# Patient Record
Sex: MALE | Race: WHITE | Employment: OTHER | URBAN - NONMETROPOLITAN AREA
[De-identification: names, ages, dates, MRNs, and addresses within clinical notes are randomized per-mention and may not be internally consistent; named-entity substitution may affect disease eponyms.]

---

## 2017-09-22 ENCOUNTER — HOSPITAL ENCOUNTER (OUTPATIENT)
Dept: PHYSICAL THERAPY | Age: 42
Setting detail: THERAPIES SERIES
Discharge: HOME | End: 2017-09-22
Payer: MEDICARE

## 2017-09-22 PROCEDURE — G8978 MOBILITY CURRENT STATUS: HCPCS | Performed by: PHYSICAL THERAPIST

## 2017-09-22 PROCEDURE — 97110 THERAPEUTIC EXERCISES: CPT | Performed by: PHYSICAL THERAPIST

## 2017-09-22 PROCEDURE — 97163 PT EVAL HIGH COMPLEX 45 MIN: CPT | Performed by: PHYSICAL THERAPIST

## 2017-09-22 PROCEDURE — G8979 MOBILITY GOAL STATUS: HCPCS | Performed by: PHYSICAL THERAPIST

## 2017-09-29 ENCOUNTER — HOSPITAL ENCOUNTER (OUTPATIENT)
Dept: PHYSICAL THERAPY | Age: 42
Setting detail: THERAPIES SERIES
Discharge: HOME | End: 2017-09-29
Payer: MEDICARE

## 2017-09-29 PROCEDURE — 97110 THERAPEUTIC EXERCISES: CPT | Performed by: PHYSICAL THERAPIST

## 2017-09-29 PROCEDURE — 97112 NEUROMUSCULAR REEDUCATION: CPT | Performed by: PHYSICAL THERAPIST

## 2017-10-03 ENCOUNTER — HOSPITAL ENCOUNTER (OUTPATIENT)
Dept: PHYSICAL THERAPY | Age: 42
Setting detail: THERAPIES SERIES
Discharge: HOME | End: 2017-10-03
Payer: MEDICARE

## 2017-10-03 PROCEDURE — 97110 THERAPEUTIC EXERCISES: CPT | Performed by: PHYSICAL THERAPIST

## 2017-10-03 PROCEDURE — 97112 NEUROMUSCULAR REEDUCATION: CPT | Performed by: PHYSICAL THERAPIST

## 2017-10-03 NOTE — FLOWSHEET NOTE
Physical Therapy Daily Treatment Note    Date:  10/3/2017    Patient Name:  Aimee Severino    :  1975  MRN: [id number]  Restrictions/Precautions:     Medical/Treatment Diagnosis Information:   · Diagnosis: Multiple Sclerosis  · Treatment Diagnosis: Multiple Sclerosis, decreased strength, decreased balance  Insurance/Certification information:  PT Insurance Information: Medicare  Physician Information:  Referring Practitioner: Brad Espinoza  Plan of care signed (Y/N):  N  Visit# / total visits:  3/10  Pain level: 0/10     G-Code (if applicable):      Date G-Code Applied:  17  PT G-Codes  Functional Assessment Tool Used: TUG; Tinetti  Score: TU seconds; Tinetti:  = 46% or 54% disabled  Functional Limitation: Mobility: Walking and moving around  Mobility: Walking and Moving Around Current Status (): At least 40 percent but less than 60 percent impaired, limited or restricted  Mobility: Walking and Moving Around Goal Status (): At least 1 percent but less than 20 percent impaired, limited or restricted    Time In: 2:09   Time Out: 3:03    Progress Note: []  Yes  [x]  No  Next due by: Visit #10   Or by 10/22/17    Subjective:   \"I was a little worn out after the last PT session. But it only took about 15 minutes to recover. I still have no pain, just weakness. \"    Objective:   Observation: Required multiple HHA touch downs with balance ex this date. Test measurements:      Exercises: exercises per flowsheet to facilitate increased functional strength, stability, balance, proprioception, and improved gait and safety. Verbal, visual, and tactile cues given to improve technique and form. Exercise/Equipment Resistance/Repetitions Other comments   Nu-Step Held due to patient stating he has tried this in the past with not good results due to knees collapsing inward.          Counter Ex 15x each bilat HR/TR, 3 Way Hip, HS Curls   Mini Squats 15x Ball between legs    FTEO/FTEC 3 x 30\" Tandem Stance 2 x 30\"    Offset Feet Balance 2 x 30\"    Sidesteps 3 laps    Retro Walk 3 laps              LAQ c eccentric lower 15x bilat    Seated march with eccentric lower 15x bilat    Quad Sets 3\" x 10 each    SLR c eccentric lower 10x bilat    Hip ABD/ADD RED t-band/ball 15x     HS Curls c manual resist 15x    Prone HS Curls 15x Manual assist with R leg   Bridges 15x              [x] Provided verbal/tactile cueing for activities related to strengthening, flexibility, endurance, ROM. (53255)  [x] Provided verbal/tactile cueing for activities related to improving balance, coordination, kinesthetic sense, posture, motor skill, proprioception. (74381)    Therapeutic Activities:     [] Therapeutic activities, direct (one-on-one) patient contact (use of dynamic activities to improve functional performance). (73734)    Gait:   [] Provided training and instruction to the patient for ambulation re-education. (65772)    Self-Care/ADL's  [] Self-care/home management training and compensatory training, meal preparation, safety procedures, and instructions in use of assistive technology devices/adaptive equipment, direct one-on-one contact. (67514)    Home Exercise Program:     [x] Reviewed/Progressed HEP activities related to strengthening, flexibility, endurance, ROM. (52920)  [] Reviewed/Progressed HEP activities related to improving balance, coordination, kinesthetic sense, posture, motor skill, proprioception.  (40862)    Manual Treatments:    [] Provided manual therapy to mobilize soft tissue/joints for the purpose of modulating pain, promoting relaxation,  increasing ROM, reducing/eliminating soft tissue swelling/inflammation/restriction, improving soft tissue extensibility.  (30928)    Service Based Modalities:      Timed Code Treatment Minutes:   44' there-ex, 15' neuro re-ed    Total Treatment Minutes:   47'    Treatment/Activity Tolerance:  [x] Patient tolerated treatment well [x] Patient limited by zulay  []

## 2017-10-06 ENCOUNTER — HOSPITAL ENCOUNTER (OUTPATIENT)
Dept: PHYSICAL THERAPY | Age: 42
Setting detail: THERAPIES SERIES
Discharge: HOME | End: 2017-10-06
Payer: MEDICARE

## 2017-10-06 PROCEDURE — 97110 THERAPEUTIC EXERCISES: CPT | Performed by: PHYSICAL THERAPIST

## 2017-10-06 PROCEDURE — 97112 NEUROMUSCULAR REEDUCATION: CPT | Performed by: PHYSICAL THERAPIST

## 2017-10-06 NOTE — FLOWSHEET NOTE
the past with not good results due to knees collapsing inward. Counter Ex 15x each bilat HR/TR, 3 Way Hip, HS Curls   Mini Squats 15x Ball between legs    FTEO/FTEC 3 x 30\"    Tandem Stance 2 x 30\"    Offset Feet Balance 2 x 30\"    Sidesteps 3 laps    Retro Walk 3 laps              LAQ c eccentric lower 15x bilat    Seated march with eccentric lower 15x bilat    Seated Hip ADD/ABD Ball/GREEN TBand 20x each          Quad Sets 3\" x 10 each    SLR c eccentric lower 10x bilat    Hip ABD/ADD RED t-band/ball 15x     HS Curls c manual resist 15x    Prone HS Curls 15x Manual assist with R leg   Bridges 15x              [x] Provided verbal/tactile cueing for activities related to strengthening, flexibility, endurance, ROM. (87389)  [x] Provided verbal/tactile cueing for activities related to improving balance, coordination, kinesthetic sense, posture, motor skill, proprioception. (05706)    Therapeutic Activities:     [] Therapeutic activities, direct (one-on-one) patient contact (use of dynamic activities to improve functional performance). (79183)    Gait:   [] Provided training and instruction to the patient for ambulation re-education. (53799)    Self-Care/ADL's  [] Self-care/home management training and compensatory training, meal preparation, safety procedures, and instructions in use of assistive technology devices/adaptive equipment, direct one-on-one contact. (20302)    Home Exercise Program:     [x] Reviewed/Progressed HEP activities related to strengthening, flexibility, endurance, ROM. (28461)  [] Reviewed/Progressed HEP activities related to improving balance, coordination, kinesthetic sense, posture, motor skill, proprioception.  (90844)    Manual Treatments:    [] Provided manual therapy to mobilize soft tissue/joints for the purpose of modulating pain, promoting relaxation,  increasing ROM, reducing/eliminating soft tissue swelling/inflammation/restriction, improving soft tissue extensibility. (46167)    Service Based Modalities:      Timed Code Treatment Minutes:   55' there-ex, 15' neuro re-ed    Total Treatment Minutes:   64'    Treatment/Activity Tolerance:  [x] Patient tolerated treatment well [x] Patient limited by fatique  [] Patient limited by pain  [] Patient limited by other medical complications  [] Other:     Prognosis: [] Good [x] Fair  [] Poor    Patient Requires Follow-up: [x] Yes  [] No      Goals:     Long term goals  Time Frame for Long term goals : 4 weeks  Long term goal 1: Indep with HEP   Long term goal 2: Increase bilat LE strength to [facility] for improved ease with ADL and home/community management  Long term goal 3: Pt to perform balance activities without UE support for 45 seconds or more for improved balance and stability and iincreased ease with home/community ADL  Long term goal 4: Pt to be able to ambulate short home distances without RW to improve ease with home management and ADL  Long term goal 5: TUG/TInetti scores within age-accepted norms (13 sec TUG, >23/28 Tinetti) for improved safety and ease with home and community management    Plan:   [x] Continue per plan of care [] Alter current plan (see comments)  [] Plan of care initiated [] Hold pending MD visit [] Discharge    Plan for Next Session:  Progress as tolerated.      Electronically signed by:  Melba Espinoza DPT

## 2017-10-13 ENCOUNTER — HOSPITAL ENCOUNTER (OUTPATIENT)
Dept: PHYSICAL THERAPY | Age: 42
Setting detail: THERAPIES SERIES
Discharge: HOME | End: 2017-10-13
Payer: MEDICARE

## 2017-10-13 PROCEDURE — 97110 THERAPEUTIC EXERCISES: CPT | Performed by: PHYSICAL THERAPIST

## 2017-10-13 PROCEDURE — 97112 NEUROMUSCULAR REEDUCATION: CPT | Performed by: PHYSICAL THERAPIST

## 2017-10-18 ENCOUNTER — HOSPITAL ENCOUNTER (OUTPATIENT)
Dept: PHYSICAL THERAPY | Age: 42
Setting detail: THERAPIES SERIES
Discharge: HOME | End: 2017-10-18
Payer: MEDICARE

## 2017-10-18 PROCEDURE — 97110 THERAPEUTIC EXERCISES: CPT | Performed by: PHYSICAL THERAPIST

## 2017-10-18 PROCEDURE — 97112 NEUROMUSCULAR REEDUCATION: CPT | Performed by: PHYSICAL THERAPIST

## 2017-10-18 NOTE — FLOWSHEET NOTE
visual, and tactile cues given to improve technique and form. Exercise/Equipment Resistance/Repetitions Other comments   Nu-Step Held due to patient stating he has tried this in the past with not good results due to knees collapsing inward. Counter Ex 15x each bilat HR/TR, 3 Way Hip, HS Curls   Mini Squats 15x Ball between legs    FTEO/FTEC 3 x 30\"    Tandem Stance 2 x 30\"    Offset Feet Balance 2 x 30\"    Sidesteps 3 laps    Retro Walk 3 laps    Lateral Step Ups 4\" x 5 each direction Without KAFO for improved ease with functional knee flexion   Marches 10x Without KAFO   HS Curls Without KAFO 10x bilat              LAQ c eccentric lower 15x bilat    Seated march with eccentric lower 15x bilat    Seated Hip ADD/ABD Ball/GREEN TBand 20x each          Quad Sets 3\" x 10 each    SLR c eccentric lower 15x bilat    Hip ABD/ADD RED t-band/ball 15x     HS Curls c manual resist 15x    Prone HS Curls 15x Manual assist with R leg   Bridges 15x              Bicep Curls 4# 15x each    Tricep Ext overhead 4# 10x each    Shoulder Press 4# 10x     Wand ABC 1x each arm    [x] Provided verbal/tactile cueing for activities related to strengthening, flexibility, endurance, ROM. (77036)  [x] Provided verbal/tactile cueing for activities related to improving balance, coordination, kinesthetic sense, posture, motor skill, proprioception. (64615)    Therapeutic Activities:     [] Therapeutic activities, direct (one-on-one) patient contact (use of dynamic activities to improve functional performance). (51407)    Gait:   [] Provided training and instruction to the patient for ambulation re-education. (85441)    Self-Care/ADL's  [] Self-care/home management training and compensatory training, meal preparation, safety procedures, and instructions in use of assistive technology devices/adaptive equipment, direct one-on-one contact.  (93191)    Home Exercise Program:     [x] Reviewed/Progressed HEP activities related to strengthening,

## 2017-10-20 ENCOUNTER — HOSPITAL ENCOUNTER (OUTPATIENT)
Dept: PHYSICAL THERAPY | Age: 42
Setting detail: THERAPIES SERIES
Discharge: HOME | End: 2017-10-20
Payer: MEDICARE

## 2017-10-25 ENCOUNTER — HOSPITAL ENCOUNTER (OUTPATIENT)
Dept: PHYSICAL THERAPY | Age: 42
Setting detail: THERAPIES SERIES
Discharge: HOME | End: 2017-10-25
Payer: MEDICARE

## 2017-10-25 PROCEDURE — 97110 THERAPEUTIC EXERCISES: CPT | Performed by: PHYSICAL THERAPY ASSISTANT

## 2017-10-25 PROCEDURE — 97112 NEUROMUSCULAR REEDUCATION: CPT | Performed by: PHYSICAL THERAPY ASSISTANT

## 2017-10-27 ENCOUNTER — HOSPITAL ENCOUNTER (OUTPATIENT)
Dept: PHYSICAL THERAPY | Age: 42
Setting detail: THERAPIES SERIES
Discharge: HOME | End: 2017-10-27
Payer: MEDICARE

## 2017-10-27 PROCEDURE — 97110 THERAPEUTIC EXERCISES: CPT | Performed by: PHYSICAL THERAPIST

## 2017-10-27 PROCEDURE — 97112 NEUROMUSCULAR REEDUCATION: CPT | Performed by: PHYSICAL THERAPIST

## 2017-10-27 NOTE — FLOWSHEET NOTE
Physical Therapy Daily Treatment Note    Date:  10/27/2017    Patient Name:  Sheyla Alcocer    :  1975  MRN: [id number]     Restrictions/Precautions:       Medical/Treatment Diagnosis Information:   · Diagnosis: Multiple Sclerosis  · Treatment Diagnosis: Multiple Sclerosis, decreased strength, decreased balance    Insurance/Certification information:  PT Insurance Information: Medicare    Physician Information:  Referring Practitioner: Chas Jaeger    Plan of care signed (Y/N):  N    Visit# / total visits:  7/10    Pain level: 0/10     G-Code (if applicable):      Date G-Code Applied:  17  PT G-Codes  Functional Assessment Tool Used: TUG; Tinetti  Score: TU seconds; Tinetti:  = 46% or 54% disabled  Functional Limitation: Mobility: Walking and moving around  Mobility: Walking and Moving Around Current Status (): At least 40 percent but less than 60 percent impaired, limited or restricted  Mobility: Walking and Moving Around Goal Status (): At least 1 percent but less than 20 percent impaired, limited or restricted    Time In:  3:02   Time Out:  3:57    Progress Note: []  Yes  [x]  No  Next due by: Visit #10   Or by 10/22/17    Subjective:   \"My legs have felt about the same. The balance is still not great, but it is improving for sure. \"      Objective:   Observation: Required multiple HHA touch downs with balance ex this date. Performed a couple ex without KAFO to increase ease with functional knee flexion due to decreased weight of KAFO, though had decreased stability in R leg when right leg was supporting in SLS. Test measurements:    FTEC on stable surface: 30 seconds without HHA required and only SBA  from therapist    Exercises: ALEX performed per flow chart to facilitate strength, motion and stability to allow ease with daily activities and ambulation. Verbal cuing for progression, technique and safety. Limited strength and mobility and whitnie.    Exercise/Equipment to improving balance, coordination, kinesthetic sense, posture, motor skill, proprioception.  (78922)    Manual Treatments:    [] Provided manual therapy to mobilize soft tissue/joints for the purpose of modulating pain, promoting relaxation,  increasing ROM, reducing/eliminating soft tissue swelling/inflammation/restriction, improving soft tissue extensibility. (97963)    Service Based Modalities:      Timed Code Treatment Minutes:   27' there-ex, 25' neuro re-ed    Total Treatment Minutes:   54'    Treatment/Activity Tolerance:  [x] Patient tolerated treatment well [x] Patient limited by fatique  [] Patient limited by pain  [] Patient limited by other medical complications  [] Other:     Prognosis: [] Good [x] Fair  [] Poor    Patient Requires Follow-up: [x] Yes  [] No      Goals:     Long term goals  Time Frame for Long term goals : 4 weeks  Long term goal 1: Indep with HEP   Long term goal 2: Increase bilat LE strength to [facility] for improved ease with ADL and home/community management  Long term goal 3: Pt to perform balance activities without UE support for 45 seconds or more for improved balance and stability and iincreased ease with home/community ADL  Long term goal 4: Pt to be able to ambulate short home distances without RW to improve ease with home management and ADL  Long term goal 5: TUG/TInetti scores within age-accepted norms (13 sec TUG, >23/28 Tinetti) for improved safety and ease with home and community management    Plan:   [x] Continue per plan of care [] Alter current plan (see comments)  [] Plan of care initiated [] Hold pending MD visit [] Discharge    Plan for Next Session:  Progress as tolerated.      Electronically signed by:  Valentina Payne DPT

## 2017-10-31 ENCOUNTER — HOSPITAL ENCOUNTER (OUTPATIENT)
Dept: PHYSICAL THERAPY | Age: 42
Setting detail: THERAPIES SERIES
Discharge: HOME | End: 2017-10-31
Payer: MEDICARE

## 2017-10-31 PROCEDURE — 97110 THERAPEUTIC EXERCISES: CPT | Performed by: PHYSICAL THERAPY ASSISTANT

## 2017-10-31 PROCEDURE — 97112 NEUROMUSCULAR REEDUCATION: CPT | Performed by: PHYSICAL THERAPY ASSISTANT

## 2017-11-03 ENCOUNTER — HOSPITAL ENCOUNTER (OUTPATIENT)
Dept: PHYSICAL THERAPY | Age: 42
Setting detail: THERAPIES SERIES
Discharge: HOME | End: 2017-11-03
Payer: MEDICARE

## 2017-11-03 PROCEDURE — 97112 NEUROMUSCULAR REEDUCATION: CPT | Performed by: PHYSICAL THERAPIST

## 2017-11-03 PROCEDURE — G8978 MOBILITY CURRENT STATUS: HCPCS | Performed by: PHYSICAL THERAPIST

## 2017-11-03 PROCEDURE — 97110 THERAPEUTIC EXERCISES: CPT | Performed by: PHYSICAL THERAPIST

## 2017-11-03 PROCEDURE — G8979 MOBILITY GOAL STATUS: HCPCS | Performed by: PHYSICAL THERAPIST

## 2017-11-03 NOTE — PROGRESS NOTES
Physical Therapy  [facility]  Rehabilitation and Sports Medicine    [x] [place]  Phone: [phone]  Fax: [phone]      [] [place]  Phone: [phone]  Fax: [phone]    Physical Therapy Progress Note  Date: 11/3/2017        Patient Name:  Marissa Zaman    :  1975  MRN: [id number]  Restrictions/Precautions:      Medical/Treatment Diagnosis Information:  ·   Diagnosis: Multiple Sclerosis  · Treatment Diagnosis: Multiple Sclerosis, decreased strength, decreased balance  Insurance/Certification information:   Medicare  Physician Information:   Jany Jeffery  Plan of care signed (Y/N): N   Visit# / total visits:  9/10  Pain level: 0/10     G-Code (if applicable):      Date G-Code Applied:  11/3/2017  PT G-Codes  Functional Assessment Tool Used: TUG; Tinetti  Score: TU seconds; Tinetti:  = 61% or 39% disabled  Functional Limitation: Mobility: Walking and moving around  Mobility: Walking and Moving Around Current Status (): At least 20 percent but less than 40 percent impaired, limited or restricted  Mobility: Walking and Moving Around Goal Status (): At least 1 percent but less than 20 percent impaired, limited or restricted     Time Period for Report:  17 - 11/3/17  Cancels/No-shows to date:  3    Plan of Care/Treatment to date:  [x] Therapeutic Exercise    [] Modalities:  [x] Therapeutic Activity     [] Ultrasound  [] Electrical Stimulation  [x] Gait Training      [] Cervical Traction    [] Lumbar Traction  [x] Neuromuscular Re-education  [] Cold/hotpack [] Iontophoresis  [x] Instruction in HEP      Other:  [] Manual Therapy       []    [] Aquatic Therapy       []                    ? Subjective:    \"I am noticing that I am getting stronger, but the progress is still slow. I know that it is part of the disease process that progress won't come overnight. \"     Objective:  ·   Observation: Required multiple HHA touch downs with balance ex this re-certify for additional visits:      Requested frequency/duration:  2X/week for 4 weeks    Electronically signed by:  Lida Amezquita PT, DPT    If you have any questions or concerns, please don't hesitate to call.   Thank you for your referral.    Physician Signature:________________________________Date:__________________  By signing above, therapists plan is approved by physician

## 2017-11-03 NOTE — FLOWSHEET NOTE
Physical Therapy Daily Treatment Note    Date:  11/3/2017    Patient Name:  Estelita Calvo    :  1975  MRN: [id number]     Restrictions/Precautions:       Medical/Treatment Diagnosis Information:   · Diagnosis: Multiple Sclerosis  · Treatment Diagnosis: Multiple Sclerosis, decreased strength, decreased balance    Insurance/Certification information:  PT Insurance Information: Medicare    Physician Information:  Referring Practitioner: Marguerite Garcia    Plan of care signed (Y/N):  N    Visit# / total visits:  9/10    Pain level: 0/10     G-Code (if applicable):      Date G-Code Applied:  11/3/17  PT G-Codes  Functional Assessment Tool Used: TUG; Tinetti  Score: TU seconds (chair with no arms); Tinetti:  = 61% or 39% disabled  Functional Limitation: Mobility: Walking and moving around  Mobility: Walking and Moving Around Current Status (): At least 20 percent but less than 40 percent impaired, limited or restricted  Mobility: Walking and Moving Around Goal Status (): At least 1 percent but less than 20 percent impaired, limited or restricted    Time In:  3:03   Time Out: 3:57      Progress Note: [x]  Yes, Sent 11/3/17 []  No  Next due by: Visit #10   Or by 12/3/17    Subjective:   \"I am noticing that I am getting stronger, but the progress is still slow. I know that it is part of the disease process that progress won't come overnight. \"    Objective:   Observation: Required multiple HHA touch downs with balance ex this date. Performed a couple ex without KAFO to increase ease with functional knee flexion due to decreased weight of KAFO, though had decreased stability in R leg when right leg was supporting in SLS.   Test measurements:    FTEO: 30 seconds without assist from therapist or HHA  Feet offset eyes open: required no HHA or therapist assist for full 30\"  Rhomberg/Tandem: required 3-4 touch downs HHA to remain balanced for 30\"      Exercises: ALEX performed per flow chart to facilitate strength, motion and stability to allow ease with daily activities and ambulation. Verbal cuing for progression, technique and safety. Limited strength and mobility and stability. Initiated foam with balance exercises. Difficulty noted. Exercise/Equipment Resistance/Repetitions Other comments   Nu-Step Held due to patient stating he has tried this in the past with not good results due to knees collapsing inward. Counter Ex 20x each bilat HR/TR, 3 Way Hip, HS Curls   Mini Squats 15x Ball between legs No UE assist   FTEO/FTEC 3 x 30\" FOAM   Tandem Stance 3 x 30\"    Offset Feet Balance 3 x 30\"    Sidesteps 3 laps    Retro Walk 1 lap around gym    Lateral Step Ups 4\" x 5 each direction Without KAFO for improved ease with functional knee flexion   Marches 15x Without KAFO   HS Curls Without KAFO 15x bilat              LAQ c eccentric lower 15x bilat    Seated march with eccentric lower 15x bilat    Seated Hip ADD/ABD RED 25x each         Quad Sets 3\" x 10 each    SLR c eccentric lower 15x bilat    Hip ABD/ADD RED t-band/ball 20x  Advanced   HS Curls c manual resist 15x    Prone HS Curls 15x Manual assist with R leg   Bridges 15x              Bicep Curls 4# 15x each    Tricep Ext overhead 4# 10x each    Shoulder Press 4# 15x     Wand ABC 1x each arm    [x] Provided verbal/tactile cueing for activities related to strengthening, flexibility, endurance, ROM. (24600)  [x] Provided verbal/tactile cueing for activities related to improving balance, coordination, kinesthetic sense, posture, motor skill, proprioception. (32326)    Therapeutic Activities:     [] Therapeutic activities, direct (one-on-one) patient contact (use of dynamic activities to improve functional performance). (60100)    Gait:   [] Provided training and instruction to the patient for ambulation re-education.  (11511)    Self-Care/ADL's  [] Self-care/home management training and compensatory training, meal preparation, safety procedures, and instructions in use of assistive technology devices/adaptive equipment, direct one-on-one contact. (83859)    Home Exercise Program:     [x] Reviewed/Progressed HEP activities related to strengthening, flexibility, endurance, ROM. (73189)  [] Reviewed/Progressed HEP activities related to improving balance, coordination, kinesthetic sense, posture, motor skill, proprioception.  (41503)    Manual Treatments:    [] Provided manual therapy to mobilize soft tissue/joints for the purpose of modulating pain, promoting relaxation,  increasing ROM, reducing/eliminating soft tissue swelling/inflammation/restriction, improving soft tissue extensibility. (58040)    Service Based Modalities:      Timed Code Treatment Minutes:  24 ' there-ex, 30' neuro re-ed    Total Treatment Minutes:   47'    Treatment/Activity Tolerance:  [x] Patient tolerated treatment well [x] Patient limited by fatique  [] Patient limited by pain  [] Patient limited by other medical complications  [] Other:     Prognosis: [] Good [x] Fair  [] Poor    Patient Requires Follow-up: [x] Yes  [] No      Goals:     Long term goals  Time Frame for Long term goals : 4 weeks  Long term goal 1: Indep with HEP   Long term goal 2: Increase bilat LE strength to [facility] for improved ease with ADL and home/community management  Long term goal 3: Pt to perform balance activities without UE support for 45 seconds or more for improved balance and stability and iincreased ease with home/community ADL  Long term goal 4: Pt to be able to ambulate short home distances without RW to improve ease with home management and ADL  Long term goal 5: TUG/TInetti scores within age-accepted norms (13 sec TUG, >23/28 Tinetti) for improved safety and ease with home and community management    Plan:   [x] Continue per plan of care [] Alter current plan (see comments)  [] Plan of care initiated [] Hold pending MD visit [] Discharge    Plan for Next Session:  Progress as tolerated.

## 2017-11-08 ENCOUNTER — HOSPITAL ENCOUNTER (OUTPATIENT)
Dept: PHYSICAL THERAPY | Age: 42
Setting detail: THERAPIES SERIES
Discharge: HOME | End: 2017-11-08
Payer: MEDICARE

## 2017-11-08 PROCEDURE — 97112 NEUROMUSCULAR REEDUCATION: CPT | Performed by: PHYSICAL THERAPY ASSISTANT

## 2017-11-08 PROCEDURE — 97110 THERAPEUTIC EXERCISES: CPT | Performed by: PHYSICAL THERAPY ASSISTANT

## 2017-11-08 NOTE — FLOWSHEET NOTE
Physical Therapy Daily Treatment Note    Date:  2017    Patient Name:  Marissa Zaman    :  1975  MRN: [id number]     Restrictions/Precautions:       Medical/Treatment Diagnosis Information:   · Diagnosis: Multiple Sclerosis  · Treatment Diagnosis: Multiple Sclerosis, decreased strength, decreased balance    Insurance/Certification information:  PT Insurance Information: Medicare    Physician Information:  Referring Practitioner: Jany Jeffery    Plan of care signed (Y/N):  N    Visit# / total visits:  10/10    Pain level: 0/10     G-Code (if applicable):      Date G-Code Applied:  11/3/17  PT G-Codes  Functional Assessment Tool Used: TUG; Tinetti  Score: TU seconds (chair with no arms); Tinetti:  = 61% or 39% disabled  Functional Limitation: Mobility: Walking and moving around  Mobility: Walking and Moving Around Current Status (): At least 20 percent but less than 40 percent impaired, limited or restricted  Mobility: Walking and Moving Around Goal Status (): At least 1 percent but less than 20 percent impaired, limited or restricted    Time In:  4:06  Time Out: 5:06    Progress Note: []  Yes, Sent 11/3/17 [x]  No  Next due by: Visit #10   Or by 12/3/17    Subjective:   Pt. Relates discomfort through right pec region. Pain with palpation. No additional c/o noted at this time. Objective:   Observation: Required multiple HHA touch downs with balance ex this date. Performed a couple ex without KAFO to increase ease with functional knee flexion due to decreased weight of KAFO, though had decreased stability in R leg when right leg was supporting in SLS.   Test measurements:    FTEO: 30 seconds without assist from therapist or HHA  Feet offset eyes open: required no HHA or therapist assist for full 30\"  Rhomberg/Tandem: required 3-4 touch downs HHA to remain balanced for 30\"      Exercises: ALEX performed per flow chart to facilitate strength, motion and stability to allow ease with daily activities and ambulation. Verbal cuing for progression, technique and safety. Limited strength and mobility and stability. Held several exercises this date. Initiated [place] e-stim re-ed to facilitate anterior tib strength. Exercise/Equipment Resistance/Repetitions Other comments   Nu-Step Held due to patient stating he has tried this in the past with not good results due to knees collapsing inward. Counter Ex 20x each bilat HR/TR, 3 Way Hip, HS Curls   Mini Squats 15x Ball between legs No UE assist   FTEO/FTEC 3 x 30\" FOAM   Tandem Stance 3 x 30\"    Offset Feet Balance 3 x 30\"    Sidesteps 3 laps    Retro Walk 1 lap around gym    Lateral Step Ups 4\" x 5 each direction Without KAFO for improved ease with functional knee flexion   Marches 15x Without KAFO   HS Curls Without KAFO 15x bilat              LAQ c eccentric lower 15x bilat    Seated march with eccentric lower 15x bilat    Seated Hip ADD/ABD RED 25x each         Quad Sets 3\" x 10 each    SLR c eccentric lower 15x bilat    Hip ABD/ADD RED t-band/ball 20x  Advanced   HS Curls c manual resist     Prone HS Curls  Manual assist with R leg   Bridges               Bicep Curls     Tricep Ext overhead     Shoulder Press     Wand ABC     [x] Provided verbal/tactile cueing for activities related to strengthening, flexibility, endurance, ROM. (19575)  [x] Provided verbal/tactile cueing for activities related to improving balance, coordination, kinesthetic sense, posture, motor skill, proprioception. (22563)    Therapeutic Activities:     [] Therapeutic activities, direct (one-on-one) patient contact (use of dynamic activities to improve functional performance). (90150)    Gait:   [] Provided training and instruction to the patient for ambulation re-education.  (99772)    Self-Care/ADL's  [] Self-care/home management training and compensatory training, meal preparation, safety procedures, and instructions in use of assistive technology devices/adaptive equipment, direct one-on-one contact. (63754)    Home Exercise Program:     [x] Reviewed/Progressed HEP activities related to strengthening, flexibility, endurance, ROM. (75821)  [] Reviewed/Progressed HEP activities related to improving balance, coordination, kinesthetic sense, posture, motor skill, proprioception.  (36469)    Manual Treatments:    [] Provided manual therapy to mobilize soft tissue/joints for the purpose of modulating pain, promoting relaxation,  increasing ROM, reducing/eliminating soft tissue swelling/inflammation/restriction, improving soft tissue extensibility. (58075)    Service Based Modalities:      Timed Code Treatment Minutes: 30' there-ex, 30' neuro re-ed    Total Treatment Minutes:   61'    Treatment/Activity Tolerance:  [x] Patient tolerated treatment well [x] Patient limited by fatique  [] Patient limited by pain  [] Patient limited by other medical complications  [] Other:     Prognosis: [] Good [x] Fair  [] Poor    Patient Requires Follow-up: [x] Yes  [] No      Goals:     Long term goals  Time Frame for Long term goals : 4 weeks  Long term goal 1: Indep with HEP   Long term goal 2: Increase bilat LE strength to [facility] for improved ease with ADL and home/community management  Long term goal 3: Pt to perform balance activities without UE support for 45 seconds or more for improved balance and stability and iincreased ease with home/community ADL  Long term goal 4: Pt to be able to ambulate short home distances without RW to improve ease with home management and ADL  Long term goal 5: TUG/TInetti scores within age-accepted norms (13 sec TUG, >23/28 Tinetti) for improved safety and ease with home and community management    Plan:   [x] Continue per plan of care [] Alter current plan (see comments)  [] Plan of care initiated [] Hold pending MD visit [] Discharge    Plan for Next Session:  Progress as tolerated. Monitor tolerance to neuro re-ed. Progress as able.      Electronically signed by:  Chris Ye

## 2017-11-14 ENCOUNTER — HOSPITAL ENCOUNTER (OUTPATIENT)
Dept: PHYSICAL THERAPY | Age: 42
Setting detail: THERAPIES SERIES
Discharge: HOME | End: 2017-11-14
Payer: MEDICARE

## 2017-11-14 ENCOUNTER — OFFICE VISIT (OUTPATIENT)
Dept: PRIMARY CARE CLINIC | Age: 42
End: 2017-11-14
Payer: MEDICARE

## 2017-11-14 VITALS
WEIGHT: 139 LBS | OXYGEN SATURATION: 97 % | HEIGHT: 74 IN | BODY MASS INDEX: 17.84 KG/M2 | RESPIRATION RATE: 16 BRPM | HEART RATE: 86 BPM | SYSTOLIC BLOOD PRESSURE: 100 MMHG | DIASTOLIC BLOOD PRESSURE: 50 MMHG | TEMPERATURE: 96.9 F

## 2017-11-14 DIAGNOSIS — H10.33 ACUTE BACTERIAL CONJUNCTIVITIS OF BOTH EYES: Primary | ICD-10-CM

## 2017-11-14 PROCEDURE — G8484 FLU IMMUNIZE NO ADMIN: HCPCS | Performed by: NURSE PRACTITIONER

## 2017-11-14 PROCEDURE — G8427 DOCREV CUR MEDS BY ELIG CLIN: HCPCS | Performed by: NURSE PRACTITIONER

## 2017-11-14 PROCEDURE — 99213 OFFICE O/P EST LOW 20 MIN: CPT | Performed by: NURSE PRACTITIONER

## 2017-11-14 PROCEDURE — G8419 CALC BMI OUT NRM PARAM NOF/U: HCPCS | Performed by: NURSE PRACTITIONER

## 2017-11-14 PROCEDURE — 1036F TOBACCO NON-USER: CPT | Performed by: NURSE PRACTITIONER

## 2017-11-14 RX ORDER — SULFACETAMIDE SODIUM 100 MG/ML
2 SOLUTION/ DROPS OPHTHALMIC 4 TIMES DAILY
Qty: 1 BOTTLE | Refills: 0 | Status: SHIPPED | OUTPATIENT
Start: 2017-11-14 | End: 2017-11-24

## 2017-11-14 RX ORDER — GABAPENTIN 300 MG/1
600 CAPSULE ORAL NIGHTLY
COMMUNITY

## 2017-11-14 ASSESSMENT — ENCOUNTER SYMPTOMS
EYE REDNESS: 1
BLURRED VISION: 0
EYE DISCHARGE: 1
SHORTNESS OF BREATH: 0
WHEEZING: 0
EYE ITCHING: 1
COUGH: 0
FOREIGN BODY SENSATION: 1

## 2017-11-14 NOTE — PROGRESS NOTES
Physical Therapy  Outpatient Physical Therapy    [x] [place]  Phone: [phone]  Fax: [phone]      [] [place]  Phone: [phone]  Fax: [phone]    Physical Therapy  Cancellation/No-show Note  Patient Name:  Prudencio Hernandez  :  1975   Date:  2017  Cancelled visits to date: 2  No-shows to date: 2    For today's appointment patient:  [x]  Cancelled  []  Rescheduled appointment  []  No-show     Reason given by patient:  [x]  Patient ill  []  Conflicting appointment  []  No transportation    []  Conflict with work  []  No reason given  []  Other:     Comments:  Patient called and was just seen by Urgent Care and has pinkeye, so will not be coming to therapy today    Electronically signed by: Elizabeth Valentino, PT, DPT

## 2017-11-14 NOTE — PROGRESS NOTES
Subjective:      Patient ID: Alen Delong is a 36 y.o. male. Eye Problem    Both eyes are affected. This is a new problem. The current episode started yesterday. The problem occurs constantly. The problem has been unchanged. The injury mechanism is unknown. The patient is experiencing no pain. There is no known exposure to pink eye. He does not wear contacts. Associated symptoms include an eye discharge, eye redness, a foreign body sensation and itching. Pertinent negatives include no blurred vision or fever. He has tried nothing for the symptoms. The treatment provided no relief. Review of Systems   Constitutional: Negative for activity change, appetite change and fever. Eyes: Positive for discharge, redness and itching. Negative for blurred vision and visual disturbance. Respiratory: Negative for cough, shortness of breath and wheezing. Cardiovascular: Negative for chest pain and palpitations. Objective:   Physical Exam   Constitutional: He appears well-developed and well-nourished. No distress. HENT:   Head: Normocephalic and atraumatic. Eyes: EOM are normal. Pupils are equal, round, and reactive to light. Right eye exhibits discharge. Left eye exhibits discharge. Right conjunctiva is injected. Left conjunctiva is injected. Neck: Neck supple. Cardiovascular: Normal rate and regular rhythm. Pulmonary/Chest: Effort normal and breath sounds normal. No respiratory distress. He has no wheezes. He has no rales. Neurological: He is alert. Nursing note and vitals reviewed. Assessment:      1.  Acute bacterial conjunctivitis of both eyes             Plan:      beph-10 2 drops into both eyes 4 times daily for 10 days  Warm compresses to both eyes  Return if symptoms do not improve or worsen   Return PRN

## 2017-11-17 ENCOUNTER — APPOINTMENT (OUTPATIENT)
Dept: PHYSICAL THERAPY | Age: 42
End: 2017-11-17
Payer: MEDICARE

## 2017-11-21 ENCOUNTER — HOSPITAL ENCOUNTER (OUTPATIENT)
Dept: PHYSICAL THERAPY | Age: 42
Setting detail: THERAPIES SERIES
Discharge: HOME | End: 2017-11-21
Payer: MEDICARE

## 2017-11-21 PROCEDURE — 97110 THERAPEUTIC EXERCISES: CPT | Performed by: PHYSICAL THERAPY ASSISTANT

## 2017-11-21 PROCEDURE — 97112 NEUROMUSCULAR REEDUCATION: CPT | Performed by: PHYSICAL THERAPY ASSISTANT

## 2017-11-22 ENCOUNTER — HOSPITAL ENCOUNTER (OUTPATIENT)
Dept: PHYSICAL THERAPY | Age: 42
Setting detail: THERAPIES SERIES
Discharge: HOME | End: 2017-11-22
Payer: MEDICARE

## 2017-11-22 PROCEDURE — G8978 MOBILITY CURRENT STATUS: HCPCS | Performed by: PHYSICAL THERAPIST

## 2017-11-22 PROCEDURE — 97112 NEUROMUSCULAR REEDUCATION: CPT | Performed by: PHYSICAL THERAPY ASSISTANT

## 2017-11-22 PROCEDURE — G8979 MOBILITY GOAL STATUS: HCPCS | Performed by: PHYSICAL THERAPIST

## 2017-11-22 PROCEDURE — 97110 THERAPEUTIC EXERCISES: CPT | Performed by: PHYSICAL THERAPY ASSISTANT

## 2017-11-22 NOTE — PROGRESS NOTES
Physical Therapy  [facility]  Rehabilitation and Sports Medicine    [x] [place]  Phone: [phone]  Fax: [phone]      [] [place]  Phone: [phone]  Fax: [phone]    Physical Therapy Progress Note  Date: 2017        Patient Name:  Mally Dobbins    :  1975  MRN: [id number]  Restrictions/Precautions:      Medical/Treatment Diagnosis Information:  ·   Diagnosis: Multiple Sclerosis  · Treatment Diagnosis: Multiple Sclerosis, decreased strength, decreased balance   Insurance/Certification information:   Medicare  Physician Information:   Clement Crenshaw  Plan of care signed (Y/N): N   Visit# / total visits:  11  Pain level: 0/10     G-Code (if applicable):      Date G-Code Applied:  2017    PT G-Codes  Functional Assessment Tool Used: TUG; Tinetti  Score: TU seconds (chair with no arms); Tinetti:  = 61% or 39% disabled  Functional Limitation: Mobility: Walking and moving around  Mobility: Walking and Moving Around Current Status (): At least 20 percent but less than 40 percent impaired, limited or restricted  Mobility: Walking and Moving Around Goal Status (): At least 1 percent but less than 20 percent impaired, limited or restricted     Time Period for Report:  17 - 17  Cancels/No-shows to date:  4    Plan of Care/Treatment to date:  [x] Therapeutic Exercise    [] Modalities:  [x] Therapeutic Activity     [] Ultrasound  [] Electrical Stimulation  [x] Gait Training      [] Cervical Traction    [] Lumbar Traction  [x] Neuromuscular Re-education  [] Cold/hotpack [] Iontophoresis  [x] Instruction in HEP      Other:  [] Manual Therapy       []    [] Aquatic Therapy       []                    ? Subjective:    No issues with pain noted this date. Notes feeling well with neuro re-ed this date.       Objective:  ·     Observation:   · TU'' (IE: 18'')  · Tinetti:  (IE: )  · LE strength hip flexion: 3/5  · Abd:-3-3/5  ·                                     Add: 3-3+/5  ·                         Knee flexion: -3-3/5                          Extension: 4/5        Assessment:  Maria A Resendiz is making progress with stability, balance, and improved safety and ease/speed with ambulation, though he is still limited in all of these areas. Plan:    Continue per POC to increase strength, stability, balance, proprioception for improved ease with ambulation and home/community management    Goals:    Long term goals  Time Frame for Long term goals : 4 weeks  Long term goal 1: Indep with HEP (Compliance and understanding noted)  Long term goal 2: Increase bilat LE strength to [facility] for improved ease with ADL and home/community management (See above)  Long term goal 3: Pt to perform balance activities without UE support for 45 seconds or more for improved balance and stability and iincreased ease with home/community ADL(Requires several rest breaks throughout program)  Long term goal 4: Pt to be able to ambulate short home distances without RW to improve ease with home management and ADL (Ongoing)  Long term goal 5: TUG/TInetti scores within age-accepted norms (13 sec TUG, >23/28 Tinetti) for improved safety and ease with home and community management (See above)    Current Frequency/Duration: 11/22/17 - 12/22/17  # Days per week: [] 1 day # Weeks: [] 1 week [x] 4 weeks      [x] 2 days? [] 2 weeks [] 5 weeks      [] 3 days   [] 3 weeks [] 6 weeks     Rehab Potential: [] Excellent [x] Good [] Fair  [] Poor     Goal Status:  [] Achieved [x] Partially Achieved  [] Not Achieved     Patient Status: [] Continue per initial plan of Care     [] Patient now discharged     [x] Additional visits requested, Please re-certify for additional visits:      Requested frequency/duration:  2X/week for 4 weeks    Electronically signed by:  Bj Valero, PT, DPT    If you have any questions or concerns, please don't hesitate to call.   Thank you for your referral.    Physician Signature:________________________________Date:__________________  By signing above, therapists plan is approved by physician

## 2017-11-27 ENCOUNTER — HOSPITAL ENCOUNTER (OUTPATIENT)
Dept: PHYSICAL THERAPY | Age: 42
Setting detail: THERAPIES SERIES
Discharge: HOME | End: 2017-11-27
Payer: MEDICARE

## 2017-11-27 PROCEDURE — 97110 THERAPEUTIC EXERCISES: CPT | Performed by: PHYSICAL THERAPIST

## 2017-11-27 PROCEDURE — 97112 NEUROMUSCULAR REEDUCATION: CPT | Performed by: PHYSICAL THERAPIST

## 2017-11-27 NOTE — FLOWSHEET NOTE
position    Exercises:   Exercise/Equipment Resistance/Repetitions Other comments   Nu-Step          Counter Ex 20x each bilat HR/TR, 3 Way Hip, HS Curls   Mini Squats 10x Ball between legs No UE assist, FOAM   FTEO/FTEC 3 x 30\" FOAM   Tandem Stance 3 x 30\"    Offset Feet Balance 3 x 30\"    Sidesteps 3 laps    Retro Walk 1 lap around gym    Lateral Step Ups 4\" x 5 each direction Without KAFO for improved ease with functional knee flexion   Marches 15x seated   HS Curls Without KAFO 15x bilat    Amb with quad cane 2 laps Therapist CGA        LAQ c eccentric lower 20x bilat Advanced   Seated march with eccentric lower 20x bilat Advanced   Seated Hip ADD/ABD RED 25x each         Quad Sets 3\" x 10 each    SLR c eccentric lower 15x bilat    Hip ABD/ADD RED t-band/ball 20x  Advanced   HS Curls  RED Tband 10x bilat    Prone HS Curls  Manual assist with R leg   Bridges 15x              Bicep Curls 3#x15 Initiated   Tricep Ext overhead     Shoulder Press 3#x15 Initiated                  Wand ABC     [x] Provided verbal/tactile cueing for activities related to strengthening, flexibility, endurance, ROM. (99133)  [x] Provided verbal/tactile cueing for activities related to improving balance, coordination, kinesthetic sense, posture, motor skill, proprioception. (45024)    Therapeutic Activities:     [] Therapeutic activities, direct (one-on-one) patient contact (use of dynamic activities to improve functional performance). (45458)    Gait:   [] Provided training and instruction to the patient for ambulation re-education. (80759)    Self-Care/ADL's  [] Self-care/home management training and compensatory training, meal preparation, safety procedures, and instructions in use of assistive technology devices/adaptive equipment, direct one-on-one contact.  (83020)    Home Exercise Program:     [x] Reviewed/Progressed HEP activities related to strengthening, flexibility, endurance, ROM. (48402)  [] Reviewed/Progressed HEP activities

## 2017-11-29 ENCOUNTER — HOSPITAL ENCOUNTER (OUTPATIENT)
Dept: PHYSICAL THERAPY | Age: 42
Setting detail: THERAPIES SERIES
Discharge: HOME | End: 2017-11-29
Payer: MEDICARE

## 2017-11-29 PROCEDURE — 97112 NEUROMUSCULAR REEDUCATION: CPT | Performed by: PHYSICAL THERAPY ASSISTANT

## 2017-11-29 PROCEDURE — 97110 THERAPEUTIC EXERCISES: CPT | Performed by: PHYSICAL THERAPY ASSISTANT

## 2017-11-29 NOTE — FLOWSHEET NOTE
foot back position worse than left foot back position    Exercises:   Exercise/Equipment Resistance/Repetitions Other comments   Nu-Step          Counter Ex 20x each bilat HR/TR, 3 Way Hip, HS Curls   Mini Squats 10x Ball between legs No UE assist, FOAM   FTEO/FTEC 3 x 30\" FOAM   Tandem Stance 3 x 30\"    Offset Feet Balance 3 x 30\"    Sidesteps 3 laps    Retro Walk 1 lap around gym    Lateral Step Ups  Without KAFO for improved ease with functional knee flexion   Marches 15x seated   HS Curls Without KAFO 15x bilat    Amb with quad cane 2 laps Therapist CGA        LAQ c eccentric lower 20x bilat Advanced   Seated march with eccentric lower 20x bilat Advanced   Seated Hip ADD/ABD RED 25x each         Quad Sets 3\" x 10 each    SLR c eccentric lower 15x bilat    Hip ABD/ADD RED t-band/ball 20x  Advanced   HS Curls  RED Tband 10x bilat    Prone HS Curls  Manual assist with R leg   Bridges 15x              Bicep Curls 3#x15    Tricep Ext overhead     Shoulder Press 3#x15                   Wand ABC     [x] Provided verbal/tactile cueing for activities related to strengthening, flexibility, endurance, ROM. (86571)  [x] Provided verbal/tactile cueing for activities related to improving balance, coordination, kinesthetic sense, posture, motor skill, proprioception. (17224)    Therapeutic Activities:     [] Therapeutic activities, direct (one-on-one) patient contact (use of dynamic activities to improve functional performance). (48200)    Gait:   [] Provided training and instruction to the patient for ambulation re-education. (55743)    Self-Care/ADL's  [] Self-care/home management training and compensatory training, meal preparation, safety procedures, and instructions in use of assistive technology devices/adaptive equipment, direct one-on-one contact.  (80057)    Home Exercise Program:     [x] Reviewed/Progressed HEP activities related to strengthening, flexibility, endurance, ROM. (37121)  [] Reviewed/Progressed HEP

## 2017-12-04 ENCOUNTER — HOSPITAL ENCOUNTER (OUTPATIENT)
Dept: PHYSICAL THERAPY | Age: 42
Setting detail: THERAPIES SERIES
Discharge: HOME | End: 2017-12-04
Payer: MEDICARE

## 2017-12-04 PROCEDURE — 97110 THERAPEUTIC EXERCISES: CPT

## 2017-12-04 PROCEDURE — 97032 APPL MODALITY 1+ESTIM EA 15: CPT

## 2017-12-04 NOTE — FLOWSHEET NOTE
session. Exercise/Equipment Resistance/Repetitions Other comments   Nu-Step     Elliptical 5'    Counter Ex 20x each bilat HR/TR, 3 Way Hip, HS Curls   Mini Squats 10x Ball between legs No UE assist, FOAM   FTEO/FTEC 3 x 30\" FOAM   Tandem Stance 3 x 30\"    Offset Feet Balance 3 x 30\"    Sidesteps 3 laps    Retro Walk 1 lap around gym    Lateral Step Ups  Without KAFO for improved ease with functional knee flexion   Marches 15x seated   HS Curls Without KAFO 15x bilat    Amb with quad cane 2 laps Therapist CGA        LAQ c eccentric lower 20x bilat Advanced   Seated march with eccentric lower 20x bilat Advanced   Seated Hip ADD/ABD RED 25x each         Quad Sets 3\" x 10 each    SLR c eccentric lower 15x bilat    Hip ABD/ADD RED t-band/ball 20x  Advanced   HS Curls  RED Tband 10x bilat    Prone HS Curls  Manual assist with R leg   Bridges 15x              Bicep Curls 4#x15    Tricep Ext overhead     Shoulder Press 4#x15                   Wand ABC     [x] Provided verbal/tactile cueing for activities related to strengthening, flexibility, endurance, ROM. (63225)  [x] Provided verbal/tactile cueing for activities related to improving balance, coordination, kinesthetic sense, posture, motor skill, proprioception. (88356)    Therapeutic Activities:     [] Therapeutic activities, direct (one-on-one) patient contact (use of dynamic activities to improve functional performance). (09307)    Gait:   [] Provided training and instruction to the patient for ambulation re-education. (40637)    Self-Care/ADL's  [] Self-care/home management training and compensatory training, meal preparation, safety procedures, and instructions in use of assistive technology devices/adaptive equipment, direct one-on-one contact. (85913)    Home Exercise Program:   Continue current HEP.    [x] Reviewed/Progressed HEP activities related to strengthening, flexibility, endurance, ROM. (28297)  [x] Reviewed/Progressed HEP activities related to improving balance, coordination, kinesthetic sense, posture, motor skill, proprioception.  (77936)    Manual Treatments:    [] Provided manual therapy to mobilize soft tissue/joints for the purpose of modulating pain, promoting relaxation,  increasing ROM, reducing/eliminating soft tissue swelling/inflammation/restriction, improving soft tissue extensibility. (29584)    Service Based Modalities:  [place] stim to R anterior tib x 10'    Timed Code Treatment Minutes:  36' there-ex        15' neuro re-ed    Total Treatment Minutes:   72'    Treatment/Activity Tolerance:  [x] Patient tolerated treatment well [x] Patient limited by fatique  [] Patient limited by pain  [] Patient limited by other medical complications  [] Other:     Prognosis: [] Good [x] Fair  [] Poor    Patient Requires Follow-up: [x] Yes  [] No      Goals:     Long term goals  Time Frame for Long term goals : 4 weeks  Long term goal 1: Indep with HEP  Long term goal 2: Increase bilat LE strength to [facility] for improved ease with ADL and home/community management   Long term goal 3: Pt to perform balance activities without UE support for 45 seconds or more for improved balance and stability and iincreased ease with home/community ADL(Requires several rest breaks throughout program)  Long term goal 4: Pt to be able to ambulate short home distances without RW to improve ease with home management and ADL (Ongoing)  Long term goal 5: TUG/TInetti scores within age-accepted norms (13 sec TUG, >23/28 Tinetti) for improved safety and ease with home and community management     Plan:   [x] Continue per plan of care [] Alter current plan (see comments)   [] Plan of care initiated [] Hold pending MD visit [] Discharge    Plan for Next Session:  Progress as tolerated. Monitor tolerance to neuro re-ed. Progress as able.      Electronically signed by:  Breanna Hartman

## 2017-12-06 ENCOUNTER — HOSPITAL ENCOUNTER (OUTPATIENT)
Dept: PHYSICAL THERAPY | Age: 42
Setting detail: THERAPIES SERIES
Discharge: HOME | End: 2017-12-06
Payer: MEDICARE

## 2017-12-06 PROCEDURE — 97112 NEUROMUSCULAR REEDUCATION: CPT | Performed by: PHYSICAL THERAPIST

## 2017-12-06 PROCEDURE — 97110 THERAPEUTIC EXERCISES: CPT | Performed by: PHYSICAL THERAPIST

## 2017-12-06 NOTE — FLOWSHEET NOTE
Physical Therapy Daily Treatment Note    Date:  2017    Patient Name:  Darron Greco    :  1975  MRN: [id number]     Restrictions/Precautions:       Medical/Treatment Diagnosis Information:   · Diagnosis: Multiple Sclerosis  · Treatment Diagnosis: Multiple Sclerosis, decreased strength, decreased balance    Insurance/Certification information:  PT Insurance Information: Medicare    Physician Information:  Referring Practitioner: Kaylah Rodriguez    Plan of care signed (Y/N):  N    Visit# / total visits:   2nd POC (16 total)    Pain level: 0/10     G-Code (if applicable):      Date G-Code Applied:  11/3/17  PT G-Codes  Functional Assessment Tool Used: TUG; Tinetti  Score: TU seconds (chair with no arms); Tinetti:  = 61% or 39% disabled  Functional Limitation: Mobility: Walking and moving around  Mobility: Walking and Moving Around Current Status (): At least 20 percent but less than 40 percent impaired, limited or restricted  Mobility: Walking and Moving Around Goal Status (): At least 1 percent but less than 20 percent impaired, limited or restricted    Time In:  2:59  Time Out: 3:54     Progress Note: []  Yes, Sent 11/3/17 [x]  No  Next due by: Visit #10   Or by 12/3/17    Subjective:   \"I still have a long way to go, but I have been impressed with how much easier it is for me to walk around home when I don't have my walker (furniture walking is improved. )\"    Objective: ALEX performed per flow chart to facilitate strength, motion and stability to allow ease with daily activities and ambulation. Verbal cuing for progression, technique and safety. Limited strength and mobility and stability. [place] e-stim re-ed to facilitate anterior tib strength. Difficulty with advanced gait and balance exercises remains.      Observation:   Required CGA from therapist with SP cane ambulation trials, although required less pressure and HHA with opposite hand on therapist shoulder this date as opposed to initial date of incorporating this activity    Exercises: Pt performed all ALEX per flow chart to increase LE strength and stability. Russian stim post session. Exercise/Equipment Resistance/Repetitions Other comments   Nu-Step     Elliptical 5'    Counter Ex 20x each bilat HR/TR, 3 Way Hip, HS Curls   Mini Squats 10x Ball between legs No UE assist, FOAM   FTEO/FTEC 3 x 30\" FOAM   Tandem Stance 3 x 30\"    Offset Feet Balance 3 x 30\"    Sidesteps 3 laps    Retro Walk 1 lap around gym    Lateral Step Ups  Without KAFO for improved ease with functional knee flexion   Marches 15x seated   HS Curls Without KAFO 15x bilat    Amb with quad cane 2 laps Therapist CGA        LAQ c eccentric lower 20x bilat Advanced   Seated march with eccentric lower 20x bilat Advanced   Seated Hip ADD/ABD RED 25x each         Quad Sets 3\" x 10 each    SLR c eccentric lower 15x bilat    Hip ABD/ADD RED t-band/ball 20x  Advanced   HS Curls  RED Tband 10x bilat    Prone HS Curls  Manual assist with R leg   Bridges 15x, x2              Bicep Curls 4#x15    Tricep Ext overhead     Shoulder Press 4#x15                   Wand ABC     [x] Provided verbal/tactile cueing for activities related to strengthening, flexibility, endurance, ROM. (27844)  [x] Provided verbal/tactile cueing for activities related to improving balance, coordination, kinesthetic sense, posture, motor skill, proprioception. (65495)    Therapeutic Activities:     [] Therapeutic activities, direct (one-on-one) patient contact (use of dynamic activities to improve functional performance). (19747)    Gait:   [] Provided training and instruction to the patient for ambulation re-education. (32780)    Self-Care/ADL's  [] Self-care/home management training and compensatory training, meal preparation, safety procedures, and instructions in use of assistive technology devices/adaptive equipment, direct one-on-one contact.  (90092)    Home Exercise Program:   Continue current HEP.   [x] Reviewed/Progressed HEP activities related to strengthening, flexibility, endurance, ROM. (08902)  [x] Reviewed/Progressed HEP activities related to improving balance, coordination, kinesthetic sense, posture, motor skill, proprioception.  (06562)    Manual Treatments:    [] Provided manual therapy to mobilize soft tissue/joints for the purpose of modulating pain, promoting relaxation,  increasing ROM, reducing/eliminating soft tissue swelling/inflammation/restriction, improving soft tissue extensibility. (90060)    Service Based Modalities:      Timed Code Treatment Minutes:  36' there-ex        15' neuro re-ed    Total Treatment Minutes:   54'    Treatment/Activity Tolerance:  [x] Patient tolerated treatment well [x] Patient limited by fatique  [] Patient limited by pain  [] Patient limited by other medical complications  [] Other:     Prognosis: [] Good [x] Fair  [] Poor    Patient Requires Follow-up: [x] Yes  [] No      Goals:     Long term goals  Time Frame for Long term goals : 4 weeks  Long term goal 1: Indep with HEP  Long term goal 2: Increase bilat LE strength to [facility] for improved ease with ADL and home/community management   Long term goal 3: Pt to perform balance activities without UE support for 45 seconds or more for improved balance and stability and iincreased ease with home/community ADL(Requires several rest breaks throughout program)  Long term goal 4: Pt to be able to ambulate short home distances without RW to improve ease with home management and ADL (Ongoing)  Long term goal 5: TUG/TInetti scores within age-accepted norms (13 sec TUG, >23/28 Tinetti) for improved safety and ease with home and community management     Plan:   [x] Continue per plan of care [] Alter current plan (see comments)   [] Plan of care initiated [] Hold pending MD visit [] Discharge    Plan for Next Session:  Progress as tolerated. Monitor tolerance to neuro re-ed. Progress as able.      Electronically signed

## 2017-12-12 ENCOUNTER — HOSPITAL ENCOUNTER (OUTPATIENT)
Dept: PHYSICAL THERAPY | Age: 42
Setting detail: THERAPIES SERIES
Discharge: HOME | End: 2017-12-12
Payer: MEDICARE

## 2017-12-12 PROCEDURE — 97112 NEUROMUSCULAR REEDUCATION: CPT | Performed by: PHYSICAL THERAPY ASSISTANT

## 2017-12-12 PROCEDURE — 97110 THERAPEUTIC EXERCISES: CPT | Performed by: PHYSICAL THERAPY ASSISTANT

## 2017-12-12 NOTE — FLOWSHEET NOTE
Physical Therapy Daily Treatment Note    Date:  2017    Patient Name:  Lorelei Bernal    :  1975  MRN: [id number]     Restrictions/Precautions:       Medical/Treatment Diagnosis Information:   · Diagnosis: Multiple Sclerosis  · Treatment Diagnosis: Multiple Sclerosis, decreased strength, decreased balance    Insurance/Certification information:  PT Insurance Information: Medicare    Physician Information:  Referring Practitioner: Andi Calvo    Plan of care signed (Y/N):  N    Visit# / total visits:   2nd POC (17 total)    Pain level: 0/10     G-Code (if applicable):      Date G-Code Applied:  11/3/17  PT G-Codes  Functional Assessment Tool Used: TUG; Tinetti  Score: TU seconds (chair with no arms); Tinetti:  = 61% or 39% disabled  Functional Limitation: Mobility: Walking and moving around  Mobility: Walking and Moving Around Current Status (): At least 20 percent but less than 40 percent impaired, limited or restricted  Mobility: Walking and Moving Around Goal Status (): At least 1 percent but less than 20 percent impaired, limited or restricted    Time In:  2:58  Time Out: 4:00    Progress Note: []  Yes, Sent 11/3/17 [x]  No  Next due by: Visit #10   Or by 17    Subjective:      Objective: ALEX performed per flow chart to facilitate strength, motion and stability to allow ease with daily activities and ambulation. Verbal cuing for progression, technique and safety. Limited strength and mobility and stability. [place] e-stim re-ed to facilitate anterior tib strength. Difficulty with advanced gait and balance exercises remains. Observation:   Required CGA from therapist with SP cane ambulation trials, although required less pressure and HHA with opposite hand on therapist shoulder this date as opposed to initial date of incorporating this activity    Exercises: Pt performed all ALEX per flow chart to increase LE strength and stability. Russian stim post session. Exercise/Equipment Resistance/Repetitions Other comments   Nu-Step     Elliptical 5'    Counter Ex 10x2# each bilat HR/TR, 3 Way Hip, HS Curls   Mini Squats 10x Ball between legs No UE assist, FOAM   FTEO/FTEC 3 x 30\" FOAM   Tandem Stance 3 x 30\"    Offset Feet Balance 3 x 30\"    Sidesteps 3 laps    Retro Walk 1 lap around gym    Lateral Step Ups  Without KAFO for improved ease with functional knee flexion   Marches 10 x2# seated   HS Curls Without KAFO     Amb with quad cane 1 laps Therapist CGA        LAQ c eccentric lower Advanced   Seated march with eccentric lower Advanced   Seated Hip ADD/ABD RED 25x each         Quad Sets    SLR c eccentric lower    Hip ABD/ADD RED t-band/ball 20x  Advanced   HS Curls  RED Tband 10x bilat    Prone HS Curls  Manual assist with R leg   Bridges 15x, x2              Bicep Curls 4#x15    Tricep Ext overhead     Shoulder Press 7#x15 BAR   Shoulder flexion, abd 10x3# Initiated             Wand ABC     [x] Provided verbal/tactile cueing for activities related to strengthening, flexibility, endurance, ROM. (18371)  [x] Provided verbal/tactile cueing for activities related to improving balance, coordination, kinesthetic sense, posture, motor skill, proprioception. (05426)    Therapeutic Activities:     [] Therapeutic activities, direct (one-on-one) patient contact (use of dynamic activities to improve functional performance). (28660)    Gait:   [] Provided training and instruction to the patient for ambulation re-education. (82668)    Self-Care/ADL's  [] Self-care/home management training and compensatory training, meal preparation, safety procedures, and instructions in use of assistive technology devices/adaptive equipment, direct one-on-one contact. (68645)    Home Exercise Program:   Continue current HEP.    [x] Reviewed/Progressed HEP activities related to strengthening, flexibility, endurance, ROM. (15827)  [x] Reviewed/Progressed HEP activities related to improving balance,

## 2017-12-14 ENCOUNTER — HOSPITAL ENCOUNTER (OUTPATIENT)
Dept: PHYSICAL THERAPY | Age: 42
Setting detail: THERAPIES SERIES
Discharge: HOME | End: 2017-12-14
Payer: MEDICARE

## 2017-12-14 PROCEDURE — 97110 THERAPEUTIC EXERCISES: CPT | Performed by: PHYSICAL THERAPY ASSISTANT

## 2017-12-14 PROCEDURE — 97112 NEUROMUSCULAR REEDUCATION: CPT | Performed by: PHYSICAL THERAPY ASSISTANT

## 2017-12-14 NOTE — FLOWSHEET NOTE
Physical Therapy Daily Treatment Note    Date:  2017    Patient Name:  Aimee Severino    :  1975  MRN: [id number]     Restrictions/Precautions:       Medical/Treatment Diagnosis Information:   · Diagnosis: Multiple Sclerosis  · Treatment Diagnosis: Multiple Sclerosis, decreased strength, decreased balance    Insurance/Certification information:  PT Insurance Information: Medicare    Physician Information:  Referring Practitioner: Brad Espinoza    Plan of care signed (Y/N):  N    Visit# / total visits:   2nd POC (18 total)    Pain level: 0/10     G-Code (if applicable):      Date G-Code Applied:  11/3/17  PT G-Codes  Functional Assessment Tool Used: TUG; Tinetti  Score: TU seconds (chair with no arms); Tinetti:  = 61% or 39% disabled  Functional Limitation: Mobility: Walking and moving around  Mobility: Walking and Moving Around Current Status (): At least 20 percent but less than 40 percent impaired, limited or restricted  Mobility: Walking and Moving Around Goal Status (): At least 1 percent but less than 20 percent impaired, limited or restricted    Time In:  3:01  Time Out: 4:15    Progress Note: []  Yes, Sent 11/3/17 [x]  No  Next due by: Visit #10   Or by 17    Subjective:  Pt. States fatigue after last session but no increase in pain or lasting effects. Objective: ALEX performed per flow chart to facilitate strength, motion and stability to allow ease with daily activities and ambulation. Verbal cuing for progression, technique and safety. Held several exercises due to testing and measurements this [email or web address]  Limited strength and mobility and stability. [place] e-stim re-ed to facilitate anterior tib strength. Difficulty with advanced gait and balance exercises remains.      Observation:     Tinetti:     TU sec (with arm rests)    18 sec with arm rests (18 sec at last UPOC)    Strength knee flexion: 4/5, 3/5     Ext: 4+/5, -4/5    Hip flexion: 3+-4-/5   Ext: HEP activities related to improving balance, coordination, kinesthetic sense, posture, motor skill, proprioception.  (94278)    Manual Treatments:    [] Provided manual therapy to mobilize soft tissue/joints for the purpose of modulating pain, promoting relaxation,  increasing ROM, reducing/eliminating soft tissue swelling/inflammation/restriction, improving soft tissue extensibility. (67253)    Service Based Modalities:      Timed Code Treatment Minutes:  61' there-ex        15' neuro re-ed    Total Treatment Minutes:   76'    Treatment/Activity Tolerance:  [x] Patient tolerated treatment well [x] Patient limited by fatique  [] Patient limited by pain  [] Patient limited by other medical complications  [] Other:     Prognosis: [] Good [x] Fair  [] Poor    Patient Requires Follow-up: [x] Yes  [] No      Goals:     Long term goals  Time Frame for Long term goals : 4 weeks  Long term goal 1: Indep with HEP (Ongoing compliance with UPOC)  Long term goal 2: Increase bilat LE strength to [facility] for improved ease with ADL and home/community management (See above)  Long term goal 3: Pt to perform balance activities without UE support for 45 seconds or more for improved balance and stability and iincreased ease with home/community ADL(FTEO: >45'', EC: 5-7'', both on foam)  Long term goal 4: Pt to be able to ambulate short home distances without RW to improve ease with home management and ADL (Ongoing, grabs items throughout house when without AD. Gait trains with straight cane with one UE assist)  Long term goal 5: TUG/TInetti scores within age-accepted norms (13 sec TUG, >23/28 Tinetti) for improved safety and ease with home and community management (See above)    Plan:   [x] Continue per plan of care [] Alter current plan (see comments)   [] Plan of care initiated [] Hold pending MD visit [] Discharge    Plan for Next Session:  Progress as tolerated. Monitor tolerance to neuro re-ed. Progress as able.      Electronically signed by:   Rosie Hannah

## 2017-12-18 ENCOUNTER — HOSPITAL ENCOUNTER (OUTPATIENT)
Dept: PHYSICAL THERAPY | Age: 42
Setting detail: THERAPIES SERIES
Discharge: HOME | End: 2017-12-18
Payer: MEDICARE

## 2017-12-18 PROCEDURE — 97112 NEUROMUSCULAR REEDUCATION: CPT | Performed by: PHYSICAL THERAPY ASSISTANT

## 2017-12-18 PROCEDURE — 97110 THERAPEUTIC EXERCISES: CPT | Performed by: PHYSICAL THERAPY ASSISTANT

## 2017-12-18 NOTE — FLOWSHEET NOTE
Physical Therapy Daily Treatment Note    Date:  2017    Patient Name:  Susie Bradley    :  1975  MRN: [id number]     Restrictions/Precautions:       Medical/Treatment Diagnosis Information:   · Diagnosis: Multiple Sclerosis  · Treatment Diagnosis: Multiple Sclerosis, decreased strength, decreased balance    Insurance/Certification information:  PT Insurance Information: Medicare    Physician Information:  Referring Practitioner: Lyly Woodall    Plan of care signed (Y/N):  N    Visit# / total visits:   POC (19 total)    Pain level: 0/10     G-Code (if applicable):      Date G-Code Applied:  11/3/17  PT G-Codes  Functional Assessment Tool Used: TUG; Tinetti  Score: TU seconds (chair with no arms); Tinetti:  = 61% or 39% disabled  Functional Limitation: Mobility: Walking and moving around  Mobility: Walking and Moving Around Current Status (): At least 20 percent but less than 40 percent impaired, limited or restricted  Mobility: Walking and Moving Around Goal Status (): At least 1 percent but less than 20 percent impaired, limited or restricted    Time In:  2:32  Time Out: 3:41    Progress Note: []  Yes, Sent 11/3/17 [x]  No  Next due by: Visit #10   Or by 17    Subjective:  Pt. States fatigue after last session but no increase in pain or lasting effects. Objective: ALEX performed per flow chart to facilitate strength, motion and stability to allow ease with daily activities and ambulation. Advanced and progressed several exercises to improve balance and strength. Verbal cuing for progression, technique and safety. Limited strength and mobility and stability. [place] e-stim re-ed to facilitate anterior tib strength. Difficulty with advanced gait and balance exercises remains.      Observation:       Exercises:  Exercise/Equipment Resistance/Repetitions Other comments   Nu-Step     Elliptical 5'    Counter Ex 10x2# each bilat HR/TR, 3 Way Hip, HS Curls   Mini Squats 15x Ball between legs No UE assist, FOAM (Advanced)   FTEO/FTEC 3 x 30\" FOAM   Tandem Stance 3 x 30\"    Offset Feet Balance 3 x 30\"    Sidesteps 3 laps    Retro Walk 1 lap around gym    Lateral Step Ups  Without KAFO for improved ease with functional knee flexion   Marches 10 x2# seated   HS Curls Without KAFO     Amb with quad cane 1 laps Therapist CGA        LAQ c eccentric lower 20x bilat Advanced   Seated march with eccentric lower Advanced   Seated Hip ADD/ABD  25x/20x GR each         Quad Sets    SLR c eccentric lower    Hip ABD/ADD RED t-band/ball 20x  Advanced   HS Curls  RED Tband 10x bilat    Prone HS Curls  Manual assist with R leg   Bridges 15x, x2              Bicep Curls 4#x15    Tricep Ext overhead     Shoulder Press 7#x20 BAR (Advanced)   Shoulder flexion, abd 10x3#              Wand ABC     [x] Provided verbal/tactile cueing for activities related to strengthening, flexibility, endurance, ROM. (72145)  [x] Provided verbal/tactile cueing for activities related to improving balance, coordination, kinesthetic sense, posture, motor skill, proprioception. (55186)    Therapeutic Activities:     [] Therapeutic activities, direct (one-on-one) patient contact (use of dynamic activities to improve functional performance). (61700)    Gait:   [] Provided training and instruction to the patient for ambulation re-education. (09906)    Self-Care/ADL's  [] Self-care/home management training and compensatory training, meal preparation, safety procedures, and instructions in use of assistive technology devices/adaptive equipment, direct one-on-one contact. (01135)    Home Exercise Program:   Continue current HEP.    [x] Reviewed/Progressed HEP activities related to strengthening, flexibility, endurance, ROM. (49402)  [x] Reviewed/Progressed HEP activities related to improving balance, coordination, kinesthetic sense, posture, motor skill, proprioception.  (81728)    Manual Treatments:    [] Provided manual therapy to

## 2017-12-19 PROCEDURE — G8978 MOBILITY CURRENT STATUS: HCPCS | Performed by: PHYSICAL THERAPIST

## 2017-12-19 PROCEDURE — G8979 MOBILITY GOAL STATUS: HCPCS | Performed by: PHYSICAL THERAPIST

## 2017-12-19 NOTE — PROGRESS NOTES
Physical Therapy  [facility]  Rehabilitation and Sports Medicine    [x] [place]  Phone: [phone]  Fax: [phone]      [] [place]  Phone: [phone]  Fax: [phone]    Physical Therapy Progress Note  Date: 2017        Patient Name:  Susie Bradley    :  1975  MRN: [id number]  Restrictions/Precautions:     Medical/Treatment Diagnosis Information:   · Diagnosis: Multiple Sclerosis  · Treatment Diagnosis: Multiple Sclerosis, decreased strength, decreased balance  Insurance/Certification information:  PT Insurance Information: Medicare  Physician Information:  Referring Practitioner: Lyly Woodall  Plan of care signed (Y/N):  N  Visit# / total visits:   3rd POC (19 total)  Pain level:      0/10      G-Code (if applicable):      Date G-Code Applied:  2017  PT G-Codes  Functional Assessment Tool Used: TUG; Tinetti  Functional Limitation: Mobility: Walking and moving around  Mobility: Walking and Moving Around Current Status (): At least 20 percent but less than 40 percent impaired, limited or restricted  Mobility: Walking and Moving Around Goal Status (): At least 1 percent but less than 20 percent impaired, limited or restricted     Time Period for Report:  17 - 17  Cancels/No-shows to date:  4    Plan of Care/Treatment to date:  [] Therapeutic Exercise    [] Modalities:  [] Therapeutic Activity     [] Ultrasound  [] Electrical Stimulation  [] Gait Training      [] Cervical Traction    [] Lumbar Traction  [] Neuromuscular Re-education  [] Cold/hotpack [] Iontophoresis  [] Instruction in HEP      Other:  [] Manual Therapy       []    [] Aquatic Therapy       []                    ? Subjective:    Pt. States fatigue after last session but no increase in pain or lasting effects.          Objective:   TU seconds  Tinetti        Assessment:   Elen Keen has been making progress with strength and stability, improving in balance, although all of theses areas are still lacking compared to normal values. Plan:    Continue per POC to increase strength, stability, improve balance and ease/indep with ambulation and ADL       Goals:    Long term goals  Time Frame for Long term goals : 4 weeks  Long term goal 1: Indep with HEP (Ongoing compliance with UPOC)  Long term goal 2: Increase bilat LE strength to [facility] for improved ease with ADL and home/community management (See above)  Long term goal 3: Pt to perform balance activities without UE support for 45 seconds or more for improved balance and stability and iincreased ease with home/community ADL(FTEO: >45'', EC: 5-7'', both on foam)  Long term goal 4: Pt to be able to ambulate short home distances without RW to improve ease with home management and ADL (Ongoing, grabs items throughout house when without AD. Gait trains with straight cane with one UE assist)  Long term goal 5: TUG/TInetti scores within age-accepted norms (13 sec TUG, >23/28 Tinetti) for improved safety and ease with home and community management (See above)      Current Frequency/Duration: 12/18/17 - 1/29/18  # Days per week: [] 1 day # Weeks: [] 1 week [] 4 weeks      [x] 2 days? [] 2 weeks [] 5 weeks      [] 3 days   [] 3 weeks [x] 6 weeks     Rehab Potential: [] Excellent [x] Good [x] Fair  [] Poor     Goal Status:  [] Achieved [x] Partially Achieved  [] Not Achieved     Patient Status: [] Continue per initial plan of Care     [] Patient now discharged     [x] Additional visits requested, Please re-certify for additional visits:      Requested frequency/duration:  2X/week for 6 weeks    Electronically signed by:  Samir Sebastian, PT, DPT    If you have any questions or concerns, please don't hesitate to call.   Thank you for your referral.    Physician Signature:________________________________Date:__________________  By signing above, therapists plan is approved by physician

## 2017-12-20 ENCOUNTER — HOSPITAL ENCOUNTER (OUTPATIENT)
Dept: PHYSICAL THERAPY | Age: 42
Setting detail: THERAPIES SERIES
Discharge: HOME | End: 2017-12-20
Payer: MEDICARE

## 2017-12-20 PROCEDURE — 97112 NEUROMUSCULAR REEDUCATION: CPT | Performed by: PHYSICAL THERAPY ASSISTANT

## 2017-12-20 PROCEDURE — 97110 THERAPEUTIC EXERCISES: CPT | Performed by: PHYSICAL THERAPY ASSISTANT

## 2017-12-20 NOTE — FLOWSHEET NOTE
Curls   Mini Squats 15x Ball between legs No UE assist, FOAM (Advanced)   FTEO/FTEC 3 x 30\" FOAM   Tandem Stance 3 x 30\"    Offset Feet Balance     Sidesteps 3 laps 0-1 HHA   Retro Walk 1 lap around gym    Lateral Step Ups  Without KAFO for improved ease with functional knee flexion   Marches 15 x2# Seated (Advanced)   HS Curls Without KAFO     Amb with quad cane 1 laps Therapist CGA, NO UE assist        LAQ c eccentric lower 20x bilat Advanced   Seated march with eccentric lower Advanced   Seated Hip ADD/ABD  25x/20x GR each         Quad Sets    SLR c eccentric lower    Hip ABD/ADD     HS Curls  RED Tband 10x bilat    Prone HS Curls  Manual assist with R leg   Bridges 15x, x2         Ball diagonals 5x ea Standing   [place] 1x Square             Bicep Curls 4#x15 In standing   Tricep Ext overhead     Shoulder Press 7#x20 BAR (Advanced) Instanding   Shoulder flexion, abd 10x3# Instanding. Wand ABC     [x] Provided verbal/tactile cueing for activities related to strengthening, flexibility, endurance, ROM. (52708)  [x] Provided verbal/tactile cueing for activities related to improving balance, coordination, kinesthetic sense, posture, motor skill, proprioception. (08866)    Therapeutic Activities:     [] Therapeutic activities, direct (one-on-one) patient contact (use of dynamic activities to improve functional performance). (81420)    Gait:   [] Provided training and instruction to the patient for ambulation re-education. (60662)    Self-Care/ADL's  [] Self-care/home management training and compensatory training, meal preparation, safety procedures, and instructions in use of assistive technology devices/adaptive equipment, direct one-on-one contact. (31954)    Home Exercise Program:   Continue current HEP.    [x] Reviewed/Progressed HEP activities related to strengthening, flexibility, endurance, ROM. (24217)  [x] Reviewed/Progressed HEP activities related to improving balance, coordination, kinesthetic

## 2017-12-26 ENCOUNTER — HOSPITAL ENCOUNTER (OUTPATIENT)
Dept: PHYSICAL THERAPY | Age: 42
Setting detail: THERAPIES SERIES
Discharge: HOME | End: 2017-12-26
Payer: MEDICARE

## 2017-12-26 PROCEDURE — 97110 THERAPEUTIC EXERCISES: CPT | Performed by: PHYSICAL THERAPY ASSISTANT

## 2017-12-26 PROCEDURE — 97112 NEUROMUSCULAR REEDUCATION: CPT | Performed by: PHYSICAL THERAPY ASSISTANT

## 2017-12-26 NOTE — FLOWSHEET NOTE
Tandem Stance     Offset Feet Balance     Sidesteps 3 laps 0-1 HHA   Retro Walk 1 lap around gym Single point cane   Lateral Step Ups  Without KAFO for improved ease with functional knee flexion   Marches 15 x2# Seated    HS Curls Without KAFO     Amb with quad cane 1 laps Therapist CGA, NO UE assist        LAQ c eccentric lower 20x bilat Advanced   Seated march with eccentric lower Advanced   Seated Hip ADD/ABD  25x/20x GR each         Quad Sets    SLR c eccentric lower    Hip ABD/ADD     HS Curls  RED Tband 10x bilat    Prone HS Curls  Manual assist with R leg   Bridges 15x, x2         Ball diagonals 5x ea Standing   Valpar 1x Square             Bicep Curls In standing   Tricep Ext overhead    Shoulder Press BAR (Advanced) Instanding   Shoulder flexion, abd Instanding. Wand ABC     [x] Provided verbal/tactile cueing for activities related to strengthening, flexibility, endurance, ROM. (95696)  [x] Provided verbal/tactile cueing for activities related to improving balance, coordination, kinesthetic sense, posture, motor skill, proprioception. (25811)    Therapeutic Activities:     [] Therapeutic activities, direct (one-on-one) patient contact (use of dynamic activities to improve functional performance). (58591)    Gait:   [] Provided training and instruction to the patient for ambulation re-education. (33984)    Self-Care/ADL's  [] Self-care/home management training and compensatory training, meal preparation, safety procedures, and instructions in use of assistive technology devices/adaptive equipment, direct one-on-one contact. (05550)    Home Exercise Program:   Continue current HEP.    [x] Reviewed/Progressed HEP activities related to strengthening, flexibility, endurance, ROM. (99842)  [x] Reviewed/Progressed HEP activities related to improving balance, coordination, kinesthetic sense, posture, motor skill, proprioception.  (44083)    Manual Treatments:    [] Provided manual therapy to mobilize soft tissue/joints for the purpose of modulating pain, promoting relaxation,  increasing ROM, reducing/eliminating soft tissue swelling/inflammation/restriction, improving soft tissue extensibility. (21523)    Service Based Modalities:      Timed Code Treatment Minutes:  59' there-ex        14' neuro re-ed    Total Treatment Minutes:  66'    Treatment/Activity Tolerance:  [x] Patient tolerated treatment well [x] Patient limited by fatique  [] Patient limited by pain  [] Patient limited by other medical complications  [] Other:     Prognosis: [] Good [x] Fair  [] Poor    Patient Requires Follow-up: [x] Yes  [] No      Goals:     Long term goals  Time Frame for Long term goals : 4 weeks  Long term goal 1: Indep with HEP (Ongoing compliance with [facility])  Long term goal 2: Increase bilat LE strength to [facility] for improved ease with ADL and home/community management (See above)  Long term goal 3: Pt to perform balance activities without UE support for 45 seconds or more for improved balance and stability and iincreased ease with home/community ADL(FTEO: >45'', EC: 5-7'', both on foam)  Long term goal 4: Pt to be able to ambulate short home distances without RW to improve ease with home management and ADL (Ongoing, grabs items throughout house when without AD. Gait trains with straight cane with no UE assist)  Long term goal 5: TUG/TInetti scores within age-accepted norms (13 sec TUG, >23/28 Tinetti) for improved safety and ease with home and community management (See above)    Plan:   [x] Continue per plan of care [] Alter current plan (see comments)   [] Plan of care initiated [] Hold pending MD visit [] Discharge    Plan for Next Session:  Progress as tolerated. Monitor tolerance to neuro re-ed. Progress as able. Electronically signed by:   Meaghan Hassan

## 2017-12-28 ENCOUNTER — HOSPITAL ENCOUNTER (OUTPATIENT)
Dept: PHYSICAL THERAPY | Age: 42
Setting detail: THERAPIES SERIES
Discharge: HOME | End: 2017-12-28
Payer: MEDICARE

## 2018-01-02 ENCOUNTER — HOSPITAL ENCOUNTER (OUTPATIENT)
Dept: PHYSICAL THERAPY | Age: 43
Setting detail: THERAPIES SERIES
Discharge: HOME | End: 2018-01-02
Payer: MEDICARE

## 2018-01-02 PROCEDURE — 97112 NEUROMUSCULAR REEDUCATION: CPT | Performed by: PHYSICAL THERAPY ASSISTANT

## 2018-01-02 PROCEDURE — 97110 THERAPEUTIC EXERCISES: CPT | Performed by: PHYSICAL THERAPY ASSISTANT

## 2018-01-04 ENCOUNTER — HOSPITAL ENCOUNTER (OUTPATIENT)
Dept: PHYSICAL THERAPY | Age: 43
Setting detail: THERAPIES SERIES
Discharge: HOME | End: 2018-01-04
Payer: MEDICARE

## 2018-01-04 PROCEDURE — 97112 NEUROMUSCULAR REEDUCATION: CPT | Performed by: PHYSICAL THERAPY ASSISTANT

## 2018-01-04 PROCEDURE — 97110 THERAPEUTIC EXERCISES: CPT | Performed by: PHYSICAL THERAPY ASSISTANT

## 2018-01-04 NOTE — FLOWSHEET NOTE
Physical Therapy Daily Treatment Note    Date:  2018    Patient Name:  Mahnaz Garcia    :  1975  MRN: [id number]     Restrictions/Precautions:       Medical/Treatment Diagnosis Information:   · Diagnosis: Multiple Sclerosis  · Treatment Diagnosis: Multiple Sclerosis, decreased strength, decreased balance    Insurance/Certification information:  PT Insurance Information: Medicare    Physician Information:  Referring Practitioner: Elizabeth Waldron    Plan of care signed (Y/N):  N    Visit# / total visits:   3rd POC (22 total)    Pain level: 0/10     G-Code (if applicable):      Date G-Code Applied:  11/3/17  PT G-Codes  Functional Assessment Tool Used: TUG; Tinetti  Score: TU seconds (chair with no arms); Tinetti:  = 61% or 39% disabled  Functional Limitation: Mobility: Walking and moving around  Mobility: Walking and Moving Around Current Status (): At least 20 percent but less than 40 percent impaired, limited or restricted  Mobility: Walking and Moving Around Goal Status (): At least 1 percent but less than 20 percent impaired, limited or restricted    Time In:  2:53  Time Out: 4:12    Progress Note: []  Yes, Sent 11/3/17 [x]  No  Next due by: Visit #10   Or by 17    Subjective:  Notes feeling well after last session. No c/o at this time or after last session. Objective: ALEX performed per flow chart to facilitate strength, motion and stability to allow ease with daily activities and ambulation. Advanced and progressed several exercises to improve balance and strength. Verbal cuing for progression, technique and safety. Advanced to single point cane with retro gait training. Difficulty with dynamic gait noted. [place] e-stim re-ed to facilitate anterior tib strength. Observation:   Increased paulo with gait with straight cance noted.      RTD: 1-10-18      Exercises:  Exercise/Equipment Resistance/Repetitions Other comments   Nu-Step     Elliptical 6'    Counter Ex

## 2018-01-09 ENCOUNTER — HOSPITAL ENCOUNTER (OUTPATIENT)
Dept: PHYSICAL THERAPY | Age: 43
Setting detail: THERAPIES SERIES
Discharge: HOME | End: 2018-01-09
Payer: MEDICARE

## 2018-01-09 PROCEDURE — 97110 THERAPEUTIC EXERCISES: CPT | Performed by: PHYSICAL THERAPIST

## 2018-01-09 PROCEDURE — 97112 NEUROMUSCULAR REEDUCATION: CPT | Performed by: PHYSICAL THERAPIST

## 2018-01-09 PROCEDURE — G0283 ELEC STIM OTHER THAN WOUND: HCPCS | Performed by: PHYSICAL THERAPIST

## 2018-01-16 ENCOUNTER — HOSPITAL ENCOUNTER (OUTPATIENT)
Dept: PHYSICAL THERAPY | Age: 43
Setting detail: THERAPIES SERIES
Discharge: HOME | End: 2018-01-16
Payer: MEDICARE

## 2018-01-16 PROCEDURE — 97112 NEUROMUSCULAR REEDUCATION: CPT | Performed by: PHYSICAL THERAPY ASSISTANT

## 2018-01-16 PROCEDURE — 97110 THERAPEUTIC EXERCISES: CPT | Performed by: PHYSICAL THERAPY ASSISTANT

## 2018-01-16 NOTE — FLOWSHEET NOTE
Physical Therapy Daily Treatment Note    Date:  2018    Patient Name:  Chris Calvert    :  1975  MRN: [id number]     Restrictions/Precautions:       Medical/Treatment Diagnosis Information:   · Diagnosis: Multiple Sclerosis  · Treatment Diagnosis: Multiple Sclerosis, decreased strength, decreased balance    Insurance/Certification information:  PT Insurance Information: Medicare    Physician Information:  Referring Practitioner: Amee Castro    Plan of care signed (Y/N):  N    Visit# / total visits:   3rd POC (24 total)    Pain level: 0/10     G-Code (if applicable):      Date G-Code Applied:  17  PT G-Codes  Functional Assessment Tool Used: TUG; Tinetti  Score: TU seconds (chair with no arms)     15 seconds (Current); Tinetti:  = 61% or 39% disabled   Functional Limitation: Mobility: Walking and moving around  Mobility: Walking and Moving Around Current Status (): At least 20 percent but less than 40 percent impaired, limited or restricted  Mobility: Walking and Moving Around Goal Status (): At least 1 percent but less than 20 percent impaired, limited or restricted    Time In:  3:00  Time Out: 4:15    Progress Note: []  Yes, Sent 11/3/17 [x]  No  Next due by: Visit #10   Or by 18    Subjective:  Notes falling once (x1) since last session when walking with cane at home. No additional c/o at this time. Notes being placed on different OTC meds. Will bring in updated list next session. Objective: ALEX performed per flow chart to facilitate strength, motion and stability to allow ease with daily activities and ambulation. Advanced and progressed several exercises to improve balance and strength. Verbal cuing for progression, technique and safety. Difficulty with dynamic gait and balance remains. [place] e-stim re-ed to facilitate anterior tib strength. Observation:   Increased paulo with gait with straight cance noted.        Exercises:  Exercise/Equipment

## 2018-01-18 ENCOUNTER — HOSPITAL ENCOUNTER (OUTPATIENT)
Dept: PHYSICAL THERAPY | Age: 43
Setting detail: THERAPIES SERIES
Discharge: HOME | End: 2018-01-18
Payer: MEDICARE

## 2018-01-18 PROCEDURE — 97112 NEUROMUSCULAR REEDUCATION: CPT | Performed by: PHYSICAL THERAPIST

## 2018-01-18 PROCEDURE — 97110 THERAPEUTIC EXERCISES: CPT | Performed by: PHYSICAL THERAPIST

## 2018-01-18 NOTE — FLOWSHEET NOTE
Physical Therapy Daily Treatment Note    Date:  2018    Patient Name:  Liberty Ramirez    :  1975  MRN: [id number]     Restrictions/Precautions:       Medical/Treatment Diagnosis Information:   · Diagnosis: Multiple Sclerosis  · Treatment Diagnosis: Multiple Sclerosis, decreased strength, decreased balance    Insurance/Certification information:  PT Insurance Information: Medicare    Physician Information:  Referring Practitioner: Lenora Rodriguez    Plan of care signed (Y/N):  N    Visit# / total visits:   3rd POC (25 total)    Pain level: 0/10     G-Code (if applicable):      Date G-Code Applied:  17  PT G-Codes  Functional Assessment Tool Used: TUG; Tinetti  Score: TU seconds (chair with no arms)     15 seconds (Current); Tinetti:  = 61% or 39% disabled   Functional Limitation: Mobility: Walking and moving around  Mobility: Walking and Moving Around Current Status (): At least 20 percent but less than 40 percent impaired, limited or restricted  Mobility: Walking and Moving Around Goal Status (): At least 1 percent but less than 20 percent impaired, limited or restricted    Time In:  2:56  Time Out: 4:00    Progress Note: []  Yes, Sent 11/3/17 [x]  No  Next due by: Visit #10   Or by 18    Subjective:  \"I am going to be starting a new medication which my doctor said has research to show that it can improve MS patients, so I start that as soon as it arrives in the mail. \"      Objective: ALEX performed per flow chart to facilitate strength, motion and stability to allow ease with daily activities and ambulation. Advanced and progressed several exercises to improve balance and strength. Verbal cuing for progression, technique and safety. Difficulty with dynamic gait and balance remains. [place] e-stim re-ed to facilitate anterior tib strength. Observation:   Increased paulo with gait with straight cance noted.    Multiple bouts of instability and required HHA with technology devices/adaptive equipment, direct one-on-one contact. (94427)    Home Exercise Program:   Continue current HEP. [x] Reviewed/Progressed HEP activities related to strengthening, flexibility, endurance, ROM. (45763)  [x] Reviewed/Progressed HEP activities related to improving balance, coordination, kinesthetic sense, posture, motor skill, proprioception.  (94359)    Manual Treatments:    [] Provided manual therapy to mobilize soft tissue/joints for the purpose of modulating pain, promoting relaxation,  increasing ROM, reducing/eliminating soft tissue swelling/inflammation/restriction, improving soft tissue extensibility. (73980)    Service Based Modalities:      Timed Code Treatment Minutes:  52' there-ex        15' neuro re-ed    Total Treatment Minutes: 59'    Treatment/Activity Tolerance:  [x] Patient tolerated treatment well [x] Patient limited by fatique  [] Patient limited by pain  [] Patient limited by other medical complications  [] Other:     Prognosis: [] Good [x] Fair  [] Poor    Patient Requires Follow-up: [x] Yes  [] No      Goals:     Long term goals  Time Frame for Long term goals : 4 weeks  Long term goal 1: Indep with HEP   Long term goal 2: Increase bilat LE strength to [facility] for improved ease with ADL and home/community management   Long term goal 3: Pt to perform balance activities without UE support for 45 seconds or more for improved balance and stability and iincreased ease with home/community ADL  Long term goal 4: Pt to be able to ambulate short home distances without RW to improve ease with home management and ADL   Long term goal 5: TUG/TInetti scores within age-accepted norms (13 sec TUG, >23/28 Tinetti) for improved safety and ease with home and community management     Plan:   [x] Continue per plan of care [] Alter current plan (see comments)   [] Plan of care initiated [] Hold pending MD visit [] Discharge    Plan for Next Session:  Progress as tolerated.  Monitor tolerance to

## 2018-01-23 ENCOUNTER — HOSPITAL ENCOUNTER (OUTPATIENT)
Dept: PHYSICAL THERAPY | Age: 43
Setting detail: THERAPIES SERIES
Discharge: HOME | End: 2018-01-23
Payer: MEDICARE

## 2018-01-23 PROCEDURE — G0283 ELEC STIM OTHER THAN WOUND: HCPCS | Performed by: PHYSICAL THERAPY ASSISTANT

## 2018-01-23 PROCEDURE — 97112 NEUROMUSCULAR REEDUCATION: CPT | Performed by: PHYSICAL THERAPY ASSISTANT

## 2018-01-23 PROCEDURE — 97110 THERAPEUTIC EXERCISES: CPT | Performed by: PHYSICAL THERAPY ASSISTANT

## 2018-01-23 NOTE — FLOWSHEET NOTE
Physical Therapy Daily Treatment Note    Date:  2018    Patient Name:  Travon Carpenter    :  1975  MRN: [id number]     Restrictions/Precautions:       Medical/Treatment Diagnosis Information:   · Diagnosis: Multiple Sclerosis  · Treatment Diagnosis: Multiple Sclerosis, decreased strength, decreased balance    Insurance/Certification information:  PT Insurance Information: Medicare    Physician Information:  Referring Practitioner: Carina Aldana    Plan of care signed (Y/N):  N    Visit# / total visits:   3rd POC (26 total)    Pain level: 0/10     G-Code (if applicable):      Date G-Code Applied:  17  PT G-Codes  Functional Assessment Tool Used: TUG; Tinetti  Score: TU seconds (chair with no arms)     15 seconds (Current); Tinetti:  = 61% or 39% disabled   Functional Limitation: Mobility: Walking and moving around  Mobility: Walking and Moving Around Current Status (): At least 20 percent but less than 40 percent impaired, limited or restricted  Mobility: Walking and Moving Around Goal Status (): At least 1 percent but less than 20 percent impaired, limited or restricted    Time In:  3:00  Time Out: 4:14    Progress Note: []  Yes, Sent 11/3/17 [x]  No  Next due by: Visit #10   Or by 18    Subjective:  Pt. Lloyd Goddard having no pain at this time. Has been taking Biotin (100mg, 1x/day) ~1wk, no noticeable changes     Objective: ALEX performed per flow chart to facilitate strength, motion and stability to allow ease with daily activities and ambulation. Advanced and progressed several exercises to improve balance and strength. Verbal cuing for progression, technique and safety. Difficulty with dynamic gait and balance remains. [place] e-stim re-ed to facilitate anterior tib strength.      Observation:     Testing: Strength hip flexion: 3+/5 ; 4/5    Abd: -4/5   Knee flexion: R/L: -4-4/5 : 4-4+/5    Ext: 4/5    Tinetti:    TU''      Exercises:  Exercise/Equipment

## 2018-01-25 ENCOUNTER — HOSPITAL ENCOUNTER (OUTPATIENT)
Dept: PHYSICAL THERAPY | Age: 43
Setting detail: THERAPIES SERIES
Discharge: HOME | End: 2018-01-25
Payer: MEDICARE

## 2018-01-25 PROCEDURE — G0283 ELEC STIM OTHER THAN WOUND: HCPCS | Performed by: PHYSICAL THERAPY ASSISTANT

## 2018-01-25 PROCEDURE — 97110 THERAPEUTIC EXERCISES: CPT | Performed by: PHYSICAL THERAPY ASSISTANT

## 2018-01-25 PROCEDURE — 97112 NEUROMUSCULAR REEDUCATION: CPT | Performed by: PHYSICAL THERAPY ASSISTANT

## 2018-01-25 NOTE — FLOWSHEET NOTE
posture, motor skill, proprioception.  (27619)    Manual Treatments:    [] Provided manual therapy to mobilize soft tissue/joints for the purpose of modulating pain, promoting relaxation,  increasing ROM, reducing/eliminating soft tissue swelling/inflammation/restriction, improving soft tissue extensibility. (89880)    Service Based Modalities:  13' [place] e-stim to R LE to increase muscle recruitment and neuro facilitation    Timed Code Treatment Minutes:  40' there-ex        14' neuro re-ed    Total Treatment Minutes: 68'    Treatment/Activity Tolerance:  [x] Patient tolerated treatment well [x] Patient limited by fatique  [] Patient limited by pain  [] Patient limited by other medical complications  [] Other:     Prognosis: [] Good [x] Fair  [] Poor    Patient Requires Follow-up: [x] Yes  [] No      Goals:     Long term goals  Time Frame for Long term goals : 4 weeks  Long term goal 1: Indep with HEP   Long term goal 2: Increase bilat LE strength to [facility] for improved ease with ADL and home/community management   Long term goal 3: Pt to perform balance activities without UE support for 45 seconds or more for improved balance and stability and iincreased ease with home/community ADL  Long term goal 4: Pt to be able to ambulate short home distances without RW to improve ease with home management and ADL   Long term goal 5: TUG/TInetti scores within age-accepted norms (13 sec TUG, >23/28 Tinetti) for improved safety and ease with home and community management     Plan:   [x] Continue per plan of care [] Alter current plan (see comments)   [] Plan of care initiated [] Hold pending MD visit [] Discharge    Plan for Next Session:  Progress as tolerated. Monitor tolerance to neuro re-ed. Progress as able. Electronically signed by:   Daiana Enciso

## 2018-01-30 ENCOUNTER — HOSPITAL ENCOUNTER (OUTPATIENT)
Dept: PHYSICAL THERAPY | Age: 43
Setting detail: THERAPIES SERIES
Discharge: HOME | End: 2018-01-30
Payer: MEDICARE

## 2018-01-31 PROCEDURE — G8979 MOBILITY GOAL STATUS: HCPCS | Performed by: PHYSICAL THERAPIST

## 2018-01-31 PROCEDURE — G8978 MOBILITY CURRENT STATUS: HCPCS | Performed by: PHYSICAL THERAPIST

## 2018-01-31 NOTE — PROGRESS NOTES
Physical Therapy  [facility]  Rehabilitation and Sports Medicine    [x] [place]  Phone: [phone]  Fax: [phone]      [] [place]  Phone: [phone]  Fax: [phone]    Physical Therapy Progress Note  Date: 2018        Patient Name:  Nona Grace    :  1975  MRN: [id number]  Restrictions/Precautions:     Medical/Treatment Diagnosis Information:   · Diagnosis: Multiple Sclerosis  · Treatment Diagnosis: Multiple Sclerosis, decreased strength, decreased balance  Insurance/Certification information:  PT Insurance Information: Medicare  Physician Information:  Referring Practitioner: Boy Barone  Plan of care signed (Y/N):  N  Visit# / total visits:   3rd POC (26 total)  Pain level:      0/10       G-Code (if applicable):      Date G-Code Applied:  18    PT G-Codes  Functional Assessment Tool Used: TUG; Tinetti  Score: TU seconds (chair with no arms)     15 seconds (Current); Tinetti:  = 61% or 39% disabled   Functional Limitation: Mobility: Walking and moving around  Mobility: Walking and Moving Around Current Status (): At least 20 percent but less than 40 percent impaired, limited or restricted  Mobility: Walking and Moving Around Goal Status (): At least 1 percent but less than 20 percent impaired, limited or restricted    Time Period for Report: 17 - 18   Cancels/No-shows to date: 5    Plan of Care/Treatment to date:  [x] Therapeutic Exercise    [x] Modalities:  [] Therapeutic Activity     [] Ultrasound  [x] Electrical Stimulation  [x] Gait Training      [] Cervical Traction    [] Lumbar Traction  [x] Neuromuscular Re-education  [] Cold/hotpack [] Iontophoresis  [x] Instruction in HEP      Other:  [x] Manual Therapy       []    [] Aquatic Therapy       []                    ? Subjective:    Pt. Relates having no pain at this time.  Has been taking Biotin (100mg, 1x/day) ~1wk, no noticeable changes      Objective:    ALEX

## 2018-02-01 ENCOUNTER — HOSPITAL ENCOUNTER (OUTPATIENT)
Dept: PHYSICAL THERAPY | Age: 43
Setting detail: THERAPIES SERIES
Discharge: HOME | End: 2018-02-01
Payer: MEDICARE

## 2018-02-01 PROCEDURE — 97116 GAIT TRAINING THERAPY: CPT | Performed by: PHYSICAL THERAPIST

## 2018-02-01 PROCEDURE — 97112 NEUROMUSCULAR REEDUCATION: CPT | Performed by: PHYSICAL THERAPIST

## 2018-02-01 PROCEDURE — 97110 THERAPEUTIC EXERCISES: CPT | Performed by: PHYSICAL THERAPIST

## 2018-02-01 NOTE — FLOWSHEET NOTE
Physical Therapy Daily Treatment Note    Date:  2018    Patient Name:  Travon Carpenter    :  1975  MRN: [id number]     Restrictions/Precautions:       Medical/Treatment Diagnosis Information:   · Diagnosis: Multiple Sclerosis  · Treatment Diagnosis: Multiple Sclerosis, decreased strength, decreased balance    Insurance/Certification information:  PT Insurance Information: Medicare    Physician Information:  Referring Practitioner: Carina Aldana    Plan of care signed (Y/N):  N    Visit# / total visits: 2/10 4th POC (28 total)    Pain level: 0/10     G-Code (if applicable):      Date G-Code Applied:  18  PT G-Codes  Functional Assessment Tool Used: TUG; Tinetti  Score: TU seconds (chair with no arms)     15 seconds (Current); Tinetti:  = 61% or 39% disabled   Functional Limitation: Mobility: Walking and moving around  Mobility: Walking and Moving Around Current Status (): At least 20 percent but less than 40 percent impaired, limited or restricted  Mobility: Walking and Moving Around Goal Status (): At least 1 percent but less than 20 percent impaired, limited or restricted    Time In:  3:05  Time Out: 4:00    Progress Note: []  Yes, Sent 11/3/17 [x]  No  Next due by: Visit #10   Or by 18    Subjective:  Pt. Relates having no pain at this time. No issues noted after last session. Objective: ALEX performed per flow chart to facilitate strength, motion and stability to allow ease with daily activities and ambulation. Advanced and progressed several exercises to improve balance and strength. Verbal cuing for progression, technique and safety. Difficulty with dynamic gait and balance remains but improvement noted this date. [place] e-stim re-ed to facilitate anterior tib strength.      Observation: LEFS:  = 66% disabled    Exercises:  Exercise/Equipment Resistance/Repetitions Other comments   Nu-Step     Elliptical     Counter Ex 15x2# each bilat , 3 Way Hip, HS Curls coordination, kinesthetic sense, posture, motor skill, proprioception.  (24752)    Manual Treatments:    [] Provided manual therapy to mobilize soft tissue/joints for the purpose of modulating pain, promoting relaxation,  increasing ROM, reducing/eliminating soft tissue swelling/inflammation/restriction, improving soft tissue extensibility. (84633)    Service Based Modalities:      Timed Code Treatment Minutes:  13' there-ex        30' neuro re-ed       10' Gait    Total Treatment Minutes: 54'    Treatment/Activity Tolerance:  [x] Patient tolerated treatment well [x] Patient limited by fatique  [] Patient limited by pain  [] Patient limited by other medical complications  [] Other:     Prognosis: [] Good [x] Fair  [] Poor    Patient Requires Follow-up: [x] Yes  [] No      Goals:     Long term goals  Time Frame for Long term goals : 4 weeks  Long term goal 1: Indep with HEP   Long term goal 2: Increase bilat LE strength to [facility] for improved ease with ADL and home/community management   Long term goal 3: Pt to perform balance activities without UE support for 45 seconds or more for improved balance and stability and iincreased ease with home/community ADL  Long term goal 4: Pt to be able to ambulate short home distances without RW to improve ease with home management and ADL   Long term goal 5: TUG/TInetti scores within age-accepted norms (13 sec TUG, >23/28 Tinetti) for improved safety and ease with home and community management     Plan:   [x] Continue per plan of care [] Alter current plan (see comments)   [] Plan of care initiated [] Hold pending MD visit [] Discharge    Plan for Next Session:  Progress as tolerated. Monitor tolerance to neuro re-ed. Progress as able.      Electronically signed by:  Kole Strickland DPT

## 2018-02-06 ENCOUNTER — HOSPITAL ENCOUNTER (OUTPATIENT)
Dept: PHYSICAL THERAPY | Age: 43
Setting detail: THERAPIES SERIES
Discharge: HOME | End: 2018-02-06
Payer: MEDICARE

## 2018-02-06 PROCEDURE — 97110 THERAPEUTIC EXERCISES: CPT | Performed by: PHYSICAL THERAPY ASSISTANT

## 2018-02-06 PROCEDURE — 97112 NEUROMUSCULAR REEDUCATION: CPT | Performed by: PHYSICAL THERAPY ASSISTANT

## 2018-02-06 PROCEDURE — G0283 ELEC STIM OTHER THAN WOUND: HCPCS | Performed by: PHYSICAL THERAPY ASSISTANT

## 2018-02-06 NOTE — FLOWSHEET NOTE
Physical Therapy Daily Treatment Note    Date:  2018    Patient Name:  Fabien Ahn    :  1975  MRN: [id number]     Restrictions/Precautions:       Medical/Treatment Diagnosis Information:   · Diagnosis: Multiple Sclerosis  · Treatment Diagnosis: Multiple Sclerosis, decreased strength, decreased balance    Insurance/Certification information:  PT Insurance Information: Medicare    Physician Information:  Referring Practitioner: Alfred Mccoy    Plan of care signed (Y/N):  N    Visit# / total visits: 2/10 4th POC (28 total)    Pain level: 0/10     G-Code (if applicable):      Date G-Code Applied:  18  PT G-Codes  Functional Assessment Tool Used: TUG; Tinetti  Score: TU seconds (chair with no arms)     15 seconds (Current); Tinetti:  = 61% or 39% disabled   Functional Limitation: Mobility: Walking and moving around  Mobility: Walking and Moving Around Current Status (): At least 20 percent but less than 40 percent impaired, limited or restricted  Mobility: Walking and Moving Around Goal Status (): At least 1 percent but less than 20 percent impaired, limited or restricted    Time In: 3:00  Time Out: 4:15    Progress Note: []  Yes, Sent 11/3/17 [x]  No  Next due by: Visit #10   Or by 18    Subjective:  Pt. Relates having no pain at this time. No issues noted after last session. Objective: ALEX performed per flow chart to facilitate strength, motion and stability to allow ease with daily activities and ambulation. Advanced and progressed several exercises to improve balance and strength. Verbal cuing for progression, technique and safety. Difficulty with dynamic gait and balance remains and exacerbated with fatigue this date. [place] e-stim re-ed to facilitate anterior tib strength. Pt. Given written and verbal instruction for ankle t-band HEP. Increased fatigue noted at this date. Unable to perform full lap of gait with cane due to fatigue.  Held several advanced

## 2018-02-08 ENCOUNTER — HOSPITAL ENCOUNTER (OUTPATIENT)
Dept: PHYSICAL THERAPY | Age: 43
Setting detail: THERAPIES SERIES
Discharge: HOME | End: 2018-02-08
Payer: MEDICARE

## 2018-02-08 PROCEDURE — 97110 THERAPEUTIC EXERCISES: CPT | Performed by: PHYSICAL THERAPIST

## 2018-02-08 PROCEDURE — 97112 NEUROMUSCULAR REEDUCATION: CPT | Performed by: PHYSICAL THERAPIST

## 2018-02-13 ENCOUNTER — HOSPITAL ENCOUNTER (OUTPATIENT)
Dept: PHYSICAL THERAPY | Age: 43
Setting detail: THERAPIES SERIES
Discharge: HOME | End: 2018-02-13
Payer: MEDICARE

## 2018-02-13 PROCEDURE — 97112 NEUROMUSCULAR REEDUCATION: CPT | Performed by: PHYSICAL THERAPIST

## 2018-02-13 PROCEDURE — 97110 THERAPEUTIC EXERCISES: CPT | Performed by: PHYSICAL THERAPIST

## 2018-02-13 NOTE — FLOWSHEET NOTE
ambulation re-education. (29333)    Self-Care/ADL's  [] Self-care/home management training and compensatory training, meal preparation, safety procedures, and instructions in use of assistive technology devices/adaptive equipment, direct one-on-one contact. (16128)    Home Exercise Program:   Continue current HEP. [x] Reviewed/Progressed HEP activities related to strengthening, flexibility, endurance, ROM. (30231)  [x] Reviewed/Progressed HEP activities related to improving balance, coordination, kinesthetic sense, posture, motor skill, proprioception.  (76715)    Manual Treatments:    [] Provided manual therapy to mobilize soft tissue/joints for the purpose of modulating pain, promoting relaxation,  increasing ROM, reducing/eliminating soft tissue swelling/inflammation/restriction, improving soft tissue extensibility. (45338)    Service Based Modalities:   Timed Code Treatment Minutes:  27' there-ex        23' neuro re-ed           Total Treatment Minutes: 48'    Treatment/Activity Tolerance:  [x] Patient tolerated treatment well [x] Patient limited by fatique  [] Patient limited by pain  [] Patient limited by other medical complications  [] Other:     Prognosis: [] Good [x] Fair  [] Poor    Patient Requires Follow-up: [x] Yes  [] No      Goals:     Long term goals  Time Frame for Long term goals : 4 weeks  Long term goal 1: Indep with HEP   Long term goal 2:  Increase bilat LE strength to [facility] for improved ease with ADL and home/community management   Long term goal 3: Pt to perform balance activities without UE support for 45 seconds or more for improved balance and stability and iincreased ease with home/community ADL  Long term goal 4: Pt to be able to ambulate short home distances without RW to improve ease with home management and ADL   Long term goal 5: TUG/TInetti scores within age-accepted norms (13 sec TUG, >23/28 Tinetti) for improved safety and ease with home and community management     Plan:   [x]

## 2018-02-15 ENCOUNTER — APPOINTMENT (OUTPATIENT)
Dept: PHYSICAL THERAPY | Age: 43
End: 2018-02-15
Payer: MEDICARE

## 2018-02-20 ENCOUNTER — HOSPITAL ENCOUNTER (OUTPATIENT)
Dept: PHYSICAL THERAPY | Age: 43
Setting detail: THERAPIES SERIES
Discharge: HOME | End: 2018-02-20
Payer: MEDICARE

## 2018-02-20 PROCEDURE — G0283 ELEC STIM OTHER THAN WOUND: HCPCS | Performed by: PHYSICAL THERAPY ASSISTANT

## 2018-02-20 PROCEDURE — 97110 THERAPEUTIC EXERCISES: CPT | Performed by: PHYSICAL THERAPY ASSISTANT

## 2018-02-20 PROCEDURE — 97112 NEUROMUSCULAR REEDUCATION: CPT | Performed by: PHYSICAL THERAPY ASSISTANT

## 2018-02-20 NOTE — FLOWSHEET NOTE
this date during 2nd lap of no-assistive device (but PT CGA) ambulation around the clinic. Exercises:  Exercise/Equipment Resistance/Repetitions Other comments   Nu-Step     Elliptical     Counter Ex 15x2# each bilat (FOAM) , 3 Way Hip, HS Curls   Mini Squats 15x Ball between legs 1-2 UE assist, ruth disc   FTEO/FAEC 3 x 30\" FOAM   Tandem Stance 2 x 30\"    Offset Feet Balance  DynaDisc 3 x 30\"    Sidesteps 3 laps 0-1 HHA   Retro Walk  Single point cane   Lateral Step Ups  Without KAFO for improved ease with functional knee flexion   Balance on Round surface of BOSU 1 x 40\"    Marches 20 x2# Seated    \"[place]\" walking along //    HS Curls Without KAFO    Amb with straight cane Therapist CGA, NO UE assist   Hurdles    LAQ c eccentric lower    Seated march with eccentric lower 20x   Seated Hip ADD/ABD 25x/20x BLUE each         Quad Sets    SLR c eccentric lower    Hip ABD/ADD     HS Curls     Prone HS Curls Manual assist with R leg   Bridges         Ball diagonals, rot, overhead 10x4# ea Standing    Valpar  Square             Bicep Curls In standing   Tricep Ext overhead    Shoulder Press BAR (Advanced)    Shoulder flexion, abd    Ambulate around clinic, no AD 2 laps Verbal and tactile cues as well as CGA from therapist to improve gait mechanics and dynamic stability with gait        Wand Ambulation Half lap Initiated today   [x] Provided verbal/tactile cueing for activities related to strengthening, flexibility, endurance, ROM. (11725)  [x] Provided verbal/tactile cueing for activities related to improving balance, coordination, kinesthetic sense, posture, motor skill, proprioception. (92397)    Therapeutic Activities:     [] Therapeutic activities, direct (one-on-one) patient contact (use of dynamic activities to improve functional performance). (50905)    Gait:   [] Provided training and instruction to the patient for ambulation re-education.  (84005)    Self-Care/ADL's  [] Self-care/home management training

## 2018-02-22 ENCOUNTER — HOSPITAL ENCOUNTER (OUTPATIENT)
Dept: PHYSICAL THERAPY | Age: 43
Setting detail: THERAPIES SERIES
Discharge: HOME | End: 2018-02-22
Payer: MEDICARE

## 2018-02-22 PROCEDURE — 97112 NEUROMUSCULAR REEDUCATION: CPT | Performed by: PHYSICAL THERAPIST

## 2018-02-22 PROCEDURE — 97110 THERAPEUTIC EXERCISES: CPT | Performed by: PHYSICAL THERAPIST

## 2018-02-22 NOTE — FLOWSHEET NOTE
Physical Therapy Daily Treatment Note    Date:  2018    Patient Name:  Leona Mcclendon    :  1975  MRN: [id number]     Restrictions/Precautions:       Medical/Treatment Diagnosis Information:   · Diagnosis: Multiple Sclerosis  · Treatment Diagnosis: Multiple Sclerosis, decreased strength, decreased balance    Insurance/Certification information:  PT Insurance Information: Medicare    Physician Information:  Referring Practitioner: Yaa Barlow    Plan of care signed (Y/N):  N    Visit# / total visits: 5/10 4th POC (31 total)    Pain level: 0/10     G-Code (if applicable):      Date G-Code Applied:  18  PT G-Codes  Functional Assessment Tool Used: TUG; Tinetti  Score: TU seconds (chair with no arms)     15 seconds (Current); Tinetti:  = 61% or 39% disabled   Functional Limitation: Mobility: Walking and moving around  Mobility: Walking and Moving Around Current Status (): At least 20 percent but less than 40 percent impaired, limited or restricted  Mobility: Walking and Moving Around Goal Status (): At least 1 percent but less than 20 percent impaired, limited or restricted    Time In: 3:14  Time Out: 4:04    Progress Note: []  Yes, Sent 11/3/17 [x]  No  Next due by: Visit #10   Or by 18    Subjective:  \"I have been doing alright with this new walker, it is lighter than the old one, so it's easier to push and easier to maneuver in my car. My walking seems to be getting a bit better, but I'm still not ready to try a cane on my own. \"    Objective: ALEX performed per flow chart to facilitate strength, motion and stability to allow ease with daily activities and ambulation. Advanced and progressed several exercises to improve balance and strength. Verbal cuing for progression, technique and safety. Difficulty with dynamic gait and balance remains and exacerbated with fatigue this date.      Increased fatigue noted at this date during 2nd lap of no-assistive device (but PT CGA)

## 2018-02-27 ENCOUNTER — HOSPITAL ENCOUNTER (OUTPATIENT)
Dept: PHYSICAL THERAPY | Age: 43
Setting detail: THERAPIES SERIES
Discharge: HOME | End: 2018-02-27
Payer: MEDICARE

## 2018-02-27 PROCEDURE — 97110 THERAPEUTIC EXERCISES: CPT | Performed by: PHYSICAL THERAPIST

## 2018-02-27 PROCEDURE — 97112 NEUROMUSCULAR REEDUCATION: CPT | Performed by: PHYSICAL THERAPIST

## 2018-02-27 NOTE — FLOWSHEET NOTE
Physical Therapy Daily Treatment Note    Date:  2018    Patient Name:  Deandra Lofton    :  1975  MRN: [id number]     Restrictions/Precautions:       Medical/Treatment Diagnosis Information:   · Diagnosis: Multiple Sclerosis  · Treatment Diagnosis: Multiple Sclerosis, decreased strength, decreased balance    Insurance/Certification information:  PT Insurance Information: Medicare    Physician Information:  Referring Practitioner: Tristian Lyons    Plan of care signed (Y/N):  N    Visit# / total visits: 6/10 4th POC (32 total)    Pain level: 010     G-Code (if applicable):      Date G-Code Applied:  18  PT G-Codes  Functional Assessment Tool Used: TUG; Tinetti  Score: TU seconds (initial eval)     14 seconds (Current); Tinetti:  = 68% or 32% disabled     (Previous  = 39%)  Functional Limitation: Mobility: Walking and moving around  Mobility: Walking and Moving Around Current Status (): At least 20 percent but less than 40 percent impaired, limited or restricted  Mobility: Walking and Moving Around Goal Status (): At least 1 percent but less than 20 percent impaired, limited or restricted    Time In: 2:39  Time Out: 3:37    Progress Note: []  Yes, Sent 11/3/17 [x]  No  Next due by: Visit #10   Or by 18    Subjective:  \"I noticed that I didn't have as much pain or soreness in my legs after the last PT session, so I think we can step things up even more difficulty to push these leg muscles even more today. \"    Objective: ALEX performed per flow chart to facilitate strength, motion and stability to allow ease with daily activities and ambulation. Advanced and progressed several exercises to improve balance and strength. Verbal cuing for progression, technique and safety. Difficulty with dynamic gait and balance remains and exacerbated with fatigue this date.      Increased fatigue noted at this date during 2nd lap of no-assistive device (but PT CGA) ambulation around the clinic. Able to take rest break and then continue on with dynadisc and BOSU balance exercises. Exercises:  Exercise/Equipment Resistance/Repetitions Other comments   Nu-Step     Elliptical     Counter Ex 20x2# each bilat (FOAM)  3 Way Hip, HS Curls, Marches   Mini Squats 20x Ball between legs 1-2 UE assist, ruth disc   FTEO/FAEC 3 x 30\" FOAM   Tandem Stance 2 x 30\"    Offset Feet Balance  DynaDisc 3 x 30\"    Sidesteps 3 laps 0-1 HHA   Retro Walk  Single point cane   Lateral Step Ups  Without KAFO for improved ease with functional knee flexion   Balance on Round surface of BOSU 1 x 40\"    Marches 20 x2# Seated    \"[place]\" walking along //    HS Curls Without KAFO    Amb with straight cane Therapist CGA, NO UE assist   Hurdles    LAQ c eccentric lower 20x bilat   Seated march with eccentric lower 20x   Seated Hip ADD/ABD 25x/20x BLUE each    Tandem balance EO/EC 2 x 30\" each    Sit-Stands 10x with airex pad in chair  10x without airex, but with ball between legs         Quad Sets    SLR c eccentric lower    Hip ABD/ADD     HS Curls     Prone HS Curls Manual assist with R leg   Bridges         Ball diagonals, rot, overhead 10x4# ea Standing    [place]  Square             Bicep Curls In standing   Tricep Ext overhead    Shoulder Press BAR (Advanced)    Shoulder flexion, abd    Ambulate around clinic, no AD 2 laps Verbal and tactile cues as well as CGA from therapist to improve gait mechanics and dynamic stability with gait        [x] Provided verbal/tactile cueing for activities related to strengthening, flexibility, endurance, ROM. (10582)  [x] Provided verbal/tactile cueing for activities related to improving balance, coordination, kinesthetic sense, posture, motor skill, proprioception. (21807)    Therapeutic Activities:     [] Therapeutic activities, direct (one-on-one) patient contact (use of dynamic activities to improve functional performance).  (01915)    Gait:   [] Provided training and instruction to the

## 2018-03-01 ENCOUNTER — APPOINTMENT (OUTPATIENT)
Dept: PHYSICAL THERAPY | Age: 43
End: 2018-03-01
Payer: MEDICARE

## 2018-03-06 ENCOUNTER — HOSPITAL ENCOUNTER (OUTPATIENT)
Dept: PHYSICAL THERAPY | Age: 43
Setting detail: THERAPIES SERIES
Discharge: HOME | End: 2018-03-06
Payer: MEDICARE

## 2018-03-06 PROCEDURE — 97110 THERAPEUTIC EXERCISES: CPT | Performed by: PHYSICAL THERAPY ASSISTANT

## 2018-03-06 PROCEDURE — 97112 NEUROMUSCULAR REEDUCATION: CPT | Performed by: PHYSICAL THERAPY ASSISTANT

## 2018-03-06 NOTE — FLOWSHEET NOTE
assistive technology devices/adaptive equipment, direct one-on-one contact. (16557)    Home Exercise Program:   Continue current HEP. [x] Reviewed/Progressed HEP activities related to strengthening, flexibility, endurance, ROM. (51277)  [x] Reviewed/Progressed HEP activities related to improving balance, coordination, kinesthetic sense, posture, motor skill, proprioception.  (05896)    Manual Treatments:    [] Provided manual therapy to mobilize soft tissue/joints for the purpose of modulating pain, promoting relaxation,  increasing ROM, reducing/eliminating soft tissue swelling/inflammation/restriction, improving soft tissue extensibility. (06763)    Service Based Modalities:   Timed Code Treatment Minutes:  27' there-ex        30' neuro re-ed           Total Treatment Minutes: 61'    Treatment/Activity Tolerance:  [x] Patient tolerated treatment well [x] Patient limited by fatique  [] Patient limited by pain  [] Patient limited by other medical complications  [] Other:     Prognosis: [] Good [x] Fair  [] Poor    Patient Requires Follow-up: [x] Yes  [] No      Goals:     Long term goals  Time Frame for Long term goals : 4 weeks  Long term goal 1: Indep with HEP   Long term goal 2:  Increase bilat LE strength to [facility] for improved ease with ADL and home/community management   Long term goal 3: Pt to perform balance activities without UE support for 45 seconds or more for improved balance and stability and iincreased ease with home/community ADL  Long term goal 4: Pt to be able to ambulate short home distances without RW to improve ease with home management and ADL   Long term goal 5: TUG/TInetti scores within age-accepted norms (13 sec TUG, >23/28 Tinetti) for improved safety and ease with home and community management      Plan:   [x] Continue per plan of care [] Alter current plan (see comments)   [] Plan of care initiated [] Hold pending MD visit [] Discharge    Plan for Next Session: Continue to advance dynamic

## 2018-03-08 ENCOUNTER — HOSPITAL ENCOUNTER (OUTPATIENT)
Dept: PHYSICAL THERAPY | Age: 43
Setting detail: THERAPIES SERIES
Discharge: HOME | End: 2018-03-08
Payer: MEDICARE

## 2018-03-08 PROCEDURE — 97112 NEUROMUSCULAR REEDUCATION: CPT | Performed by: PHYSICAL THERAPY ASSISTANT

## 2018-03-08 PROCEDURE — 97110 THERAPEUTIC EXERCISES: CPT | Performed by: PHYSICAL THERAPY ASSISTANT
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
## 2018-03-13 ENCOUNTER — HOSPITAL ENCOUNTER (OUTPATIENT)
Dept: PHYSICAL THERAPY | Age: 43
Setting detail: THERAPIES SERIES
Discharge: HOME | End: 2018-03-13
Payer: MEDICARE

## 2018-03-13 PROCEDURE — 97112 NEUROMUSCULAR REEDUCATION: CPT | Performed by: PHYSICAL THERAPY ASSISTANT

## 2018-03-13 PROCEDURE — 97110 THERAPEUTIC EXERCISES: CPT | Performed by: PHYSICAL THERAPY ASSISTANT

## 2018-03-15 ENCOUNTER — HOSPITAL ENCOUNTER (OUTPATIENT)
Dept: PHYSICAL THERAPY | Age: 43
Setting detail: THERAPIES SERIES
Discharge: HOME | End: 2018-03-15
Payer: MEDICARE

## 2018-03-15 PROCEDURE — 97110 THERAPEUTIC EXERCISES: CPT | Performed by: PHYSICAL THERAPIST

## 2018-03-15 PROCEDURE — 97112 NEUROMUSCULAR REEDUCATION: CPT | Performed by: PHYSICAL THERAPIST

## 2018-03-15 NOTE — FLOWSHEET NOTE
Physical Therapy Daily Treatment Note    Date:  3/15/2018    Patient Name:  Mahnaz Garcia    :  1975  MRN: [id number]     Restrictions/Precautions:       Medical/Treatment Diagnosis Information:   · Diagnosis: Multiple Sclerosis  · Treatment Diagnosis: Multiple Sclerosis, decreased strength, decreased balance    Insurance/Certification information:  PT Insurance Information: Medicare    Physician Information:  Referring Practitioner: Elizabeth Waldron    Plan of care signed (Y/N):  N    Visit# / total visits: 9/10 4th POC (34 total)    Pain level: 0/10     G-Code (if applicable):      Date G-Code Applied:  18  PT G-Codes  Functional Assessment Tool Used: TUG; Tinetti  Score: TU seconds (initial eval)     14 seconds (Current); Tinetti:  = 68% or 32% disabled     (Previous  = 39%)  Functional Limitation: Mobility: Walking and moving around  Mobility: Walking and Moving Around Current Status (): At least 20 percent but less than 40 percent impaired, limited or restricted  Mobility: Walking and Moving Around Goal Status (): At least 1 percent but less than 20 percent impaired, limited or restricted    Time In: 3:01  Time Out: 4:01    Progress Note: []  Yes, Sent 11/3/17 [x]  No  Next due by: Visit #10   Or by 18    Subjective:  No new c/o noted at this time. Felt improvement in gait in facility when performed prior to exercises. Objective: ALEX performed per flow chart to facilitate strength, motion and stability to allow ease with daily activities and ambulation. Added and advanced several exercises to improve dynamic balance and strength. Verbal cuing for progression, technique and safety. Difficulty with dynamic gait and balance remains and exacerbated with fatigue this date. Fatigue noted at conclusion of session. Increased fatigue noted at this date during 2nd lap of no-assistive device (but PTA CGA) ambulation around the clinic.    Able to complete 2 x 10 sit-stands performance). (08884)    Gait:   [] Provided training and instruction to the patient for ambulation re-education. (59826)    Self-Care/ADL's  [] Self-care/home management training and compensatory training, meal preparation, safety procedures, and instructions in use of assistive technology devices/adaptive equipment, direct one-on-one contact. (54649)    Home Exercise Program:   Continue current HEP. [x] Reviewed/Progressed HEP activities related to strengthening, flexibility, endurance, ROM. (44439)  [x] Reviewed/Progressed HEP activities related to improving balance, coordination, kinesthetic sense, posture, motor skill, proprioception.  (01906)    Manual Treatments:    [] Provided manual therapy to mobilize soft tissue/joints for the purpose of modulating pain, promoting relaxation,  increasing ROM, reducing/eliminating soft tissue swelling/inflammation/restriction, improving soft tissue extensibility. (91873)    Service Based Modalities:   Timed Code Treatment Minutes:  27' there-ex        30' neuro re-ed           Total Treatment Minutes: 61'    Treatment/Activity Tolerance:  [x] Patient tolerated treatment well [x] Patient limited by fatique  [] Patient limited by pain  [] Patient limited by other medical complications  [] Other:     Prognosis: [] Good [x] Fair  [] Poor    Patient Requires Follow-up: [x] Yes  [] No      Goals:     Long term goals  Time Frame for Long term goals : 4 weeks  Long term goal 1: Indep with HEP   Long term goal 2:  Increase bilat LE strength to [facility] for improved ease with ADL and home/community management   Long term goal 3: Pt to perform balance activities without UE support for 45 seconds or more for improved balance and stability and iincreased ease with home/community ADL  Long term goal 4: Pt to be able to ambulate short home distances without RW to improve ease with home management and ADL   Long term goal 5: TUG/TInetti scores within age-accepted norms (13 sec TUG, >23/28

## 2018-03-27 ENCOUNTER — HOSPITAL ENCOUNTER (OUTPATIENT)
Dept: PHYSICAL THERAPY | Age: 43
Setting detail: THERAPIES SERIES
Discharge: HOME | End: 2018-03-27
Payer: MEDICARE

## 2018-03-27 PROCEDURE — 97112 NEUROMUSCULAR REEDUCATION: CPT | Performed by: PHYSICAL THERAPIST

## 2018-03-27 PROCEDURE — 97110 THERAPEUTIC EXERCISES: CPT | Performed by: PHYSICAL THERAPIST

## 2018-03-27 NOTE — FLOWSHEET NOTE
Physical Therapy Daily Treatment Note    Date:  3/27/2018    Patient Name:  Salvador Swartz    :  1975  MRN: [id number]     Restrictions/Precautions:       Medical/Treatment Diagnosis Information:   · Diagnosis: Multiple Sclerosis  · Treatment Diagnosis: Multiple Sclerosis, decreased strength, decreased balance    Insurance/Certification information:  PT Insurance Information: Medicare    Physician Information:  Referring Practitioner: Radha Sherman    Plan of care signed (Y/N):  N    Visit# / total visits: 10/10 4th POC (34 total)    Pain level: 010     G-Code (if applicable):      Date G-Code Applied:  18  PT G-Codes  Functional Assessment Tool Used: TUG; Tinetti  Score: TU seconds (initial eval)     14 seconds (Current); Tinetti:  = 68% or 32% disabled     (Previous  = 39%)  Functional Limitation: Mobility: Walking and moving around  Mobility: Walking and Moving Around Current Status (): At least 20 percent but less than 40 percent impaired, limited or restricted  Mobility: Walking and Moving Around Goal Status (): At least 1 percent but less than 20 percent impaired, limited or restricted    Time In: 3:00  Time Out: 3:48    Progress Note: []  Yes, Sent 11/3/17 [x]  No  Next due by: Visit #10   Or by 18    Subjective:  \"I haven't had any major issues the past week. I missed last week because of having some family in town and a few other appointments, but I was doing my exercises at home 1x per day. \"    Objective: ALEX performed per flow chart to facilitate strength, motion and stability to allow ease with daily activities and ambulation. Added and advanced several exercises to improve dynamic balance and strength. Verbal cuing for progression, technique and safety. Difficulty with dynamic gait and balance remains and exacerbated with fatigue this date. Fatigue noted at conclusion of session.     Increased fatigue noted at this date during 2nd lap of no-assistive

## 2018-03-29 ENCOUNTER — HOSPITAL ENCOUNTER (OUTPATIENT)
Dept: PHYSICAL THERAPY | Age: 43
Setting detail: THERAPIES SERIES
Discharge: HOME | End: 2018-03-29
Payer: MEDICARE

## 2018-03-29 PROCEDURE — G8980 MOBILITY D/C STATUS: HCPCS | Performed by: PHYSICAL THERAPIST

## 2018-03-29 PROCEDURE — 97110 THERAPEUTIC EXERCISES: CPT | Performed by: PHYSICAL THERAPY ASSISTANT

## 2018-03-29 PROCEDURE — G8979 MOBILITY GOAL STATUS: HCPCS | Performed by: PHYSICAL THERAPIST

## 2018-03-29 PROCEDURE — 97112 NEUROMUSCULAR REEDUCATION: CPT | Performed by: PHYSICAL THERAPY ASSISTANT

## 2018-08-06 ENCOUNTER — HOSPITAL ENCOUNTER (OUTPATIENT)
Dept: PHYSICAL THERAPY | Age: 43
Setting detail: THERAPIES SERIES
Discharge: HOME | End: 2018-08-06
Payer: MEDICARE

## 2018-08-06 PROCEDURE — G8978 MOBILITY CURRENT STATUS: HCPCS | Performed by: PHYSICAL THERAPIST

## 2018-08-06 PROCEDURE — 97162 PT EVAL MOD COMPLEX 30 MIN: CPT | Performed by: PHYSICAL THERAPIST

## 2018-08-06 PROCEDURE — 97112 NEUROMUSCULAR REEDUCATION: CPT | Performed by: PHYSICAL THERAPIST

## 2018-08-06 PROCEDURE — G8979 MOBILITY GOAL STATUS: HCPCS | Performed by: PHYSICAL THERAPIST

## 2018-08-06 NOTE — PROGRESS NOTES
Physical Therapy  Initial Assessment  Date: 2018  Patient Name: Shaila Puckett  MRN: [id number]  : 1975     Treatment Diagnosis: multiple sclerosis-weakness    Restrictions       Subjective   General  Chart Reviewed: Yes  Patient assessed for rehabilitation services?: Yes  Response To Previous Treatment: Not applicable  Family / Caregiver Present: No  Referring Practitioner: Rae Johnson. MD Shira  Referral Date : 18  Diagnosis: Multiple Sclerosis  Follows Commands: Within Functional Limits  PT Visit Information  Onset Date: 18  PT Insurance Information: Medicare/Medicaid  Subjective  Subjective: \"I've had MS for quite a few years, I was diagnosed when I was 24 (). We have done some different rounds of therapy, which I was doing last  and earlier this spring and we had gotten pretty far but decided to take a break for a few months. I don't think I have gone backward a whole lot since we stopped in the spring, but there are a couple things that have gone backward a little bit, and haven't really progressed forward much. I am trying to do as much yardwork as I can, but sometimes I have to  my \"bad leg\" to get through the grass or mulch. \"  Pain Screening  Patient Currently in Pain: No  Vital Signs  Patient Currently in Pain: No    Vision/Hearing       Orientation  Orientation  Overall Orientation Status: Within Normal Limits    Social/Functional History  Social/Functional History  Lives With: Significant other  Type of Home: House  Home Layout: One level  Home Access: Ramped entrance;Stairs to enter without rails  Entrance Stairs - Number of Steps: 1  Bathroom Shower/Tub: Tub/Shower unit  Bathroom Toilet: Standard  Bathroom Accessibility: Accessible  Home Equipment: 4 wheeled walker  Receives Help From: Family  ADL Assistance: Independent  Homemaking Assistance: Independent  Homemaking Responsibilities: Yes  Meal Prep Responsibility: Secondary  Laundry Responsibility: Secondary  Cleaning Responsibility: Secondary (has 2x per week home health care to assist with cleaning and grocery shopping)  Shopping Responsibility: Secondary  Ambulation Assistance: Independent  Transfer Assistance: Independent  Active : Yes  Mode of Transportation: [employer]  Occupation: On disability  Leisure & Hobbies: go out to eat with girlfriend, visit with friends, yard work  Objective     Observation/Palpation  Posture: Fair  Observation: pt amb into clinic with 4WW and AFO on R leg    PROM RLE (degrees)  RLE PROM: WNL  AROM RLE (degrees)  RLE AROM: WFL  RLE General AROM: decreased ease with getting to full knee flexion due to decreased strength, not limited from pain or restricted motion  PROM LLE (degrees)  LLE PROM: WFL  AROM LLE (degrees)  LLE AROM : WFL    Strength RLE  Strength RLE: Exception  R Hip Flexion: 3/5  R Knee Flexion: 3/5  R Knee Extension: 2+/5  R Ankle Dorsiflexion: 3-/5  Strength LLE  Strength LLE: WFL  Comment: grossly 4-/5 all motions     Additional Measures  Special Tests: TU seconds           Ambulation  Ambulation?: Yes  Ambulation 1  Surface: level tile  Device: Rollator;Rolling Walker  Other Apparatus: AFO  Assistance: Independent  Quality of Gait: requires mild vaulting of R leg during swing phase to clear foot due to decreased R knee flexion during swing  Distance: 100'  Balance  Posture: Good  Sitting - Static: Fair  Sitting - Dynamic: Fair  Standing - Static: +;Poor  Standing - Dynamic: Poor;+                         Assessment   Conditions Requiring Skilled Therapeutic Intervention  Body structures, Functions, Activity limitations: Decreased functional mobility ; Decreased ADL status; Decreased ROM; Decreased strength  Treatment Diagnosis: multiple sclerosis-weakness  Prognosis: Good  Decision Making: Medium Complexity  Patient Education: Issued HEP  REQUIRES PT FOLLOW UP: Yes  Activity Tolerance  Activity Tolerance: Patient Tolerated treatment well         Plan Plan  Times per week: 2  Plan weeks: 6  Current Treatment Recommendations: Strengthening, ROM, Balance Training, Functional Mobility Training, ADL/Self-care Training, Gait Training, Neuromuscular Re-education, Manual Therapy - Soft Tissue Mobilization, Modalities, Home Exercise Program    G-Code  PT G-Nazanin  Functional Assessment Tool Used: Tinetti  Score: 15/28 = 54% or 46% disabled  Functional Limitation: Mobility: Walking and moving around  Mobility: Walking and Moving Around Current Status (): At least 40 percent but less than 60 percent impaired, limited or restricted  Mobility: Walking and Moving Around Goal Status ():  At least 1 percent but less than 20 percent impaired, limited or restricted    OutComes Score                                           Goals  Short term goals  Time Frame for Short term goals: 3 weeks  Short term goal 1: Initiate HEP  Short term goal 2: Tandem balance >15\" bilaterally for improved ease with ADL and yard work  Short term goal 3: Improve TUG time to <15\" for improved balance and safety in home and community  Long term goals  Time Frame for Long term goals : 6 weeks  Long term goal 1: Indep with HEP  Long term goal 2: Tandem balance >25\" bilaterally for improved ease with ADL and yard work  Long term goal 3: Improve TUG time to <13\" for improved balance and safety in home and community  Long term goal 4: Pt to amb 50' with least restrictive AD to improve home/community ambulation  Long term goal 5: Improve Tinetti score to >21 for improved home/community ambulation and safety  Patient Goals   Patient goals : \"walk better\"       Therapy Time   Individual Concurrent Group Co-treatment   Time In 1600         Time Out 1647         Minutes 47         Timed Code Treatment Minutes: 24 Minutes       Laurie Patterson, PT, DPT

## 2018-08-06 NOTE — PROGRESS NOTES
home/community ambulation  Long term goal 5: Improve Tinetti score to >21 for improved home/community ambulation and safety    Plan:   [] Continue per plan of care [] Alter current plan (see comments)  [x] Plan of care initiated [] Hold pending MD visit [] Discharge    Plan for Next Session:  Progress as tolerated    Electronically signed by:  Sebas Luz DPT

## 2018-08-08 ENCOUNTER — HOSPITAL ENCOUNTER (OUTPATIENT)
Dept: PHYSICAL THERAPY | Age: 43
Setting detail: THERAPIES SERIES
Discharge: HOME | End: 2018-08-08
Payer: MEDICARE

## 2018-08-08 PROCEDURE — 97112 NEUROMUSCULAR REEDUCATION: CPT | Performed by: PHYSICAL THERAPIST

## 2018-08-15 ENCOUNTER — HOSPITAL ENCOUNTER (OUTPATIENT)
Dept: PHYSICAL THERAPY | Age: 43
Setting detail: THERAPIES SERIES
Discharge: HOME | End: 2018-08-15
Payer: MEDICARE

## 2018-08-15 PROCEDURE — 97112 NEUROMUSCULAR REEDUCATION: CPT | Performed by: PHYSICAL THERAPIST

## 2018-08-20 ENCOUNTER — HOSPITAL ENCOUNTER (OUTPATIENT)
Dept: PHYSICAL THERAPY | Age: 43
Setting detail: THERAPIES SERIES
Discharge: HOME | End: 2018-08-20
Payer: MEDICARE

## 2018-08-20 PROCEDURE — 97112 NEUROMUSCULAR REEDUCATION: CPT | Performed by: PHYSICAL THERAPY ASSISTANT

## 2018-08-20 NOTE — PROGRESS NOTES
Physical Therapy    Physical Therapy Daily Treatment Note    Date:  2018    Patient Name:  Miguelito Doshi    :  1975  MRN: [id number]  Restrictions/Precautions:     Medical/Treatment Diagnosis Information:   · Diagnosis: Multiple Sclerosis  · Treatment Diagnosis: multiple sclerosis-weakness  Insurance/Certification information:  PT Insurance Information: Medicare/Medicaid  Physician Information:  Referring Practitioner: Elba Silva MD  Plan of care signed (Y/N):  N  Visit# / total visits:  4/10  Pain level: 0/10     G-Code (if applicable):      Date G-Code Applied: 18   PT G-Codes  Functional Assessment Tool Used: Tinetti  Score: 15/28 = 54% or 46% disabled  Functional Limitation: Mobility: Walking and moving around  Mobility: Walking and Moving Around Current Status (): At least 40 percent but less than 60 percent impaired, limited or restricted  Mobility: Walking and Moving Around Goal Status (): At least 1 percent but less than 20 percent impaired, limited or restricted    Time In: 2:31   Time Out:  3:15    Progress Note: []  Yes  [x]  No  Next due by: Visit #10  Or by 18    Subjective:   No new c/o at this time. No falls noted since last session. Objective: ALEX complete per flow chart to facilitate strength, stability and motion to allow ease with daily ambulation and ADL's. Verbal cuing for progression and technique with exercises. Difficulty with advanced gait and balance noted. Observation: Difficulty with advanced balance and gait exercises  Fatigue noted at conclusion of session with attempting gait without AD.    Test measurements:        Exercises:   Exercise/Equipment Resistance/Repetitions Other comments   Tandem Balance 2 x 30\" Blue beam   FTEO 2 x 30\" AIREX   FTEC 2 x 30\" AIREX   AIREX 3 way hip 15x each    Sidesteps 3 laps    Marches on AIREX 10x bilat   Therapist A for R LE   HR/TR 15x     HS Curls 10x with therapist A On [address] Marches 10x BOSU Flat Side 4 way shifts 15x each    BOSU Flat side balance 2 x 30\"    \"ski\" balance with balloon taps into wall 3 way 20 taps each    Sidesteps over hurdles  Required therapist min-mod A to clear R foot over hurdles   Retro Walk //     Seated Marches 15x    Seated LAQ 15x    Seated hip ABD/ADD GREEN 15x    Sit-Stands No UE Assist 10x    Mini Squats c Ball between knees 15x On stable/even surface             Gait around gym 1 lap No AD, therapist CGA                  [] Provided verbal/tactile cueing for activities related to strengthening, flexibility, endurance, ROM. (31499)  [x] Provided verbal/tactile cueing for activities related to improving balance, coordination, kinesthetic sense, posture, motor skill, proprioception. (34527)    Therapeutic Activities:     [] Therapeutic activities, direct (one-on-one) patient contact (use of dynamic activities to improve functional performance). (36098)    Gait:   [] Provided training and instruction to the patient for ambulation re-education. (49840)    Self-Care/ADL's  [] Self-care/home management training and compensatory training, meal preparation, safety procedures, and instructions in use of assistive technology devices/adaptive equipment, direct one-on-one contact. (52762)    Home Exercise Program:     [] Reviewed/Progressed HEP activities related to strengthening, flexibility, endurance, ROM. (65634)  [x] Reviewed/Progressed HEP activities related to improving balance, coordination, kinesthetic sense, posture, motor skill, proprioception.  (97099)    Manual Treatments:    [] Provided manual therapy to mobilize soft tissue/joints for the purpose of modulating pain, promoting relaxation,  increasing ROM, reducing/eliminating soft tissue swelling/inflammation/restriction, improving soft tissue extensibility.  (40942)    Service Based Modalities:      Timed Code Treatment Minutes:   40' Neuro Re-Ed    Total Treatment Minutes:   40'    Treatment/Activity

## 2018-08-22 ENCOUNTER — HOSPITAL ENCOUNTER (OUTPATIENT)
Dept: PHYSICAL THERAPY | Age: 43
Setting detail: THERAPIES SERIES
Discharge: HOME | End: 2018-08-22
Payer: MEDICARE

## 2018-08-22 PROCEDURE — 97112 NEUROMUSCULAR REEDUCATION: CPT | Performed by: PHYSICAL THERAPIST

## 2018-08-22 NOTE — PROGRESS NOTES
Physical Therapy    Physical Therapy Daily Treatment Note    Date:  2018    Patient Name:  Jennifer Spaulding    :  1975  MRN: [id number]  Restrictions/Precautions:     Medical/Treatment Diagnosis Information:   · Diagnosis: Multiple Sclerosis  · Treatment Diagnosis: multiple sclerosis-weakness  Insurance/Certification information:  PT Insurance Information: Medicare/Medicaid  Physician Information:  Referring Practitioner: Clint Silva MD  Plan of care signed (Y/N):  N  Visit# / total visits:  5/10  Pain level: 0/10     G-Code (if applicable):      Date G-Code Applied: 18   PT G-Codes  Functional Assessment Tool Used: Tinetti  Score: 15/28 = 54% or 46% disabled  Functional Limitation: Mobility: Walking and moving around  Mobility: Walking and Moving Around Current Status (): At least 40 percent but less than 60 percent impaired, limited or restricted  Mobility: Walking and Moving Around Goal Status (): At least 1 percent but less than 20 percent impaired, limited or restricted    Time In: 4:00   Time Out: 4:49     Progress Note: []  Yes  [x]  No  Next due by: Visit #10  Or by 18    Subjective:   \"I've been feeling alright since the last visit. My balance is still not what I want it to be, but I know that is part of the MS and I am working on it at home. \"      Objective: ALEX complete per flow chart to facilitate strength, stability and motion to allow ease with daily ambulation and ADL's. Verbal cuing for progression and technique with exercises. Difficulty with advanced gait and balance noted. Observation: Difficulty with advanced balance and gait exercises  Fatigue noted at conclusion of session with attempting gait without AD.    Test measurements:      Exercises:   Exercise/Equipment Resistance/Repetitions Other comments   Tandem Balance 2 x 30\" Blue beam   FTEO 2 x 30\" AIREX   FTEC 2 x 30\" AIREX   AIREX 3 way hip 15x each    Sidesteps 3 laps    Marches on AIREX 10x bilat

## 2018-08-27 ENCOUNTER — APPOINTMENT (OUTPATIENT)
Dept: PHYSICAL THERAPY | Age: 43
End: 2018-08-27
Payer: MEDICARE

## 2018-08-29 ENCOUNTER — HOSPITAL ENCOUNTER (OUTPATIENT)
Dept: PHYSICAL THERAPY | Age: 43
Setting detail: THERAPIES SERIES
Discharge: HOME | End: 2018-08-29
Payer: MEDICARE

## 2018-08-29 PROCEDURE — 97110 THERAPEUTIC EXERCISES: CPT | Performed by: PHYSICAL THERAPY ASSISTANT

## 2018-08-29 NOTE — FLOWSHEET NOTE
Physical Therapy    Physical Therapy Daily Treatment Note    Date:  2018    Patient Name:  Vikki Ward    :  1975  MRN: [id number]  Restrictions/Precautions:     Medical/Treatment Diagnosis Information:   · Diagnosis: Multiple Sclerosis  · Treatment Diagnosis: multiple sclerosis-weakness  Insurance/Certification information:  PT Insurance Information: Medicare/Medicaid  Physician Information:  Referring Practitioner: Elizabeth Silva MD  Plan of care signed (Y/N):  N  Visit# / total visits:  5/10  Pain level: 0/10     G-Code (if applicable):      Date G-Code Applied: 18   PT G-Codes  Functional Assessment Tool Used: Tinetti  Score: 15/28 = 54% or 46% disabled  Functional Limitation: Mobility: Walking and moving around  Mobility: Walking and Moving Around Current Status (): At least 40 percent but less than 60 percent impaired, limited or restricted  Mobility: Walking and Moving Around Goal Status (): At least 1 percent but less than 20 percent impaired, limited or restricted    Time In: 2:45  Time Out: 330     Progress Note: []  Yes  [x]  No  Next due by: Visit #10  Or by 18    Subjective:   Pt. Denies pain at this time. Attempting ambulation at home without AD intermittently. Objective: ALEX complete per flow chart to facilitate strength, stability and motion to allow ease with daily ambulation and ADL's. Verbal cuing for progression and technique with exercises. Difficulty with advanced gait and balance noted. Observation: Difficulty with advanced balance and gait exercises  Fatigue noted at conclusion of session with attempting gait without AD.    Test measurements:      Exercises:   Exercise/Equipment Resistance/Repetitions Other comments   Tandem Balance 2 x 30\" Blue beam   FTEO 2 x 30\" AIREX   FTEC 2 x 30\" AIREX   AIREX 3 way hip 15x each    Sidesteps 3 laps    Marches on AIREX 10x bilat   Therapist A for R LE   HR/TR 15x     HS Curls On [address] [place] [address] Rd BOSU Flat Side 4 way shifts 15x each    BOSU Flat side balance 2 x 30\"    BOSU Flat Side Squats     \"ski\" balance with balloon taps into wall 3 way 20 taps each    Sidesteps over hurdles 3 laps No hurdles this date   Retro Walk //     Seated Marches 15x    Seated LAQ 15x    Seated hip ABD/ADD GREEN 15x    Sit-Stands No UE Assist 10x    Mini Squats c Ball between knees 15x On stable/even surface           Gait around gym 1 lap No AD, therapist CGA                  [] Provided verbal/tactile cueing for activities related to strengthening, flexibility, endurance, ROM. (28971)  [x] Provided verbal/tactile cueing for activities related to improving balance, coordination, kinesthetic sense, posture, motor skill, proprioception. (32559)    Therapeutic Activities:     [] Therapeutic activities, direct (one-on-one) patient contact (use of dynamic activities to improve functional performance). (68129)    Gait:   [] Provided training and instruction to the patient for ambulation re-education. (18130)    Self-Care/ADL's  [] Self-care/home management training and compensatory training, meal preparation, safety procedures, and instructions in use of assistive technology devices/adaptive equipment, direct one-on-one contact. (28832)    Home Exercise Program:     [] Reviewed/Progressed HEP activities related to strengthening, flexibility, endurance, ROM. (29671)  [x] Reviewed/Progressed HEP activities related to improving balance, coordination, kinesthetic sense, posture, motor skill, proprioception.  (12691)    Manual Treatments:    [] Provided manual therapy to mobilize soft tissue/joints for the purpose of modulating pain, promoting relaxation,  increasing ROM, reducing/eliminating soft tissue swelling/inflammation/restriction, improving soft tissue extensibility.  (77907)    Service Based Modalities:      Timed Code Treatment Minutes:   39' Neuro Re-Ed    Total Treatment Minutes:  39'    Treatment/Activity Tolerance:  [x] Patient tolerated treatment well [] Patient limited by fatique  [] Patient limited by pain  [] Patient limited by other medical complications  [] Other:     Prognosis: [x] Good [] Fair  [] Poor    Patient Requires Follow-up: [x] Yes  [] No    Goals:  Short term goals  Time Frame for Short term goals: 3 weeks  Short term goal 1: Initiate HEP  Short term goal 2: Tandem balance >15\" bilaterally for improved ease with ADL and yard work  Short term goal 3: Improve TUG time to <15\" for improved balance and safety in home and community    Long term goals  Time Frame for Long term goals : 6 weeks  Long term goal 1: Indep with HEP  Long term goal 2: Tandem balance >25\" bilaterally for improved ease with ADL and yard work  Long term goal 3: Improve TUG time to <13\" for improved balance and safety in home and community  Long term goal 4: Pt to amb 50' with least restrictive AD to improve home/community ambulation  Long term goal 5: Improve Tinetti score to >21 for improved home/community ambulation and safety    Plan:   [x] Continue per plan of care [] Alter current plan (see comments)  [] Plan of care initiated [] Hold pending MD visit [] Discharge    Plan for Next Session:  Progress as tolerated    Electronically signed by:   Emy Rose PTA

## 2018-09-10 ENCOUNTER — HOSPITAL ENCOUNTER (OUTPATIENT)
Dept: PHYSICAL THERAPY | Age: 43
Setting detail: THERAPIES SERIES
End: 2018-09-10
Payer: MEDICARE

## 2018-09-14 ENCOUNTER — HOSPITAL ENCOUNTER (OUTPATIENT)
Dept: PHYSICAL THERAPY | Age: 43
Setting detail: THERAPIES SERIES
Discharge: HOME | End: 2018-09-14
Payer: MEDICARE

## 2018-09-14 PROCEDURE — 97112 NEUROMUSCULAR REEDUCATION: CPT | Performed by: PHYSICAL THERAPY ASSISTANT

## 2018-09-14 NOTE — FLOWSHEET NOTE
HR/TR 15x     HS Curls On AIREX   BOSU Round Side Marches    BOSU Flat Side 4 way shifts    BOSU Flat side balance    BOSU Flat Side Squats     \"ski\" balance with balloon taps into wall 3 way 20 taps each    Sidesteps over hurdles  No hurdles this date   Retro Walk //     Seated Marches 15x    Seated LAQ 15x    Seated hip ABD/ADD GREEN 15x    Sit-Stands No UE Assist    Mini Squats c Ball between knees On stable/even surface           Gait around gym 2 lap No AD, therapist CGA        Nu-step 6'  L4        [] Provided verbal/tactile cueing for activities related to strengthening, flexibility, endurance, ROM. (04116)  [x] Provided verbal/tactile cueing for activities related to improving balance, coordination, kinesthetic sense, posture, motor skill, proprioception. (75386)    Therapeutic Activities:     [] Therapeutic activities, direct (one-on-one) patient contact (use of dynamic activities to improve functional performance). (74831)    Gait:   [] Provided training and instruction to the patient for ambulation re-education. (70137)    Self-Care/ADL's  [] Self-care/home management training and compensatory training, meal preparation, safety procedures, and instructions in use of assistive technology devices/adaptive equipment, direct one-on-one contact. (79435)    Home Exercise Program:     [] Reviewed/Progressed HEP activities related to strengthening, flexibility, endurance, ROM. (60870)  [x] Reviewed/Progressed HEP activities related to improving balance, coordination, kinesthetic sense, posture, motor skill, proprioception.  (50535)    Manual Treatments:    [] Provided manual therapy to mobilize soft tissue/joints for the purpose of modulating pain, promoting relaxation,  increasing ROM, reducing/eliminating soft tissue swelling/inflammation/restriction, improving soft tissue extensibility.  (18558)    Service Based Modalities:      Timed Code Treatment Minutes:   52' Neuro Re-Ed    Total Treatment Minutes: 52'    Treatment/Activity Tolerance:  [x] Patient tolerated treatment well [] Patient limited by fatique  [] Patient limited by pain  [] Patient limited by other medical complications  [] Other:     Prognosis: [x] Good [] Fair  [] Poor    Patient Requires Follow-up: [x] Yes  [] No    Goals:  Short term goals  Time Frame for Short term goals: 3 weeks  Short term goal 1: Initiate HEP  Short term goal 2: Tandem balance >15\" bilaterally for improved ease with ADL and yard work  Short term goal 3: Improve TUG time to <15\" for improved balance and safety in home and community    Long term goals  Time Frame for Long term goals : 6 weeks  Long term goal 1: Indep with HEP  Long term goal 2: Tandem balance >25\" bilaterally for improved ease with ADL and yard work  Long term goal 3: Improve TUG time to <13\" for improved balance and safety in home and community  Long term goal 4: Pt to amb 50' with least restrictive AD to improve home/community ambulation  Long term goal 5: Improve Tinetti score to >21 for improved home/community ambulation and safety    Plan:   [x] Continue per plan of care [] Alter current plan (see comments)  [] Plan of care initiated [] Hold pending MD visit [] Discharge    Plan for Next Session:  Monitor tolerance and balance. Progress as per tolerance. Electronically signed by:   Ailyn Jim PTA

## 2018-09-17 ENCOUNTER — HOSPITAL ENCOUNTER (OUTPATIENT)
Dept: PHYSICAL THERAPY | Age: 43
Setting detail: THERAPIES SERIES
Discharge: HOME | End: 2018-09-17
Payer: MEDICARE

## 2018-09-17 PROCEDURE — 97112 NEUROMUSCULAR REEDUCATION: CPT | Performed by: PHYSICAL THERAPIST

## 2018-09-17 NOTE — FLOWSHEET NOTE
Physical Therapy    Physical Therapy Daily Treatment Note    Date:  2018    Patient Name:  Marly Gee    :  1975  MRN: [id number]  Restrictions/Precautions:     Medical/Treatment Diagnosis Information:   · Diagnosis: Multiple Sclerosis  · Treatment Diagnosis: multiple sclerosis-weakness  Insurance/Certification information:  PT Insurance Information: Medicare/Medicaid  Physician Information:  Referring Practitioner: Mayela Lema. MD Sihra  Plan of care signed (Y/N):  N  Visit# / total visits:  8/10  Pain level: 0/10     G-Code (if applicable):      Date G-Code Applied: 18   PT G-Codes  Functional Assessment Tool Used: Tinetti  Score: 15/28 = 54% or 46% disabled  Functional Limitation: Mobility: Walking and moving around  Mobility: Walking and Moving Around Current Status (): At least 40 percent but less than 60 percent impaired, limited or restricted  Mobility: Walking and Moving Around Goal Status (): At least 1 percent but less than 20 percent impaired, limited or restricted    Time In: 2:57  Time Out:  3:57    Progress Note: []  Yes  [x]  No  Next due by: Visit #10  Or by 18    Subjective:  \"I'm not sure if I like this new leg brace. I've had it 2 weeks now, but I think it turns my knee inward too much. I'll have to schedule an appointment to get that modified. \"  Suggested that patient return to old leg brace until he can have new one modified as new brace does appear to cause increased knee valgus/internal rotation    Objective: ALEX complete per flow chart to facilitate strength, stability and motion to allow ease with daily ambulation and ADL's. Verbal cuing for progression and technique with exercises. Difficulty with advanced gait and balance remains but improved this date. Added and advanced several exercises. Observation: Difficulty with advanced balance and gait exercises  Fatigue noted at conclusion of session with attempting gait without AD.    Test measurements: for the purpose of modulating pain, promoting relaxation,  increasing ROM, reducing/eliminating soft tissue swelling/inflammation/restriction, improving soft tissue extensibility. (56808)    Service Based Modalities:      Timed Code Treatment Minutes:   61' Neuro Re-Ed    Total Treatment Minutes:  61'    Treatment/Activity Tolerance:  [x] Patient tolerated treatment well [] Patient limited by fatique  [] Patient limited by pain  [] Patient limited by other medical complications  [] Other:     Prognosis: [x] Good [] Fair  [] Poor    Patient Requires Follow-up: [x] Yes  [] No    Goals:  Short term goals  Time Frame for Short term goals: 3 weeks  Short term goal 1: Initiate HEP  Short term goal 2: Tandem balance >15\" bilaterally for improved ease with ADL and yard work  Short term goal 3: Improve TUG time to <15\" for improved balance and safety in home and community    Long term goals  Time Frame for Long term goals : 6 weeks  Long term goal 1: Indep with HEP  Long term goal 2: Tandem balance >25\" bilaterally for improved ease with ADL and yard work  Long term goal 3: Improve TUG time to <13\" for improved balance and safety in home and community  Long term goal 4: Pt to amb 50' with least restrictive AD to improve home/community ambulation  Long term goal 5: Improve Tinetti score to >21 for improved home/community ambulation and safety    Plan:   [x] Continue per plan of care [] Alter current plan (see comments)  [] Plan of care initiated [] Hold pending MD visit [] Discharge    Plan for Next Session:  Monitor tolerance and balance. Progress as per tolerance.      Electronically signed by:  Tomy Trinidad, PT, DPT

## 2018-09-21 ENCOUNTER — HOSPITAL ENCOUNTER (OUTPATIENT)
Dept: PHYSICAL THERAPY | Age: 43
Setting detail: THERAPIES SERIES
Discharge: HOME | End: 2018-09-21
Payer: MEDICARE

## 2018-09-21 PROCEDURE — 97112 NEUROMUSCULAR REEDUCATION: CPT | Performed by: PHYSICAL THERAPY ASSISTANT

## 2018-09-25 NOTE — PROGRESS NOTES
modified as new brace does appear to cause increased knee valgus/internal rotation         Objective: Tandem Balance: 13 seconds bilat  Feet together, eyes closed: 7 seconds bilaterally  · Observation: Difficulty with advanced balance and gait exercises  · Fatigue noted at conclusion of session with attempting gait without AD. Assessment:   Pebbles Fierro continues to lack functional and dynamic strength, stability, balance, and proprioception, although he is making progress with each of these. Plan:    Continue per POC to increase functional and dynamic strength, stability, balance and proprioception for improved ease and indep with ambulation and ADL       Goals:    Short term goals  Time Frame for Short term goals: 3 weeks  Short term goal 1: Initiate HEP MET  Short term goal 2: Tandem balance >15\" bilaterally for improved ease with ADL and yard work Partially met  Short term goal 3: Improve TUG time to <15\" for improved balance and safety in home and community In progress     Long term goals  Time Frame for Long term goals : 6 weeks  Long term goal 1: Indep with HEP  Long term goal 2: Tandem balance >25\" bilaterally for improved ease with ADL and yard work  Long term goal 3: Improve TUG time to <13\" for improved balance and safety in home and community  Long term goal 4: Pt to amb 50' with least restrictive AD to improve home/community ambulation  Long term goal 5: Improve Tinetti score to >21 for improved home/community ambulation and safety  Current Frequency/Duration: 9/17/18 - 10/29/18  # Days per week: [] 1 day # Weeks: [] 1 week [] 4 weeks      [x] 2 days?    [] 2 weeks [] 5 weeks      [] 3 days   [] 3 weeks [x] 6 weeks     Rehab Potential: [] Excellent [] Good [x] Fair  [] Poor     Goal Status:  [] Achieved [x] In Progress  [] Not Achieved     Patient Status: [] Continue per initial plan of Care     [] Patient now discharged     [x] Additional visits requested, Please re-certify for additional

## 2018-10-01 ENCOUNTER — HOSPITAL ENCOUNTER (OUTPATIENT)
Dept: PHYSICAL THERAPY | Age: 43
Setting detail: THERAPIES SERIES
Discharge: HOME | End: 2018-10-01
Payer: MEDICARE

## 2018-10-01 PROCEDURE — 97112 NEUROMUSCULAR REEDUCATION: CPT | Performed by: PHYSICAL THERAPY ASSISTANT

## 2018-10-05 ENCOUNTER — HOSPITAL ENCOUNTER (OUTPATIENT)
Dept: PHYSICAL THERAPY | Age: 43
Setting detail: THERAPIES SERIES
Discharge: HOME | End: 2018-10-05
Payer: MEDICARE

## 2018-10-05 PROCEDURE — 97112 NEUROMUSCULAR REEDUCATION: CPT | Performed by: PHYSICAL THERAPIST

## 2018-10-08 ENCOUNTER — HOSPITAL ENCOUNTER (OUTPATIENT)
Dept: PHYSICAL THERAPY | Age: 43
Setting detail: THERAPIES SERIES
Discharge: HOME | End: 2018-10-08
Payer: MEDICARE

## 2018-10-08 PROCEDURE — 97112 NEUROMUSCULAR REEDUCATION: CPT | Performed by: PHYSICAL THERAPY ASSISTANT

## 2018-10-08 NOTE — FLOWSHEET NOTE
Flat Side 4 way shifts 15x each    BOSU Flat side balance 2 x 30\"    BOSU Flat Side Squats 15x    \"ski\" balance with balloon taps into wall     Sidesteps over hurdles  No hurdles this date   Retro Walk //     Seated Marches     Seated LAQ     Seated hip ABD/ADD \    Sit-Stands No UE Assist    Mini Squats c Ball between knees On stable/even surface           Gait around gym 1 lap No AD, therapist CGA        Nu-step 8' L4        [] Provided verbal/tactile cueing for activities related to strengthening, flexibility, endurance, ROM. (79853)  [x] Provided verbal/tactile cueing for activities related to improving balance, coordination, kinesthetic sense, posture, motor skill, proprioception. (50370)    Therapeutic Activities:     [] Therapeutic activities, direct (one-on-one) patient contact (use of dynamic activities to improve functional performance). (23624)    Gait:   [] Provided training and instruction to the patient for ambulation re-education. (02187)    Self-Care/ADL's  [] Self-care/home management training and compensatory training, meal preparation, safety procedures, and instructions in use of assistive technology devices/adaptive equipment, direct one-on-one contact. (32473)    Home Exercise Program:     [] Reviewed/Progressed HEP activities related to strengthening, flexibility, endurance, ROM. (44334)  [x] Reviewed/Progressed HEP activities related to improving balance, coordination, kinesthetic sense, posture, motor skill, proprioception.  (89364)    Manual Treatments:    [] Provided manual therapy to mobilize soft tissue/joints for the purpose of modulating pain, promoting relaxation,  increasing ROM, reducing/eliminating soft tissue swelling/inflammation/restriction, improving soft tissue extensibility.  (96424)    Service Based Modalities:      Timed Code Treatment Minutes:  50' Neuro Re-Ed    Total Treatment Minutes:  50'    Treatment/Activity Tolerance:  [x] Patient tolerated treatment well [] Patient

## 2018-10-12 ENCOUNTER — HOSPITAL ENCOUNTER (OUTPATIENT)
Dept: PHYSICAL THERAPY | Age: 43
Setting detail: THERAPIES SERIES
Discharge: HOME | End: 2018-10-12
Payer: MEDICARE

## 2018-10-12 PROCEDURE — 97112 NEUROMUSCULAR REEDUCATION: CPT | Performed by: PHYSICAL THERAPY ASSISTANT

## 2018-10-12 NOTE — FLOWSHEET NOTE
Physical Therapy    Physical Therapy Daily Treatment Note    Date:  10/12/2018    Patient Name:  Abraham Heredia    :  1975  MRN: [id number]  Restrictions/Precautions:     Medical/Treatment Diagnosis Information:   · Diagnosis: Multiple Sclerosis  · Treatment Diagnosis: multiple sclerosis-weakness  Insurance/Certification information:  PT Insurance Information: Medicare/Medicaid  Physician Information:  Referring Practitioner: Mittie Dance. Boster, MD  Plan of care signed (Y/N):  N  Visit# / total visits:   2nd POC 13 total  Pain level: 0/10     G-Code (if applicable):      Date G-Code Applied: 18   PT G-Codes  Functional Assessment Tool Used: Tinetti  Score: 15/28 = 54% or 46% disabled  Functional Limitation: Mobility: Walking and moving around  Mobility: Walking and Moving Around Current Status (): At least 40 percent but less than 60 percent impaired, limited or restricted  Mobility: Walking and Moving Around Goal Status (): At least 1 percent but less than 20 percent impaired, limited or restricted    Time In: 2:24  Time Out: 3:11  Progress Note: []  Yes  [x]  No  Next due by: Visit #10  Or by 10/29/18    Subjective:  No new c/o this date. Stiffness in new brace. Objective: ALEX complete per flow chart to facilitate strength, stability and motion to allow ease with daily ambulation and ADL's. Verbal cuing for progression and technique with exercises. Difficulty with advanced gait and balance remains but improved this date. Added and advanced several exercises to improve balance and stability  Observation: Difficulty with advanced balance and gait exercises. Fatigue noted at conclusion of session with attempting gait without AD.    Test measurements:     Exercises:   Exercise/Equipment Resistance/Repetitions Other comments   Tandem Balance 2 x 30\" Blue beam   FTEO 2 x 30\" Disc   FTEC 2 x 30\" Disc   Disc 3 way hip 15x each ADV   Sidesteps 3 laps No UE   Marches on AIREX 10x bilat   Therapist A for Treatment Minutes:  52'    Treatment/Activity Tolerance:  [x] Patient tolerated treatment well [] Patient limited by fatique  [] Patient limited by pain  [] Patient limited by other medical complications  [] Other:     Prognosis: [x] Good [] Fair  [] Poor    Patient Requires Follow-up: [x] Yes  [] No    Goals:  Short term goals  Time Frame for Short term goals: 3 weeks  Short term goal 1: Initiate HEP (Compliance with HEP)  Short term goal 2: Tandem balance >15\" bilaterally for improved ease with ADL and yard work MET  Short term goal 3: Improve TUG time to <15\" for improved balance and safety in home and community MET    Long term goals  Time Frame for Long term goals : 6 weeks  Long term goal 1: Indep with HEP (Regular compliance with current HEP)  Long term goal 2: Tandem balance >25\" bilaterally for improved ease with ADL and yard work   Long term goal 3: Improve TUG time to <13\" for improved balance and safety in home and community   Long term goal 4: Pt to amb 50' with least restrictive AD to improve home/community ambulation   Long term goal 5: Improve Tinetti score to >21 for improved home/community ambulation and safety     Plan:   [x] Continue per plan of care [] Alter current plan (see comments)  [] Plan of care initiated [] Hold pending MD visit [] Discharge    Plan for Next Session:  Monitor tolerance and balance. Progress as per tolerance. Electronically signed by:   Chad Lozano PTA

## 2018-10-17 ENCOUNTER — HOSPITAL ENCOUNTER (OUTPATIENT)
Dept: PHYSICAL THERAPY | Age: 43
Setting detail: THERAPIES SERIES
Discharge: HOME | End: 2018-10-17
Payer: MEDICARE

## 2018-10-17 PROCEDURE — 97112 NEUROMUSCULAR REEDUCATION: CPT | Performed by: PHYSICAL THERAPY ASSISTANT

## 2018-10-17 NOTE — FLOWSHEET NOTE
ADV   Sidesteps 3 laps No UE   Marches on AIREX 10x bilat   Therapist A for R LE   HR/TR     HS Curls  On AIREX   BOSU Round Side Marches     BOSU Flat Side 4 way shifts 15x each    BOSU Flat side balance 2 x 30\"    BOSU Flat Side Squats 15x    \"ski\" balance with balloon taps into wall     Sidesteps over hurdles  No hurdles this date   Retro Walk //     Seated Marches     Seated LAQ     Seated hip ABD/ADD \    Sit-Stands No UE Assist    Mini Squats c Ball between knees On stable/even surface      Balance beam  Ball 4 way   Gait around gym 1 laps No AD, therapist CGA  Several near LOB        Nu-step 8' L4        [] Provided verbal/tactile cueing for activities related to strengthening, flexibility, endurance, ROM. (58103)  [x] Provided verbal/tactile cueing for activities related to improving balance, coordination, kinesthetic sense, posture, motor skill, proprioception. (79864)    Therapeutic Activities:     [] Therapeutic activities, direct (one-on-one) patient contact (use of dynamic activities to improve functional performance). (73825)    Gait:   [] Provided training and instruction to the patient for ambulation re-education. (97154)    Self-Care/ADL's  [] Self-care/home management training and compensatory training, meal preparation, safety procedures, and instructions in use of assistive technology devices/adaptive equipment, direct one-on-one contact. (76869)    Home Exercise Program:     [] Reviewed/Progressed HEP activities related to strengthening, flexibility, endurance, ROM. (57497)  [x] Reviewed/Progressed HEP activities related to improving balance, coordination, kinesthetic sense, posture, motor skill, proprioception.  (28718)    Manual Treatments:    [] Provided manual therapy to mobilize soft tissue/joints for the purpose of modulating pain, promoting relaxation,  increasing ROM, reducing/eliminating soft tissue swelling/inflammation/restriction, improving soft tissue extensibility.

## 2018-10-23 ENCOUNTER — APPOINTMENT (OUTPATIENT)
Dept: PHYSICAL THERAPY | Age: 43
End: 2018-10-23
Payer: MEDICARE

## 2018-10-25 ENCOUNTER — HOSPITAL ENCOUNTER (OUTPATIENT)
Dept: PHYSICAL THERAPY | Age: 43
Setting detail: THERAPIES SERIES
Discharge: HOME | End: 2018-10-25
Payer: MEDICARE

## 2018-10-25 NOTE — PROGRESS NOTES
Physical Therapy  Outpatient Physical Therapy    [x] [place]  Phone: [phone]  Fax: [phone]      [] [place]  Phone: [phone]  Fax: [phone]    Physical Therapy  Cancellation/No-show Note  Patient Name:  Regla Chen  :  1975   Date:  10/25/2018  Cancelled visits to date: 1  No-shows to date: 0    For today's appointment patient:  [x]  Cancelled  []  Rescheduled appointment  []  No-show     Reason given by patient:  []  Patient ill  []  Conflicting appointment  []  No transportation    []  Conflict with work  []  No reason given  [x]  Other:     Comments:   \"Worried about lack of insurance coverage/cost\"    Electronically signed by: Opal Yanez, PT, DPT

## 2019-01-10 ENCOUNTER — HOSPITAL ENCOUNTER (OUTPATIENT)
Dept: PHYSICAL THERAPY | Age: 44
Setting detail: THERAPIES SERIES
Discharge: HOME | End: 2019-01-10
Payer: MEDICARE

## 2019-01-10 PROCEDURE — 97112 NEUROMUSCULAR REEDUCATION: CPT | Performed by: PHYSICAL THERAPIST

## 2019-01-10 PROCEDURE — 97162 PT EVAL MOD COMPLEX 30 MIN: CPT | Performed by: PHYSICAL THERAPIST

## 2019-01-15 ENCOUNTER — APPOINTMENT (OUTPATIENT)
Dept: PHYSICAL THERAPY | Age: 44
End: 2019-01-15
Payer: MEDICARE

## 2019-01-17 ENCOUNTER — HOSPITAL ENCOUNTER (OUTPATIENT)
Dept: PHYSICAL THERAPY | Age: 44
Setting detail: THERAPIES SERIES
Discharge: HOME | End: 2019-01-17
Payer: MEDICARE

## 2019-01-17 PROCEDURE — 97112 NEUROMUSCULAR REEDUCATION: CPT | Performed by: PHYSICAL THERAPIST

## 2019-01-21 ENCOUNTER — HOSPITAL ENCOUNTER (OUTPATIENT)
Dept: PHYSICAL THERAPY | Age: 44
Setting detail: THERAPIES SERIES
Discharge: HOME | End: 2019-01-21
Payer: MEDICARE

## 2019-01-21 PROCEDURE — 97112 NEUROMUSCULAR REEDUCATION: CPT

## 2019-01-24 ENCOUNTER — HOSPITAL ENCOUNTER (OUTPATIENT)
Dept: PHYSICAL THERAPY | Age: 44
Setting detail: THERAPIES SERIES
Discharge: HOME | End: 2019-01-24
Payer: MEDICARE

## 2019-01-24 PROCEDURE — 97116 GAIT TRAINING THERAPY: CPT | Performed by: PHYSICAL THERAPIST

## 2019-01-24 PROCEDURE — 97112 NEUROMUSCULAR REEDUCATION: CPT | Performed by: PHYSICAL THERAPIST

## 2019-01-28 ENCOUNTER — HOSPITAL ENCOUNTER (OUTPATIENT)
Dept: PHYSICAL THERAPY | Age: 44
Setting detail: THERAPIES SERIES
Discharge: HOME | End: 2019-01-28
Payer: MEDICARE

## 2019-01-28 PROCEDURE — 97116 GAIT TRAINING THERAPY: CPT | Performed by: PHYSICAL THERAPIST

## 2019-01-28 PROCEDURE — 97112 NEUROMUSCULAR REEDUCATION: CPT | Performed by: PHYSICAL THERAPIST

## 2019-01-31 ENCOUNTER — HOSPITAL ENCOUNTER (OUTPATIENT)
Dept: PHYSICAL THERAPY | Age: 44
Setting detail: THERAPIES SERIES
Discharge: HOME | End: 2019-01-31
Payer: MEDICARE

## 2019-01-31 PROCEDURE — 97112 NEUROMUSCULAR REEDUCATION: CPT | Performed by: PHYSICAL THERAPIST

## 2019-01-31 PROCEDURE — 97116 GAIT TRAINING THERAPY: CPT | Performed by: PHYSICAL THERAPIST

## 2019-02-01 ENCOUNTER — HOSPITAL ENCOUNTER (OUTPATIENT)
Dept: PHYSICAL THERAPY | Age: 44
Setting detail: THERAPIES SERIES
Discharge: HOME | End: 2019-02-01
Payer: MEDICARE

## 2019-02-01 PROCEDURE — 97112 NEUROMUSCULAR REEDUCATION: CPT | Performed by: PHYSICAL THERAPIST

## 2019-02-01 PROCEDURE — 97116 GAIT TRAINING THERAPY: CPT | Performed by: PHYSICAL THERAPIST

## 2019-02-05 ENCOUNTER — APPOINTMENT (OUTPATIENT)
Dept: PHYSICAL THERAPY | Age: 44
End: 2019-02-05
Payer: MEDICARE

## 2019-02-07 ENCOUNTER — HOSPITAL ENCOUNTER (OUTPATIENT)
Dept: PHYSICAL THERAPY | Age: 44
Setting detail: THERAPIES SERIES
Discharge: HOME | End: 2019-02-07
Payer: MEDICARE

## 2019-02-07 PROCEDURE — 97112 NEUROMUSCULAR REEDUCATION: CPT | Performed by: PHYSICAL THERAPIST

## 2019-02-07 PROCEDURE — 97116 GAIT TRAINING THERAPY: CPT | Performed by: PHYSICAL THERAPIST

## 2019-02-08 ENCOUNTER — HOSPITAL ENCOUNTER (OUTPATIENT)
Dept: PHYSICAL THERAPY | Age: 44
Setting detail: THERAPIES SERIES
Discharge: HOME | End: 2019-02-08
Payer: MEDICARE

## 2019-02-08 PROCEDURE — 97116 GAIT TRAINING THERAPY: CPT | Performed by: PHYSICAL THERAPIST

## 2019-02-08 PROCEDURE — 97112 NEUROMUSCULAR REEDUCATION: CPT | Performed by: PHYSICAL THERAPIST

## 2019-02-13 ENCOUNTER — HOSPITAL ENCOUNTER (OUTPATIENT)
Dept: PHYSICAL THERAPY | Age: 44
Setting detail: THERAPIES SERIES
Discharge: HOME | End: 2019-02-13
Payer: MEDICARE

## 2019-02-13 PROCEDURE — 97116 GAIT TRAINING THERAPY: CPT

## 2019-02-13 PROCEDURE — 97112 NEUROMUSCULAR REEDUCATION: CPT

## 2019-02-15 ENCOUNTER — HOSPITAL ENCOUNTER (OUTPATIENT)
Dept: PHYSICAL THERAPY | Age: 44
Setting detail: THERAPIES SERIES
Discharge: HOME | End: 2019-02-15
Payer: MEDICARE

## 2019-02-15 PROCEDURE — 97112 NEUROMUSCULAR REEDUCATION: CPT

## 2019-02-18 ENCOUNTER — HOSPITAL ENCOUNTER (OUTPATIENT)
Dept: PHYSICAL THERAPY | Age: 44
Setting detail: THERAPIES SERIES
Discharge: HOME | End: 2019-02-18
Payer: MEDICARE

## 2019-02-18 PROCEDURE — 97112 NEUROMUSCULAR REEDUCATION: CPT

## 2019-02-20 ENCOUNTER — HOSPITAL ENCOUNTER (OUTPATIENT)
Dept: PHYSICAL THERAPY | Age: 44
Setting detail: THERAPIES SERIES
Discharge: HOME | End: 2019-02-20
Payer: MEDICARE

## 2019-02-20 PROCEDURE — 97112 NEUROMUSCULAR REEDUCATION: CPT | Performed by: PHYSICAL THERAPIST

## 2019-02-22 ENCOUNTER — HOSPITAL ENCOUNTER (OUTPATIENT)
Dept: PHYSICAL THERAPY | Age: 44
Setting detail: THERAPIES SERIES
Discharge: HOME | End: 2019-02-22
Payer: MEDICARE

## 2019-02-22 PROCEDURE — 97150 GROUP THERAPEUTIC PROCEDURES: CPT | Performed by: PHYSICAL THERAPIST

## 2019-02-22 PROCEDURE — 97112 NEUROMUSCULAR REEDUCATION: CPT | Performed by: PHYSICAL THERAPIST

## 2019-02-26 ENCOUNTER — HOSPITAL ENCOUNTER (OUTPATIENT)
Dept: PHYSICAL THERAPY | Age: 44
Setting detail: THERAPIES SERIES
Discharge: HOME | End: 2019-02-26
Payer: MEDICARE

## 2019-02-26 PROCEDURE — 97110 THERAPEUTIC EXERCISES: CPT | Performed by: PHYSICAL THERAPY ASSISTANT

## 2019-02-28 ENCOUNTER — HOSPITAL ENCOUNTER (OUTPATIENT)
Dept: PHYSICAL THERAPY | Age: 44
Setting detail: THERAPIES SERIES
Discharge: HOME | End: 2019-02-28
Payer: MEDICARE

## 2019-03-04 ENCOUNTER — HOSPITAL ENCOUNTER (OUTPATIENT)
Dept: PHYSICAL THERAPY | Age: 44
Setting detail: THERAPIES SERIES
Discharge: HOME | End: 2019-03-04
Payer: MEDICARE

## 2019-03-04 PROCEDURE — 97112 NEUROMUSCULAR REEDUCATION: CPT | Performed by: PHYSICAL THERAPIST

## 2019-03-06 ENCOUNTER — APPOINTMENT (OUTPATIENT)
Dept: PHYSICAL THERAPY | Age: 44
End: 2019-03-06
Payer: MEDICARE

## 2019-03-07 ENCOUNTER — HOSPITAL ENCOUNTER (OUTPATIENT)
Dept: PHYSICAL THERAPY | Age: 44
Setting detail: THERAPIES SERIES
Discharge: HOME | End: 2019-03-07
Payer: MEDICARE

## 2019-03-07 PROCEDURE — 97112 NEUROMUSCULAR REEDUCATION: CPT | Performed by: PHYSICAL THERAPY ASSISTANT

## 2019-03-08 ENCOUNTER — APPOINTMENT (OUTPATIENT)
Dept: PHYSICAL THERAPY | Age: 44
End: 2019-03-08
Payer: MEDICARE

## 2019-03-15 ENCOUNTER — HOSPITAL ENCOUNTER (OUTPATIENT)
Dept: PHYSICAL THERAPY | Age: 44
Setting detail: THERAPIES SERIES
Discharge: HOME | End: 2019-03-15
Payer: MEDICARE

## 2019-03-15 PROCEDURE — 97112 NEUROMUSCULAR REEDUCATION: CPT | Performed by: PHYSICAL THERAPIST

## 2019-03-18 ENCOUNTER — HOSPITAL ENCOUNTER (OUTPATIENT)
Dept: PHYSICAL THERAPY | Age: 44
Setting detail: THERAPIES SERIES
Discharge: HOME | End: 2019-03-18
Payer: MEDICARE

## 2019-03-18 PROCEDURE — 97112 NEUROMUSCULAR REEDUCATION: CPT

## 2019-03-21 ENCOUNTER — HOSPITAL ENCOUNTER (OUTPATIENT)
Dept: PHYSICAL THERAPY | Age: 44
Setting detail: THERAPIES SERIES
Discharge: HOME | End: 2019-03-21
Payer: MEDICARE

## 2019-03-21 PROCEDURE — 97112 NEUROMUSCULAR REEDUCATION: CPT | Performed by: PHYSICAL THERAPIST

## 2019-03-25 ENCOUNTER — HOSPITAL ENCOUNTER (OUTPATIENT)
Dept: PHYSICAL THERAPY | Age: 44
Setting detail: THERAPIES SERIES
Discharge: HOME | End: 2019-03-25
Payer: MEDICARE

## 2019-03-25 PROCEDURE — 97112 NEUROMUSCULAR REEDUCATION: CPT

## 2019-03-27 ENCOUNTER — HOSPITAL ENCOUNTER (OUTPATIENT)
Dept: PHYSICAL THERAPY | Age: 44
Setting detail: THERAPIES SERIES
Discharge: HOME | End: 2019-03-27
Payer: MEDICARE

## 2019-03-27 PROCEDURE — 97112 NEUROMUSCULAR REEDUCATION: CPT

## 2019-03-29 ENCOUNTER — HOSPITAL ENCOUNTER (OUTPATIENT)
Dept: PHYSICAL THERAPY | Age: 44
Setting detail: THERAPIES SERIES
Discharge: HOME | End: 2019-03-29
Payer: MEDICARE

## 2019-03-29 NOTE — PROGRESS NOTES
Physical Therapy    Outpatient Physical Therapy    [x] [place]  Phone: [phone]  Fax: [phone]      [] [place]  Phone: [phone]  Fax: [phone]    Physical Therapy  Cancellation/No-show Note  Patient Name:  Cortez Sanchez  :  1975   Date:  3/29/2019  Cancelled visits to date: 2  No-shows to date: 0    For today's appointment patient:  [x]  Cancelled  []  Rescheduled appointment   []  No-show     Reason given by patient:  []  Patient ill  []  Conflicting appointment  []  No transportation    []  Conflict with work  []  No reason given  [x]  Other:     Comments:    Too worn out    Electronically signed by: Florecita Norman PT, DPT

## 2019-04-05 ENCOUNTER — HOSPITAL ENCOUNTER (OUTPATIENT)
Dept: PHYSICAL THERAPY | Age: 44
Setting detail: THERAPIES SERIES
Discharge: HOME | End: 2019-04-05
Payer: MEDICARE

## 2019-04-05 PROCEDURE — 97112 NEUROMUSCULAR REEDUCATION: CPT

## 2019-04-05 NOTE — FLOWSHEET NOTE
support to hold up R LE   amb without AD 2.0 laps CGA from therapist   BOSU flat side balance CGA from therapist, eyes open   BOSU Round Side Balance    Mini Lunges  Mod A from therapist   NUSTEP  Seat 11 LV5   Seated marches x20    LAQ x20    Mini squats on Round BOSU Ball between legs. Step taps F,L 2\"   Hip abd/add Ball; no resist for abd   [] Provided verbal/tactile cueing for activities related to strengthening, flexibility, endurance, ROM. (71312)   [x] Provided verbal/tactile cueing for activities related to improving balance, coordination, kinesthetic sense, posture, motor skill, proprioception. (58823)    Therapeutic Activities:     [] Therapeutic activities, direct (one-on-one) patient contact (use of dynamic activities to improve functional performance). (06759)    Gait:   [x] Provided training and instruction to the patient for ambulation re-education. (05062)  Verbal cues for proper swing phase bilaterally this date and to take moderate steps at a slow to moderate pace in order to avoid losing balance anteriorly. Self-Care/ADL's  [] Self-care/home management training and compensatory training, meal preparation, safety procedures, and instructions in use of assistive technology devices/adaptive equipment, direct one-on-one contact. (80041)    Home Exercise Program:     [] Reviewed/Progressed HEP activities related to strengthening, flexibility, endurance, ROM. (22984)  [x] Reviewed/Progressed HEP activities related to improving balance, coordination, kinesthetic sense, posture, motor skill, proprioception.  (23569)    Manual Treatments:    [] Provided manual therapy to mobilize soft tissue/joints for the purpose of modulating pain, promoting relaxation,  increasing ROM, reducing/eliminating soft tissue swelling/inflammation/restriction, improving soft tissue extensibility.  (16710)    Service Based Modalities:     Timed Code Treatment Minutes:  50' neuro re-ed            Total Treatment Minutes:

## 2019-04-08 ENCOUNTER — HOSPITAL ENCOUNTER (OUTPATIENT)
Dept: PHYSICAL THERAPY | Age: 44
Setting detail: THERAPIES SERIES
Discharge: HOME | End: 2019-04-08
Payer: MEDICARE

## 2019-04-08 PROCEDURE — 97112 NEUROMUSCULAR REEDUCATION: CPT

## 2019-04-08 NOTE — FLOWSHEET NOTE
Physical Therapy Daily Treatment Note    Date:  2019    Patient Name:  Janie Jackson    :  1975  MRN: [id number]  Restrictions/Precautions:     Medical/Treatment Diagnosis Information:   · Diagnosis: Multiple Sclerosis  · Treatment Diagnosis: multiple sclerosis-weakness  Insurance/Certification information:  PT Insurance Information: [employer]  Physician Information:  Referring Practitioner: Ann Foreman. MD Shira   Plan of care signed (Y/N):  N  Visit# / total visits:  4/10 (3rd POC) 24 total  Pain level: 0/10      Time In: 4:17  Time Out: 5:05    Progress Note: []  Yes  [x]  No  Next due by: Visit #10  Or by 3/10/19    Subjective: Pt rpts to clinic with no complaints of pain stating \"I didn't do much over the weekend so I'm feeling okay right now\". Objective: Pt tolerated todays session well indicating no increase in pain but noting quick onset of hip musculature exhaustion with seated marches and LAQ's. Pt was able to progress lateral walking to include DD obstacles and step over segments with good tolerance noted. Most challenged by independent ambulation exhibiting few LOB episodes requiring constant CGA. Observation: Rpts with RW and stiff legged gait pattern. Gait laps around clinic without AD required CGA/min A due to losses of balance. Gait belt used to maintain upright posture and prevent a fall. Test measurements:     No sitting rest breaks required but patient notes moderate fatigue with today's session.     Exercises:   Exercise/Equipment Resistance/Repetitions Other comments   Offset Balance 3 x 30\" each EO & EC   DD DLS 3 x 03\"    AIREX HR/TR 20x     AIREX Marches 20x each    AIREX 3 way hip 20x each bilat    AIREX HS Curls 15x each     Sit-Stands 20x Required min A/CGA this date   Sidesteps 5 laps    DD Balance 3 x 30\" With offset   EO & EC   SLS 3 x 30\" eachWith support to hold up R LE   amb without AD 2.0 laps CGA from therapist   [place] flat side balance CGA from therapist, eyes open   BOSU Round Side Balance    Mini Lunges  Mod A from therapist   NUSTEP  Seat 11 LV5   Seated marches x20    LAQ x20    Mini squats on Round BOSU Ball between legs. Step taps F,L 2\"   Squats  3x151 HHA. Ball between legs. Hip abd/add Ball; no resist for abd   [] Provided verbal/tactile cueing for activities related to strengthening, flexibility, endurance, ROM. (97916)   [x] Provided verbal/tactile cueing for activities related to improving balance, coordination, kinesthetic sense, posture, motor skill, proprioception. (13654)    Therapeutic Activities:     [] Therapeutic activities, direct (one-on-one) patient contact (use of dynamic activities to improve functional performance). (31746)    Gait:   [x] Provided training and instruction to the patient for ambulation re-education. (74365)  Verbal cues for proper swing phase bilaterally this date and to take moderate steps at a slow to moderate pace in order to avoid losing balance anteriorly. Self-Care/ADL's  [] Self-care/home management training and compensatory training, meal preparation, safety procedures, and instructions in use of assistive technology devices/adaptive equipment, direct one-on-one contact. (97337)    Home Exercise Program:     [] Reviewed/Progressed HEP activities related to strengthening, flexibility, endurance, ROM. (91270)  [x] Reviewed/Progressed HEP activities related to improving balance, coordination, kinesthetic sense, posture, motor skill, proprioception.  (13367)    Manual Treatments:    [] Provided manual therapy to mobilize soft tissue/joints for the purpose of modulating pain, promoting relaxation,  increasing ROM, reducing/eliminating soft tissue swelling/inflammation/restriction, improving soft tissue extensibility.  (58440)    Service Based Modalities:     Timed Code Treatment Minutes:  50' neuro re-ed            Total Treatment Minutes:   50'    Treatment/Activity Tolerance:  [x] Patient tolerated treatment well [] Patient limited by fatique  [] Patient limited by pain  [] Patient limited by other medical complications  [] Other:     Prognosis: [] Good [x] Fair  [] Poor    Patient Requires Follow-up: [x] Yes  [] No      Goals:  Short term goals  Time Frame for Short term goals: 3 weeks  Short term goal 1: Initiate HEP MET  Short term goal 2: Tandem balance >15\" bilaterally for improved ease with ADL and yard work MET 08FEB  Short term goal 3: Improve TUG time to <15\" for improved balance and safety in home and community MET 08FEB    Long term goals  Time Frame for Long term goals : 6 weeks   Long term goal 1: Indep with HEP   Long term goal 2: Tandem balance >25\" bilaterally for improved ease with ADL and yard work (Not met. Only held 15\". 25MAR)  Long term goal 3: Improve TUG time to <13\" for improved balance and safety in home and community (Performed TUG in 12\" this date. 25MAR)  Long term goal 4: Pt to amb 48' with least restrictive AD to improve home/community ambulation (Pt is able to ambulate without AD but requires CGA. Slight imbalance still present. 25MAR)   Long term goal 5: Improve Tinetti score to >21 for improved home/community ambulation and safety (Scored 24/28 this date. Cosme.Guise)    Plan:   [x] Continue per plan of care [] Alter current plan (see comments)  [] Plan of care initiated [] Hold pending MD visit [] Discharge    Plan for Next Session:  Progress to tolerance.      Electronically signed by:  Shaina Sr PTA

## 2019-04-10 ENCOUNTER — HOSPITAL ENCOUNTER (OUTPATIENT)
Dept: PHYSICAL THERAPY | Age: 44
Setting detail: THERAPIES SERIES
Discharge: HOME | End: 2019-04-10
Payer: MEDICARE

## 2019-04-10 PROCEDURE — 97112 NEUROMUSCULAR REEDUCATION: CPT

## 2019-04-10 NOTE — FLOWSHEET NOTE
Physical Therapy Daily Treatment Note    Date:  4/10/2019    Patient Name:  Abdulaziz Villa    :  1975  MRN: [id number]  Restrictions/Precautions:     Medical/Treatment Diagnosis Information:   · Diagnosis: Multiple Sclerosis  · Treatment Diagnosis: multiple sclerosis-weakness  Insurance/Certification information:  PT Insurance Information: [employer]  Physician Information:  Referring Practitioner: Courtney Adam. MD Shira   Plan of care signed (Y/N):  N  Visit# / total visits:  5/10 (3rd POC) 25 total  Pain level: 0/10      Time In: 2:07  Time Out: 2:49    Progress Note: []  Yes  [x]  No  Next due by: Visit #10  Or by 3/10/19    Subjective: Pt rpts to clinic with no complaints of pain, fatigue, or weakness stating \"I feel normal. Don't have any complaints\". Objective: Pt tolerated todays session well indicating some fatigue post session and noting exhaustion near the end of todays session. Pt was able to perform lateral walking with obstacles with decent gait mechanics and no cueing required to complete obstacles. SBA/CGA required throughout session to prevent LOB. Required two short rest breaks this date but was unable to complete all reps for seated marching this date. Observation: Rpts with RW and stiff legged gait pattern. Gait laps around clinic without AD required CGA/min A due to losses of balance. Gait belt used to maintain upright posture and prevent a fall. Test measurements:     No sitting rest breaks required but patient notes moderate fatigue with today's session. Exercises:   Exercise/Equipment Resistance/Repetitions Other comments   Offset Balance 3 x 30\" each EO & EC   DD DLS 3 x 09\"    AIREX HR/TR 20x     AIREX Marches 20x each    AIREX 3 way hip 20x each bilat    AIREX HS Curls 15x each     Sit-Stands 20x Required min A/CGA this date   Sidesteps 5 laps With obstacles.     DD Balance 3 x 30\" With offset   EO & EC   SLS 3 x 30\" eachWith support to hold up R LE   amb without AD 2.0 laps CGA from therapist   BOSU flat side balance CGA from therapist, eyes open   BOSU Round Side Balance    Mini Lunges  Mod A from therapist   NUSTEP  Seat 11 LV5   Seated marches x20 Unable to finish   LAQ x20    Mini squats on Round BOSU Ball between legs. Step taps F,L 2\"   Squats  3x151 HHA. Ball between legs. Hip abd/add Ball; no resist for abd   [] Provided verbal/tactile cueing for activities related to strengthening, flexibility, endurance, ROM. (52782)   [x] Provided verbal/tactile cueing for activities related to improving balance, coordination, kinesthetic sense, posture, motor skill, proprioception. (87556)    Therapeutic Activities:     [] Therapeutic activities, direct (one-on-one) patient contact (use of dynamic activities to improve functional performance). (66771)    Gait:   [x] Provided training and instruction to the patient for ambulation re-education. (36920)  Verbal cues for proper swing phase bilaterally this date and to take moderate steps at a slow to moderate pace in order to avoid losing balance anteriorly. Self-Care/ADL's  [] Self-care/home management training and compensatory training, meal preparation, safety procedures, and instructions in use of assistive technology devices/adaptive equipment, direct one-on-one contact. (09792)    Home Exercise Program:     [] Reviewed/Progressed HEP activities related to strengthening, flexibility, endurance, ROM. (09368)  [x] Reviewed/Progressed HEP activities related to improving balance, coordination, kinesthetic sense, posture, motor skill, proprioception.  (54221)    Manual Treatments:    [] Provided manual therapy to mobilize soft tissue/joints for the purpose of modulating pain, promoting relaxation,  increasing ROM, reducing/eliminating soft tissue swelling/inflammation/restriction, improving soft tissue extensibility.  (29891)    Service Based Modalities:      Timed Code Treatment Minutes:  43' neuro re-ed            Total Treatment Minutes:   43'    Treatment/Activity Tolerance:  [x] Patient tolerated treatment well [] Patient limited by fatique  [] Patient limited by pain  [] Patient limited by other medical complications  [] Other:     Prognosis: [] Good [x] Fair  [] Poor    Patient Requires Follow-up: [x] Yes  [] No      Goals:  Short term goals  Time Frame for Short term goals: 3 weeks  Short term goal 1: Initiate HEP MET  Short term goal 2: Tandem balance >15\" bilaterally for improved ease with ADL and yard work MET 08FEB  Short term goal 3: Improve TUG time to <15\" for improved balance and safety in home and community MET 08FEB    Long term goals  Time Frame for Long term goals : 6 weeks   Long term goal 1: Indep with HEP   Long term goal 2: Tandem balance >25\" bilaterally for improved ease with ADL and yard work (Not met. Only held 15\". 25MAR)  Long term goal 3: Improve TUG time to <13\" for improved balance and safety in home and community (Performed TUG in 12\" this date. 25MAR)  Long term goal 4: Pt to amb 48' with least restrictive AD to improve home/community ambulation (Pt is able to ambulate without AD but requires CGA. Slight imbalance still present. 25MAR)   Long term goal 5: Improve Tinetti score to >21 for improved home/community ambulation and safety (Scored 24/28 this date. Elissa)    Plan:   [x] Continue per plan of care [] Alter current plan (see comments)  [] Plan of care initiated [] Hold pending MD visit [] Discharge    Plan for Next Session:  Test TUG next session.      Electronically signed by:  George Parker PTA

## 2019-04-12 ENCOUNTER — HOSPITAL ENCOUNTER (OUTPATIENT)
Dept: PHYSICAL THERAPY | Age: 44
Setting detail: THERAPIES SERIES
Discharge: HOME | End: 2019-04-12
Payer: MEDICARE

## 2019-04-12 PROCEDURE — 97110 THERAPEUTIC EXERCISES: CPT | Performed by: PHYSICAL THERAPIST

## 2019-04-12 NOTE — FLOWSHEET NOTE
Physical Therapy Daily Treatment Note    Date:  2019    Patient Name:  Hima Byers    :  1975  MRN: [id number]  Restrictions/Precautions:     Medical/Treatment Diagnosis Information:   · Diagnosis: Multiple Sclerosis  · Treatment Diagnosis: multiple sclerosis-weakness  Insurance/Certification information:  PT Insurance Information: [address] [facility]  Physician Information:  Referring Practitioner: Mann Anglin. MD Shira   Plan of care signed (Y/N):  N  Visit# / total visits:  6/10 (3rd POC) 26 total  Pain level: 0/10      Time In: 2:00  Time Out: 2:56    Progress Note: []  Yes  [x]  No  Next due by: Visit #10  Or by 3/10/19    Subjective: Pt rpts to clinic with RW, per usual. \"The left leg doesn't feel like it's as strong as it used to be. I used to call it my 'good leg' but I don't know that I can call it that anymore. \"     Objective: Pt tolerated todays session well indicating some fatigue post session and noting exhaustion near the end of todays session. Pt was able to perform lateral walking with obstacles with decent gait mechanics and no cueing required to complete obstacles. SBA/CGA required throughout session to prevent LOB. Required two short rest breaks this date but was unable to complete all reps for seated marching this date. Observation: Rpts with RW and stiff legged gait pattern. Gait laps around clinic without AD required CGA/min A due to losses of balance. Gait belt used to maintain upright posture and prevent a fall. Test measurements:     No sitting rest breaks required but patient notes moderate fatigue with today's session. Exercises:   Exercise/Equipment Resistance/Repetitions Other comments   Offset Balance 3 x 30\" each EO & EC   DD DLS 3 x 28\"    AIREX HR/TR 20x     AIREX Marches 20x each    AIREX 3 way hip 20x each bilat    AIREX HS Curls 15x each     Sit-Stands 20x Required min A/CGA this date   Sidesteps 5 laps With obstacles.     DD Balance 3 x 30\" With offset   EO & EC SLS 3 x 30\" eachWith support to hold up R LE   amb without AD 2.0 laps CGA from therapist   BOSU flat side balance 3 x 30\" CGA from therapist, eyes open   BOSU Round Side Balance 2 x 30\"    Mini Lunges 5x each Mod A from therapist   WICHO 8'  Seat 11 LV5   Seated marches x20 Unable to finish   LAQ x20    Mini squats on Round BOSU 3x10 Ball between legs. Step taps x10  F,L 2\"   Squats  3x151 HHA. Ball between legs. Hip abd/add Ball; no resist for abd   [] Provided verbal/tactile cueing for activities related to strengthening, flexibility, endurance, ROM. (44221)   [x] Provided verbal/tactile cueing for activities related to improving balance, coordination, kinesthetic sense, posture, motor skill, proprioception. (62455)    Therapeutic Activities:     [] Therapeutic activities, direct (one-on-one) patient contact (use of dynamic activities to improve functional performance). (31635)    Gait:   [x] Provided training and instruction to the patient for ambulation re-education. (26787)  Verbal cues for proper swing phase bilaterally this date and to take moderate steps at a slow to moderate pace in order to avoid losing balance anteriorly. Self-Care/ADL's  [] Self-care/home management training and compensatory training, meal preparation, safety procedures, and instructions in use of assistive technology devices/adaptive equipment, direct one-on-one contact. (58094)    Home Exercise Program:     [] Reviewed/Progressed HEP activities related to strengthening, flexibility, endurance, ROM. (38422)  [x] Reviewed/Progressed HEP activities related to improving balance, coordination, kinesthetic sense, posture, motor skill, proprioception.  (72434)    Manual Treatments:    [] Provided manual therapy to mobilize soft tissue/joints for the purpose of modulating pain, promoting relaxation,  increasing ROM, reducing/eliminating soft tissue swelling/inflammation/restriction, improving soft tissue extensibility. (74151)    Service Based Modalities:      Timed Code Treatment Minutes:  64' neuro re-ed            Total Treatment Minutes:   64'    Treatment/Activity Tolerance:  [x] Patient tolerated treatment well [] Patient limited by fatique  [] Patient limited by pain  [] Patient limited by other medical complications  [] Other:     Prognosis: [] Good [x] Fair  [] Poor    Patient Requires Follow-up: [x] Yes  [] No      Goals:  Short term goals  Time Frame for Short term goals: 3 weeks  Short term goal 1: Initiate HEP MET  Short term goal 2: Tandem balance >15\" bilaterally for improved ease with ADL and yard work MET 08FEB  Short term goal 3: Improve TUG time to <15\" for improved balance and safety in home and community MET 08FEB    Long term goals  Time Frame for Long term goals : 6 weeks   Long term goal 1: Indep with HEP   Long term goal 2: Tandem balance >25\" bilaterally for improved ease with ADL and yard work (Not met. Only held 15\". 25MAR)  Long term goal 3: Improve TUG time to <13\" for improved balance and safety in home and community (Performed TUG in 12\" this date. 25MAR)  Long term goal 4: Pt to amb 48' with least restrictive AD to improve home/community ambulation (Pt is able to ambulate without AD but requires CGA. Slight imbalance still present. 25MAR)   Long term goal 5: Improve Tinetti score to >21 for improved home/community ambulation and safety (Scored 24/28 this date. Ethan)    Plan:   [x] Continue per plan of care [] Alter current plan (see comments)  [] Plan of care initiated [] Hold pending MD visit [] Discharge    Plan for Next Session:  Test TUG next session.      Electronically signed by:  Cris Augustin, PT, DPT

## 2019-04-15 ENCOUNTER — HOSPITAL ENCOUNTER (OUTPATIENT)
Dept: PHYSICAL THERAPY | Age: 44
Setting detail: THERAPIES SERIES
Discharge: HOME | End: 2019-04-15
Payer: MEDICARE

## 2019-04-15 PROCEDURE — 97112 NEUROMUSCULAR REEDUCATION: CPT

## 2019-04-15 NOTE — FLOWSHEET NOTE
Physical Therapy Daily Treatment Note    Date:  4/15/2019    Patient Name:  Tobias Gann    :  1975  MRN: [id number]  Restrictions/Precautions:      Medical/Treatment Diagnosis Information:   · Diagnosis: Multiple Sclerosis  · Treatment Diagnosis: multiple sclerosis-weakness  Insurance/Certification information:  PT Insurance Information: [address] [facility]  Physician Information:  Referring Practitioner: Romayne Beets. MD Shira   Plan of care signed (Y/N):  N  Visit# / total visits:  7/10 (3rd POC) 27 total  Pain level: 0/10      Time In: 2:00  Time Out: 2:48    Progress Note: []  Yes  [x]  No  Next due by: Visit #10  Or by 3/10/19    Subjective: Pt rpts to clinic with no pain stating \"I took an energy pill before coming so I'm ready to go. I never have pain it mostly just feels weak\". Objective: Pt tolerated todays session well indicating no increase in pain and little complaints of fatigue post session. Pt was able to progress multiple exercises this date with vc required for proper performance and CGA required to ensure no LOB present. Most challenged by lateral walking over obstacles. Attempted darion slingshots but could not clear height of darion. Observation: Rpts with RW and stiff legged gait pattern. Gait laps around clinic without AD required CGA/min A due to losses of balance. Gait belt used to maintain upright posture and prevent a fall. Test measurements:     No sitting rest breaks required but patient notes moderate fatigue with today's session. Exercises:   Exercise/Equipment Resistance/Repetitions Other comments   Offset Balance 3 x 30\" each EO & EC   DD DLS 3 x 76\"    AIREX HR/TR 20x     AIREX Marches 20x each    AIREX 3 way hip 20x each bilat    AIREX HS Curls 15x each     Sit-Stands 20x Required min A/CGA this date   Sidesteps 5 laps With obstacles.     DD Balance 3 x 30\" With offset   EO & EC   SLS 3 x 30\" eachWith support to hold up R LE   amb without AD 2.0 laps CGA from therapist BOSU flat side balance 3 x 30\" CGA from therapist, eyes open   BOSU Round Side Balance 2 x 30\"    Mini Lunges 5x each Mod A from therapist   WICHO 8'  Seat 11 LV5   Seated marches x20 Unable to finish   LAQ x20    Mini squats on Round BOSU 3x10 Ball between legs. Step taps x10  F,L 2\"   Squats  3x151 HHA. Ball between legs. Hip abd/add Ball; no resist for abd   [] Provided verbal/tactile cueing for activities related to strengthening, flexibility, endurance, ROM. (49206)   [x] Provided verbal/tactile cueing for activities related to improving balance, coordination, kinesthetic sense, posture, motor skill, proprioception. (43883)    Therapeutic Activities:     [] Therapeutic activities, direct (one-on-one) patient contact (use of dynamic activities to improve functional performance). (52172)    Gait:   [x] Provided training and instruction to the patient for ambulation re-education. (84650)  Verbal cues for proper swing phase bilaterally this date and to take moderate steps at a slow to moderate pace in order to avoid losing balance anteriorly. Self-Care/ADL's  [] Self-care/home management training and compensatory training, meal preparation, safety procedures, and instructions in use of assistive technology devices/adaptive equipment, direct one-on-one contact. (63628)    Home Exercise Program:     [] Reviewed/Progressed HEP activities related to strengthening, flexibility, endurance, ROM. (72228)  [x] Reviewed/Progressed HEP activities related to improving balance, coordination, kinesthetic sense, posture, motor skill, proprioception.  (32365)    Manual Treatments:    [] Provided manual therapy to mobilize soft tissue/joints for the purpose of modulating pain, promoting relaxation,  increasing ROM, reducing/eliminating soft tissue swelling/inflammation/restriction, improving soft tissue extensibility.  (06313)    Service Based Modalities:      Timed Code Treatment Minutes:  50' neuro re-ed Total Treatment Minutes:   50'    Treatment/Activity Tolerance:  [x] Patient tolerated treatment well [] Patient limited by fatique  [] Patient limited by pain  [] Patient limited by other medical complications  [] Other:     Prognosis: [] Good [x] Fair  [] Poor    Patient Requires Follow-up: [x] Yes  [] No      Goals:  Short term goals  Time Frame for Short term goals: 3 weeks  Short term goal 1: Initiate HEP MET  Short term goal 2: Tandem balance >15\" bilaterally for improved ease with ADL and yard work MET 08FEB  Short term goal 3: Improve TUG time to <15\" for improved balance and safety in home and community MET 08FEB    Long term goals  Time Frame for Long term goals : 6 weeks   Long term goal 1: Indep with HEP   Long term goal 2: Tandem balance >25\" bilaterally for improved ease with ADL and yard work (Not met. Only held 15\". 25MAR)  Long term goal 3: Improve TUG time to <13\" for improved balance and safety in home and community (Performed TUG in 12\" this date. 25MAR)  Long term goal 4: Pt to amb 48' with least restrictive AD to improve home/community ambulation (Pt is able to ambulate without AD but requires CGA. Slight imbalance still present. 25MAR)   Long term goal 5: Improve Tinetti score to >21 for improved home/community ambulation and safety (Scored 24/28 this date. Mauri)    Plan:   [x] Continue per plan of care [] Alter current plan (see comments)  [] Plan of care initiated [] Hold pending MD visit [] Discharge    Plan for Next Session:  Test TUG next session.      Electronically signed by:  Mitesh Arnold PTA

## 2019-04-17 ENCOUNTER — HOSPITAL ENCOUNTER (OUTPATIENT)
Dept: PHYSICAL THERAPY | Age: 44
Setting detail: THERAPIES SERIES
Discharge: HOME | End: 2019-04-17
Payer: MEDICARE

## 2019-04-17 PROCEDURE — 97112 NEUROMUSCULAR REEDUCATION: CPT

## 2019-04-17 NOTE — FLOWSHEET NOTE
Physical Therapy Daily Treatment Note    Date:  2019    Patient Name:  Madison Mills    :  1975  MRN: [id number]  Restrictions/Precautions:      Medical/Treatment Diagnosis Information:   · Diagnosis: Multiple Sclerosis  · Treatment Diagnosis: multiple sclerosis-weakness  Insurance/Certification information:  PT Insurance Information: [address] [facility]  Physician Information:  Referring Practitioner: Lillian Coughlin. MD Shira   Plan of care signed (Y/N):  N  Visit# / total visits:  8/10 (3rd POC) 28 total  Pain level: 0/10      Time In: 1:57  Time Out: 2:45    Progress Note: []  Yes  [x]  No  Next due by: Visit #10  Or by 3/10/19    Subjective: Pt rpts to clinic with no complaints of fatigue, pain, or instability stating \"I feel fine today. I was tired after the last session but I recovered quick\". Objective: Pt tolerated todays session well exhibiting few LOB episodes and requiring little rest with exercise completion. Requires little vc to emphasize safety and proper form and was able to progress exercises this date. Most challenged by stepping over obstacles as pt has a max of 6\" ground clearance with attempt. Completed TUG with no change since last session. Observation: Rpts with RW and stiff legged gait pattern. Gait laps around clinic without AD required CGA/min A due to losses of balance. Gait belt used to maintain upright posture and prevent a fall. Test measurements:     No sitting rest breaks required but patient notes moderate fatigue with today's session. Exercises:   Exercise/Equipment Resistance/Repetitions Other comments   Offset Balance 3 x 30\" each EO & EC   DD DLS 3 x 50\"    AIREX HR/TR 20x     AIREX Marches 20x each    AIREX 3 way hip 20x each bilat    AIREX HS Curls 15x each     Sit-Stands 20x Required min A/CGA this date   Sidesteps 5 laps With obstacles.     DD Balance 3 x 30\" With offset   EO & EC   SLS 3 x 30\" eachWith support to hold up R LE   amb without AD 2.0 laps CGA from Total Treatment Minutes:   50'    Treatment/Activity Tolerance:  [x] Patient tolerated treatment well [] Patient limited by fatique  [] Patient limited by pain  [] Patient limited by other medical complications  [] Other:     Prognosis: [] Good [x] Fair  [] Poor    Patient Requires Follow-up: [x] Yes  [] No      Goals:  Short term goals  Time Frame for Short term goals: 3 weeks  Short term goal 1: Initiate HEP MET  Short term goal 2: Tandem balance >15\" bilaterally for improved ease with ADL and yard work MET 08FEB  Short term goal 3: Improve TUG time to <15\" for improved balance and safety in home and community MET 08FEB    Long term goals  Time Frame for Long term goals : 6 weeks   Long term goal 1: Indep with HEP   Long term goal 2: Tandem balance >25\" bilaterally for improved ease with ADL and yard work (Not met. Only held 15\". 25MAR)  Long term goal 3: Improve TUG time to <13\" for improved balance and safety in home and community (Performed TUG in 12\" this date. 17APR)  Long term goal 4: Pt to amb 48' with least restrictive AD to improve home/community ambulation (Pt is able to ambulate without AD but requires CGA. Slight imbalance still present. 17APR)   Long term goal 5: Improve Tinetti score to >21 for improved home/community ambulation and safety (Scored 24/28 this date. Shanell)    Plan:   [x] Continue per plan of care [] Alter current plan (see comments)  [] Plan of care initiated [] Hold pending MD visit [] Discharge    Plan for Next Session:  Progress and test goals.      Electronically signed by:  Juan F Bullard PTA

## 2019-04-19 ENCOUNTER — HOSPITAL ENCOUNTER (OUTPATIENT)
Dept: PHYSICAL THERAPY | Age: 44
Setting detail: THERAPIES SERIES
Discharge: HOME | End: 2019-04-19
Payer: MEDICARE

## 2019-04-19 PROCEDURE — 97112 NEUROMUSCULAR REEDUCATION: CPT

## 2019-04-19 NOTE — FLOWSHEET NOTE
Physical Therapy Daily Treatment Note    Date:  2019    Patient Name:  Abbey Kaur    :  1975  MRN: [id number]  Restrictions/Precautions:      Medical/Treatment Diagnosis Information:   · Diagnosis: Multiple Sclerosis  · Treatment Diagnosis: multiple sclerosis-weakness  Insurance/Certification information:  PT Insurance Information: [address] [facility]  Physician Information:  Referring Practitioner: Kim Hollins. MD Shira   Plan of care signed (Y/N):  N  Visit# / total visits:  9/10 (3rd POC) 29 total  Pain level: 0/10      Time In: 2:05  Time Out: 2:53    Progress Note: []  Yes  [x]  No  Next due by: Visit #10  Or by 19    Subjective: Patient has no complaints other than LE muscle weakness. Objective: Modified flow sheet due to tests performed this date. Pt tolerated todays session well exhibiting few LOB episodes and requiring little rest with exercise completion. Requires little vc to emphasize safety and proper form and was able to progress exercises this date. Completed Tandem stance bilaterally and Tinetti test with no significant changes since last testing. Observation: Rpts with RW and stiff legged gait pattern. Gait laps around clinic without AD required CGA/min A due to losses of balance. Gait belt used to maintain upright posture and prevent a fall. Test measurements:   Tandem Stance: Left Leading: x15\", Right leading: 10\" (19)  Tinetti Score 18/28 (19)  No sitting rest breaks required but patient notes moderate fatigue with today's session. CGA to Mike with all balance activities this date due to decreased LE/core/trunk control. Exercises:   Exercise/Equipment Resistance/Repetitions Other comments   Offset Balance 3 x 30\" each EO & EC   DD DLS 3 x 42\"    AIREX HR/TR 20x     AIREX Marches 20x each    AIREX 3 way hip 20x each bilat    AIREX HS Curls 15x each     Sit-Stands Required min A/CGA this date   Sidesteps With obstacles.     DD Balance 3 x 30\" With offset   EO & EC   SLS With support to hold up R LE   amb without AD CGA from therapist   BOSU flat side balance CGA from therapist, eyes open   BOSU Round Side Balance    Mini Lunges Mod A from therapist   WICHO 8'  Seat 11 LV5   Seated marches x20 Unable to finish   LAQ x20    Mini squats on Round BOSU 3x10 Ball between legs. Step taps x10  F,L 2\"   Squats  3x151 HHA. Ball between legs. Hip abd/add Ball; no resist for abd   [] Provided verbal/tactile cueing for activities related to strengthening, flexibility, endurance, ROM. (55549)   [x] Provided verbal/tactile cueing for activities related to improving balance, coordination, kinesthetic sense, posture, motor skill, proprioception. (56859)    Therapeutic Activities:     [] Therapeutic activities, direct (one-on-one) patient contact (use of dynamic activities to improve functional performance). (91739)    Gait:   [x] Provided training and instruction to the patient for ambulation re-education. (26882)  Verbal cues for proper swing phase bilaterally this date and to take moderate steps at a slow to moderate pace in order to avoid losing balance anteriorly. Self-Care/ADL's  [] Self-care/home management training and compensatory training, meal preparation, safety procedures, and instructions in use of assistive technology devices/adaptive equipment, direct one-on-one contact. (28752)    Home Exercise Program:     [] Reviewed/Progressed HEP activities related to strengthening, flexibility, endurance, ROM. (52480)  [x] Reviewed/Progressed HEP activities related to improving balance, coordination, kinesthetic sense, posture, motor skill, proprioception.  (00227)    Manual Treatments:    [] Provided manual therapy to mobilize soft tissue/joints for the purpose of modulating pain, promoting relaxation,  increasing ROM, reducing/eliminating soft tissue swelling/inflammation/restriction, improving soft tissue extensibility.  (30291)    Service Based Modalities:      Timed Code Treatment Minutes:  50' neuro re-ed            Total Treatment Minutes:   50'    Treatment/Activity Tolerance:  [x] Patient tolerated treatment well [] Patient limited by fatique  [] Patient limited by pain  [] Patient limited by other medical complications  [] Other:     Prognosis: [] Good [x] Fair  [] Poor    Patient Requires Follow-up: [x] Yes  [] No      Goals:  Short term goals  Time Frame for Short term goals: 3 weeks  Short term goal 1: Initiate HEP MET  Short term goal 2: Tandem balance >15\" bilaterally for improved ease with ADL and yard work MET 08FEB  Short term goal 3: Improve TUG time to <15\" for improved balance and safety in home and community MET 08FEB    Long term goals  Time Frame for Long term goals : 6 weeks   Long term goal 1: Indep with HEP   Long term goal 2: Tandem balance >25\" bilaterally for improved ease with ADL and yard work (Not met. Only held 15\". 19APR)  Long term goal 3: Improve TUG time to <13\" for improved balance and safety in home and community (Performed TUG in 12\" this date. 17APR)  Long term goal 4: Pt to amb 48' with least restrictive AD to improve home/community ambulation (Pt is able to ambulate without AD but requires CGA. Slight imbalance still present. 17APR)   Long term goal 5: Improve Tinetti score to >21 for improved home/community ambulation and safety (Scored 18/28 this date. 19APR)    Plan:   [x] Continue per plan of care [] Alter current plan (see comments)  [] Plan of care initiated [] Hold pending MD visit [] Discharge    Plan for Next Session:  Progress as tolerated. POC end date next week.     Electronically signed by:  Katrina Pappas PTA

## 2019-04-22 ENCOUNTER — HOSPITAL ENCOUNTER (OUTPATIENT)
Dept: PHYSICAL THERAPY | Age: 44
Setting detail: THERAPIES SERIES
Discharge: HOME | End: 2019-04-22
Payer: MEDICARE

## 2019-04-22 PROCEDURE — 97112 NEUROMUSCULAR REEDUCATION: CPT | Performed by: PHYSICAL THERAPIST

## 2019-04-22 NOTE — FLOWSHEET NOTE
Physical Therapy Daily Treatment Note    Date:  2019    Patient Name:  Hima Byers    :  1975  MRN: [id number]  Restrictions/Precautions:      Medical/Treatment Diagnosis Information:   · Diagnosis: Multiple Sclerosis  · Treatment Diagnosis: multiple sclerosis-weakness  Insurance/Certification information:  PT Insurance Information: [employer]  Physician Information:  Referring Practitioner: Mann Anglin. MD Shira   Plan of care signed (Y/N):  N  Visit# / total visits:  10/10 (3rd POC) 30 total  Pain level: 0/10      Time In: 1:58  Time Out: 2:54    Progress Note: []  Yes  [x]  No  Next due by: Visit #10  Or by 19    Subjective: \"I've been feeling really good today. It feels like my balance is better today than it has been for a few days. \"    Objective: Modified flow sheet due to tests performed this date. Pt tolerated todays session well exhibiting few LOB episodes and requiring little rest with exercise completion. Requires little vc to emphasize safety and proper form and was able to progress exercises this date. Completed Tandem stance bilaterally and Tinetti test with no significant changes since last testing. Observation: Rpts with RW and stiff legged gait pattern. Gait laps around clinic without AD required CGA/min A due to losses of balance. Gait belt used to maintain upright posture and prevent a fall. Test measurements:   Tandem Stance: Left Leading: x15\", Right leading: 10\" (19)  Tinetti Score 18/28 (19)  No sitting rest breaks required but patient notes moderate fatigue with today's session. CGA with all balance activities this date due to decreased LE/core/trunk control.     Exercises:   Exercise/Equipment Resistance/Repetitions Other comments   Offset Balance 3 x 30\" each EO & EC   DD DLS 3 x 62\"    AIREX HR/TR 20x     AIREX Marches 20x each    AIREX 3 way hip 20x each bilat    AIREX HS Curls 15x each     Sit-Stands 20xRequired CGA this date   Sidesteps 5 lapsWith obstacles. DD Balance 3 x 30\" With offset   EO & EC   SLS With support to hold up R LE   amb without AD 2.0 laps CGA from therapist   BOSU flat side balance 3 x 30\" CGA from therapist, eyes open   BOSU Round Side Balance 2 x 30\"    Mini Lunges Mod A from therapist   WICHO 8'  Seat 11 LV5   Seated marches x20 Unable to finish   LAQ x20    Mini squats on Round BOSU 3x10 Ball between legs. Step taps x10  F,L 2\"   Squats  3x151 HHA. Ball between legs. Hip abd/add [id number]; GREEN   [] Provided verbal/tactile cueing for activities related to strengthening, flexibility, endurance, ROM. (15329)   [x] Provided verbal/tactile cueing for activities related to improving balance, coordination, kinesthetic sense, posture, motor skill, proprioception. (88894)    Therapeutic Activities:     [] Therapeutic activities, direct (one-on-one) patient contact (use of dynamic activities to improve functional performance). (64938)    Gait:   [x] Provided training and instruction to the patient for ambulation re-education. (42497)  Verbal cues for proper swing phase bilaterally this date and to take moderate steps at a slow to moderate pace in order to avoid losing balance anteriorly. Self-Care/ADL's  [] Self-care/home management training and compensatory training, meal preparation, safety procedures, and instructions in use of assistive technology devices/adaptive equipment, direct one-on-one contact.  (49100)    Home Exercise Program:     [] Reviewed/Progressed HEP activities related to strengthening, flexibility, endurance, ROM. (53640)  [x] Reviewed/Progressed HEP activities related to improving balance, coordination, kinesthetic sense, posture, motor skill, proprioception.  (38278)    Manual Treatments:    [] Provided manual therapy to mobilize soft tissue/joints for the purpose of modulating pain, promoting relaxation,  increasing ROM, reducing/eliminating soft tissue swelling/inflammation/restriction, improving soft tissue extensibility. (49382)    Service Based Modalities:      Timed Code Treatment Minutes:  64' neuro re-ed            Total Treatment Minutes:   64'    Treatment/Activity Tolerance:  [x] Patient tolerated treatment well [] Patient limited by fatique  [] Patient limited by pain  [] Patient limited by other medical complications  [] Other:     Prognosis: [] Good [x] Fair  [] Poor    Patient Requires Follow-up: [x] Yes  [] No      Goals:  Short term goals  Time Frame for Short term goals: 3 weeks  Short term goal 1: Initiate HEP MET  Short term goal 2: Tandem balance >15\" bilaterally for improved ease with ADL and yard work MET 08FEB  Short term goal 3: Improve TUG time to <15\" for improved balance and safety in home and community MET 08FEB    Long term goals  Time Frame for Long term goals : 6 weeks   Long term goal 1: Indep with HEP   Long term goal 2: Tandem balance >25\" bilaterally for improved ease with ADL and yard work (Not met. Only held 15\". 19APR)  Long term goal 3: Improve TUG time to <13\" for improved balance and safety in home and community (Performed TUG in 12\" this date. 17APR)  Long term goal 4: Pt to amb 48' with least restrictive AD to improve home/community ambulation (Pt is able to ambulate without AD but requires CGA. Slight imbalance still present. 17APR)   Long term goal 5: Improve Tinetti score to >21 for improved home/community ambulation and safety (Scored 18/28 this date. 19APR)    Plan:   [x] Continue per plan of care [] Alter current plan (see comments)  [] Plan of care initiated [] Hold pending MD visit [] Discharge    Plan for Next Session:  Progress as tolerated. POC end date next week.     Electronically signed by:  Florecita Norman, PT, DPT

## 2019-04-24 ENCOUNTER — HOSPITAL ENCOUNTER (OUTPATIENT)
Dept: PHYSICAL THERAPY | Age: 44
Setting detail: THERAPIES SERIES
Discharge: HOME | End: 2019-04-24
Payer: MEDICARE

## 2019-04-24 PROCEDURE — 97112 NEUROMUSCULAR REEDUCATION: CPT

## 2019-04-24 NOTE — FLOWSHEET NOTE
Physical Therapy Daily Treatment Note    Date:  2019    Patient Name:  Barbara Pappas    :  1975  MRN: [id number]  Restrictions/Precautions:      Medical/Treatment Diagnosis Information:   · Diagnosis: Multiple Sclerosis  · Treatment Diagnosis: multiple sclerosis-weakness  Insurance/Certification information:  PT Insurance Information: [address] [facility]  Physician Information:  Referring Practitioner: Mikey Silva MD   Plan of care signed (Y/N):  N  Visit# / total visits:  1/10 (4th POC) 31 total  Pain level: 0/10      Time In: 2:04  Time Out: 2:49    Progress Note: []  Yes  [x]  No  Next due by: Visit #10  Or by 19    Subjective: Pt rpts to clinic with no complaints of pain or fatigue stating \"I'd like to perform therapy 4-5 times per week if its possible. I think it would really benefit me\". Discussed with PT and pt insurance restrictions and pt agreed to 3x/week therapy. Objective:Pt tolerated todays session well exhibiting few LOB episodes and performing all ALEX with only two rest breaks required. Most challenged by forward ambulation over BOSU this date. Agreed to only 3x/week therapy from now on. Observation: Rpts with RW and stiff legged gait pattern. Gait laps around clinic without AD required CGA/min A due to losses of balance. Gait belt used to maintain upright posture and prevent a fall. Test measurements:   Tandem Stance: Left Leading: x15\", Right leading: 10\" (19)  Tinetti Score 18/28 (19)  CGA with all balance activities this date due to decreased LE/core/trunk control. Exercises:   Exercise/Equipment Resistance/Repetitions Other comments   Offset Balance 3 x 30\" each EO & EC   DD DLS 3 x 07\"    AIREX HR/TR 20x     AIREX Marches 20x each    AIREX 3 way hip 20x each bilat    AIREX HS Curls 15x each     Sit-Stands 20xRequired CGA this date   Sidesteps 5 lapsWith obstacles.     DD Balance 3 x 30\" With offset   EO & EC   SLS With support to hold up R LE   amb without AD 2.0 laps CGA from therapist   BOSU flat side balance 3 x 30\" CGA from therapist, eyes open   BOSU Round Side Balance 2 x 30\"    Mini Lunges Mod A from therapist   WICHO 8'  Seat 11 LV6   Seated marches x20 Unable to finish   LAQ x20    Mini squats on Round BOSU 3x10 Ball between legs. Step taps x10  F,L 2\"   Squats  3x151 HHA. Ball between legs. Hip abd/add [id number]; GREEN   [] Provided verbal/tactile cueing for activities related to strengthening, flexibility, endurance, ROM. (89447)   [x] Provided verbal/tactile cueing for activities related to improving balance, coordination, kinesthetic sense, posture, motor skill, proprioception. (42909)    Therapeutic Activities:     [] Therapeutic activities, direct (one-on-one) patient contact (use of dynamic activities to improve functional performance). (22935)    Gait:   [x] Provided training and instruction to the patient for ambulation re-education. (68815)  Verbal cues for proper swing phase bilaterally this date and to take moderate steps at a slow to moderate pace in order to avoid losing balance anteriorly. Self-Care/ADL's  [] Self-care/home management training and compensatory training, meal preparation, safety procedures, and instructions in use of assistive technology devices/adaptive equipment, direct one-on-one contact. (45065)    Home Exercise Program:     [] Reviewed/Progressed HEP activities related to strengthening, flexibility, endurance, ROM. (21124)  [x] Reviewed/Progressed HEP activities related to improving balance, coordination, kinesthetic sense, posture, motor skill, proprioception.  (78884)    Manual Treatments:    [] Provided manual therapy to mobilize soft tissue/joints for the purpose of modulating pain, promoting relaxation,  increasing ROM, reducing/eliminating soft tissue swelling/inflammation/restriction, improving soft tissue extensibility.  (20181)    Service Based Modalities:      Timed Code Treatment Minutes:  39' neuro re-ed Total Treatment Minutes:   39'    Treatment/Activity Tolerance:  [x] Patient tolerated treatment well [] Patient limited by fatique  [] Patient limited by pain  [] Patient limited by other medical complications  [] Other:     Prognosis: [] Good [x] Fair  [] Poor    Patient Requires Follow-up: [x] Yes  [] No      Goals:  Short term goals  Time Frame for Short term goals: 3 weeks  Short term goal 1: Initiate HEP MET  Short term goal 2: Tandem balance >15\" bilaterally for improved ease with ADL and yard work MET 08FEB  Short term goal 3: Improve TUG time to <15\" for improved balance and safety in home and community MET 08FEB    Long term goals  Time Frame for Long term goals : 6 weeks   Long term goal 1: Indep with HEP   Long term goal 2: Tandem balance >25\" bilaterally for improved ease with ADL and yard work (Not met. Only held 15\". 19APR)  Long term goal 3: Improve TUG time to <13\" for improved balance and safety in home and community (Performed TUG in 12\" this date. 17APR)  Long term goal 4: Pt to amb 48' with least restrictive AD to improve home/community ambulation (Pt is able to ambulate without AD but requires CGA. Slight imbalance still present. 17APR)   Long term goal 5: Improve Tinetti score to >21 for improved home/community ambulation and safety (Scored 18/28 this date.  19APR)    Plan:   [x] Continue per plan of care [] Alter current plan (see comments)  [] Plan of care initiated [] Hold pending MD visit [] Discharge    Plan for Next Session:  End with NUSTEP    Electronically signed by:  Berny Aguilar PTA

## 2019-04-26 ENCOUNTER — HOSPITAL ENCOUNTER (OUTPATIENT)
Dept: PHYSICAL THERAPY | Age: 44
Setting detail: THERAPIES SERIES
Discharge: HOME | End: 2019-04-26
Payer: MEDICARE

## 2019-04-26 PROCEDURE — 97112 NEUROMUSCULAR REEDUCATION: CPT | Performed by: PHYSICAL THERAPIST

## 2019-04-26 NOTE — FLOWSHEET NOTE
Minutes:   61'    Treatment/Activity Tolerance:  [x] Patient tolerated treatment well [] Patient limited by fatique  [] Patient limited by pain  [] Patient limited by other medical complications  [] Other:     Prognosis: [] Good [x] Fair  [] Poor    Patient Requires Follow-up: [x] Yes  [] No      Goals:  Short term goals  Time Frame for Short term goals: 3 weeks  Short term goal 1: Initiate HEP MET  Short term goal 2: Tandem balance >15\" bilaterally for improved ease with ADL and yard work MET 08FEB  Short term goal 3: Improve TUG time to <15\" for improved balance and safety in home and community MET 08FEB    Long term goals  Time Frame for Long term goals : 6 weeks   Long term goal 1: Indep with HEP   Long term goal 2: Tandem balance >25\" bilaterally for improved ease with ADL and yard work (Not met. Only held 15\". 19APR)  Long term goal 3: Improve TUG time to <13\" for improved balance and safety in home and community (Performed TUG in 12\" this date. 17APR)  Long term goal 4: Pt to amb 48' with least restrictive AD to improve home/community ambulation (Pt is able to ambulate without AD but requires CGA. Slight imbalance still present. 17APR)   Long term goal 5: Improve Tinetti score to >21 for improved home/community ambulation and safety (Scored 18/28 this date.  19APR)    Plan:   [x] Continue per plan of care [] Alter current plan (see comments)  [] Plan of care initiated [] Hold pending MD visit [] Discharge    Plan for Next Session:  End with NUSTEP    Electronically signed by:  Denis Jeffery, PT, DPT

## 2019-04-29 ENCOUNTER — HOSPITAL ENCOUNTER (OUTPATIENT)
Dept: PHYSICAL THERAPY | Age: 44
Setting detail: THERAPIES SERIES
Discharge: HOME | End: 2019-04-29
Payer: MEDICARE

## 2019-04-29 PROCEDURE — 97112 NEUROMUSCULAR REEDUCATION: CPT | Performed by: PHYSICAL THERAPIST

## 2019-04-29 PROCEDURE — 97110 THERAPEUTIC EXERCISES: CPT

## 2019-04-30 ENCOUNTER — OFFICE VISIT (OUTPATIENT)
Dept: UROLOGY | Age: 44
End: 2019-04-30
Payer: MEDICARE

## 2019-04-30 VITALS
DIASTOLIC BLOOD PRESSURE: 76 MMHG | WEIGHT: 140 LBS | HEIGHT: 74 IN | SYSTOLIC BLOOD PRESSURE: 104 MMHG | BODY MASS INDEX: 17.97 KG/M2 | HEART RATE: 82 BPM

## 2019-04-30 DIAGNOSIS — N31.9 NEUROGENIC BLADDER: Primary | ICD-10-CM

## 2019-04-30 DIAGNOSIS — G35 MULTIPLE SCLEROSIS (HCC): ICD-10-CM

## 2019-04-30 DIAGNOSIS — N52.9 ERECTILE DYSFUNCTION, UNSPECIFIED ERECTILE DYSFUNCTION TYPE: ICD-10-CM

## 2019-04-30 PROCEDURE — 1036F TOBACCO NON-USER: CPT | Performed by: UROLOGY

## 2019-04-30 PROCEDURE — G8427 DOCREV CUR MEDS BY ELIG CLIN: HCPCS | Performed by: UROLOGY

## 2019-04-30 PROCEDURE — 99205 OFFICE O/P NEW HI 60 MIN: CPT | Performed by: UROLOGY

## 2019-04-30 PROCEDURE — G8419 CALC BMI OUT NRM PARAM NOF/U: HCPCS | Performed by: UROLOGY

## 2019-04-30 RX ORDER — MULTIVIT WITH MINERALS/LUTEIN
250 TABLET ORAL DAILY
COMMUNITY

## 2019-04-30 RX ORDER — MEGESTROL ACETATE 20 MG/1
20 TABLET ORAL DAILY
COMMUNITY
Start: 2019-03-24

## 2019-04-30 RX ORDER — MODAFINIL 100 MG/1
100 TABLET ORAL DAILY
Status: ON HOLD | COMMUNITY
Start: 2019-04-04 | End: 2020-03-05

## 2019-04-30 RX ORDER — BIOTIN 1000 MCG
100 TABLET,CHEWABLE ORAL DAILY
COMMUNITY
Start: 2018-11-26 | End: 2021-05-03

## 2019-04-30 RX ORDER — VALACYCLOVIR HYDROCHLORIDE 500 MG/1
500 TABLET, FILM COATED ORAL 2 TIMES DAILY PRN
COMMUNITY

## 2019-04-30 RX ORDER — OXYBUTYNIN CHLORIDE 10 MG/1
10 TABLET, EXTENDED RELEASE ORAL DAILY
Status: ON HOLD | COMMUNITY
Start: 2019-03-25 | End: 2020-03-05

## 2019-04-30 RX ORDER — PERPHENAZINE/AMITRIPTYLINE HCL 2 MG-25 MG
TABLET ORAL DAILY
COMMUNITY

## 2019-04-30 RX ORDER — OMEPRAZOLE 40 MG/1
CAPSULE, DELAYED RELEASE ORAL
Refills: 0 | COMMUNITY
Start: 2019-02-20

## 2019-04-30 RX ORDER — CHOLECALCIFEROL (VITAMIN D3) 125 MCG
5000 CAPSULE ORAL DAILY
Refills: 1 | COMMUNITY
Start: 2019-04-10

## 2019-04-30 RX ORDER — MAGNESIUM GLUCONATE 30 MG(550)
TABLET ORAL DAILY
Status: ON HOLD | COMMUNITY
End: 2020-03-05

## 2019-04-30 RX ORDER — CYANOCOBALAMIN (VITAMIN B-12) 500 MCG
LOZENGE ORAL DAILY
COMMUNITY

## 2019-04-30 NOTE — PROGRESS NOTES
Julio César Gar MD        [facility] [address]  [address] [id number]  Dept: [phone]  Dept Fax: [phone]  Loc: [phone]      [facility] Urology Office Note - New Patient    Patient:  Ratna Clark  YOB: 1975  Date: 5/5/2019    The patient is a 39 y.o. male who presents today for evaluation of the following problems:   Chief Complaint   Patient presents with    Urinary Tract Infection     self caths, has history of MS    Nephrolithiasis    referred/consultation requested by Jeanne Banda. HISTORY OF PRESENT ILLNESS:     Onset was  Years ago  Overall, the problem(s) are unchanged. Severity is described as mild-moderate. Associated Symptoms: No dysuria, no gross hematuria. Current Pain Severity: 0    Hx of MS. Catheterizes 3-4x. Generally has no problems catheterizing. Can feel discomfort when bladder is very full. Cant void on his own.  2-3 UTI annually        Requested/reviewed records from Jeanne Banda office and/or outside physician/EMR    (Patient's old records have been requested, reviewed and pertinent findings summarized in today's note.)    Procedures Today: N/A      Last several PSA's:  No results found for: PSA    Last total testosterone:  No results found for: TESTOSTERONE    Urinalysis today:  No results found for this visit on 04/30/19. Last BUN and creatinine:  No results found for: BUN  No results found for: CREATININE    Additional Lab/Culture results: none    Imaging Reviewed during this Office Visit:   Julio César Gar MD independently reviewed the images and verified the radiology reports from:    CT 11/26/2013       IMPRESSION:          1. Focal short segment thickening of the third portion of the duodenum. While this can be seen with peristalsis, this can also be seen with    duodenitis and underlying mass lesion and the amount of thickening is    greater than that  expected for peristalsis.  Further evaluation with upper endoscopy is recommended.       2. 1.1 cm indeterminate low-attenuation lesion in the right lobe of the    liver. Further evaluation with contrast-enhanced MRI is recommended.       3. 1.2 cm bladder calculus. This is near the left UVJ; however, there is    no left-sided hydronephrosis or hydroureter.       4. Nonspecific skin thickening cyst with adjacent subcutaneous stranding    of the ventral body wall. Recommend clinical correlation with physical    exam for further evaluation. PAST MEDICAL, FAMILY AND SOCIAL HISTORY:  Past Medical History:   Diagnosis Date    MS (multiple sclerosis) ([facility] [address]      Past Surgical History:   Procedure Laterality Date    COLONOSCOPY      UPPER GASTROINTESTINAL ENDOSCOPY       Family History   Problem Relation Age of Onset    Cancer Paternal Uncle     Cancer Paternal Grandmother      Outpatient Medications Marked as Taking for the 4/30/19 encounter (Office Visit) with Charissa Villalta MD   Medication Sig Dispense Refill    oxybutynin (DITROPAN-XL) 10 MG extended release tablet 10 mg daily       megestrol (MEGACE) 20 MG tablet 20 mg daily      Biotin 1000 MCG CHEW Take 100 mg by mouth daily      RA VITAMIN D-3 5000 units CAPS capsule 5,000 Units daily  1    valACYclovir (VALTREX) 500 MG tablet Take 500 mg by mouth 2 times daily      omeprazole (PRILOSEC) 40 MG delayed release capsule take 1 capsule by mouth once daily  0    modafinil (PROVIGIL) 100 MG tablet Take 100 mg by mouth daily.       Vitamin E 400 units TABS Take by mouth daily      Cyanocobalamin (B-12) 3000 MCG SUBL Place under the tongue daily      Potassium Gluconate 595 (99 K) MG TABS Take by mouth daily      Multiple Vitamins-Minerals (MULTIVITAMIN ADULT PO) Take 1 tablet by mouth daily      Ascorbic Acid (VITAMIN C) 250 MG tablet Take 250 mg by mouth daily      gabapentin (NEURONTIN) 300 MG capsule Take 300 mg by mouth nightly      ocrelizumab (OCREVUS) 300 MG/10ML SOLN injection Infuse 300 mg intravenously once 2 times a year/ every 6 months      sildenafil (VIAGRA) 100 MG tablet Take 100 mg by mouth as needed.  bisacodyl (DULCOLAX) 5 MG EC tablet Take 2 tablets by mouth daily as needed for Constipation. Patient is to purchase Dulcolax tablets over the counter if not covered by [employer]. Take four Dulcolax tablets as directed. Please follow further instructions as listed on your Colonoscopy Preparation sheet. 30 tablet 4    baclofen (LIORESAL) 10 MG tablet 10 mg 3 times daily       midodrine (PROAMATINE) 5 MG tablet Take 5 mg by mouth 2 times daily          Excedrin migraine  [asa-apap-caff buffered] and Prednisone  Social History     Tobacco Use   Smoking Status Former Smoker    Packs/day: 1.00    Last attempt to quit: 6/1/2015    Years since quitting: 3.9   Smokeless Tobacco Never Used      (If patient a smoker, smoking cessation counseling offered)   Social History     Substance and Sexual Activity   Alcohol Use No       REVIEW OF SYSTEMS:  Constitutional: negative  Eyes: negative  Respiratory: negative  Cardiovascular: negative  Gastrointestinal: negative  Genitourinary: see HPI  Musculoskeletal: negative  Skin: negative   Neurological: negative  Hematological/Lymphatic: negative  Psychological: negative        Physical Exam:    This a 39 y.o. male  Vitals:    04/30/19 1120   BP: 104/76   Pulse: 82     Body mass index is 17.97 kg/m². Constitutional: Patient in no acute distress; Neuro: alert and oriented to person place and time. Psych: Mood and affect normal.  Skin: warm, dry  Lungs: Respiratory effort normal, Symmetric chest rise  Cardiovascular:  Normal peripheral pulses; Regular rate, radial pulses palpable  Abdomen: Soft, non-tender, non-distended with no CVA, flank pain, hepatosplenomegaly or hernia. Kidneys normal.  Bladder non-tender and not distended.   Lymphatics: no palpable lymphadenopathy  Minimal/no edema in bilateral lower extremities        Assessment and Plan

## 2019-05-01 ENCOUNTER — HOSPITAL ENCOUNTER (OUTPATIENT)
Dept: PHYSICAL THERAPY | Age: 44
Setting detail: THERAPIES SERIES
Discharge: HOME | End: 2019-05-01
Payer: MEDICARE

## 2019-05-01 PROCEDURE — 97112 NEUROMUSCULAR REEDUCATION: CPT

## 2019-05-01 NOTE — FLOWSHEET NOTE
Physical Therapy Daily Treatment Note    Date:  2019    Patient Name:  Kennedy Lorenzana    :  1975  MRN: [id number]  Restrictions/Precautions:      Medical/Treatment Diagnosis Information:   · Diagnosis: Multiple Sclerosis  · Treatment Diagnosis: multiple sclerosis-weakness  Insurance/Certification information:  PT Insurance Information: [address] [facility]  Physician Information:  Referring Practitioner: Edmund Silva MD   Plan of care signed (Y/N):  N  Visit# / total visits:  4/10 (4th POC) 34 total  Pain level: 0/10      Time In: 2:45  Time Out: 3:35    Progress Note: []  Yes  [x]  No  Next due by: Visit #10  Or by 19    Subjective: Pt rpts to clinic with no complaints of pain, fatigue, or stability. Objective: Pt tolerated todays session well exhibiting some LOB episodes during independent ambulation that pt attributes to L knee instability this date. Continues to respond well to progressed obstacle ambulation but requires extensive HHA to accomplish walking activities. Requires CGA to ensure absence of falls. Observation: Rpts with RW and stiff legged gait pattern.  [employer] donWritten. Gait laps around clinic without AD required CGA/min A due to losses of balance. Gait belt used to maintain upright posture and prevent a fall. Test measurements:   Tandem Stance: Left Leading: x15\", Right leading: 10\" (19)  Tinetti Score 18/28 (19)  CGA with all balance activities and ambulation without AD this date due to decreased LE/core/trunk control. Exercises:   Exercise/Equipment Resistance/Repetitions Other comments   Offset Balance 3 x 30\" each EO & EC   DD DLS 3 x 00\"    AIREX HR/TR 20x     AIREX Marches 20x each    AIREX 3 way hip 20x each bilat    AIREX HS Curls 15x each     Sit-Stands 20x  From SBallRequired CGA this date   Sidesteps 5 lapsWith obstacles.     DD Balance 3 x 30\" With offset   EO & EC   SLS With support to hold up R LE   amb without AD 2.0 laps CGA from therapist   BOSU flat side balance 3 x 30\" CGA from therapist, eyes open   BOSU Round Side Balance 2 x 30\"    Mini Lunges Mod A from therapist   GEETAEP 8'  Seat 11 LV6   Seated marches x20 Seated on SBall   LAQ x20    Step taps x10  F,L 2\"   Squats  1x15 each on  AIREX, DD, BOSU1 HHA. Ball between legs. Hip abd/add [id number]; GREEN   [] Provided verbal/tactile cueing for activities related to strengthening, flexibility, endurance, ROM. (80831)   [x] Provided verbal/tactile cueing for activities related to improving balance, coordination, kinesthetic sense, posture, motor skill, proprioception. (54773)    Therapeutic Activities:     [] Therapeutic activities, direct (one-on-one) patient contact (use of dynamic activities to improve functional performance). (85041)    Gait:   [x] Provided training and instruction to the patient for ambulation re-education. (84180)  Verbal cues for proper swing phase bilaterally this date and to take moderate steps at a slow to moderate pace in order to avoid losing balance anteriorly. Self-Care/ADL's  [] Self-care/home management training and compensatory training, meal preparation, safety procedures, and instructions in use of assistive technology devices/adaptive equipment, direct one-on-one contact. (72875)    Home Exercise Program:     [] Reviewed/Progressed HEP activities related to strengthening, flexibility, endurance, ROM. (25088)  [x] Reviewed/Progressed HEP activities related to improving balance, coordination, kinesthetic sense, posture, motor skill, proprioception.  (45153)    Manual Treatments:    [] Provided manual therapy to mobilize soft tissue/joints for the purpose of modulating pain, promoting relaxation,  increasing ROM, reducing/eliminating soft tissue swelling/inflammation/restriction, improving soft tissue extensibility.  (53418)    Service Based Modalities:      Timed Code Treatment Minutes:  48' neuro re-ed            Total Treatment Minutes:   48'    Treatment/Activity Tolerance:  [x] Patient tolerated treatment well [] Patient limited by fatique  [] Patient limited by pain  [] Patient limited by other medical complications  [] Other:     Prognosis: [] Good [x] Fair  [] Poor    Patient Requires Follow-up: [x] Yes  [] No      Goals:  Short term goals  Time Frame for Short term goals: 3 weeks  Short term goal 1: Initiate HEP MET  Short term goal 2: Tandem balance >15\" bilaterally for improved ease with ADL and yard work MET 08FEB  Short term goal 3: Improve TUG time to <15\" for improved balance and safety in home and community MET 08FEB    Long term goals  Time Frame for Long term goals : 6 weeks   Long term goal 1: Indep with HEP   Long term goal 2: Tandem balance >25\" bilaterally for improved ease with ADL and yard work (Not met. Only held 15\". 19APR)  Long term goal 3: Improve TUG time to <13\" for improved balance and safety in home and community (Performed TUG in 12\" this date. 17APR)  Long term goal 4: Pt to amb 48' with least restrictive AD to improve home/community ambulation (Pt is able to ambulate without AD but requires CGA. Slight imbalance still present. 17APR)   Long term goal 5: Improve Tinetti score to >21 for improved home/community ambulation and safety (Scored 18/28 this date.  19APR)    Plan:   [x] Continue per plan of care [] Alter current plan (see comments)  [] Plan of care initiated [] Hold pending MD visit [] Discharge    Plan for Next Session:  End with NUSTEP    Electronically signed by:  Shaina Sr PTA

## 2019-05-03 ENCOUNTER — HOSPITAL ENCOUNTER (OUTPATIENT)
Dept: PHYSICAL THERAPY | Age: 44
Setting detail: THERAPIES SERIES
Discharge: HOME | End: 2019-05-03
Payer: MEDICARE

## 2019-05-03 PROCEDURE — 97112 NEUROMUSCULAR REEDUCATION: CPT | Performed by: PHYSICAL THERAPIST

## 2019-05-03 NOTE — FLOWSHEET NOTE
obstacles. DD Balance 3 x 30\" With offset   EO & EC   SLS With support to hold up R LE   amb without AD 2.0 laps CGA from therapist   BOSU flat side balance 3 x 30\" CGA from therapist, eyes open   BOSU Round Side Balance 2 x 30\"    BOSU flat side + 15x each   Mini Lunges Mod A from therapist   WICHO 8'  Seat 11 LV6   Seated marches x20 Seated on SBall, CGA from therapist   LAQ x20    Step taps x10  F,L 2\"   Squats  1x15 each on  AIREX, DD, BOSU1 HHA. Ball between legs. Hip abd/add [id number]; GREEN   [] Provided verbal/tactile cueing for activities related to strengthening, flexibility, endurance, ROM. (61455)   [x] Provided verbal/tactile cueing for activities related to improving balance, coordination, kinesthetic sense, posture, motor skill, proprioception. (79338)    Therapeutic Activities:     [] Therapeutic activities, direct (one-on-one) patient contact (use of dynamic activities to improve functional performance). (87553)    Gait:   [x] Provided training and instruction to the patient for ambulation re-education. (21249)  Verbal cues for proper swing phase bilaterally this date and to take moderate steps at a slow to moderate pace in order to avoid losing balance anteriorly. Self-Care/ADL's  [] Self-care/home management training and compensatory training, meal preparation, safety procedures, and instructions in use of assistive technology devices/adaptive equipment, direct one-on-one contact.  (68040)    Home Exercise Program:     [] Reviewed/Progressed HEP activities related to strengthening, flexibility, endurance, ROM. (22990)  [x] Reviewed/Progressed HEP activities related to improving balance, coordination, kinesthetic sense, posture, motor skill, proprioception.  (57711)    Manual Treatments:    [] Provided manual therapy to mobilize soft tissue/joints for the purpose of modulating pain, promoting relaxation,  increasing ROM, reducing/eliminating soft tissue swelling/inflammation/restriction, improving soft tissue extensibility. (07427)    Service Based Modalities:      Timed Code Treatment Minutes:  61' neuro re-ed            Total Treatment Minutes:   61'    Treatment/Activity Tolerance:  [x] Patient tolerated treatment well [] Patient limited by fatique  [] Patient limited by pain  [] Patient limited by other medical complications  [] Other:     Prognosis: [] Good [x] Fair  [] Poor    Patient Requires Follow-up: [x] Yes  [] No      Goals:  Short term goals  Time Frame for Short term goals: 3 weeks  Short term goal 1: Initiate HEP MET  Short term goal 2: Tandem balance >15\" bilaterally for improved ease with ADL and yard work MET 08FEB  Short term goal 3: Improve TUG time to <15\" for improved balance and safety in home and community MET 08FEB    Long term goals  Time Frame for Long term goals : 6 weeks   Long term goal 1: Indep with HEP   Long term goal 2: Tandem balance >25\" bilaterally for improved ease with ADL and yard work (Not met. Only held 15\". 19APR)  Long term goal 3: Improve TUG time to <13\" for improved balance and safety in home and community (Performed TUG in 12\" this date. 17APR)  Long term goal 4: Pt to amb 48' with least restrictive AD to improve home/community ambulation (Pt is able to ambulate without AD but requires CGA. Slight imbalance still present. 17APR)   Long term goal 5: Improve Tinetti score to >21 for improved home/community ambulation and safety (Scored 18/28 this date.  19APR)    Plan:   [x] Continue per plan of care [] Alter current plan (see comments)  [] Plan of care initiated [] Hold pending MD visit [] Discharge    Plan for Next Session:  End with NUSTEP    Electronically signed by:  Pipe Black, PT, DPT

## 2019-05-06 ENCOUNTER — HOSPITAL ENCOUNTER (OUTPATIENT)
Dept: PHYSICAL THERAPY | Age: 44
Setting detail: THERAPIES SERIES
Discharge: HOME | End: 2019-05-06
Payer: MEDICARE

## 2019-05-06 PROCEDURE — 97112 NEUROMUSCULAR REEDUCATION: CPT

## 2019-05-06 NOTE — FLOWSHEET NOTE
Physical Therapy Daily Treatment Note    Date:  2019    Patient Name:  Madison Mills    :  1975  MRN: [id number]  Restrictions/Precautions:      Medical/Treatment Diagnosis Information:   · Diagnosis: Multiple Sclerosis  · Treatment Diagnosis: multiple sclerosis-weakness  Insurance/Certification information:  PT Insurance Information: [address] [facility]  Physician Information:  Referring Practitioner: Lillian Silva MD   Plan of care signed (Y/N):  N  Visit# / total visits:  6/10 (4th POC) 36 total  Pain level: 0/10      Time In: 2:46  Time Out: 3:30    Progress Note: []  Yes  [x]  No  Next due by: Visit #10  Or by 19    Subjective: Pt rpts to clinic with no complaints of pain or fatigue stating \"I did a lot of yard work yesterday and did fine with it. I have a lot of energy today\". Objective: Pt tolerated todays session well indicating good stability with independent ambulation this date. Few LOB episodes exhibited this date but still requires CGA with all standing activity. Most challenged by heightened step up with R LE as pt circumduct's to accomplish most step up activities. Few rest breaks required as pt verbalizes wanting to perform 4x/week therapy sessions. Observation: Rpts with RW and stiff legged gait pattern. KAFO donned. Gait laps around clinic without AD required CGA/min A due to losses of balance. Gait belt used to maintain upright posture and prevent a fall. Test measurements:   Tandem Stance: Left Leading: x15\", Right leading: 10\" (19)  Tinetti Score 18/28 (19)   CGA with all balance activities and ambulation without AD this date due to decreased LE/core/trunk control.     Exercises:   Exercise/Equipment Resistance/Repetitions Other comments   Offset Balance 3 x 30\" each EO & EC   DD DLS 3 x 32\"    AIREX HR/TR 20x     AIREX Marches 20x each    AIREX 3 way hip 20x each bilat    AIREX HS Curls 15x each     Sit-Stands 20x  From SBallRequired CGA this date Sidesteps 5 lapsWith obstacles. DD Balance 3 x 30\" With offset   EO & EC   SLS With support to hold up R LE   amb without AD 2.0 laps CGA from therapist   BOSU flat side balance 3 x 30\" CGA from therapist, eyes open   BOSU Round Side Balance 2 x 30\"    BOSU flat side + 15x each   Mini Lunges Mod A from therapist   WICHO 8'  Seat 11 LV6   Seated marches x20 Seated on SBall, CGA from therapist   LAQ x20    Step taps x10  F,L 2\"   Squats  1x15 each on  AIREX, DD, BOSU1 HHA. Ball between legs. Cybex leg press 3x10110#   Hip abd/add [id number]; GREEN   [] Provided verbal/tactile cueing for activities related to strengthening, flexibility, endurance, ROM. (71008)   [x] Provided verbal/tactile cueing for activities related to improving balance, coordination, kinesthetic sense, posture, motor skill, proprioception. (29544)    Therapeutic Activities:     [] Therapeutic activities, direct (one-on-one) patient contact (use of dynamic activities to improve functional performance). (05436)    Gait:   [x] Provided training and instruction to the patient for ambulation re-education. (79099)  Verbal cues for proper swing phase bilaterally this date and to take moderate steps at a slow to moderate pace in order to avoid losing balance anteriorly. Self-Care/ADL's  [] Self-care/home management training and compensatory training, meal preparation, safety procedures, and instructions in use of assistive technology devices/adaptive equipment, direct one-on-one contact.  (96010)    Home Exercise Program:     [] Reviewed/Progressed HEP activities related to strengthening, flexibility, endurance, ROM. (08919)  [x] Reviewed/Progressed HEP activities related to improving balance, coordination, kinesthetic sense, posture, motor skill, proprioception.  (80635)    Manual Treatments:    [] Provided manual therapy to mobilize soft tissue/joints for the purpose of modulating pain, promoting relaxation,  increasing ROM, reducing/eliminating soft tissue swelling/inflammation/restriction, improving soft tissue extensibility. (63595)    Service Based Modalities:      Timed Code Treatment Minutes:  40' neuro re-ed            Total Treatment Minutes:   40'    Treatment/Activity Tolerance:  [x] Patient tolerated treatment well [] Patient limited by fatique  [] Patient limited by pain  [] Patient limited by other medical complications  [] Other:     Prognosis: [] Good [x] Fair  [] Poor    Patient Requires Follow-up: [x] Yes  [] No      Goals:  Short term goals  Time Frame for Short term goals: 3 weeks  Short term goal 1: Initiate HEP MET  Short term goal 2: Tandem balance >15\" bilaterally for improved ease with ADL and yard work MET 08FEB  Short term goal 3: Improve TUG time to <15\" for improved balance and safety in home and community MET 08FEB    Long term goals  Time Frame for Long term goals : 6 weeks   Long term goal 1: Indep with HEP   Long term goal 2: Tandem balance >25\" bilaterally for improved ease with ADL and yard work (Not met. Only held 15\". 19APR)  Long term goal 3: Improve TUG time to <13\" for improved balance and safety in home and community (Performed TUG in 12\" this date. 17APR)  Long term goal 4: Pt to amb 48' with least restrictive AD to improve home/community ambulation (Pt is able to ambulate without AD but requires CGA. Slight imbalance still present. 17APR)   Long term goal 5: Improve Tinetti score to >21 for improved home/community ambulation and safety (Scored 18/28 this date.  19APR)    Plan:   [x] Continue per plan of care [] Alter current plan (see comments)  [] Plan of care initiated [] Hold pending MD visit [] Discharge    Plan for Next Session:  End with NUSTEP    Electronically signed by:  Carmina Sharma PTA

## 2019-05-08 ENCOUNTER — HOSPITAL ENCOUNTER (OUTPATIENT)
Dept: PHYSICAL THERAPY | Age: 44
Setting detail: THERAPIES SERIES
Discharge: HOME | End: 2019-05-08
Payer: MEDICARE

## 2019-05-08 PROCEDURE — 97112 NEUROMUSCULAR REEDUCATION: CPT

## 2019-05-08 NOTE — FLOWSHEET NOTE
neuro re-ed            Total Treatment Minutes:   52'    Treatment/Activity Tolerance:  [x] Patient tolerated treatment well [] Patient limited by fatique  [] Patient limited by pain  [] Patient limited by other medical complications  [] Other:     Prognosis: [] Good [x] Fair  [] Poor    Patient Requires Follow-up: [x] Yes  [] No      Goals:  Short term goals  Time Frame for Short term goals: 3 weeks  Short term goal 1: Initiate HEP MET  Short term goal 2: Tandem balance >15\" bilaterally for improved ease with ADL and yard work MET 08FEB  Short term goal 3: Improve TUG time to <15\" for improved balance and safety in home and community MET 08FEB    Long term goals  Time Frame for Long term goals : 6 weeks   Long term goal 1: Indep with HEP   Long term goal 2: Tandem balance >25\" bilaterally for improved ease with ADL and yard work (Not met. Only held 15\". 19APR)  Long term goal 3: Improve TUG time to <13\" for improved balance and safety in home and community (Performed TUG in 12\" this date. 17APR)  Long term goal 4: Pt to amb 48' with least restrictive AD to improve home/community ambulation (Pt is able to ambulate without AD but requires CGA. Slight imbalance still present. 17APR)   Long term goal 5: Improve Tinetti score to >21 for improved home/community ambulation and safety (Scored 18/28 this date. 19APR)    Plan:   [x] Continue per plan of care [] Alter current plan (see comments)  [] Plan of care initiated [] Hold pending MD visit [] Discharge    Plan for Next Session:  End with TM incline walking.      Electronically signed by:  Mazin Brown PTA

## 2019-05-10 ENCOUNTER — HOSPITAL ENCOUNTER (OUTPATIENT)
Dept: PHYSICAL THERAPY | Age: 44
Setting detail: THERAPIES SERIES
Discharge: HOME | End: 2019-05-10
Payer: MEDICARE

## 2019-05-10 PROCEDURE — 97112 NEUROMUSCULAR REEDUCATION: CPT | Performed by: PHYSICAL THERAPIST

## 2019-05-10 NOTE — FLOWSHEET NOTE
Physical Therapy Daily Treatment Note    Date:  5/10/2019    Patient Name:  Jeffy Corey    :  1975  MRN: [id number]  Restrictions/Precautions:      Medical/Treatment Diagnosis Information:   · Diagnosis: Multiple Sclerosis  · Treatment Diagnosis: multiple sclerosis-weakness  Insurance/Certification information:  PT Insurance Information: [address] [facility]  Physician Information:  Referring Practitioner: Clarissa Sandy. MD Shira   Plan of care signed (Y/N):  N  Visit# / total visits:  8/10 (4th POC) 38 total  Pain level: 0/10      Time In: 1:01  Time Out: 2:01    Progress Note: []  Yes  [x]  No  Next due by: Visit #10  Or by 19    Subjective: \"The other day I noticed that it felt like I had more feeling in my right ankle, which I haven'ty had in awhile. I have not really felt any fatigue with doing 3x per week for a few weeks now, and I'd like to try 4x per week. \"    Objective: Pt tolerated todays session well indicating no increase in pain post session. Pt was able to perform much neurologic retraining and balance activities requiring CGA to prevent falling. Most challenged by any step up activity as pt has poor hip rise on R to clear steps. Requires few rest breaks this date. Observation: Rpts with RW and stiff legged gait pattern. KAFO donned. Gait laps around clinic without AD required CGA/min A due to losses of balance. Gait belt used to maintain upright posture and prevent a fall. Test measurements:   Tandem Stance: Left Leading: x15\", Right leading: 10\" (19)  Tinetti Score 18/28 (19)   CGA with all balance activities and ambulation without AD this date due to decreased LE/core/trunk control.     Exercises:   Exercise/Equipment Resistance/Repetitions Other comments   Offset Balance 3 x 30\" each EO & EC   DD DLS 3 x 89\"    AIREX HR/TR 20x     AIREX Marches 20x each    AIREX 3 way hip 20x each bilat    AIREX HS Curls 15x each     Sit-Stands 20x  From SBallRequired CGA this date ROM, reducing/eliminating soft tissue swelling/inflammation/restriction, improving soft tissue extensibility. (68497)    Service Based Modalities:      Timed Code Treatment Minutes:  52' neuro re-ed            Total Treatment Minutes:   52'    Treatment/Activity Tolerance:  [x] Patient tolerated treatment well [] Patient limited by fatique  [] Patient limited by pain  [] Patient limited by other medical complications  [] Other:     Prognosis: [] Good [x] Fair  [] Poor    Patient Requires Follow-up: [x] Yes  [] No      Goals:  Short term goals  Time Frame for Short term goals: 3 weeks  Short term goal 1: Initiate HEP MET  Short term goal 2: Tandem balance >15\" bilaterally for improved ease with ADL and yard work MET 08FEB  Short term goal 3: Improve TUG time to <15\" for improved balance and safety in home and community MET 08FEB    Long term goals  Time Frame for Long term goals : 6 weeks   Long term goal 1: Indep with HEP   Long term goal 2: Tandem balance >25\" bilaterally for improved ease with ADL and yard work (Not met. Only held 15\". 19APR)  Long term goal 3: Improve TUG time to <13\" for improved balance and safety in home and community (Performed TUG in 12\" this date. 17APR)  Long term goal 4: Pt to amb 48' with least restrictive AD to improve home/community ambulation (Pt is able to ambulate without AD but requires CGA. Slight imbalance still present. 17APR)   Long term goal 5: Improve Tinetti score to >21 for improved home/community ambulation and safety (Scored 18/28 this date. 19APR)    Plan:   [x] Continue per plan of care [] Alter current plan (see comments)  [] Plan of care initiated [] Hold pending MD visit [] Discharge    Plan for Next Session:  Will attempt 4x per week next week. Monitor tolerance to increased sessions during the week.     Electronically signed by:  Denis Jeffery, PT, DPT

## 2019-05-13 ENCOUNTER — HOSPITAL ENCOUNTER (OUTPATIENT)
Dept: PHYSICAL THERAPY | Age: 44
Setting detail: THERAPIES SERIES
Discharge: HOME | End: 2019-05-13
Payer: MEDICARE

## 2019-05-13 PROCEDURE — 97112 NEUROMUSCULAR REEDUCATION: CPT

## 2019-05-13 NOTE — FLOWSHEET NOTE
Physical Therapy Daily Treatment Note    Date:  2019    Patient Name:  Cortez Sanchez    :  1975  MRN: [id number]  Restrictions/Precautions:      Medical/Treatment Diagnosis Information:   · Diagnosis: Multiple Sclerosis  · Treatment Diagnosis: multiple sclerosis-weakness  Insurance/Certification information:  PT Insurance Information: [address] [facility]  Physician Information:  Referring Practitioner: Tiffanie Soto. MD Shira   Plan of care signed (Y/N):  N      Visit# / total visits:  9/10 (4th POC) 39 total  Pain level: 0/10      Time In: 2:01  Time Out: 2:49    Progress Note: []  Yes  [x]  No  Next due by: Visit #10  Or by 19    Subjective: Pt rpts to clinic with no current complaints of pain or fatigue stating \"I feel fine. The multiple times is tough but I like working it often\". Objective: Pt tolerated todays session well indicating no increased pain but some complaints of fatigue post session. Progressed multiple exercises with pt having most difficulty with FTEC on airex pad exhibiting multiple LOB episodes. Required no rest breaks today and pushed most weight on leg press to date at 125#. Observation: Rpts with RW and stiff legged gait pattern. KAFO donned. Gait laps around clinic without AD required CGA/min A due to losses of balance. Gait belt used to maintain upright posture and prevent a fall. Test measurements:   Tandem Stance: Left Leading: x15\", Right leading: 10\" (19)  Tinetti Score 18/28 (19)   CGA with all balance activities and ambulation without AD this date due to decreased LE/core/trunk control. Exercises:   Exercise/Equipment Resistance/Repetitions Other comments   Offset Balance 3 x 30\" each EO & EC   DD DLS 3 x 34\"    AIREX HR/TR 20x     AIREX Marches 20x each    AIREX 3 way hip 20x each bilat    AIREX HS Curls 15x each     Sit-Stands 20x  From SBallRequired CGA this date   Sidesteps 5 lapsWith obstacles.     DD Balance 3 x 30\" With offset   EO & EC   SLS With support to hold up R LE   amb without AD 2.0 laps CGA from therapist   BOSU flat side balance 3 x 30\" CGA from therapist, eyes open   BOSU Round Side Balance 2 x 30\"    BOSU flat side + 15x each   Mini Lunges Mod A from therapist   NUEP 8'  Seat 11 LV6   Seated marches x20 Seated on SBall, CGA from therapist   LAQ x20    Step taps x10  F,L 2\"   Squats  1x15 each on  AIREX, DD, BOSU1 HHA. Ball between legs. Cybex leg press 3x10125#   TM incline walking 8'   Hip abd/add [id number]; GREEN   [] Provided verbal/tactile cueing for activities related to strengthening, flexibility, endurance, ROM. (73754)   [x] Provided verbal/tactile cueing for activities related to improving balance, coordination, kinesthetic sense, posture, motor skill, proprioception. (87382)    Therapeutic Activities:     [] Therapeutic activities, direct (one-on-one) patient contact (use of dynamic activities to improve functional performance). (18351)    Gait:   [x] Provided training and instruction to the patient for ambulation re-education. (43153)  Verbal cues for proper swing phase bilaterally this date and to take moderate steps at a slow to moderate pace in order to avoid losing balance anteriorly. Self-Care/ADL's  [] Self-care/home management training and compensatory training, meal preparation, safety procedures, and instructions in use of assistive technology devices/adaptive equipment, direct one-on-one contact.  (08760)    Home Exercise Program:      [] Reviewed/Progressed HEP activities related to strengthening, flexibility, endurance, ROM. (47546)  [x] Reviewed/Progressed HEP activities related to improving balance, coordination, kinesthetic sense, posture, motor skill, proprioception.  (79545)    Manual Treatments:    [] Provided manual therapy to mobilize soft tissue/joints for the purpose of modulating pain, promoting relaxation,  increasing ROM, reducing/eliminating soft tissue swelling/inflammation/restriction, improving soft tissue extensibility. (63014)    Service Based Modalities:      Timed Code Treatment Minutes:  50' neuro re-ed            Total Treatment Minutes:   50'    Treatment/Activity Tolerance:  [x] Patient tolerated treatment well [] Patient limited by fatique  [] Patient limited by pain  [] Patient limited by other medical complications  [] Other:     Prognosis: [] Good [x] Fair  [] Poor    Patient Requires Follow-up: [x] Yes  [] No      Goals:  Short term goals  Time Frame for Short term goals: 3 weeks  Short term goal 1: Initiate HEP MET  Short term goal 2: Tandem balance >15\" bilaterally for improved ease with ADL and yard work MET 08FEB  Short term goal 3: Improve TUG time to <15\" for improved balance and safety in home and community MET 08FEB    Long term goals  Time Frame for Long term goals : 6 weeks   Long term goal 1: Indep with HEP   Long term goal 2: Tandem balance >25\" bilaterally for improved ease with ADL and yard work (Not met. Only held 15\". 19APR)  Long term goal 3: Improve TUG time to <13\" for improved balance and safety in home and community (Performed TUG in 12\" this date. 17APR)  Long term goal 4: Pt to amb 48' with least restrictive AD to improve home/community ambulation (Pt is able to ambulate without AD but requires CGA. Slight imbalance still present. 17APR)   Long term goal 5: Improve Tinetti score to >21 for improved home/community ambulation and safety (Scored 18/28 this date. 19APR)      Plan:   [x] Continue per plan of care [] Alter current plan (see comments)  [] Plan of care initiated [] Hold pending MD visit [] Discharge    Plan for Next Session:  Test all goals next session.      Electronically signed by:  Cheryl Ying PTA

## 2019-05-15 ENCOUNTER — HOSPITAL ENCOUNTER (OUTPATIENT)
Dept: PHYSICAL THERAPY | Age: 44
Setting detail: THERAPIES SERIES
Discharge: HOME | End: 2019-05-15
Payer: MEDICARE

## 2019-05-15 PROCEDURE — 97112 NEUROMUSCULAR REEDUCATION: CPT

## 2019-05-15 NOTE — FLOWSHEET NOTE
Physical Therapy Daily Treatment Note    Date:  5/15/2019    Patient Name:  Daisha Amador    :  1975  MRN: [id number]  Restrictions/Precautions:      Medical/Treatment Diagnosis Information:   · Diagnosis: Multiple Sclerosis  · Treatment Diagnosis: multiple sclerosis-weakness  Insurance/Certification information:  PT Insurance Information: [address] [facility]  Physician Information:  Referring Practitioner: Radha Silva MD   Plan of care signed (Y/N):  N      Visit# / total visits:  10/10 (4th POC) 40 total  Pain level: 0/10      Time In: 2:45  Time Out: 3:38    Progress Note: []  Yes  [x]  No  Next due by: Visit #10  Or by 19    Subjective: Pt rpts to clinic with no current complaints of pain or fatigue stating \"I feel fine. The multiple times is tough but I like working it often\". Objective: Modified flow sheet due to objective measurements/test collection performed this date. Pt tolerated todays session well indicating no increased pain but some complaints of fatigue post session. Required no rest breaks today and pushed most weight on leg press to date at 125#. Observation: Rpts with RW and stiff legged gait pattern. KAFO donned. Gait laps around clinic without AD required CGA/min A due to losses of balance. Gait belt used to maintain upright posture and prevent a fall. Test measurements:   Tandem Stance: Left Leading: x7\", Right leadin\" (5/15/19)  Tinetti Score 20/28 (5/15/19)   CGA with all balance activities and ambulation without AD this date due to decreased LE/core/trunk control. Exercises:   Exercise/Equipment Resistance/Repetitions Other comments   Offset Balance EO & EC   DD DLS    AIREX HR/TR 18P     AIREX Marches 20x each    AIREX 3 way hip 20x each bilat    AIREX HS Curls 15x each     Sit-Stands Required CGA this date   Sidesteps With obstacles.     DD Balance With offset   EO & EC   SLS With support to hold up R LE   amb without AD 1.0 laps CGA from therapist BOSU flat side balance CGA from therapist, eyes open   BOSU Round Side Balance    BOSU flat side +    Mini Lunges Mod A from therapist   NUSTEP 8'  Seat 10 LV7   Seated marches Seated on SBall, CGA from therapist   LAQ    Step taps F,L 2\"   Squats  1 HHA. Ball between legs. Cybex leg press 3x10125#   TM incline walking    Hip abd/add [id number]; GREEN   [] Provided verbal/tactile cueing for activities related to strengthening, flexibility, endurance, ROM. (70742)   [x] Provided verbal/tactile cueing for activities related to improving balance, coordination, kinesthetic sense, posture, motor skill, proprioception. (57525)    Therapeutic Activities:     [] Therapeutic activities, direct (one-on-one) patient contact (use of dynamic activities to improve functional performance). (29909)    Gait:   [x] Provided training and instruction to the patient for ambulation re-education. (38586)  Verbal cues for proper swing phase bilaterally this date and to take moderate steps at a slow to moderate pace in order to avoid losing balance anteriorly. Self-Care/ADL's  [] Self-care/home management training and compensatory training, meal preparation, safety procedures, and instructions in use of assistive technology devices/adaptive equipment, direct one-on-one contact. (76994)    Home Exercise Program:      [] Reviewed/Progressed HEP activities related to strengthening, flexibility, endurance, ROM. (98013)  [x] Reviewed/Progressed HEP activities related to improving balance, coordination, kinesthetic sense, posture, motor skill, proprioception.  (33641)    Manual Treatments:    [] Provided manual therapy to mobilize soft tissue/joints for the purpose of modulating pain, promoting relaxation,  increasing ROM, reducing/eliminating soft tissue swelling/inflammation/restriction, improving soft tissue extensibility.  (89184)    Service Based Modalities:      Timed Code Treatment Minutes:  48' neuro re-ed            Total Treatment Minutes:   48'    Treatment/Activity Tolerance:  [x] Patient tolerated treatment well [] Patient limited by fatique  [] Patient limited by pain  [] Patient limited by other medical complications  [] Other:     Prognosis: [] Good [x] Fair  [] Poor    Patient Requires Follow-up: [x] Yes  [] No      Goals:  Short term goals  Time Frame for Short term goals: 3 weeks  Short term goal 1: Initiate HEP MET  Short term goal 2: Tandem balance >15\" bilaterally for improved ease with ADL and yard work MET 08FEB  Short term goal 3: Improve TUG time to <15\" for improved balance and safety in home and community MET 08FEB    Long term goals  Time Frame for Long term goals : 6 weeks   Long term goal 1: Indep with HEP   Long term goal 2: Tandem balance >25\" bilaterally for improved ease with ADL and yard work (Part Met- (tandem offset) RLE in front x25sec, LLE in front x7sec, 5/15/19)  Long term goal 3: Improve TUG time to <13\" for improved balance and safety in home and community (MET Performed TUG in 12\" this date. 17APR)  Long term goal 4: Pt to amb 48' with least restrictive AD to improve home/community ambulation (Pt is able to ambulate without AD but requires CGA. Slight imbalance still present. 17APR)   Long term goal 5: Improve Tinetti score to >21 for improved home/community ambulation and safety (Scored 20/28 this date. 5/15/19)      Plan:   [x] Continue per plan of care [] Alter current plan (see comments)  [] Plan of care initiated [] Hold pending MD visit [] Discharge    Plan for Next Session:  Monitor response and progress as tolerated.      Electronically signed by:  Lilia Ledbetter PTA

## 2019-05-16 ENCOUNTER — HOSPITAL ENCOUNTER (OUTPATIENT)
Dept: PHYSICAL THERAPY | Age: 44
Setting detail: THERAPIES SERIES
Discharge: HOME | End: 2019-05-16
Payer: MEDICARE

## 2019-05-16 PROCEDURE — 97112 NEUROMUSCULAR REEDUCATION: CPT | Performed by: PHYSICAL THERAPY ASSISTANT

## 2019-05-16 NOTE — PROGRESS NOTES
18/28 (current)   (13/28 initial eval)    · Muscle fasciculations palpable in right hip    · Observation: Rpts with RW and stiff legged gait pattern. · Gait laps around clinic without AD required CGA/min A due to losses of balance. Gait belt used to maintain upright and prevent a fall.     · Test measurements:     · Improved tolerance with exercises and improved endurance this date. Assessment:    Arsalan Piper is making progress with dynamic balance and stability, although still limited and continues to lack functional strength and endurance needed for ambulation without a rolling walker. Plan:    Continue per POC 3-4x per week for 4 weeks to increase strength, stability, dynamic balance and proprioception for improved independence with gait and safety with home and community management. Goals:    Short term goals  Time Frame for Short term goals: 3 weeks  Short term goal 1: Initiate HEP MET  Short term goal 2: Tandem balance >15\" bilaterally for improved ease with ADL and yard work MET 08FEB  Short term goal 3: Improve TUG time to <15\" for improved balance and safety in home and community MET 08FEB     Long term goals  Time Frame for Long term goals : 6 weeks   Long term goal 1: Indep with HEP MET   Long term goal 2: Tandem balance >25\" bilaterally for improved ease with ADL and yard work (Not met. Only held 15\". 25MAR)  Long term goal 3: Improve TUG time to <13\" for improved balance and safety in home and community (Performed TUG in 12\" this date. 25MAR) MET  Long term goal 4: Pt to amb 48' with least restrictive AD to improve home/community ambulation (Pt is able to ambulate without AD but requires CGA. Slight imbalance still present. 25MAR) In progress   Long term goal 5: Improve Tinetti score to >21 for improved home/community ambulation and safety (Scored 24/28 this date.  25MAR)  MET    Current Frequency/Duration: 4/25/19 - 5/25/19  # Days per week:       [] 1 day  # Weeks:        [] 1 week [x] 4 weeks                                                [] 2 days? [] 2 weeks          [] 5 weeks                                                [x] 3-4 days                         [] 3 weeks          [] 6 weeks          Rehab Potential: [] Excellent [x] Good [] Fair  [] Poor     Goal Status:  [] Achieved [x] In Progress  [] Not Achieved     Patient Status: [] Continue per initial plan of Care     [] Patient now discharged     [x] Additional visits requested, Please re-certify for additional visits:      Requested frequency/duration:  2X/week for 4 weeks    Electronically signed by:  Lit Villalta PT, DPT    If you have any questions or concerns, please don't hesitate to call.   Thank you for your referral.    Physician Signature:________________________________Date:__________________  By signing above, therapists plan is approved by physician

## 2019-05-16 NOTE — FLOWSHEET NOTE
Physical Therapy Daily Treatment Note    Date:  2019    Patient Name:  Taz Valerio    :  1975  MRN: [id number]  Restrictions/Precautions:      Medical/Treatment Diagnosis Information:   · Diagnosis: Multiple Sclerosis  · Treatment Diagnosis: multiple sclerosis-weakness  Insurance/Certification information:  PT Insurance Information: [address] [facility]  Physician Information:  Referring Practitioner: Selin Fajardo. MD Shira   Plan of care signed (Y/N):  N      Visit# / total visits:   ( POC) 41 total  Pain level: 0/10      Time In: 2:01  Time Out: 258    Progress Note: []  Yes  [x]  No  Next due by: Visit #10  Or by 19    Subjective: No c/o noted at this time. No soreness or fatigue noted with increased therapy time. Objective: ALEX complete per flow chart to facilitate strength, motion and stability to allow ease and safety with movement and ambulation. Verbal cuing for progression and technique with exercises. Increased fatigue noted this date and difficulty with leg press exercises. Decreased weight required to complete. Observation: Rpts with RW and stiff legged gait pattern. KAFO donned. Gait laps around clinic without AD required CGA/min A due to losses of balance. Gait belt used to maintain upright posture and prevent a fall. Test measurements:      Exercises:   Exercise/Equipment Resistance/Repetitions Other comments   Offset Balance 3 x 30\" each EO & EC   DD DLS 3 x 33\"    AIREX HR/TR 20x     AIREX Marches 20x each    AIREX 3 way hip 20x each bilat    AIREX HS Curls 15x each     Sit-Stands  Required CGA this date   Sidesteps 5 laps With obstacles.     DD Balance 3 x 30\" With offset   EO & EC   SLS  With support to hold up R LE   amb without AD 1.0 laps CGA from therapist   BOSU flat side balance  CGA from therapist, eyes open   BOSU Round Side Balance     BOSU flat side +     Mini Lunges  Mod A from therapist   NUSTEP 8'  Seat 10 LV7   Seated marches x20 Seated on SBall, CGA from therapist   LAQ x20    Step taps x10  F,L 2\"   Squats  1x15 each on  AIREX, DD, BOSU 1 HHA. Ball between legs. Cybex leg press 3x10 125#   TM incline walking 8' 3' @0%, 5' @1%   Hip abd/add [id number]; GREEN   [] Provided verbal/tactile cueing for activities related to strengthening, flexibility, endurance, ROM. (67301)   [x] Provided verbal/tactile cueing for activities related to improving balance, coordination, kinesthetic sense, posture, motor skill, proprioception. (64479)    Therapeutic Activities:     [] Therapeutic activities, direct (one-on-one) patient contact (use of dynamic activities to improve functional performance). (38232)    Gait:   [x] Provided training and instruction to the patient for ambulation re-education. (64989)  Verbal cues for proper swing phase bilaterally this date and to take moderate steps at a slow to moderate pace in order to avoid losing balance anteriorly. Self-Care/ADL's  [] Self-care/home management training and compensatory training, meal preparation, safety procedures, and instructions in use of assistive technology devices/adaptive equipment, direct one-on-one contact. (38932)    Home Exercise Program:      [] Reviewed/Progressed HEP activities related to strengthening, flexibility, endurance, ROM. (34233)  [x] Reviewed/Progressed HEP activities related to improving balance, coordination, kinesthetic sense, posture, motor skill, proprioception.  (63538)    Manual Treatments:    [] Provided manual therapy to mobilize soft tissue/joints for the purpose of modulating pain, promoting relaxation,  increasing ROM, reducing/eliminating soft tissue swelling/inflammation/restriction, improving soft tissue extensibility.  (05967)    Service Based Modalities:      Timed Code Treatment Minutes:  62' neuro re-ed            Total Treatment Minutes:   62'    Treatment/Activity Tolerance:  [x] Patient tolerated treatment well [] Patient limited by fatique  [] Patient limited by pain  [] Patient limited by other medical complications  [] Other:     Prognosis: [] Good [x] Fair  [] Poor    Patient Requires Follow-up: [x] Yes  [] No      Goals:  Short term goals  Time Frame for Short term goals: 3 weeks  Short term goal 1: Initiate HEP MET  Short term goal 2: Tandem balance >15\" bilaterally for improved ease with ADL and yard work MET 08FEB  Short term goal 3: Improve TUG time to <15\" for improved balance and safety in home and community MET 08FEB    Long term goals  Time Frame for Long term goals : 6 weeks   Long term goal 1: Indep with HEP   Long term goal 2: Tandem balance >25\" bilaterally for improved ease with ADL and yard work (Part Met- (tandem offset) RLE in front x25sec, LLE in front x7sec, 5/15/19)  Long term goal 3: Improve TUG time to <13\" for improved balance and safety in home and community (MET Performed TUG in 12\" this date. 17APR)  Long term goal 4: Pt to amb 48' with least restrictive AD to improve home/community ambulation (Pt is able to ambulate without AD but requires CGA. Slight imbalance still present. 17APR)   Long term goal 5: Improve Tinetti score to >21 for improved home/community ambulation and safety (Scored 20/28 this date. 5/15/19)      Plan:   [x] Continue per plan of care [] Alter current plan (see comments)  [] Plan of care initiated [] Hold pending MD visit [] Discharge    Plan for Next Session:  Monitor response and progress as tolerated. Electronically signed by:   Nilay Parry PTA

## 2019-05-17 ENCOUNTER — HOSPITAL ENCOUNTER (OUTPATIENT)
Dept: PHYSICAL THERAPY | Age: 44
Setting detail: THERAPIES SERIES
Discharge: HOME | End: 2019-05-17
Payer: MEDICARE

## 2019-05-17 PROCEDURE — 97112 NEUROMUSCULAR REEDUCATION: CPT

## 2019-05-17 NOTE — FLOWSHEET NOTE
Physical Therapy Daily Treatment Note    Date:  2019    Patient Name:  Kallie Aviles    :  1975  MRN: [id number]  Restrictions/Precautions:      Medical/Treatment Diagnosis Information:   · Diagnosis: Multiple Sclerosis  · Treatment Diagnosis: multiple sclerosis-weakness  Insurance/Certification information:  PT Insurance Information: [address] [facility]  Physician Information:  Referring Practitioner: Darleen Silva MD   Plan of care signed (Y/N):  N      Visit# / total visits:   ( POC) 42 total  Pain level: 0/10      Time In: 2:01   Time Out: 2:48    Progress Note: []  Yes  [x]  No  Next due by: Visit #10  Or by 19    Subjective: Patient reports no fatigue or muscle soreness this date. No complaints after previous session. Objective: Neuro re-ed complete per flow chart to facilitate strength, motion and stability to allow ease and safety with movement and ambulation. Verbal cuing for progression and technique with exercises. Progressed flow sheet for stability. Michele Mcgarry exhibits LE fatigue requiring reduced leg press weight. Observation: Rpts with RW and stiff legged gait pattern. KAFO donned. Test measurements:      Exercises:   Exercise/Equipment Resistance/Repetitions Other comments   Offset Balance 3 x 30\" each EO & EC   DD DLS 3 x 09\"    AIREX HR/TR    AIREX Marches    AIREX 3 way hip    AIREX HS Curls    Sit-Stands  Required CGA this date   Sidesteps 5 laps With obstacles. DD Balance 3 x 30\" With offset   EO & EC   SLS  With support to hold up R LE   amb without AD  CGA from therapist   BOSU flat side balance  CGA from therapist, eyes open   BOSU Round Side Balance     BOSU flat side +     Mini Lunges F/L x10ea CGA- Min A from therapist   NUSTEP   Seat 10 LV7   Seated marches  Seated on SBall, CGA from therapist   LAQ    Step taps F,L 2\"   Squats  1x20 each on  AIREX, DD, BOSU 1 HHA. Ball between legs.     Cybex leg press X15, last 3 x5\" eccentric 120#   TM incline walking 6' 1.5' @1%, 4.5' @2%   Hip abd/add Fabricio MANCILLA   [] Provided verbal/tactile cueing for activities related to strengthening, flexibility, endurance, ROM. (42651)   [x] Provided verbal/tactile cueing for activities related to improving balance, coordination, kinesthetic sense, posture, motor skill, proprioception. (45758)    Therapeutic Activities:     [] Therapeutic activities, direct (one-on-one) patient contact (use of dynamic activities to improve functional performance). (80768)    Gait:   [x] Provided training and instruction to the patient for ambulation re-education. (19089)  Verbal cues for proper swing phase bilaterally this date and to take moderate steps at a slow to moderate pace in order to avoid losing balance anteriorly. Self-Care/ADL's  [] Self-care/home management training and compensatory training, meal preparation, safety procedures, and instructions in use of assistive technology devices/adaptive equipment, direct one-on-one contact. (43943)    Home Exercise Program:      [] Reviewed/Progressed HEP activities related to strengthening, flexibility, endurance, ROM. (82797)  [x] Reviewed/Progressed HEP activities related to improving balance, coordination, kinesthetic sense, posture, motor skill, proprioception.  (70295)    Manual Treatments:    [] Provided manual therapy to mobilize soft tissue/joints for the purpose of modulating pain, promoting relaxation,  increasing ROM, reducing/eliminating soft tissue swelling/inflammation/restriction, improving soft tissue extensibility.  (11573)    Service Based Modalities:      Timed Code Treatment Minutes:  52' neuro re-ed            Total Treatment Minutes:   52'    Treatment/Activity Tolerance:  [x] Patient tolerated treatment well [] Patient limited by fatique  [] Patient limited by pain  [] Patient limited by other medical complications  [] Other:     Prognosis: [] Good [x] Fair  [] Poor    Patient Requires Follow-up: [x] Yes  [] No      Goals:  Short term goals  Time Frame for Short term goals: 3 weeks  Short term goal 1: Initiate HEP MET  Short term goal 2: Tandem balance >15\" bilaterally for improved ease with ADL and yard work MET 08FEB  Short term goal 3: Improve TUG time to <15\" for improved balance and safety in home and community MET 08FEB    Long term goals  Time Frame for Long term goals : 6 weeks   Long term goal 1: Indep with HEP   Long term goal 2: Tandem balance >25\" bilaterally for improved ease with ADL and yard work (Part Met- (tandem offset) RLE in front x25sec, LLE in front x7sec, 5/15/19)  Long term goal 3: Improve TUG time to <13\" for improved balance and safety in home and community (MET Performed TUG in 12\" this date. 17APR)  Long term goal 4: Pt to amb 48' with least restrictive AD to improve home/community ambulation (Pt is able to ambulate without AD but requires CGA. Slight imbalance still present. 17APR)   Long term goal 5: Improve Tinetti score to >21 for improved home/community ambulation and safety (Scored 20/28 this date. 5/15/19)      Plan:   [x] Continue per plan of care [] Alter current plan (see comments)  [] Plan of care initiated [] Hold pending MD visit [] Discharge    Plan for Next Session:  Monitor response and progress as tolerated.      Electronically signed by:  Suyapa Rodriguez PTA

## 2019-05-20 ENCOUNTER — PROCEDURE VISIT (OUTPATIENT)
Dept: UROLOGY | Age: 44
End: 2019-05-20
Payer: MEDICARE

## 2019-05-20 ENCOUNTER — HOSPITAL ENCOUNTER (OUTPATIENT)
Dept: INTERVENTIONAL RADIOLOGY/VASCULAR | Age: 44
Discharge: HOME | End: 2019-05-22
Payer: MEDICARE

## 2019-05-20 ENCOUNTER — HOSPITAL ENCOUNTER (EMERGENCY)
Age: 44
Discharge: HOME | End: 2019-05-20
Attending: EMERGENCY MEDICINE
Payer: MEDICARE

## 2019-05-20 VITALS
WEIGHT: 140 LBS | HEART RATE: 109 BPM | TEMPERATURE: 98.1 F | RESPIRATION RATE: 15 BRPM | HEIGHT: 74 IN | OXYGEN SATURATION: 98 % | DIASTOLIC BLOOD PRESSURE: 68 MMHG | SYSTOLIC BLOOD PRESSURE: 132 MMHG | BODY MASS INDEX: 17.97 KG/M2

## 2019-05-20 VITALS
HEIGHT: 74 IN | HEART RATE: 87 BPM | WEIGHT: 139.99 LBS | SYSTOLIC BLOOD PRESSURE: 112 MMHG | BODY MASS INDEX: 17.97 KG/M2 | OXYGEN SATURATION: 97 % | DIASTOLIC BLOOD PRESSURE: 64 MMHG

## 2019-05-20 DIAGNOSIS — N48.33 DRUG-INDUCED PRIAPISM: Primary | ICD-10-CM

## 2019-05-20 DIAGNOSIS — G35 MULTIPLE SCLEROSIS (HCC): ICD-10-CM

## 2019-05-20 DIAGNOSIS — N31.9 NEUROGENIC BLADDER: ICD-10-CM

## 2019-05-20 DIAGNOSIS — N40.1 BENIGN LOCALIZED PROSTATIC HYPERPLASIA WITH LOWER URINARY TRACT SYMPTOMS (LUTS): ICD-10-CM

## 2019-05-20 DIAGNOSIS — N52.9 ERECTILE DYSFUNCTION, UNSPECIFIED ERECTILE DYSFUNCTION TYPE: ICD-10-CM

## 2019-05-20 DIAGNOSIS — N31.9 NEUROGENIC BLADDER: Primary | ICD-10-CM

## 2019-05-20 PROCEDURE — 99213 OFFICE O/P EST LOW 20 MIN: CPT | Performed by: UROLOGY

## 2019-05-20 PROCEDURE — G8419 CALC BMI OUT NRM PARAM NOF/U: HCPCS | Performed by: UROLOGY

## 2019-05-20 PROCEDURE — 76770 US EXAM ABDO BACK WALL COMP: CPT

## 2019-05-20 PROCEDURE — 99283 EMERGENCY DEPT VISIT LOW MDM: CPT

## 2019-05-20 PROCEDURE — 96372 THER/PROPH/DIAG INJ SC/IM: CPT

## 2019-05-20 PROCEDURE — 52000 CYSTOURETHROSCOPY: CPT | Performed by: UROLOGY

## 2019-05-20 PROCEDURE — G8427 DOCREV CUR MEDS BY ELIG CLIN: HCPCS | Performed by: UROLOGY

## 2019-05-20 PROCEDURE — 6360000002 HC RX W HCPCS: Performed by: EMERGENCY MEDICINE

## 2019-05-20 PROCEDURE — 1036F TOBACCO NON-USER: CPT | Performed by: UROLOGY

## 2019-05-20 PROCEDURE — 51726 COMPLEX CYSTOMETROGRAM: CPT | Performed by: UROLOGY

## 2019-05-20 RX ORDER — TERBUTALINE SULFATE 1 MG/ML
0.25 INJECTION, SOLUTION SUBCUTANEOUS ONCE
Status: COMPLETED | OUTPATIENT
Start: 2019-05-20 | End: 2019-05-20

## 2019-05-20 RX ORDER — OXYBUTYNIN CHLORIDE 15 MG/1
15 TABLET, EXTENDED RELEASE ORAL DAILY
Qty: 90 TABLET | Refills: 3 | Status: SHIPPED | OUTPATIENT
Start: 2019-05-20 | End: 2020-03-13 | Stop reason: SDUPTHER

## 2019-05-20 RX ADMIN — TERBUTALINE SULFATE 0.25 MG: 1 INJECTION SUBCUTANEOUS at 20:46

## 2019-05-20 ASSESSMENT — PAIN SCALES - GENERAL: PAINLEVEL_OUTOF10: 1

## 2019-05-20 ASSESSMENT — ENCOUNTER SYMPTOMS
COUGH: 0
SHORTNESS OF BREATH: 0

## 2019-05-20 ASSESSMENT — PAIN DESCRIPTION - DESCRIPTORS: DESCRIPTORS: TIGHTNESS

## 2019-05-20 ASSESSMENT — PAIN DESCRIPTION - LOCATION: LOCATION: PENIS

## 2019-05-21 ENCOUNTER — HOSPITAL ENCOUNTER (OUTPATIENT)
Dept: PHYSICAL THERAPY | Age: 44
Setting detail: THERAPIES SERIES
Discharge: HOME | End: 2019-05-21
Payer: MEDICARE

## 2019-05-21 PROCEDURE — 97110 THERAPEUTIC EXERCISES: CPT

## 2019-05-21 NOTE — FLOWSHEET NOTE
Physical Therapy Daily Treatment Note    Date:  2019    Patient Name:  Abbey Kaur    :  1975  MRN: [id number]  Restrictions/Precautions:      Medical/Treatment Diagnosis Information:   · Diagnosis: Multiple Sclerosis  · Treatment Diagnosis: multiple sclerosis-weakness  Insurance/Certification information:  PT Insurance Information: [address] [facility]  Physician Information:  Referring Practitioner: Kim Hollins. MD Shira   Plan of care signed (Y/N):  N      Visit# / total visits: 3/10  (5th POC) 43 total  Pain level: 0/10      Time In: 2:44   Time Out: 3:35     Progress Note: []  Yes  [x]  No  Next due by: Visit #10  Or by 19    Subjective: Pt rpts to clinic with no complaints of pain, fatigue, or soreness and notes no complaints of last session. Objective: Neuro re-ed complete per flow chart to facilitate strength, motion and stability to allow ease and safety with movement and ambulation. Verbal cuing for progression and technique with exercises. Progressed flow sheet for stability. Ended session with 8' incline TM walking with good tolerance noted. Observation: Rpts with RW and stiff legged gait pattern. KAFO donned. Test measurements:      Exercises:   Exercise/Equipment Resistance/Repetitions Other comments   Offset Balance 3 x 30\" each EO & EC   DD DLS 3 x 37\"    AIREX HR/TR    AIREX Marches    AIREX 3 way hip    AIREX HS Curls    Sit-Stands  Required CGA this date   Sidesteps 5 laps With obstacles. DD Balance 3 x 30\" With offset   EO & EC   SLS  With support to hold up R LE   amb without AD  CGA from therapist   BOSU flat side balance  CGA from therapist, eyes open   BOSU Round Side Balance     BOSU flat side +     Mini Lunges F/L x10ea CGA- Min A from therapist   NUSTEP   Seat 10 LV7   Seated marches  Seated on SBall, CGA from therapist   LAQ    Step taps F,L 2\"   Squats  1x20 each on  AIREX, DD, BOSU 1 HHA. Ball between legs.     Cybex leg press X15, last 3 x5\" eccentric 120#   TM incline walking 8' 1.5' @1%, 4.5' @2%   Hip abd/add Cornelia MANCILLA   [] Provided verbal/tactile cueing for activities related to strengthening, flexibility, endurance, ROM. (20419)   [x] Provided verbal/tactile cueing for activities related to improving balance, coordination, kinesthetic sense, posture, motor skill, proprioception. (94173)    Therapeutic Activities:     [] Therapeutic activities, direct (one-on-one) patient contact (use of dynamic activities to improve functional performance). (66039)    Gait:   [x] Provided training and instruction to the patient for ambulation re-education. (62879)  Verbal cues for proper swing phase bilaterally this date and to take moderate steps at a slow to moderate pace in order to avoid losing balance anteriorly. Self-Care/ADL's  [] Self-care/home management training and compensatory training, meal preparation, safety procedures, and instructions in use of assistive technology devices/adaptive equipment, direct one-on-one contact. (70438)    Home Exercise Program:      [] Reviewed/Progressed HEP activities related to strengthening, flexibility, endurance, ROM. (38634)  [x] Reviewed/Progressed HEP activities related to improving balance, coordination, kinesthetic sense, posture, motor skill, proprioception.  (42872)    Manual Treatments:    [] Provided manual therapy to mobilize soft tissue/joints for the purpose of modulating pain, promoting relaxation,  increasing ROM, reducing/eliminating soft tissue swelling/inflammation/restriction, improving soft tissue extensibility.  (21136)    Service Based Modalities:      Timed Code Treatment Minutes:  46' neuro re-ed            Total Treatment Minutes:   46'    Treatment/Activity Tolerance:  [x] Patient tolerated treatment well [] Patient limited by fatique  [] Patient limited by pain  [] Patient limited by other medical complications  [] Other:     Prognosis: [] Good [x] Fair  [] Poor    Patient Requires Follow-up: [x] Yes  []

## 2019-05-21 NOTE — PROGRESS NOTES
The patient tolerated the procedure well. ISABELLA RUSHING [facility]  URODYNAMICS REPORT    Patient:  Arlin Ramos  MRN: [id number]  YOB: 1975   ATTENDING PHYSICIAN:  Lalita Baker MD  PROCEDURE:  Urodynamics  PROCEDURE:    After urodynamics catheters were placed the study was initiated with a fill rate of 293 ml per minute. Throughout the study the patient was noted to have detrusor overactivity present. The bladder was noted to be compliant. The maximum capacity was 323 ml. The maximum functional capacity was approximately 323 mL. The detrusor pressures did not rise above 40 cm of water. During this study the patient expressed first desire to void around 22 ml. Normal desire to void was around 309 ml. Strong desire was 323 ml. There was leak noted during the study during cough  The maximum detrusor pressure was 30 cm of water. Once the bladder was full the urethral catheter was slowly removed. The patient was catheterized and could not void on his own  The patient tolerated the procedure well. At this point in time the recommendations will be to continue catheterizing to prevent high pressure transmission to upper tracts and increase ditropan to 15 daily. ISABELLA CARTAGENA          Last several PSA's:  No results found for: PSA    Last total testosterone:  No results found for: TESTOSTERONE    Urinalysis today:  No results found for this visit on 05/20/19.     Last BUN and creatinine:  No results found for: BUN  No results found for: CREATININE    Additional Lab/Culture results: none    Imaging Reviewed during this Office Visit:   [address], MD independently reviewed the images and verified the radiology reports from:    Renal ultrasound negative for hydronephrosis      PAST MEDICAL, FAMILY AND SOCIAL HISTORY:  Past Medical History:   Diagnosis Date    MS (multiple sclerosis) ([facility] [address]      Past Surgical History:   Procedure Laterality Date    COLONOSCOPY      UPPER GASTROINTESTINAL ENDOSCOPY       Family History   Problem Relation Age of Onset    Cancer Paternal Uncle     Cancer Paternal Grandmother      Outpatient Medications Marked as Taking for the 5/20/19 encounter (Procedure visit) with Kiko Browning MD   Medication Sig Dispense Refill    oxybutynin (DITROPAN XL) 15 MG extended release tablet Take 1 tablet by mouth daily 90 tablet 3    oxybutynin (DITROPAN-XL) 10 MG extended release tablet 10 mg daily       megestrol (MEGACE) 20 MG tablet 20 mg daily      Biotin 1000 MCG CHEW Take 100 mg by mouth daily      RA VITAMIN D-3 5000 units CAPS capsule 5,000 Units daily  1    valACYclovir (VALTREX) 500 MG tablet Take 500 mg by mouth 2 times daily      omeprazole (PRILOSEC) 40 MG delayed release capsule take 1 capsule by mouth once daily  0    modafinil (PROVIGIL) 100 MG tablet Take 100 mg by mouth daily.  Vitamin E 400 units TABS Take by mouth daily      Cyanocobalamin (B-12) 3000 MCG SUBL Place under the tongue daily      Potassium Gluconate 595 (99 K) MG TABS Take by mouth daily      Multiple Vitamins-Minerals (MULTIVITAMIN ADULT PO) Take 1 tablet by mouth daily      Ascorbic Acid (VITAMIN C) 250 MG tablet Take 250 mg by mouth daily      gabapentin (NEURONTIN) 300 MG capsule Take 300 mg by mouth nightly      ocrelizumab (OCREVUS) 300 MG/10ML SOLN injection Infuse 300 mg intravenously once 2 times a year/ every 6 months      sildenafil (VIAGRA) 100 MG tablet Take 100 mg by mouth as needed.  bisacodyl (DULCOLAX) 5 MG EC tablet Take 2 tablets by mouth daily as needed for Constipation. Patient is to purchase Dulcolax tablets over the counter if not covered by [employer]. Take four Dulcolax tablets as directed. Please follow further instructions as listed on your Colonoscopy Preparation sheet.  30 tablet 4    baclofen (LIORESAL) 10 MG tablet 10 mg 3 times daily       midodrine (PROAMATINE) 5 MG tablet Take 5 mg by mouth 2 times daily          Excedrin migraine  [asa-apap-caff buffered] and Prednisone  Social History     Tobacco Use   Smoking Status Former Smoker    Packs/day: 1.00    Last attempt to quit: 6/1/2015    Years since quitting: 3.9   Smokeless Tobacco Never Used      (If patient a smoker, smoking cessation counseling offered)   Social History     Substance and Sexual Activity   Alcohol Use No       REVIEW OF SYSTEMS:  Constitutional: negative  Eyes: negative  Respiratory: negative  Cardiovascular: negative  Gastrointestinal: negative  Genitourinary: see HPI  Musculoskeletal: negative  Skin: negative   Neurological: negative  Hematological/Lymphatic: negative  Psychological: negative        Physical Exam:    This a 39 y.o. male  Vitals:    05/20/19 1328   BP: 112/64   Pulse: 87   SpO2: 97%     Body mass index is 17.97 kg/m². Constitutional: Patient in no acute distress; Neuro: alert and oriented to person place and time. Psych: Mood and affect normal.  Skin: warm, dry  Lungs: Respiratory effort normal, Symmetric chest rise  Cardiovascular:  Normal peripheral pulses; Regular rate, radial pulses palpable  Abdomen: Soft, non-tender, non-distended with no CVA, flank pain, hepatosplenomegaly or hernia. Kidneys normal.  Bladder non-tender and not distended. Lymphatics: no palpable lymphadenopathy  Minimal/no edema in bilateral lower extremities        Assessment and Plan        1. Neurogenic bladder    2. Multiple sclerosis ([address]    3. Erectile dysfunction, unspecified erectile dysfunction type    4. Benign localized prostatic hyperplasia with lower urinary tract symptoms (LUTS)               Plan:        Renal ultrasound negative  There was an element of prostatic hypertrophy. However, pt has not been able to void for > 25 years on his own and it his voiding dysfunction is primarily neurogenic in etiology and not obstructive  Continue oxybutynin. We have increased to 15 daily  ED. We performed trimix injections.  Appropriate instructions provided (including presenting to ED if prolonged erection)      Prescriptions Ordered:  Orders Placed This Encounter   Medications    oxybutynin (DITROPAN XL) 15 MG extended release tablet     Sig: Take 1 tablet by mouth daily     Dispense:  90 tablet     Refill:  3      Orders Placed:  No orders of the defined types were placed in this encounter.            Elijah Cade MD

## 2019-05-21 NOTE — ED PROVIDER NOTES
[address] [facility]  eMERGENCY dEPARTMENT eNCOUnter      Pt Name: Taz Valerio  MRN: [id number]  [place] 1975  Date of evaluation: 5/20/2019      CHIEF COMPLAINT       Chief Complaint   Patient presents with    Other     Erection since 3pm         HISTORY OF [address] is a 39 y.o. male who presents with complaints of sustained erection, he saw his urologist today had a kidney ultrasound and bladder ultrasound also received an injection for directions by the urologist he went and picked up her prescription for the same medication down in [place] he says that the erection is persistent and is not terribly uncomfortable but it just won't go away  Patient has not had a history of present present before he does have a history of MS  He has taken a dose of Sudafed  REVIEW OF SYSTEMS         Review of Systems   Constitutional: Negative for activity change and appetite change. Respiratory: Negative for cough and shortness of breath. Genitourinary: Positive for penile swelling. Negative for decreased urine volume, dysuria, enuresis, scrotal swelling, testicular pain and urgency. Musculoskeletal:        Patient walks with a walker and uses a leg brace on the right leg due to his MS         PAST MEDICAL HISTORY    has a past medical history of MS (multiple sclerosis) ([address] SURGICAL HISTORY      has a past surgical history that includes Colonoscopy and Upper gastrointestinal endoscopy. CURRENT MEDICATIONS       Previous Medications    ASCORBIC ACID (VITAMIN C) 250 MG TABLET    Take 250 mg by mouth daily    BACLOFEN (LIORESAL) 10 MG TABLET    10 mg 3 times daily     BIOTIN 1000 MCG CHEW    Take 100 mg by mouth daily    BISACODYL (DULCOLAX) 5 MG EC TABLET    Take 2 tablets by mouth daily as needed for Constipation. Patient is to purchase Dulcolax tablets over the counter if not covered by [employer]. Take four Dulcolax tablets as directed.  Please follow further instructions as

## 2019-05-21 NOTE — ED NOTES
Patient states took new medication prescribed unsure of name thinks starts with a T to assist in getting an erection states took at 3pm and has had an erections since. States took Sudafed at [facility] as instructed.      Garlan Felty, RN  05/20/19 2033

## 2019-05-22 ENCOUNTER — HOSPITAL ENCOUNTER (OUTPATIENT)
Dept: PHYSICAL THERAPY | Age: 44
Setting detail: THERAPIES SERIES
Discharge: HOME | End: 2019-05-22
Payer: MEDICARE

## 2019-05-22 PROCEDURE — 97112 NEUROMUSCULAR REEDUCATION: CPT

## 2019-05-22 NOTE — FLOWSHEET NOTE
Physical Therapy Daily Treatment Note    Date:  2019    Patient Name:  Kallie Aviles    :  1975  MRN: [id number]  Restrictions/Precautions:      Medical/Treatment Diagnosis Information:   · Diagnosis: Multiple Sclerosis  · Treatment Diagnosis: multiple sclerosis-weakness  Insurance/Certification information:  PT Insurance Information: [employer]  Physician Information:  Referring Practitioner: Darleen Silva MD   Plan of care signed (Y/N):  N      Visit# / total visits: 4/10  (5th POC) 44 total  Pain level: 0/10      Time In: 2:00   Time Out: 2:50     Progress Note: []  Yes  [x]  No  Next due by: Visit #10  Or by 19    Subjective: No issues reported this date. No complaints from previous session. Objective: Pt tolerated todays session well indicating only slight fatigue post session. Pt was able to progress multiple exercises this date and performed independent ambulation with obstacles this date requiring CGA to ensure no LOB episodes. Most challenged by initiated 4-way hip exercise this date noting much fatigue and requiring HHA during all movements. Little activation of hip abductors present to perform resisted movement. Particularly challenged by leg press this date noting early onsets of fatigue. Observation: Rpts with RW and stiff legged gait pattern. KAFO donned. Test measurements:      Exercises:   Exercise/Equipment Resistance/Repetitions Other comments   Offset Balance 3 x 30\" each EO & EC   DD DLS 3 x 23\"    AIREX HR/TR    AIREX Marches    AIREX 3 way hip    AIREX HS Curls    Sit-Stands  Required CGA this date   Sidesteps 5 laps With obstacles.     DD Balance 3 x 30\" With offset   EO & EC   SLS  With support to hold up R LE   amb without AD  CGA from therapist   BOSU flat side balance  CGA from therapist, eyes open   BOSU Round Side Balance     BOSU flat side +     Mini Lunges F/L x10ea CGA- Min A from therapist   NUSTEP   Seat 10 LV7   Seated marches  Seated on SBall, CGA from therapist   EITAN    Step taps F,L 2\"   Squats  1x20 each on  AIREX, DD, BOSU 1 HHA. Ball between legs. Cybex leg press X15, last 3 x5\" eccentric 120#   TM incline walking 8' 1.5' @1%, 4.5' @2%   Hip abd/add Cornelia Oliva; CHARO   [] Provided verbal/tactile cueing for activities related to strengthening, flexibility, endurance, ROM. (01074)   [x] Provided verbal/tactile cueing for activities related to improving balance, coordination, kinesthetic sense, posture, motor skill, proprioception. (46984)    Therapeutic Activities:     [] Therapeutic activities, direct (one-on-one) patient contact (use of dynamic activities to improve functional performance). (77367)    Gait:   [x] Provided training and instruction to the patient for ambulation re-education. (04226)  Verbal cues for proper swing phase bilaterally this date and to take moderate steps at a slow to moderate pace in order to avoid losing balance anteriorly. Self-Care/ADL's  [] Self-care/home management training and compensatory training, meal preparation, safety procedures, and instructions in use of assistive technology devices/adaptive equipment, direct one-on-one contact. (57515)    Home Exercise Program:      [] Reviewed/Progressed HEP activities related to strengthening, flexibility, endurance, ROM. (16331)  [x] Reviewed/Progressed HEP activities related to improving balance, coordination, kinesthetic sense, posture, motor skill, proprioception.  (23953)    Manual Treatments:    [] Provided manual therapy to mobilize soft tissue/joints for the purpose of modulating pain, promoting relaxation,  increasing ROM, reducing/eliminating soft tissue swelling/inflammation/restriction, improving soft tissue extensibility.  (91324)    Service Based Modalities:      Timed Code Treatment Minutes:  48' neuro re-ed            Total Treatment Minutes:   48'    Treatment/Activity Tolerance:  [x] Patient tolerated treatment well [] Patient limited by fatique  [] Patient limited by pain  [] Patient limited by other medical complications  [] Other:     Prognosis: [] Good [x] Fair  [] Poor    Patient Requires Follow-up: [x] Yes  [] No      Goals:  Short term goals  Time Frame for Short term goals: 3 weeks  Short term goal 1: Initiate HEP MET  Short term goal 2: Tandem balance >15\" bilaterally for improved ease with ADL and yard work MET 08FEB  Short term goal 3: Improve TUG time to <15\" for improved balance and safety in home and community MET 08FEB    Long term goals  Time Frame for Long term goals : 6 weeks   Long term goal 1: Indep with HEP   Long term goal 2: Tandem balance >25\" bilaterally for improved ease with ADL and yard work (Part Met- (tandem offset) RLE in front x25sec, LLE in front x7sec, 5/15/19)  Long term goal 3: Improve TUG time to <13\" for improved balance and safety in home and community (MET Performed TUG in 12\" this date. 17APR)  Long term goal 4: Pt to amb 48' with least restrictive AD to improve home/community ambulation (Pt is able to ambulate without AD but requires CGA. Slight imbalance still present. 17APR)   Long term goal 5: Improve Tinetti score to >21 for improved home/community ambulation and safety (Scored 20/28 this date. 5/15/19)      Plan:   [x] Continue per plan of care [] Alter current plan (see comments)  [] Plan of care initiated [] Hold pending MD visit [] Discharge    Plan for Next Session:  Monitor response and progress as tolerated.      Electronically signed by:  Daphnie Puga PTA

## 2019-05-23 ENCOUNTER — HOSPITAL ENCOUNTER (OUTPATIENT)
Dept: PHYSICAL THERAPY | Age: 44
Setting detail: THERAPIES SERIES
Discharge: HOME | End: 2019-05-23
Payer: MEDICARE

## 2019-05-23 PROCEDURE — 97112 NEUROMUSCULAR REEDUCATION: CPT

## 2019-05-23 NOTE — FLOWSHEET NOTE
Physical Therapy Daily Treatment Note    Date:  2019    Patient Name:  Roxie Wood    :  1975  MRN: [id number]  Restrictions/Precautions:      Medical/Treatment Diagnosis Information:   · Diagnosis: Multiple Sclerosis  · Treatment Diagnosis: multiple sclerosis-weakness  Insurance/Certification information:  PT Insurance Information: [facility]  Physician Information:  Referring Practitioner: Riley Doe. MD Shira   Plan of care signed (Y/N):  N      Visit# / total visits: 5/10  (5th POC) 45 total  Pain level: 0/10      Time In: 2:44   Time Out: 3:40     Progress Note: []  Yes  [x]  No  Next due by: Visit #10  Or by 19    Subjective: Pt rpts to clinic with no complaints from previous session and no fatigue or pain present. rpts cutting grass yesterday with no issues. Objective: Pt tolerated todays session well indicating no increase in pain or fatigue post session and noting little LOB episodes with independent ambulation with CGA. Most challenged by stepping onto and over obstacles during ambulation exercise requiring much physical correction to maintain balance. No rest breaks required this date. Initiated SL leg press this date with good tolerance noted and requiring UE assistance to hold leg in place during motion. Observation: Rpts with RW and stiff legged gait pattern. KAFO donned. Test measurements:      Exercises:   Exercise/Equipment Resistance/Repetitions Other comments   Offset Balance 3 x 30\" each EO & EC   DD DLS 3 x 06\"    AIREX HR/TR    AIREX Marches    AIREX 3 way hip    AIREX HS Curls    Sit-Stands  Required CGA this date   Sidesteps 5 laps With obstacles.     DD Balance 3 x 30\" With offset   EO & EC   SLS  With support to hold up R LE   amb without AD  CGA from therapist   BOSU flat side balance  CGA from therapist, eyes open   BOSU Round Side Balance     BOSU flat side +     Mini Lunges F/L x10ea CGA- Min A from therapist   NUSTEP   Seat 10 LV7   Seated marches  Seated on SBall, CGA from therapist   LAQ    Step taps F,L 2\"   Squats  1x20 each on  AIREX, DD, BOSU 1 HHA. Ball between legs. Cybex leg press/SL X15, last 3 x5\" eccentric 120#, 30#   TM incline walking 8' 1.5' @1%, 4.5' @2%   Hip abd/add Theresa Dove; GREEN   [] Provided verbal/tactile cueing for activities related to strengthening, flexibility, endurance, ROM. (74836)   [x] Provided verbal/tactile cueing for activities related to improving balance, coordination, kinesthetic sense, posture, motor skill, proprioception. (73922)    Therapeutic Activities:     [] Therapeutic activities, direct (one-on-one) patient contact (use of dynamic activities to improve functional performance). (43716)    Gait:   [x] Provided training and instruction to the patient for ambulation re-education. (56768)  Verbal cues for proper swing phase bilaterally this date and to take moderate steps at a slow to moderate pace in order to avoid losing balance anteriorly. Self-Care/ADL's  [] Self-care/home management training and compensatory training, meal preparation, safety procedures, and instructions in use of assistive technology devices/adaptive equipment, direct one-on-one contact. (40478)    Home Exercise Program:      [] Reviewed/Progressed HEP activities related to strengthening, flexibility, endurance, ROM. (28877)  [x] Reviewed/Progressed HEP activities related to improving balance, coordination, kinesthetic sense, posture, motor skill, proprioception.  (69264)    Manual Treatments:    [] Provided manual therapy to mobilize soft tissue/joints for the purpose of modulating pain, promoting relaxation,  increasing ROM, reducing/eliminating soft tissue swelling/inflammation/restriction, improving soft tissue extensibility.  (27583)    Service Based Modalities:      Timed Code Treatment Minutes:  64' neuro re-ed            Total Treatment Minutes:   64'    Treatment/Activity Tolerance:  [x] Patient tolerated treatment well [] Patient limited by zulay  [] Patient limited by pain  [] Patient limited by other medical complications  [] Other:     Prognosis: [] Good [x] Fair  [] Poor    Patient Requires Follow-up: [x] Yes  [] No      Goals:  Short term goals  Time Frame for Short term goals: 3 weeks  Short term goal 1: Initiate HEP MET  Short term goal 2: Tandem balance >15\" bilaterally for improved ease with ADL and yard work MET 08FEB  Short term goal 3: Improve TUG time to <15\" for improved balance and safety in home and community MET 08FEB    Long term goals  Time Frame for Long term goals : 6 weeks   Long term goal 1: Indep with HEP   Long term goal 2: Tandem balance >25\" bilaterally for improved ease with ADL and yard work (Part Met- (tandem offset) RLE in front x25sec, LLE in front x7sec, 5/15/19)  Long term goal 3: Improve TUG time to <13\" for improved balance and safety in home and community (MET Performed TUG in 12\" this date. 17APR)  Long term goal 4: Pt to amb 48' with least restrictive AD to improve home/community ambulation (Pt is able to ambulate without AD but requires CGA. Slight imbalance still present. 17APR)   Long term goal 5: Improve Tinetti score to >21 for improved home/community ambulation and safety (Scored 20/28 this date. 5/15/19)      Plan:   [x] Continue per plan of care [] Alter current plan (see comments)  [] Plan of care initiated [] Hold pending MD visit [] Discharge    Plan for Next Session:  Monitor response and progress as tolerated.      Electronically signed by:  Chandrakant Yusuf PTA

## 2019-05-28 ENCOUNTER — HOSPITAL ENCOUNTER (OUTPATIENT)
Dept: PHYSICAL THERAPY | Age: 44
Setting detail: THERAPIES SERIES
Discharge: HOME | End: 2019-05-28
Payer: MEDICARE

## 2019-05-28 PROCEDURE — 97112 NEUROMUSCULAR REEDUCATION: CPT

## 2019-05-28 NOTE — FLOWSHEET NOTE
1>4>7>10; 2/3>5/6>8/9        Cybex leg press/SL x12 each 120#, 30#   TM incline walking 1.5' @1%, 4.5' @2%   Hip abd/add Adeola MANCILLA   [] Provided verbal/tactile cueing for activities related to strengthening, flexibility, endurance, ROM. (61078)   [x] Provided verbal/tactile cueing for activities related to improving balance, coordination, kinesthetic sense, posture, motor skill, proprioception. (68123)    Therapeutic Activities:     [] Therapeutic activities, direct (one-on-one) patient contact (use of dynamic activities to improve functional performance). (18925)    Gait:   [x] Provided training and instruction to the patient for ambulation re-education. (60027)  Verbal cues for proper swing phase bilaterally this date and to take moderate steps at a slow to moderate pace in order to avoid losing balance anteriorly. Self-Care/ADL's  [] Self-care/home management training and compensatory training, meal preparation, safety procedures, and instructions in use of assistive technology devices/adaptive equipment, direct one-on-one contact. (68755)    Home Exercise Program:      [] Reviewed/Progressed HEP activities related to strengthening, flexibility, endurance, ROM. (67851)  [x] Reviewed/Progressed HEP activities related to improving balance, coordination, kinesthetic sense, posture, motor skill, proprioception.  (11264)    Manual Treatments:    [] Provided manual therapy to mobilize soft tissue/joints for the purpose of modulating pain, promoting relaxation,  increasing ROM, reducing/eliminating soft tissue swelling/inflammation/restriction, improving soft tissue extensibility.  (91610)    Service Based Modalities:      Timed Code Treatment Minutes:  50' neuro re-ed            Total Treatment Minutes:   50'    Treatment/Activity Tolerance:  [x] Patient tolerated treatment well [] Patient limited by fatique  [] Patient limited by pain  [] Patient limited by other medical complications  [] Other:     Prognosis: [] Good [x] Fair  [] Poor    Patient Requires Follow-up: [x] Yes  [] No      Goals:  Short term goals  Time Frame for Short term goals: 3 weeks  Short term goal 1: Initiate HEP MET  Short term goal 2: Tandem balance >15\" bilaterally for improved ease with ADL and yard work MET 08FEB  Short term goal 3: Improve TUG time to <15\" for improved balance and safety in home and community MET 08FEB    Long term goals  Time Frame for Long term goals : 6 weeks   Long term goal 1: Indep with HEP   Long term goal 2: Tandem balance >25\" bilaterally for improved ease with ADL and yard work (Part Met- (tandem offset) RLE in front x25sec, LLE in front x7sec, 5/15/19)  Long term goal 3: Improve TUG time to <13\" for improved balance and safety in home and community (MET Performed TUG in 12\" this date. 17APR)  Long term goal 4: Pt to amb 48' with least restrictive AD to improve home/community ambulation (Pt is able to ambulate without AD but requires CGA. Slight imbalance still present. 17APR)   Long term goal 5: Improve Tinetti score to >21 for improved home/community ambulation and safety (Scored 20/28 this date. 5/15/19)      Plan:   [x] Continue per plan of care [] Alter current plan (see comments)  [] Plan of care initiated [] Hold pending MD visit [] Discharge    Plan for Next Session:  Monitor response and progress as tolerated.      Electronically signed by:  Carl Hooker PTA

## 2019-05-30 ENCOUNTER — HOSPITAL ENCOUNTER (OUTPATIENT)
Dept: PHYSICAL THERAPY | Age: 44
Setting detail: THERAPIES SERIES
Discharge: HOME | End: 2019-05-30
Payer: MEDICARE

## 2019-05-30 PROCEDURE — 97112 NEUROMUSCULAR REEDUCATION: CPT | Performed by: PHYSICAL THERAPIST

## 2019-05-30 NOTE — FLOWSHEET NOTE
dot   Reach + Step x3 laps each; CGA-Mike Hop Scotch Mat; 1>4>7>10; 2/3>5/6>8/9        Cybex leg press/SL x12 each 120#, 30#   TM incline walking 1.5' @1%, 4.5' @2%   Hip abd/add Adeola MANCILLA   [] Provided verbal/tactile cueing for activities related to strengthening, flexibility, endurance, ROM. (17470)   [x] Provided verbal/tactile cueing for activities related to improving balance, coordination, kinesthetic sense, posture, motor skill, proprioception. (24279)    Therapeutic Activities:     [] Therapeutic activities, direct (one-on-one) patient contact (use of dynamic activities to improve functional performance). (48227)    Gait:   [x] Provided training and instruction to the patient for ambulation re-education. (96626)  Verbal cues for proper swing phase bilaterally this date and to take moderate steps at a slow to moderate pace in order to avoid losing balance anteriorly. Self-Care/ADL's  [] Self-care/home management training and compensatory training, meal preparation, safety procedures, and instructions in use of assistive technology devices/adaptive equipment, direct one-on-one contact. (48291)    Home Exercise Program:      [] Reviewed/Progressed HEP activities related to strengthening, flexibility, endurance, ROM. (54575)  [x] Reviewed/Progressed HEP activities related to improving balance, coordination, kinesthetic sense, posture, motor skill, proprioception.  (52738)    Manual Treatments:    [] Provided manual therapy to mobilize soft tissue/joints for the purpose of modulating pain, promoting relaxation,  increasing ROM, reducing/eliminating soft tissue swelling/inflammation/restriction, improving soft tissue extensibility.  (20003)    Service Based Modalities:      Timed Code Treatment Minutes:  61' neuro re-ed            Total Treatment Minutes:   61'    Treatment/Activity Tolerance:  [x] Patient tolerated treatment well [] Patient limited by fatique  [] Patient limited by pain  [] Patient limited by other medical complications  [] Other:     Prognosis: [] Good [x] Fair  [] Poor    Patient Requires Follow-up: [x] Yes  [] No      Goals:  Short term goals  Time Frame for Short term goals: 3 weeks  Short term goal 1: Initiate HEP MET  Short term goal 2: Tandem balance >15\" bilaterally for improved ease with ADL and yard work MET 08FEB  Short term goal 3: Improve TUG time to <15\" for improved balance and safety in home and community MET 08FEB    Long term goals  Time Frame for Long term goals : 6 weeks   Long term goal 1: Indep with HEP   Long term goal 2: Tandem balance >25\" bilaterally for improved ease with ADL and yard work (Part Met- (tandem offset) RLE in front x25sec, LLE in front x7sec, 5/15/19)  Long term goal 3: Improve TUG time to <13\" for improved balance and safety in home and community (MET Performed TUG in 12\" this date. 17APR)  Long term goal 4: Pt to amb 48' with least restrictive AD to improve home/community ambulation (Pt is able to ambulate without AD but requires CGA. Slight imbalance still present. 17APR)   Long term goal 5: Improve Tinetti score to >21 for improved home/community ambulation and safety (Scored 20/28 this date. 5/15/19)      Plan:   [x] Continue per plan of care [] Alter current plan (see comments)  [] Plan of care initiated [] Hold pending MD visit [] Discharge    Plan for Next Session:  Monitor response and progress as tolerated.      Electronically signed by:  Quirino Landers, PT, DPT

## 2019-05-31 ENCOUNTER — HOSPITAL ENCOUNTER (OUTPATIENT)
Dept: PHYSICAL THERAPY | Age: 44
Setting detail: THERAPIES SERIES
Discharge: HOME | End: 2019-05-31
Payer: MEDICARE

## 2019-05-31 PROCEDURE — 97112 NEUROMUSCULAR REEDUCATION: CPT

## 2019-05-31 NOTE — FLOWSHEET NOTE
Physical Therapy Daily Treatment Note    Date:  2019    Patient Name:  Mariangel Lane    :  1975  MRN: [id number]  Restrictions/Precautions:      Medical/Treatment Diagnosis Information:   · Diagnosis: Multiple Sclerosis  · Treatment Diagnosis: multiple sclerosis-weakness  Insurance/Certification information:  PT Insurance Information: [address] [facility]  Physician Information:  Referring Practitioner: Elyse Marquez. MD Shira   Plan of care signed (Y/N):  N      Visit# / total visits: 8/10  (5th POC) 48 total  Pain level: 0/10      Time In: 2:25   Time Out: 3:21     Progress Note: []  Yes  [x]  No  Next due by: Visit #10  Or by 19    Subjective: Patient reports to therapy with no new complaints. No recent changes to report. Patient states, \"I feel like I have been getting a lot stronger in my legs. I really think the leg press helps me get stronger. \"    Objective: Pt tolerated todays session well indicating no increase in pain post session. Most challenged my side stepping over obstacles during ambulation exercises requiring help to lift R LE over hurdles. No rest breaks required this date. · Observation: Rpts with RW and stiff legged gait pattern. KAFO donned. Test measurements:      Exercises:   Exercise/Equipment Resistance/Repetitions Other comments   Offset Balance 3 x 30\" each EO & EC   DD DLS 3 x 55\"    AIREX HR/TR    AIREX Marches    AIREX 3 way hip    AIREX HS Curls    Sit-Stands  Required CGA this date   Sidesteps 5 laps With obstacles. SLS HRaises 15x each    amb without AD  CGA from therapist   BOSU flat side balance 3x1' CGA from therapist, eyes open   BOSU Round Side Balance     BOSU flat side +     Mini Lunges  CGA- Min A from therapist   Retro walk  3 laps    NUSTEP 8'  Seat 10 LV7   Seated marches  Seated on SBall, CGA from therapist   LAQ    Step taps F,L 2\"   Squats  1x20 each on  AIREX, DD, BOSU flat side up 1 HHA. Ball between legs. Dot maneuverability   CGA-Mike.  6 dots spread out, instructing patient to step fwd/laterally/backwards to stand on desired dot   Reach + Step  Hop Scotch Mat; 1>4>7>10; 2/3>5/6>8/9        Cybex leg press/ x12 each 120#, 30#   TM incline walking 1.5' @1%, 4.5' @2%   Hip abd/add Alden Aid; GREEN   [] Provided verbal/tactile cueing for activities related to strengthening, flexibility, endurance, ROM. (32148)   [x] Provided verbal/tactile cueing for activities related to improving balance, coordination, kinesthetic sense, posture, motor skill, proprioception. (48029)    Therapeutic Activities:     [] Therapeutic activities, direct (one-on-one) patient contact (use of dynamic activities to improve functional performance). (12258)    Gait:   [x] Provided training and instruction to the patient for ambulation re-education. (80817)  Verbal cues for proper swing phase bilaterally this date and to take moderate steps at a slow to moderate pace in order to avoid losing balance anteriorly. Self-Care/ADL's  [] Self-care/home management training and compensatory training, meal preparation, safety procedures, and instructions in use of assistive technology devices/adaptive equipment, direct one-on-one contact. (29171)    Home Exercise Program:      [] Reviewed/Progressed HEP activities related to strengthening, flexibility, endurance, ROM. (14500)  [x] Reviewed/Progressed HEP activities related to improving balance, coordination, kinesthetic sense, posture, motor skill, proprioception.  (89388)    Manual Treatments:    [] Provided manual therapy to mobilize soft tissue/joints for the purpose of modulating pain, promoting relaxation,  increasing ROM, reducing/eliminating soft tissue swelling/inflammation/restriction, improving soft tissue extensibility.  (01588)    Service Based Modalities:      Timed Code Treatment Minutes:  64' neuro re-ed            Total Treatment Minutes:   64'    Treatment/Activity Tolerance:  [x] Patient tolerated treatment well [] Patient limited

## 2019-06-03 ENCOUNTER — HOSPITAL ENCOUNTER (OUTPATIENT)
Dept: PHYSICAL THERAPY | Age: 44
Setting detail: THERAPIES SERIES
Discharge: HOME | End: 2019-06-03
Payer: MEDICARE

## 2019-06-03 PROCEDURE — 97112 NEUROMUSCULAR REEDUCATION: CPT | Performed by: PHYSICAL THERAPIST

## 2019-06-03 NOTE — FLOWSHEET NOTE
Physical Therapy Daily Treatment Note    Date:  6/3/2019    Patient Name:  Madison Mills    :  1975  MRN: [id number]  Restrictions/Precautions:      Medical/Treatment Diagnosis Information:   · Diagnosis: Multiple Sclerosis  · Treatment Diagnosis: multiple sclerosis-weakness  Insurance/Certification information:  PT Insurance Information: [address] [facility]  Physician Information:  Referring Practitioner: Lillian Silva MD   Plan of care signed (Y/N):  N      Visit# / total visits: 9/10  (5th POC) 49 total  Pain level: 0/10      Time In: 3:01   Time Out: 3:57     Progress Note: []  Yes  [x]  No  Next due by: Visit #10  Or by 19    Subjective: \"I haven't had much fatigue after therapy sessions so far. \"  Pt states he has been walking short distances in his home without any AD. Has not tried this outside of home yet. Objective: Pt tolerated todays session well indicating no increase in pain post session. Most challenged my side stepping over obstacles during ambulation exercises requiring help to lift R LE over hurdles. No rest breaks required this date. · Observation: Rpts with RW and stiff legged gait pattern. KAFO donned. Test measurements:      Exercises:   Exercise/Equipment Resistance/Repetitions Other comments   Offset Balance 3 x 30\" each EO & EC   DD DLS 3 x 16\"    AIREX HR/TR    AIREX Marches    AIREX 3 way hip    AIREX HS Curls    Sit-Stands  Required CGA this date   Sidesteps 5 laps With obstacles.     SLS HRaises 15x each    amb without AD  CGA from therapist   BOSU flat side balance 3x1' CGA from therapist, eyes open   BOSU Round Side Balance Marching 15x each    BOSU flat side + 15x each    Mini Lunges Kicking Cones 1x 10 CGA- Min A from therapist   Retro walk  3 laps    Offset Feet with Balloon Toss 3 x 30\"    NUSTEP 8'  Seat 10 LV7   Seated marches  Seated on SBall, CGA from therapist   LAQ    Step taps F,L 2\"   Squats  1x20 each on  AIREX, DD, BOSU flat side up  Picking up cones 1 Angela Kovacs between legs. Dot maneuverability   CGA-Mike. 6 dots spread out, instructing patient to step fwd/laterally/backwards to stand on desired dot   Reach + Step  Hop Scotch Mat; 1>4>7>10; 2/3>5/6>8/9        Cybex leg press/ 2x15 each 90#   TM incline walking 1.5' @1%, 4.5' @2%   Hip abd/add Denilson Ortega; CHARO   [] Provided verbal/tactile cueing for activities related to strengthening, flexibility, endurance, ROM. (39177)   [x] Provided verbal/tactile cueing for activities related to improving balance, coordination, kinesthetic sense, posture, motor skill, proprioception. (62958)    Therapeutic Activities:     [] Therapeutic activities, direct (one-on-one) patient contact (use of dynamic activities to improve functional performance). (53689)    Gait:   [x] Provided training and instruction to the patient for ambulation re-education. (29412)  Verbal cues for proper swing phase bilaterally this date and to take moderate steps at a slow to moderate pace in order to avoid losing balance anteriorly. Self-Care/ADL's  [] Self-care/home management training and compensatory training, meal preparation, safety procedures, and instructions in use of assistive technology devices/adaptive equipment, direct one-on-one contact. (79289)    Home Exercise Program:      [] Reviewed/Progressed HEP activities related to strengthening, flexibility, endurance, ROM. (71867)  [x] Reviewed/Progressed HEP activities related to improving balance, coordination, kinesthetic sense, posture, motor skill, proprioception.  (07372)    Manual Treatments:    [] Provided manual therapy to mobilize soft tissue/joints for the purpose of modulating pain, promoting relaxation,  increasing ROM, reducing/eliminating soft tissue swelling/inflammation/restriction, improving soft tissue extensibility.  (07547)    Service Based Modalities:      Timed Code Treatment Minutes:  64' neuro re-ed            Total Treatment Minutes:   64'    Treatment/Activity Tolerance:  [x] Patient tolerated treatment well [] Patient limited by fatique  [] Patient limited by pain  [] Patient limited by other medical complications  [] Other:     Prognosis: [] Good [x] Fair  [] Poor    Patient Requires Follow-up: [x] Yes  [] No      Goals:  Short term goals  Time Frame for Short term goals: 3 weeks  Short term goal 1: Initiate HEP MET  Short term goal 2: Tandem balance >15\" bilaterally for improved ease with ADL and yard work MET 08FEB  Short term goal 3: Improve TUG time to <15\" for improved balance and safety in home and community MET 08FEB    Long term goals  Time Frame for Long term goals : 6 weeks   Long term goal 1: Indep with HEP   Long term goal 2: Tandem balance >25\" bilaterally for improved ease with ADL and yard work (Part Met- (tandem offset) RLE in front x25sec, LLE in front x7sec, 5/15/19)  Long term goal 3: Improve TUG time to <13\" for improved balance and safety in home and community (MET Performed TUG in 12\" this date. 17APR)  Long term goal 4: Pt to amb 48' with least restrictive AD to improve home/community ambulation (Pt is able to ambulate without AD but requires CGA. Slight imbalance still present. 17APR)   Long term goal 5: Improve Tinetti score to >21 for improved home/community ambulation and safety (Scored 20/28 this date. 5/15/19)      Plan:   [x] Continue per plan of care [] Alter current plan (see comments)  [] Plan of care initiated [] Hold pending MD visit [] Discharge    Plan for Next Session:  Monitor response and progress as tolerated.      Electronically signed by:  Lit Villalta, PT, DPT

## 2019-06-04 ENCOUNTER — HOSPITAL ENCOUNTER (OUTPATIENT)
Dept: PHYSICAL THERAPY | Age: 44
Setting detail: THERAPIES SERIES
Discharge: HOME | End: 2019-06-04
Payer: MEDICARE

## 2019-06-04 PROCEDURE — 97112 NEUROMUSCULAR REEDUCATION: CPT

## 2019-06-04 NOTE — FLOWSHEET NOTE
Physical Therapy Daily Treatment Note    Date:  2019    Patient Name:  Angelica Dallas    :  1975  MRN: [id number]  Restrictions/Precautions:      Medical/Treatment Diagnosis Information:   · Diagnosis: Multiple Sclerosis  · Treatment Diagnosis: multiple sclerosis-weakness  Insurance/Certification information:  PT Insurance Information: [employer]  Physician Information:  Referring Practitioner: Monica Oliveros. MD Shira   Plan of care signed (Y/N):  N      Visit# / total visits: 10/10  (5th POC) 50 total  Pain level: 0/10       Time In: 2:46   Time Out: 3:31     Progress Note: []  Yes  [x]  No  Next due by: Visit #10  Or by 19    Subjective: Patient presents to therapy with 0/10 pain. \"I have been feeling pretty good today. I usually feel pretty good after each therapy session I never really have soreness or anything. \"    Objective: Pt tolerated todays session well indicating no increase in pain post session. Most challenged by dot maneuverability with multiple off balance episodes with Mod assist. No rest breaks required this date. · Observation: Rpts with RW and stiff legged gait pattern. KAFO donned. Test measurements:      Exercises:   Exercise/Equipment Resistance/Repetitions Other comments   Offset Balance 3 x 30\" each EO & EC   DD DLS 3 x 03\"    AIREX HR/TR    AIREX Marches    AIREX 3 way hip    AIREX HS Curls    Sit-Stands  Required CGA this date   Sidesteps 5 laps With obstacles. SLS HRaises 15x each    amb without AD  CGA from therapist   BOSU flat side balance 3x1' CGA from therapist, eyes open   BOSU Round Side Balance     BOSU flat side +     Mini Lunges CGA- Min A from therapist   Retro walk     Offset Feet with Balloon Toss     NUSTEP 8'  Seat 10 LV6   Seated marches  Seated on SBall, CGA from therapist   LAQ    Step taps F,L 2\"   Squats  1x20 each on  AIREX, DD, BOSU flat side up  Picking up cones 1 HHA. Ball between legs. Dot maneuverability  10' Mike.  6 dots spread out, instructing patient to step fwd/laterally/backwards to stand on desired dot   Reach + Step  Hop Scotch Mat; 1>4>7>10; 2/3>5/6>8/9        Cybex leg press/  90#   TM incline walking 1.5' @1%, 4.5' @2%   Hip abd/add Richard Booth; CHARO   [] Provided verbal/tactile cueing for activities related to strengthening, flexibility, endurance, ROM. (34965)   [x] Provided verbal/tactile cueing for activities related to improving balance, coordination, kinesthetic sense, posture, motor skill, proprioception. (02153)    Therapeutic Activities:     [] Therapeutic activities, direct (one-on-one) patient contact (use of dynamic activities to improve functional performance). (08820)    Gait:   [x] Provided training and instruction to the patient for ambulation re-education. (65287)  Verbal cues for proper swing phase bilaterally this date and to take moderate steps at a slow to moderate pace in order to avoid losing balance anteriorly. Self-Care/ADL's  [] Self-care/home management training and compensatory training, meal preparation, safety procedures, and instructions in use of assistive technology devices/adaptive equipment, direct one-on-one contact. (83330)    Home Exercise Program:      [] Reviewed/Progressed HEP activities related to strengthening, flexibility, endurance, ROM. (99720)  [x] Reviewed/Progressed HEP activities related to improving balance, coordination, kinesthetic sense, posture, motor skill, proprioception.  (64623)    Manual Treatments:    [] Provided manual therapy to mobilize soft tissue/joints for the purpose of modulating pain, promoting relaxation,  increasing ROM, reducing/eliminating soft tissue swelling/inflammation/restriction, improving soft tissue extensibility.  (37569)    Service Based Modalities:      Timed Code Treatment Minutes:  39' neuro re-ed            Total Treatment Minutes:  39'    Treatment/Activity Tolerance:  [x] Patient tolerated treatment well [] Patient limited by zulay  [] Patient limited by pain  [] Patient limited by other medical complications  [] Other:     Prognosis: [] Good [x] Fair  [] Poor    Patient Requires Follow-up: [x] Yes  [] No      Goals:  Short term goals  Time Frame for Short term goals: 3 weeks  Short term goal 1: Initiate HEP MET  Short term goal 2: Tandem balance >15\" bilaterally for improved ease with ADL and yard work MET 08FEB  Short term goal 3: Improve TUG time to <15\" for improved balance and safety in home and community MET 08FEB    Long term goals  Time Frame for Long term goals : 6 weeks   Long term goal 1: Indep with HEP   Long term goal 2: Tandem balance >25\" bilaterally for improved ease with ADL and yard work (Part Met- (tandem offset) RLE in front x25sec, LLE in front x7sec, 5/15/19)  Long term goal 3: Improve TUG time to <13\" for improved balance and safety in home and community (MET Performed TUG in 12\" this date. 17APR)  Long term goal 4: Pt to amb 48' with least restrictive AD to improve home/community ambulation (Pt is able to ambulate without AD but requires CGA. Slight imbalance still present. 17APR)   Long term goal 5: Improve Tinetti score to >21 for improved home/community ambulation and safety (Scored 20/28 this date. 5/15/19)      Plan:   [x] Continue per plan of care [] Alter current plan (see comments)  [] Plan of care initiated [] Hold pending MD visit [] Discharge    Jacob VILLARREAL performed session with direct supervision by Noemi Rucker PTA. Plan for Next Session:  Monitor response and progress as tolerated.      Electronically signed by:  Noemi Rucker PTA

## 2019-06-05 ENCOUNTER — HOSPITAL ENCOUNTER (OUTPATIENT)
Dept: PHYSICAL THERAPY | Age: 44
Setting detail: THERAPIES SERIES
Discharge: HOME | End: 2019-06-05
Payer: MEDICARE

## 2019-06-05 PROCEDURE — 97112 NEUROMUSCULAR REEDUCATION: CPT | Performed by: PHYSICAL THERAPY ASSISTANT

## 2019-06-05 NOTE — FLOWSHEET NOTE
Physical Therapy Daily Treatment Note    Date:  2019    Patient Name:  Yan Obrien    :  1975  MRN: [id number]  Restrictions/Precautions:      Medical/Treatment Diagnosis Information:   · Diagnosis: Multiple Sclerosis  · Treatment Diagnosis: multiple sclerosis-weakness  Insurance/Certification information:  PT Insurance Information: [address] [facility]  Physician Information:  Referring Practitioner: Kameron Silva MD   Plan of care signed (Y/N):  N      Visit# / total visits: 1/10  (5th POC) 51 total  Pain level: 0/10       Time In: 2:46   Time Out: 3:31     Progress Note: []  Yes  [x]  No  Next due by: Visit #10  Or by 19    Subjective: Notes feeling well overall. Relates left LE muscle soreness this date. Objective: ALEX complete per flow chart to facilitate strength, motion and mobility to allow ease with daily ambulation and household chores. Difficulty with advanced gait and balance exercises remains. Several bouts of off balance. · Observation: Rpts with RW and stiff legged gait pattern. KAFO donned. Test measurements:      Exercises:   Exercise/Equipment Resistance/Repetitions Other comments   Offset Balance 3 x 30\" each EO & EC   DD DLS 3 x 64\"    AIREX HR/TR    AIREX Marches    AIREX 3 way hip    AIREX HS Curls    Sit-Stands  Required CGA this date   Sidesteps 2 laps With obstacles. (Hurdles)   SLS HRaises 15x each    amb without AD  CGA from therapist   BOSU flat side balance 3x1' CGA from therapist, eyes open   BOSU Round Side Balance     BOSU flat side +     Mini Lunges CGA- Min A from therapist   Retro walk     Offset Feet with Balloon Toss     NUSTEP 8'  Seat 10 LV6   Seated marches  Seated on SBall, CGA from therapist   LAQ    Step taps F,L 2\"   Squats  1x20 each on  AIREX, DD, BOSU flat side up  Picking up cones 1 HHA. Ball between legs. Dot maneuverability  5' Mike.  6 dots spread out, instructing patient to step fwd/laterally/backwards to stand on desired dot   Reach + Step  Hop Scotch Mat; 1>4>7>10; 2/3>5/6>8/9        Cybex leg press/  90#   TM incline walking 1.5' @1%, 4.5' @2%   Hip abd/add Marie MANCILLA   [] Provided verbal/tactile cueing for activities related to strengthening, flexibility, endurance, ROM. (69293)   [x] Provided verbal/tactile cueing for activities related to improving balance, coordination, kinesthetic sense, posture, motor skill, proprioception. (51394)    Therapeutic Activities:     [] Therapeutic activities, direct (one-on-one) patient contact (use of dynamic activities to improve functional performance). (23537)    Gait:   [x] Provided training and instruction to the patient for ambulation re-education. (25849)  Verbal cues for proper swing phase bilaterally this date and to take moderate steps at a slow to moderate pace in order to avoid losing balance anteriorly. Self-Care/ADL's  [] Self-care/home management training and compensatory training, meal preparation, safety procedures, and instructions in use of assistive technology devices/adaptive equipment, direct one-on-one contact. (84228)    Home Exercise Program:      [] Reviewed/Progressed HEP activities related to strengthening, flexibility, endurance, ROM. (05670)  [x] Reviewed/Progressed HEP activities related to improving balance, coordination, kinesthetic sense, posture, motor skill, proprioception.  (94881)    Manual Treatments:    [] Provided manual therapy to mobilize soft tissue/joints for the purpose of modulating pain, promoting relaxation,  increasing ROM, reducing/eliminating soft tissue swelling/inflammation/restriction, improving soft tissue extensibility.  (16030)    Service Based Modalities:      Timed Code Treatment Minutes:  39' neuro re-ed            Total Treatment Minutes:  39'    Treatment/Activity Tolerance:  [x] Patient tolerated treatment well [] Patient limited by fatique  [] Patient limited by pain  [] Patient limited by other medical complications  [] Other: Prognosis: [] Good [x] Fair  [] Poor    Patient Requires Follow-up: [x] Yes  [] No      Goals:  Short term goals  Time Frame for Short term goals: 3 weeks  Short term goal 1: Initiate HEP MET  Short term goal 2: Tandem balance >15\" bilaterally for improved ease with ADL and yard work MET 08FEB  Short term goal 3: Improve TUG time to <15\" for improved balance and safety in home and community MET 08FEB    Long term goals  Time Frame for Long term goals : 6 weeks   Long term goal 1: Indep with HEP   Long term goal 2: Tandem balance >25\" bilaterally for improved ease with ADL and yard work (Part Met- (tandem offset) RLE in front x25sec, LLE in front x7sec, 5/15/19)  Long term goal 3: Improve TUG time to <13\" for improved balance and safety in home and community (MET Performed TUG in 12\" this date. 17APR)  Long term goal 4: Pt to amb 48' with least restrictive AD to improve home/community ambulation (Able to gait train [de-identified]' without AD and CGA. Multiple bouts of off balance requires assistance.)  Long term goal 5: Improve Tinetti score to >21 for improved home/community ambulation and safety (Scored 20/28 this date. 5/15/19)      Plan:   [x] Continue per plan of care [] Alter current plan (see comments)  [] Plan of care initiated [] Hold pending MD visit [] Discharge    Plan for Next Session:  Monitor response and progress as tolerated. Electronically signed by:   Tamela Kelley PTA

## 2019-06-06 ENCOUNTER — APPOINTMENT (OUTPATIENT)
Dept: PHYSICAL THERAPY | Age: 44
End: 2019-06-06
Payer: MEDICARE

## 2019-06-10 ENCOUNTER — HOSPITAL ENCOUNTER (OUTPATIENT)
Dept: PHYSICAL THERAPY | Age: 44
Setting detail: THERAPIES SERIES
Discharge: HOME | End: 2019-06-10
Payer: MEDICARE

## 2019-06-10 PROCEDURE — 97112 NEUROMUSCULAR REEDUCATION: CPT

## 2019-06-10 NOTE — FLOWSHEET NOTE
Physical Therapy Daily Treatment Note    Date:  6/10/2019    Patient Name:  Madison Mills    :  1975  MRN: [id number]  Restrictions/Precautions:      Medical/Treatment Diagnosis Information:   · Diagnosis: Multiple Sclerosis  · Treatment Diagnosis: multiple sclerosis-weakness  Insurance/Certification information:  PT Insurance Information: [employer]  Physician Information:  Referring Practitioner: Lillian Silva MD   Plan of care signed (Y/N):  N      Visit# / total visits: 2/10  (5th POC) 52 total  Pain level: 0/10       Time In: 2:47   Time Out: 3:38     Progress Note: []  Yes  [x]  No  Next due by: Visit #10  Or by 19    Subjective: Pt rpts to clinic with no complaints of pain, fatigue, or soreness. No issues from previous session noted. Objective: ALEX complete per flow chart to facilitate strength, motion and mobility to allow ease with daily ambulation and household chores. Difficulty with advanced gait and balance exercises remains. Several bouts of off balance requiring much CGA to ensure absence of falls. Most challenged by hop scotch walking this date. · Observation: Rpts with RW and stiff legged gait pattern. KAFO donned. Test measurements:      Exercises:   Exercise/Equipment Resistance/Repetitions Other comments   Offset Balance 3 x 30\" each EO & EC   DD DLS 3 x 98\"    AIREX HR/TR    AIREX Marches    AIREX 3 way hip    AIREX HS Curls    Sit-Stands  Required CGA this date   Sidesteps 2 laps With obstacles. (Hurdles)   SLS HRaises 15x each    amb without AD  CGA from therapist   BOSU flat side balance 3x1' CGA from therapist, eyes open   BOSU Round Side Balance     BOSU flat side +     Mini Lunges CGA- Min A from therapist   Retro walk     Offset Feet with Balloon Toss     NUSTEP 8'  Seat 10 LV6   Seated marches  Seated on SBall, CGA from therapist   LAQ    Step taps F,L 2\"   Squats  1x20 each on  AIREX, DD, BOSU flat side up  Picking up cones 1 HHA. Ball between legs.     Dot maneuverability  5' Mike. 6 dots spread out, instructing patient to step fwd/laterally/backwards to stand on desired dot   Reach + Step  Hop Scotch Mat; 1>4>7>10; 2/3>5/6>8/9        Cybex leg press/  90#   TM incline walking 1.5' @1%, 4.5' @2%   Hip abd/add [place]; [place]   [] Provided verbal/tactile cueing for activities related to strengthening, flexibility, endurance, ROM. (01881)   [x] Provided verbal/tactile cueing for activities related to improving balance, coordination, kinesthetic sense, posture, motor skill, proprioception. (08185)    Therapeutic Activities:     [] Therapeutic activities, direct (one-on-one) patient contact (use of dynamic activities to improve functional performance). (06568)    Gait:   [x] Provided training and instruction to the patient for ambulation re-education. (35080)  Verbal cues for proper swing phase bilaterally this date and to take moderate steps at a slow to moderate pace in order to avoid losing balance anteriorly. Self-Care/ADL's  [] Self-care/home management training and compensatory training, meal preparation, safety procedures, and instructions in use of assistive technology devices/adaptive equipment, direct one-on-one contact. (90248)    Home Exercise Program:      [] Reviewed/Progressed HEP activities related to strengthening, flexibility, endurance, ROM. (03159)  [x] Reviewed/Progressed HEP activities related to improving balance, coordination, kinesthetic sense, posture, motor skill, proprioception.  (09451)    Manual Treatments:    [] Provided manual therapy to mobilize soft tissue/joints for the purpose of modulating pain, promoting relaxation,  increasing ROM, reducing/eliminating soft tissue swelling/inflammation/restriction, improving soft tissue extensibility.  (24623)    Service Based Modalities:      Timed Code Treatment Minutes:  46' neuro re-ed            Total Treatment Minutes:  46'    Treatment/Activity Tolerance:  [x] Patient tolerated treatment well [] Patient limited by fatique  [] Patient limited by pain  [] Patient limited by other medical complications  [] Other:     Prognosis: [] Good [x] Fair  [] Poor    Patient Requires Follow-up: [x] Yes  [] No      Goals:  Short term goals  Time Frame for Short term goals: 3 weeks  Short term goal 1: Initiate HEP MET  Short term goal 2: Tandem balance >15\" bilaterally for improved ease with ADL and yard work MET 08FEB  Short term goal 3: Improve TUG time to <15\" for improved balance and safety in home and community MET 08FEB    Long term goals  Time Frame for Long term goals : 6 weeks   Long term goal 1: Indep with HEP   Long term goal 2: Tandem balance >25\" bilaterally for improved ease with ADL and yard work (Part Met- (tandem offset) RLE in front x25sec, LLE in front x7sec, 5/15/19)  Long term goal 3: Improve TUG time to <13\" for improved balance and safety in home and community (MET Performed TUG in 12\" this date. 17APR)  Long term goal 4: Pt to amb 48' with least restrictive AD to improve home/community ambulation (Able to gait train [de-identified]' without AD and CGA. Multiple bouts of off balance requires assistance.)  Long term goal 5: Improve Tinetti score to >21 for improved home/community ambulation and safety (Scored 20/28 this date. 5/15/19)      Plan:   [x] Continue per plan of care [] Alter current plan (see comments)  [] Plan of care initiated [] Hold pending MD visit [] Discharge    Plan for Next Session:  Monitor response and progress as tolerated.      Electronically signed by:  Angy Luna PTA

## 2019-06-12 ENCOUNTER — HOSPITAL ENCOUNTER (OUTPATIENT)
Dept: PHYSICAL THERAPY | Age: 44
Setting detail: THERAPIES SERIES
Discharge: HOME | End: 2019-06-12
Payer: MEDICARE

## 2019-06-12 PROCEDURE — 97112 NEUROMUSCULAR REEDUCATION: CPT

## 2019-06-12 NOTE — FLOWSHEET NOTE
Treatment Minutes:  37'    Treatment/Activity Tolerance:  [x] Patient tolerated treatment well [] Patient limited by fatique  [] Patient limited by pain  [] Patient limited by other medical complications  [] Other:     Prognosis: [] Good [x] Fair  [] Poor    Patient Requires Follow-up: [x] Yes  [] No      Goals:  Short term goals  Time Frame for Short term goals: 3 weeks  Short term goal 1: Initiate HEP MET  Short term goal 2: Tandem balance >15\" bilaterally for improved ease with ADL and yard work MET 08FEB  Short term goal 3: Improve TUG time to <15\" for improved balance and safety in home and community MET 08FEB    Long term goals  Time Frame for Long term goals : 6 weeks   Long term goal 1: Indep with HEP   Long term goal 2: Tandem balance >25\" bilaterally for improved ease with ADL and yard work (Part Met- (tandem offset) RLE in front x25sec, LLE in front x7sec, 5/15/19)  Long term goal 3: Improve TUG time to <13\" for improved balance and safety in home and community (MET Performed TUG in 12\" this date. 17APR)  Long term goal 4: Pt to amb 48' with least restrictive AD to improve home/community ambulation (Able to gait train [de-identified]' without AD and CGA. Multiple bouts of off balance requires assistance.)  Long term goal 5: Improve Tinetti score to >21 for improved home/community ambulation and safety (Scored 20/28 this date. 5/15/19)      Plan:   [x] Continue per plan of care [] Alter current plan (see comments)  [] Plan of care initiated [] Hold pending MD visit [] Discharge    Plan for Next Session:  Monitor response and progress as tolerated.      Electronically signed by:  Chandrakant Yusuf PTA

## 2019-06-13 ENCOUNTER — HOSPITAL ENCOUNTER (OUTPATIENT)
Dept: PHYSICAL THERAPY | Age: 44
Setting detail: THERAPIES SERIES
Discharge: HOME | End: 2019-06-13
Payer: MEDICARE

## 2019-06-13 PROCEDURE — 97112 NEUROMUSCULAR REEDUCATION: CPT

## 2019-06-13 NOTE — FLOWSHEET NOTE
Physical Therapy Daily Treatment Note    Date:  2019    Patient Name:  Sebastien Rios    :  1975  MRN: [id number]  Restrictions/Precautions:      Medical/Treatment Diagnosis Information:   · Diagnosis: Multiple Sclerosis  · Treatment Diagnosis: multiple sclerosis-weakness  Insurance/Certification information:  PT Insurance Information: [id number] [facility]  Physician Information:  Referring Practitioner: Rosaura Silva MD   Plan of care signed (Y/N):  N      Visit# / total visits: 4/10  (5th POC) 54 total  Pain level: 0/10       Time In: 2:41   Time Out: 3:29     Progress Note: []  Yes  [x]  No  Next due by: Visit #10  Or by 19    Subjective: Pt rpts to clinic with no significant complaints stating \"I am feeling pretty good and I have been up and about all day and I am feeling good. \"     Objective: ALEX complete per flow chart to facilitate strength, motion and mobility to allow ease with daily ambulation and household chores. Difficulty with advanced gait and balance exercises remains. Several bouts of off balance requiring much CGA to ensure absence of falls. Most challenged by general ambulation with obstacles this date exhibiting multiple LOB episodes. · Observation: Rpts with RW and stiff legged gait pattern. KAFO donned. Patient tolerated treatment well. · Demonstrated increased fatigue with Dot maneuverability and Reach and Step, relied on HHA repeatedly  Test measurements:      Exercises:   Exercise/Equipment Resistance/Repetitions Other comments   Offset Balance 3 x 30\" each EO & EC   DD DLS 3 x 96\"    AIREX HR/TR    AIREX Marches    AIREX 3 way hip    AIREX HS Curls    Sit-Stands  Required CGA this date   Sidesteps 3 laps With obstacles.  (Hurdles)   SLS HRaises     amb without AD  CGA from therapist   BOSU flat side balance  CGA from therapist, eyes open   BOSU Round Side Balance     BOSU flat side +     Mini Lunges CGA- Min A from therapist   Retro walk     Offset Feet with Balloon Toss 3 x 30\"    NUSTEP 8'  Seat 10 LV6   Seated marches  Seated on SBall, CGA from therapist   EITAN    Step taps F,L 2\"   Squats  1x20 each on  AIREX, DD, BOSU flat side up  Picking up cones 1 HHA. Ball between legs. Dot maneuverability  5' Mike. 6 dots spread out, instructing patient to step fwd/laterally/backwards to stand on desired dot   Reach + Step x5 laps each; CGA-Mike Hop Scotch Mat; 1>4>7>10; 2/3>5/6>8/9        Cybex leg press/ 15 each 130#   TM incline walking 1.5' @1%, 4.5' @2%   Hip abd/add [place]; [place]   [] Provided verbal/tactile cueing for activities related to strengthening, flexibility, endurance, ROM. (88209)   [x] Provided verbal/tactile cueing for activities related to improving balance, coordination, kinesthetic sense, posture, motor skill, proprioception. (83053)    Therapeutic Activities:     [] Therapeutic activities, direct (one-on-one) patient contact (use of dynamic activities to improve functional performance). (98440)    Gait:   [x] Provided training and instruction to the patient for ambulation re-education. (92643)  Verbal cues for proper swing phase bilaterally this date and to take moderate steps at a slow to moderate pace in order to avoid losing balance anteriorly. Self-Care/ADL's  [] Self-care/home management training and compensatory training, meal preparation, safety procedures, and instructions in use of assistive technology devices/adaptive equipment, direct one-on-one contact.  (22726)    Home Exercise Program:      [] Reviewed/Progressed HEP activities related to strengthening, flexibility, endurance, ROM. (11522)  [x] Reviewed/Progressed HEP activities related to improving balance, coordination, kinesthetic sense, posture, motor skill, proprioception.  (89568)    Manual Treatments:    [] Provided manual therapy to mobilize soft tissue/joints for the purpose of modulating pain, promoting relaxation,  increasing ROM, reducing/eliminating soft tissue swelling/inflammation/restriction, improving soft tissue extensibility. (76163)    Service Based Modalities:       Timed Code Treatment Minutes:  50' neuro re-ed            Total Treatment Minutes:  50'    Treatment/Activity Tolerance:  [x] Patient tolerated treatment well [] Patient limited by fatique  [] Patient limited by pain  [] Patient limited by other medical complications  [] Other:     Prognosis: [] Good [x] Fair  [] Poor    Patient Requires Follow-up: [x] Yes  [] No      Goals:  Short term goals  Time Frame for Short term goals: 3 weeks  Short term goal 1: Initiate HEP MET  Short term goal 2: Tandem balance >15\" bilaterally for improved ease with ADL and yard work MET 08FEB  Short term goal 3: Improve TUG time to <15\" for improved balance and safety in home and community MET 08FEB    Long term goals  Time Frame for Long term goals : 6 weeks   Long term goal 1: Indep with HEP   Long term goal 2: Tandem balance >25\" bilaterally for improved ease with ADL and yard work (Part Met- (tandem offset) RLE in front x25sec, LLE in front x7sec, 5/15/19)  Long term goal 3: Improve TUG time to <13\" for improved balance and safety in home and community (MET Performed TUG in 12\" this date. 17APR)  Long term goal 4: Pt to amb 48' with least restrictive AD to improve home/community ambulation (Able to gait train [de-identified]' without AD and CGA. Multiple bouts of off balance requires assistance.)  Long term goal 5: Improve Tinetti score to >21 for improved home/community ambulation and safety (Scored 20/28 this date. 5/15/19)      Plan:   [x] Continue per plan of care [] Alter current plan (see comments)  [] Plan of care initiated [] Hold pending MD visit [] Discharge    Koby VILLARREAL performed session with direct supervision by Radha Myers PTA. Plan for Next Session:  Monitor response and progress as tolerated.      Electronically signed by:  Radha Myers PTA

## 2019-06-14 ENCOUNTER — HOSPITAL ENCOUNTER (OUTPATIENT)
Dept: PHYSICAL THERAPY | Age: 44
Setting detail: THERAPIES SERIES
Discharge: HOME | End: 2019-06-14
Payer: MEDICARE

## 2019-06-14 PROCEDURE — 97112 NEUROMUSCULAR REEDUCATION: CPT

## 2019-06-14 NOTE — FLOWSHEET NOTE
Seated marches  Seated on SBall, CGA from therapist   EITAN    Step taps F,L 2\"   Squats  1x20 each on  AIREX, DD, BOSU flat side up  Picking up cones 1 HHA. Ball between legs. Dot maneuverability  5' Mike. 6 dots spread out, instructing patient to step fwd/laterally/backwards to stand on desired dot   Reach + Step x5 laps each; CGA-Mike Hop Scotch Mat; 1>4>7>10; 2/3>5/6>8/9        Cybex leg press/ 15 each 130#   TM incline walking 1.5' @1%, 4.5' @2%   Hip abd/add [place]; [place]   [] Provided verbal/tactile cueing for activities related to strengthening, flexibility, endurance, ROM. (53991)   [x] Provided verbal/tactile cueing for activities related to improving balance, coordination, kinesthetic sense, posture, motor skill, proprioception. (42517)    Therapeutic Activities:     [] Therapeutic activities, direct (one-on-one) patient contact (use of dynamic activities to improve functional performance). (97530)    Gait:   [x] Provided training and instruction to the patient for ambulation re-education. (30592)  Verbal cues for proper swing phase bilaterally this date and to take moderate steps at a slow to moderate pace in order to avoid losing balance anteriorly. Self-Care/ADL's  [] Self-care/home management training and compensatory training, meal preparation, safety procedures, and instructions in use of assistive technology devices/adaptive equipment, direct one-on-one contact.  (15152)    Home Exercise Program:      [] Reviewed/Progressed HEP activities related to strengthening, flexibility, endurance, ROM. (65022)  [x] Reviewed/Progressed HEP activities related to improving balance, coordination, kinesthetic sense, posture, motor skill, proprioception.  (96582)    Manual Treatments:    [] Provided manual therapy to mobilize soft tissue/joints for the purpose of modulating pain, promoting relaxation,  increasing ROM, reducing/eliminating soft tissue swelling/inflammation/restriction, improving soft tissue extensibility. (88636)    Service Based Modalities:       Timed Code Treatment Minutes:  40' neuro re-ed            Total Treatment Minutes:  40'    Treatment/Activity Tolerance:  [x] Patient tolerated treatment well [] Patient limited by fatique  [] Patient limited by pain  [] Patient limited by other medical complications  [] Other:     Prognosis: [] Good [x] Fair  [] Poor    Patient Requires Follow-up: [x] Yes  [] No      Goals:  Short term goals  Time Frame for Short term goals: 3 weeks  Short term goal 1: Initiate HEP MET  Short term goal 2: Tandem balance >15\" bilaterally for improved ease with ADL and yard work MET 08FEB  Short term goal 3: Improve TUG time to <15\" for improved balance and safety in home and community MET 08FEB    Long term goals  Time Frame for Long term goals : 6 weeks   Long term goal 1: Indep with HEP   Long term goal 2: Tandem balance >25\" bilaterally for improved ease with ADL and yard work (Part Met- (tandem offset) RLE in front x25sec, LLE in front x7sec, 5/15/19)  Long term goal 3: Improve TUG time to <13\" for improved balance and safety in home and community (MET Performed TUG in 12\" this date. 17APR)  Long term goal 4: Pt to amb 48' with least restrictive AD to improve home/community ambulation (Able to gait train [de-identified]' without AD and CGA. Multiple bouts of off balance requires assistance.)  Long term goal 5: Improve Tinetti score to >21 for improved home/community ambulation and safety (Scored 20/28 this date. 5/15/19)      Plan:   [x] Continue per plan of care [] Alter current plan (see comments)  [] Plan of care initiated [] Hold pending MD visit [] Discharge    Plan for Next Session:  Monitor response and progress as tolerated.      Electronically signed by:  George Parker PTA

## 2019-06-17 ENCOUNTER — HOSPITAL ENCOUNTER (OUTPATIENT)
Dept: PHYSICAL THERAPY | Age: 44
Setting detail: THERAPIES SERIES
Discharge: HOME | End: 2019-06-17
Payer: MEDICARE

## 2019-06-17 PROCEDURE — 97112 NEUROMUSCULAR REEDUCATION: CPT

## 2019-06-17 NOTE — PROGRESS NOTES
Physical Therapy  [facility]  Rehabilitation and Sports Medicine    [x] [place]  Phone: [phone]  Fax: [phone]      [] [place]  Phone: [phone]  Fax: [phone]    Physical Therapy Progress Note  Date: 2019        Patient Name:  Kennedy Lorenzana    :  1975  MRN: [id number]  Restrictions/Precautions:      Medical/Treatment Diagnosis Information:   · Diagnosis: Multiple Sclerosis  · Treatment Diagnosis: multiple sclerosis-weakness  Insurance/Certification information:  PT Insurance Information: [employer]  Physician Information:  Referring Practitioner: Edmund Silva MD   Plan of care signed (Y/N):  N      Visit# / total visits: 6/10  (5th POC) 46 total  Pain level:      0/10        Time Period for Report:  19 - 19  Cancels/No-shows to date:  0    Plan of Care/Treatment to date:  [x] Therapeutic Exercise    [] Modalities:  [x] Therapeutic Activity     [] Ultrasound  [] Electrical Stimulation  [x] Gait Training      [] Cervical Traction    [] Lumbar Traction  [x] Neuromuscular Re-education  [] Cold/hotpack [] Iontophoresis  [x] Instruction in HEP      Other:  [x] Manual Therapy       []    [] Aquatic Therapy       []                    ? Subjective:  Patient reports to therapy with no new complaints. No recent changes to report. Objective:  ·   Observation: Rpts with RW and stiff legged gait pattern. · Gait laps around clinic without AD required CGA/min A due to losses of balance.  Gait belt used to maintain upright posture and prevent a fall.     · Test measurements:   Tandem Stance: Left Leading: x15\", Right leading: 10\" (19)  · Tinetti Score  (19)  · CGA with all balance activities and ambulation without AD this date due to decreased LE/core/trunk control.     TU\"  With rolling walker  Tinetti:  (current)   ( initial eval)    · Muscle fasciculations palpable in right hip    · Observation: Rpts with RW and stiff legged gait pattern. · Gait laps around clinic without AD required CGA/min A due to losses of balance. Gait belt used to maintain upright and prevent a fall.     · Test measurements:     · Improved tolerance with exercises and improved endurance this date. Assessment:    Lawrence Tan is making progress with dynamic balance and stability, although still limited and continues to lack functional strength and endurance needed for ambulation without a rolling walker. Is progressing with ease of ambulation with SP cane and no AD with therapist in clinic, although not yet capable of doing these indep at home at this time. Plan:    Continue per POC 3-4x per week for 4 weeks to increase strength, stability, dynamic balance and proprioception for improved independence with gait and safety with home and community management. Goals:    Short term goals  Time Frame for Short term goals: 3 weeks  Short term goal 1: Initiate HEP MET  Short term goal 2: Tandem balance >15\" bilaterally for improved ease with ADL and yard work MET 08FEB  Short term goal 3: Improve TUG time to <15\" for improved balance and safety in home and community MET 08FEB     Long term goals  Time Frame for Long term goals : 6 weeks   Long term goal 1: Indep with HEP MET   Long term goal 2: Tandem balance >25\" bilaterally for improved ease with ADL and yard work (Not met. Only held 15\". 25MAR)  Long term goal 3: Improve TUG time to <13\" for improved balance and safety in home and community (Performed TUG in 12\" this date. 25MAR) MET  Long term goal 4: Pt to amb 48' with least restrictive AD to improve home/community ambulation (Pt is able to ambulate without AD but requires CGA. Slight imbalance still present. 25MAR) In progress   Long term goal 5: Improve Tinetti score to >21 for improved home/community ambulation and safety (Scored 24/28 this date.  25MAR)  MET    Current Frequency/Duration: 5/25/19 - 6/25/19  # Days per week:       [] 1 day  # Weeks: [] 1 week            [x] 4 weeks                                                [] 2 days? [] 2 weeks          [] 5 weeks                                                [x] 3-4 days                         [] 3 weeks          [] 6 weeks          Rehab Potential: [] Excellent [x] Good [] Fair  [] Poor     Goal Status:  [] Achieved [x] In Progress  [] Not Achieved     Patient Status: [] Continue per initial plan of Care     [] Patient now discharged     [x] Additional visits requested, Please re-certify for additional visits:      Requested frequency/duration:  3-4X/week for 4 weeks    Electronically signed by:  Joe Acosta PT, DPT    If you have any questions or concerns, please don't hesitate to call.   Thank you for your referral.    Physician Signature:________________________________Date:__________________  By signing above, therapists plan is approved by physician

## 2019-06-17 NOTE — FLOWSHEET NOTE
Physical Therapy Daily Treatment Note    Date:  2019    Patient Name:  Ratna Clark    :  1975  MRN: [id number]  Restrictions/Precautions:      Medical/Treatment Diagnosis Information:   · Diagnosis: Multiple Sclerosis  · Treatment Diagnosis: multiple sclerosis-weakness  Insurance/Certification information:  PT Insurance Information: [employer]  Physician Information:  Referring Practitioner: Minerva Silva MD   Plan of care signed (Y/N):  N      Visit# / total visits: 6/10  (5th POC) 56 total  Pain level: 0/10       Time In: 2:46   Time Out: 3:30     Progress Note: []  Yes  [x]  No  Next due by: Visit #10  Or by 19    Subjective: Pt rpts to clinic with no complaints of pain fatigue, or falls since last session. Objective: ALEX complete per flow chart to facilitate strength, motion and mobility to allow ease with daily ambulation and household chores. Difficulty with advanced gait and balance exercises remains. Several bouts of off balance requiring much CGA to ensure absence of falls. Ambulated over obstacles well this date exhibiting little LOB episodes. · Observation: Rpts with RW and stiff legged gait pattern. KAFO donned. Patient tolerated treatment well. · Demonstrated increased fatigue with Dot maneuverability and Reach and Step, relied on HHA repeatedly  Test measurements:      Exercises:   Exercise/Equipment Resistance/Repetitions Other comments   Offset Balance 3 x 30\" each EO & EC   DD DLS 3 x 31\"    AIREX HR/TR    AIREX Marches    AIREX 3 way hip    AIREX HS Curls    Sit-Stands  Required CGA this date   Sidesteps 3 laps With obstacles.  (Hurdles)   SLS HRaises     amb without AD  CGA from therapist   BOSU flat side balance  CGA from therapist, eyes open   BOSU Round Side Balance     BOSU flat side +     Mini Lunges CGA- Min A from therapist   Retro walk     Offset Feet with Balloon Toss 3 x 30\"    NUSTEP 8'  Seat 10 LV6   Seated marches  Seated on SBall, CGA from therapist LAQ    Step taps F,L 2\"   Squats  1x20 each on  AIREX, DD, BOSU flat side up  Picking up cones 1 HHA. Ball between legs. Dot maneuverability  5' Mike. 6 dots spread out, instructing patient to step fwd/laterally/backwards to stand on desired dot   Reach + Step x5 laps each; CGA-Mike Hop Scotch Mat; 1>4>7>10; 2/3>5/6>8/9        Cybex leg press/ 15 each 130#   TM incline walking 1.5' @1%, 4.5' @2%   Hip abd/add Eupora Salomón; CHARO   [] Provided verbal/tactile cueing for activities related to strengthening, flexibility, endurance, ROM. (94037)   [x] Provided verbal/tactile cueing for activities related to improving balance, coordination, kinesthetic sense, posture, motor skill, proprioception. (97055)    Therapeutic Activities:     [] Therapeutic activities, direct (one-on-one) patient contact (use of dynamic activities to improve functional performance). (45260)    Gait:   [x] Provided training and instruction to the patient for ambulation re-education. (90288)  Verbal cues for proper swing phase bilaterally this date and to take moderate steps at a slow to moderate pace in order to avoid losing balance anteriorly. Self-Care/ADL's  [] Self-care/home management training and compensatory training, meal preparation, safety procedures, and instructions in use of assistive technology devices/adaptive equipment, direct one-on-one contact. (39185)    Home Exercise Program:      [] Reviewed/Progressed HEP activities related to strengthening, flexibility, endurance, ROM. (98834)  [x] Reviewed/Progressed HEP activities related to improving balance, coordination, kinesthetic sense, posture, motor skill, proprioception.  (46983)    Manual Treatments:    [] Provided manual therapy to mobilize soft tissue/joints for the purpose of modulating pain, promoting relaxation,  increasing ROM, reducing/eliminating soft tissue swelling/inflammation/restriction, improving soft tissue extensibility.  (51889)    Service Based Modalities: Timed Code Treatment Minutes:  40' neuro re-ed            Total Treatment Minutes:  40'    Treatment/Activity Tolerance:  [x] Patient tolerated treatment well [] Patient limited by fatique  [] Patient limited by pain  [] Patient limited by other medical complications  [] Other:     Prognosis: [] Good [x] Fair  [] Poor    Patient Requires Follow-up: [x] Yes  [] No      Goals:  Short term goals  Time Frame for Short term goals: 3 weeks  Short term goal 1: Initiate HEP MET  Short term goal 2: Tandem balance >15\" bilaterally for improved ease with ADL and yard work MET 08FEB  Short term goal 3: Improve TUG time to <15\" for improved balance and safety in home and community MET 08FEB    Long term goals  Time Frame for Long term goals : 6 weeks   Long term goal 1: Indep with HEP   Long term goal 2: Tandem balance >25\" bilaterally for improved ease with ADL and yard work (Part Met- (tandem offset) RLE in front x25sec, LLE in front x7sec, 5/15/19)  Long term goal 3: Improve TUG time to <13\" for improved balance and safety in home and community (MET Performed TUG in 12\" this date. 17APR)  Long term goal 4: Pt to amb 48' with least restrictive AD to improve home/community ambulation (Able to gait train [de-identified]' without AD and CGA. Multiple bouts of off balance requires assistance.)  Long term goal 5: Improve Tinetti score to >21 for improved home/community ambulation and safety (Scored 20/28 this date. 5/15/19)      Plan:   [x] Continue per plan of care [] Alter current plan (see comments)  [] Plan of care initiated [] Hold pending MD visit [] Discharge    Plan for Next Session:  Monitor response and progress as tolerated.      Electronically signed by:  Daphnie Puga PTA

## 2019-06-18 ENCOUNTER — HOSPITAL ENCOUNTER (OUTPATIENT)
Dept: PHYSICAL THERAPY | Age: 44
Setting detail: THERAPIES SERIES
Discharge: HOME | End: 2019-06-18
Payer: MEDICARE

## 2019-06-18 PROCEDURE — 97112 NEUROMUSCULAR REEDUCATION: CPT

## 2019-06-18 NOTE — FLOWSHEET NOTE
Physical Therapy Daily Treatment Note    Date:  2019    Patient Name:  Daisha Amador    :  1975  MRN: [id number]  Restrictions/Precautions:      Medical/Treatment Diagnosis Information:   · Diagnosis: Multiple Sclerosis  · Treatment Diagnosis: multiple sclerosis-weakness  Insurance/Certification information:  PT Insurance Information: [address] [facility]  Physician Information:  Referring Practitioner: Radha Silva MD   Plan of care signed (Y/N):  N      Visit# / total visits: 7/10  (5th POC) 57 total   Pain level: 0/10       Time In: 2:47   Time Out:  3:35     Progress Note: []  Yes  [x]  No  Next due by: Visit #10  Or by 19    Subjective: No issues reported this date. · Objective: Pt tolerated todays session fairly as pt exhibited multiple LOB episodes early in session requiring extensive HHA and CGA to ensure no falls present. Had slight stumble in TM but caught himself with hand rails and was okay to continue therapy but no apparent injuries present. Most challenged by dot navigation this date. · Observation: Rpts with RW and stiff legged gait pattern. KAFO donned. Patient tolerated treatment well. · Demonstrated increased fatigue with Dot maneuverability and Reach and Step, relied on HHA repeatedly  Test measurements:      Exercises:   Exercise/Equipment Resistance/Repetitions Other comments   Offset Balance 3 x 30\" each EO & EC   DD DLS 3 x 35\"    AIREX HR/TR    AIREX Marches    AIREX 3 way hip    AIREX HS Curls    Sit-Stands  Required CGA this date   Sidesteps 3 laps With obstacles.  (Hurdles)   SLS HRaises     amb without AD  CGA from therapist   BOSU flat side balance  CGA from therapist, eyes open   BOSU Round Side Balance     BOSU flat side +     Mini Lunges CGA- Min A from therapist   Retro walk     Offset Feet with Balloon Toss 3 x 30\"    NUSTEP 8'  Seat 10 LV6   Seated marches  Seated on SBall, CGA from therapist   LAQ    Step taps F,L 2\"   Squats  1x20 each on  AIREX, DD, FLAKITOU flat side up  Picking up cones 1 HHA. Ball between legs. Dot maneuverability  5' Mike. 6 dots spread out, instructing patient to step fwd/laterally/backwards to stand on desired dot   Reach + Step x5 laps each; CGA-Mike Hop Scotch Mat; 1>4>7>10; 2/3>5/6>8/9        Cybex leg press/ 15 each 130#   TM incline walking 1.5' @1%, 4.5' @2%   Hip abd/add Theresa Dove; GREEN   [] Provided verbal/tactile cueing for activities related to strengthening, flexibility, endurance, ROM. (32067)   [x] Provided verbal/tactile cueing for activities related to improving balance, coordination, kinesthetic sense, posture, motor skill, proprioception. (01223)    Therapeutic Activities:     [] Therapeutic activities, direct (one-on-one) patient contact (use of dynamic activities to improve functional performance). (72272)    Gait:   [x] Provided training and instruction to the patient for ambulation re-education. (40049)  Verbal cues for proper swing phase bilaterally this date and to take moderate steps at a slow to moderate pace in order to avoid losing balance anteriorly. Self-Care/ADL's  [] Self-care/home management training and compensatory training, meal preparation, safety procedures, and instructions in use of assistive technology devices/adaptive equipment, direct one-on-one contact. (09862)    Home Exercise Program:      [] Reviewed/Progressed HEP activities related to strengthening, flexibility, endurance, ROM. (00690)  [x] Reviewed/Progressed HEP activities related to improving balance, coordination, kinesthetic sense, posture, motor skill, proprioception.  (56148)    Manual Treatments:    [] Provided manual therapy to mobilize soft tissue/joints for the purpose of modulating pain, promoting relaxation,  increasing ROM, reducing/eliminating soft tissue swelling/inflammation/restriction, improving soft tissue extensibility.  (89709)    Service Based Modalities:       Timed Code Treatment Minutes:  50' neuro re-ed            Total Treatment Minutes:  50'    Treatment/Activity Tolerance:  [x] Patient tolerated treatment well [] Patient limited by fatique  [] Patient limited by pain  [] Patient limited by other medical complications  [] Other:     Prognosis: [] Good [x] Fair  [] Poor    Patient Requires Follow-up: [x] Yes  [] No      Goals:  Short term goals  Time Frame for Short term goals: 3 weeks  Short term goal 1: Initiate HEP MET  Short term goal 2: Tandem balance >15\" bilaterally for improved ease with ADL and yard work MET 08FEB  Short term goal 3: Improve TUG time to <15\" for improved balance and safety in home and community MET 08FEB    Long term goals  Time Frame for Long term goals : 6 weeks   Long term goal 1: Indep with HEP   Long term goal 2: Tandem balance >25\" bilaterally for improved ease with ADL and yard work (Part Met- (tandem offset) RLE in front x25sec, LLE in front x7sec, 5/15/19)  Long term goal 3: Improve TUG time to <13\" for improved balance and safety in home and community (MET Performed TUG in 12\" this date. 17APR)  Long term goal 4: Pt to amb 48' with least restrictive AD to improve home/community ambulation (Able to gait train [de-identified]' without AD and CGA. Multiple bouts of off balance requires assistance.)  Long term goal 5: Improve Tinetti score to >21 for improved home/community ambulation and safety (Scored 20/28 this date. 5/15/19)      Plan:   [x] Continue per plan of care [] Alter current plan (see comments)  [] Plan of care initiated [] Hold pending MD visit [] Discharge    Plan for Next Session:  Monitor response and progress as tolerated.      Electronically signed by:  Sis Santos PTA

## 2019-06-20 ENCOUNTER — HOSPITAL ENCOUNTER (OUTPATIENT)
Dept: PHYSICAL THERAPY | Age: 44
Setting detail: THERAPIES SERIES
Discharge: HOME | End: 2019-06-20
Payer: MEDICARE

## 2019-06-20 PROCEDURE — 97112 NEUROMUSCULAR REEDUCATION: CPT | Performed by: PHYSICAL THERAPIST

## 2019-06-20 NOTE — FLOWSHEET NOTE
Physical Therapy Daily Treatment Note    Date:  2019    Patient Name:  Cortez Sanchez    :  1975  MRN: [id number]  Restrictions/Precautions:      Medical/Treatment Diagnosis Information:   · Diagnosis: Multiple Sclerosis  · Treatment Diagnosis: multiple sclerosis-weakness  Insurance/Certification information:  PT Insurance Information: [address] [facility]  Physician Information:  Referring Practitioner: Tiffanie Soto. MD Shira   Plan of care signed (Y/N):  N      Visit# / total visits: 8/10  (5th POC) 58 total   Pain level: 0/10       Time In: 2:02   Time Out: 3:00      Progress Note: []  Yes  [x]  No  Next due by: Visit #10  Or by 19    Subjective: \"I had to buy a destiny gun and re-destiny my brace because it was starting to come apart again. But once I did that it has been working out alright. I walk at home without a walker or cane for 5-10 feet distances, but only on good days, otherwise I use the walker in the home most of the time. \"    · Objective: Pt tolerated todays session fairly as pt exhibited multiple LOB episodes late in session requiring extensive HHA and CGA to ensure no falls present. Was able to ambulate in clinic with SP cane for 3 laps with only SBA this date   · Observation: Rpts with RW and stiff legged gait pattern. KAFO donned. Patient tolerated treatment well. · Demonstrated increased fatigue with Dot maneuverability and Reach and Step, relied on HHA repeatedly  Test measurements:      Exercises:   Exercise/Equipment Resistance/Repetitions Other comments   Offset Balance 3 x 30\" each EO & EC   DD DLS 3 x 62\"    AIREX HR/TR    AIREX Marches    AIREX 3 way hip    AIREX HS Curls    Sit-Stands  Required CGA this date   Sidesteps 3 laps With obstacles.  (Hurdles)   SLS HRaises     amb without AD  CGA from therapist   BOSU flat side balance 3x1' CGA from therapist, eyes open   BOSU Round Side Balance Marching 15x each    BOSU flat side + 15x each    Mini Lunges CGA- Min A from therapist Retro walk  3 laps   Offset Feet with Balloon Toss 3 x 30\"    NUSTEP 8'  Seat 10 LV6   Seated marches  Seated on SBall, CGA from therapist   EITAN    Step taps F,L 2\"   Squats  1x20 each on  AIREX, DD, BOSU flat side up  Picking up cones 1 HHA. Ball between legs. Dot maneuverability  5' Mike. 6 dots spread out, instructing patient to step fwd/laterally/backwards to stand on desired dot   Reach + Step x5 laps each; CGA-Mike Hop Scotch Mat; 1>4>7>10; 2/3>5/6>8/9   Walk along curved beam 5 laps each    Cybex leg press/ 15 each 130#   TM incline walking 1.5' @1%, 4.5' @2%   Hip abd/add Talia Murphy; CHARO   [] Provided verbal/tactile cueing for activities related to strengthening, flexibility, endurance, ROM. (24337)   [x] Provided verbal/tactile cueing for activities related to improving balance, coordination, kinesthetic sense, posture, motor skill, proprioception. (05345)    Therapeutic Activities:     [] Therapeutic activities, direct (one-on-one) patient contact (use of dynamic activities to improve functional performance). (91318)    Gait:   [x] Provided training and instruction to the patient for ambulation re-education. (03159)  Verbal cues for proper swing phase bilaterally this date and to take moderate steps at a slow to moderate pace in order to avoid losing balance anteriorly. Self-Care/ADL's  [] Self-care/home management training and compensatory training, meal preparation, safety procedures, and instructions in use of assistive technology devices/adaptive equipment, direct one-on-one contact.  (85317)    Home Exercise Program:      [] Reviewed/Progressed HEP activities related to strengthening, flexibility, endurance, ROM. (53714)  [x] Reviewed/Progressed HEP activities related to improving balance, coordination, kinesthetic sense, posture, motor skill, proprioception.  (21702)    Manual Treatments:    [] Provided manual therapy to mobilize soft tissue/joints for the purpose of modulating pain, promoting relaxation,  increasing ROM, reducing/eliminating soft tissue swelling/inflammation/restriction, improving soft tissue extensibility. (86132)    Service Based Modalities:       Timed Code Treatment Minutes:  62' neuro re-ed            Total Treatment Minutes:  62'    Treatment/Activity Tolerance:  [x] Patient tolerated treatment well [] Patient limited by fatique  [] Patient limited by pain  [] Patient limited by other medical complications  [] Other:     Prognosis: [] Good [x] Fair  [] Poor    Patient Requires Follow-up: [x] Yes  [] No      Goals:  Short term goals  Time Frame for Short term goals: 3 weeks  Short term goal 1: Initiate HEP MET  Short term goal 2: Tandem balance >15\" bilaterally for improved ease with ADL and yard work MET 08FEB  Short term goal 3: Improve TUG time to <15\" for improved balance and safety in home and community MET 08FEB    Long term goals  Time Frame for Long term goals : 6 weeks   Long term goal 1: Indep with HEP   Long term goal 2: Tandem balance >25\" bilaterally for improved ease with ADL and yard work (Part Met- (tandem offset) RLE in front x25sec, LLE in front x7sec, 5/15/19)  Long term goal 3: Improve TUG time to <13\" for improved balance and safety in home and community (MET Performed TUG in 12\" this date. 17APR)  Long term goal 4: Pt to amb 48' with least restrictive AD to improve home/community ambulation (Able to gait train [de-identified]' without AD and CGA. Multiple bouts of off balance requires assistance.)  Long term goal 5: Improve Tinetti score to >21 for improved home/community ambulation and safety (Scored 20/28 this date. 5/15/19)      Plan:   [x] Continue per plan of care [] Alter current plan (see comments)  [] Plan of care initiated [] Hold pending MD visit [] Discharge    Plan for Next Session:  Monitor response and progress as tolerated.      Electronically signed by:  Joe Acosta, PT, DPT

## 2019-06-21 ENCOUNTER — HOSPITAL ENCOUNTER (OUTPATIENT)
Dept: PHYSICAL THERAPY | Age: 44
Setting detail: THERAPIES SERIES
Discharge: HOME | End: 2019-06-21
Payer: MEDICARE

## 2019-06-21 PROCEDURE — 97112 NEUROMUSCULAR REEDUCATION: CPT | Performed by: PHYSICAL THERAPIST

## 2019-06-21 NOTE — FLOWSHEET NOTE
Physical Therapy Daily Treatment Note    Date:  2019    Patient Name:  Madison Mills    :  1975  MRN: [id number]  Restrictions/Precautions:      Medical/Treatment Diagnosis Information:   · Diagnosis: Multiple Sclerosis  · Treatment Diagnosis: multiple sclerosis-weakness  Insurance/Certification information:  PT Insurance Information: [employer]  Physician Information:  Referring Practitioner: Lillian Silva MD   Plan of care signed (Y/N):  N      Visit# / total visits: 9/10  (5th POC) 59 total   Pain level: 0/10       Time In: 2:15   Time Out: 3:02      Progress Note: []  Yes  [x]  No  Next due by: Visit #10  Or by 19    Subjective: \"My legs feel a little shaky today, but they usually do by the 4th day of therapy each week. I haven't tried walking without my walker yet, but I'm going to try some short distances at home this weekend. \"    · Objective: Pt tolerated todays session fairly as pt exhibited multiple LOB episodes late in session requiring extensive HHA and CGA to ensure no falls present. Was able to ambulate in clinic with SP cane for 3 laps with only SBA this date   · Observation: Rpts with RW and stiff legged gait pattern. KAFO donned. Patient tolerated treatment well. · Demonstrated increased fatigue with Dot maneuverability and Reach and Step, relied on HHA repeatedly  Test measurements:      Exercises:   Exercise/Equipment Resistance/Repetitions Other comments   Offset Balance 3 x 30\" each EO & EC   DD DLS 3 x 29\"    AIREX HR/TR    AIREX Marches    AIREX 3 way hip    AIREX HS Curls    Sit-Stands  Required CGA this date   Sidesteps 3 laps With obstacles.  (Hurdles)   SLS HRaises     amb without AD  CGA from therapist   BOSU flat side balance 3x1' CGA from therapist, eyes open   BOSU Round Side Balance Marching 15x each    BOSU flat side + 15x each    Mini Lunges CGA- Min A from therapist   Retro walk  3 laps   Offset Feet with Balloon Toss 3 x 30\"    NUSTEP 8'  Seat 10 LV6 Seated luis  Seated on SBall, CGA from therapist   EITAN    Step taps F,L 2\"   Squats  1x20 each on  AIREX, DD, BOSU flat side up  Picking up cones 1 HHA. Ball between legs. Dot maneuverability  5' Mike. 6 dots spread out, instructing patient to step fwd/laterally/backwards to stand on desired dot   Reach + Step x5 laps each; CGA-Mike Hop Scotch Mat; 1>4>7>10; 2/3>5/6>8/9   Walk along curved beam 5 laps each    Cybex leg press/ 15 each 130#   TM incline walking 1.5' @1%, 4.5' @2%   Hip abd/add Al Gip; GREEN   [] Provided verbal/tactile cueing for activities related to strengthening, flexibility, endurance, ROM. (95756)   [x] Provided verbal/tactile cueing for activities related to improving balance, coordination, kinesthetic sense, posture, motor skill, proprioception. (20869)    Therapeutic Activities:     [] Therapeutic activities, direct (one-on-one) patient contact (use of dynamic activities to improve functional performance). (84813)    Gait:   [x] Provided training and instruction to the patient for ambulation re-education. (61774)  Verbal cues for proper swing phase bilaterally this date and to take moderate steps at a slow to moderate pace in order to avoid losing balance anteriorly. Self-Care/ADL's  [] Self-care/home management training and compensatory training, meal preparation, safety procedures, and instructions in use of assistive technology devices/adaptive equipment, direct one-on-one contact.  (00154)    Home Exercise Program:      [] Reviewed/Progressed HEP activities related to strengthening, flexibility, endurance, ROM. (83157)  [x] Reviewed/Progressed HEP activities related to improving balance, coordination, kinesthetic sense, posture, motor skill, proprioception.  (02899)    Manual Treatments:    [] Provided manual therapy to mobilize soft tissue/joints for the purpose of modulating pain, promoting relaxation,  increasing ROM, reducing/eliminating soft tissue swelling/inflammation/restriction, improving soft tissue extensibility. (59322)    Service Based Modalities:       Timed Code Treatment Minutes:  52' neuro re-ed            Total Treatment Minutes:  52'    Treatment/Activity Tolerance:  [x] Patient tolerated treatment well [] Patient limited by fatique  [] Patient limited by pain  [] Patient limited by other medical complications  [] Other:     Prognosis: [] Good [x] Fair  [] Poor    Patient Requires Follow-up: [x] Yes  [] No      Goals:  Short term goals  Time Frame for Short term goals: 3 weeks  Short term goal 1: Initiate HEP MET  Short term goal 2: Tandem balance >15\" bilaterally for improved ease with ADL and yard work MET 08FEB  Short term goal 3: Improve TUG time to <15\" for improved balance and safety in home and community MET 08FEB    Long term goals  Time Frame for Long term goals : 6 weeks   Long term goal 1: Indep with HEP   Long term goal 2: Tandem balance >25\" bilaterally for improved ease with ADL and yard work (Part Met- (tandem offset) RLE in front x25sec, LLE in front x7sec, 5/15/19)  Long term goal 3: Improve TUG time to <13\" for improved balance and safety in home and community (MET Performed TUG in 12\" this date. 17APR)  Long term goal 4: Pt to amb 48' with least restrictive AD to improve home/community ambulation (Able to gait train [de-identified]' without AD and CGA. Multiple bouts of off balance requires assistance.)  Long term goal 5: Improve Tinetti score to >21 for improved home/community ambulation and safety (Scored 20/28 this date. 5/15/19)      Plan:   [x] Continue per plan of care [] Alter current plan (see comments)  [] Plan of care initiated [] Hold pending MD visit [] Discharge    Plan for Next Session:  Monitor response and progress as tolerated.      Electronically signed by:  Chan Hope, PT, DPT

## 2019-06-23 ENCOUNTER — HOSPITAL ENCOUNTER (EMERGENCY)
Age: 44
Discharge: HOME | End: 2019-06-23
Attending: EMERGENCY MEDICINE
Payer: MEDICARE

## 2019-06-23 VITALS
RESPIRATION RATE: 14 BRPM | TEMPERATURE: 97.9 F | OXYGEN SATURATION: 97 % | HEART RATE: 110 BPM | SYSTOLIC BLOOD PRESSURE: 113 MMHG | DIASTOLIC BLOOD PRESSURE: 58 MMHG

## 2019-06-23 DIAGNOSIS — N48.33 PRIAPISM, DRUG-INDUCED: Primary | ICD-10-CM

## 2019-06-23 PROCEDURE — 99283 EMERGENCY DEPT VISIT LOW MDM: CPT

## 2019-06-23 RX ORDER — TERBUTALINE SULFATE 1 MG/ML
0.25 INJECTION, SOLUTION SUBCUTANEOUS ONCE
Status: DISCONTINUED | OUTPATIENT
Start: 2019-06-23 | End: 2019-06-23 | Stop reason: HOSPADM

## 2019-06-23 ASSESSMENT — ENCOUNTER SYMPTOMS: ABDOMINAL PAIN: 0

## 2019-06-24 ENCOUNTER — HOSPITAL ENCOUNTER (OUTPATIENT)
Dept: PHYSICAL THERAPY | Age: 44
Setting detail: THERAPIES SERIES
Discharge: HOME | End: 2019-06-24
Payer: MEDICARE

## 2019-06-24 PROCEDURE — 97112 NEUROMUSCULAR REEDUCATION: CPT | Performed by: PHYSICAL THERAPIST

## 2019-06-24 NOTE — FLOWSHEET NOTE
Physical Therapy Daily Treatment Note    Date:  2019    Patient Name:  Cely Wade    :  1975  MRN: [id number]  Restrictions/Precautions:      Medical/Treatment Diagnosis Information:   · Diagnosis: Multiple Sclerosis  · Treatment Diagnosis: multiple sclerosis-weakness  Insurance/Certification information:  PT Insurance Information: [address] [facility]  Physician Information:  Referring Practitioner: Louise Brasher. MD Shira   Plan of care signed (Y/N):  N      Visit# / total visits: 10/10  (5th POC) 60 total   Pain level: 0/10       Time In: 2:05   Time Out:3:15       Progress Note: []  Yes  [x]  No  Next due by: Visit #10  Or by 19    Subjective: \"My legs feel good today. However last night my my legs where twitching and in pain nothing rally calmed it down. \"     · Objective: Pt tolerated todays session well Was able to ambulate in clinic with SP cane for 3 laps with CGA this date. · Pt required break to use restroom at the beginning of session  · Observation: Rpts with RW and stiff legged gait pattern. KAFO donned. Patient tolerated treatment well. Test measurements:  Tandum balance tested       RLE in front able to maintain balance for 1 minute      LLE in front able to maintain balance for 45 seconds    Exercises:   Exercise/Equipment Resistance/Repetitions Other comments   Offset Balance  EO & EC   DD DLS 1 x 1'    AIREX HR/TR 20x    AIREX Marches 20x each    AIREX 3 way hip    AIREX HS Curls    Sit-Stands  Required CGA this date   Sidesteps 5 laps With obstacles.  (Hurdles and Blue balance beam)   SLS HRaises     amb without AD  CGA from therapist   BOSU flat side balance  CGA from therapist, eyes open   BOSU Round Side Balance Marching 15x each    BOSU flat side + 15x each    Mini Lunges CGA- Min A from therapist   Retro walk  3 laps   Offset Feet with Balloon Toss     NUSTEP  Seat 10 LV6   Seated marches  Seated on SBall, CGA from therapist   LAQ    Step taps F,L 2\"   Squats  1x20 each on AIREX,  1 HHA. Ball between legs. Dot maneuverability   Mike. 6 dots spread out, instructing patient to step fwd/laterally/backwards to stand on desired dot   Reach + Step  Hop Scotch Mat; 1>4>7>10; 2/3>5/6>8/9   Walk along curved beam     Cybex leg press/ 130#   TM  walking 8'   Hip abd/add Theresa Dove; GREEN   [] Provided verbal/tactile cueing for activities related to strengthening, flexibility, endurance, ROM. (63479)   [x] Provided verbal/tactile cueing for activities related to improving balance, coordination, kinesthetic sense, posture, motor skill, proprioception. (81451)    Therapeutic Activities:     [] Therapeutic activities, direct (one-on-one) patient contact (use of dynamic activities to improve functional performance). (84923)    Gait:   [x] Provided training and instruction to the patient for ambulation re-education. (41510)  Verbal cues for proper swing phase bilaterally this date and to take moderate steps at a slow to moderate pace in order to avoid losing balance anteriorly. Self-Care/ADL's  [] Self-care/home management training and compensatory training, meal preparation, safety procedures, and instructions in use of assistive technology devices/adaptive equipment, direct one-on-one contact. (13354)    Home Exercise Program:      [] Reviewed/Progressed HEP activities related to strengthening, flexibility, endurance, ROM. (47957)  [x] Reviewed/Progressed HEP activities related to improving balance, coordination, kinesthetic sense, posture, motor skill, proprioception.  (65571)    Manual Treatments:    [] Provided manual therapy to mobilize soft tissue/joints for the purpose of modulating pain, promoting relaxation,  increasing ROM, reducing/eliminating soft tissue swelling/inflammation/restriction, improving soft tissue extensibility.  (23018)    Service Based Modalities:       Timed Code Treatment Minutes:  61' neuro re-ed            Total Treatment Minutes:  79'    Treatment/Activity Tolerance:  [x] Patient tolerated treatment well [] Patient limited by fatique  [] Patient limited by pain  [] Patient limited by other medical complications  [] Other:     Prognosis: [] Good [x] Fair  [] Poor    Patient Requires Follow-up: [x] Yes  [] No      Goals:  Short term goals  Time Frame for Short term goals: 3 weeks  Short term goal 1: Initiate HEP MET  Short term goal 2: Tandem balance >15\" bilaterally for improved ease with ADL and yard work MET 08FEB  Short term goal 3: Improve TUG time to <15\" for improved balance and safety in home and community MET 08FEB    Long term goals  Time Frame for Long term goals : 6 weeks   Long term goal 1: Indep with HEP   Long term goal 2: Tandem balance >25\" bilaterally for improved ease with ADL and yard work MET 6/24/19   Long term goal 3: Improve TUG time to <13\" for improved balance and safety in home and community (MET Performed TUG in 12\" this date. 17APR)  Long term goal 4: Pt to amb 48' with least restrictive AD to improve home/community ambulation (Able to gait train [de-identified]' without AD and CGA. Multiple bouts of off balance requires assistance.)  Long term goal 5: Improve Tinetti score to >21 for improved home/community ambulation and safety (Scored 20/28 this date. 5/15/19)      Plan:   [x] Continue per plan of care [] Alter current plan (see comments)  [] Plan of care initiated [] Hold pending MD visit [] Discharge    Plan for Next Session:  Monitor response and progress as tolerated. Patient treated by Bertha RANGEL under direct, one-on-one supervision of licensed PT.   Electronically signed by:  Mary Sauceda, PT, DPT

## 2019-06-25 ENCOUNTER — HOSPITAL ENCOUNTER (OUTPATIENT)
Dept: PHYSICAL THERAPY | Age: 44
Setting detail: THERAPIES SERIES
Discharge: HOME | End: 2019-06-25
Payer: MEDICARE

## 2019-06-25 PROCEDURE — 97112 NEUROMUSCULAR REEDUCATION: CPT

## 2019-06-25 NOTE — FLOWSHEET NOTE
Physical Therapy Daily Treatment Note    Date:  2019    Patient Name:  Kennedy Lorenzana    :  1975  MRN: [id number]  Restrictions/Precautions:      Medical/Treatment Diagnosis Information:   · Diagnosis: Multiple Sclerosis  · Treatment Diagnosis: multiple sclerosis-weakness  Insurance/Certification information:  PT Insurance Information: [address] [facility]  Physician Information:  Referring Practitioner: Edmund Silva MD   Plan of care signed (Y/N):  N       Visit# / total visits: 1/10  (6th POC) 61 total   Pain level: 0/10       Time In: 2:01   Time Out:   2:46     Progress Note: []  Yes  [x]  No  Next due by: Visit #10  Or by 19    Subjective: Pt rpts to clinic with no complaints of pain or dysfunction from patients normal.     · Objective: Pt tolerated todays session well indicating fatigue post session. Initiated several UE exercises including shoulders raises, pushups at bar, and chair dips to increase UE strength and stability. Also initiated stool scoots this date with good tolerance and stability noted. Continues to require CGA throughout activity. · Observation: Rpts with RW and stiff legged gait pattern. KAFO donned. Patient tolerated treatment well. Test measurements:  Tandum balance tested       RLE in front able to maintain balance for 1 minute      LLE in front able to maintain balance for 45 seconds    Exercises:   Exercise/Equipment Resistance/Repetitions Other comments   Offset Balance  EO & EC   DD DLS 1 x 1'    AIREX HR/TR 20x    AIREX Marches 20x each    AIREX 3 way hip    AIREX HS Curls    Sit-Stands  Required CGA this date   Sidesteps 5 laps With obstacles.  (Hurdles and Blue balance beam)   SLS HRaises     amb without AD  CGA from therapist   BOSU flat side balance  CGA from therapist, eyes open   BOSU Round Side Balance Marching 15x each    BOSU flat side + 15x each    Mini Lunges CGA- Min A from therapist   Retro walk  3 laps   Offset Feet with Balloon Toss     NUSTEP  Seat

## 2019-06-26 ENCOUNTER — HOSPITAL ENCOUNTER (OUTPATIENT)
Dept: PHYSICAL THERAPY | Age: 44
Setting detail: THERAPIES SERIES
Discharge: HOME | End: 2019-06-26
Payer: MEDICARE

## 2019-06-26 PROCEDURE — 97112 NEUROMUSCULAR REEDUCATION: CPT

## 2019-06-26 NOTE — FLOWSHEET NOTE
Physical Therapy Daily Treatment Note    Date:  2019    Patient Name:  Kahlil Nevarez    :  1975  MRN: [id number]  Restrictions/Precautions:      Medical/Treatment Diagnosis Information:   · Diagnosis: Multiple Sclerosis  · Treatment Diagnosis: multiple sclerosis-weakness  Insurance/Certification information:  PT Insurance Information: [address] [facility]  Physician Information:  Referring Practitioner: Damaso Patel. MD Shira   Plan of care signed (Y/N):  N       Visit# / total visits: 2/10  (6th POC) 62 total   Pain level: 0/10       Time In: 2:04   Time Out:   2:45     Progress Note: []  Yes  [x]  No  Next due by: Visit #10  Or by 19    Subjective: Pt rpts to clinic with only mild complaints of soreness in UE's after last session stating \"I liked working the upper body but I could tell its been awhile. · Objective: Pt tolerated todays session well exhibiting very good balance with FTEC on airex pad. Able to initiated [place] shoulder press with 10# DB's this date noting fatigue but tolerated exercise well. Pt was most challenged by 3 continuous walking laps noting fatigue. Observation: Rpts with RW and stiff legged gait pattern. KAFO dongaudencio. Test measurements:  Tandum balance tested       RLE in front able to maintain balance for 1 minute      LLE in front able to maintain balance for 45 seconds    Exercises:   Exercise/Equipment Resistance/Repetitions Other comments   Offset Balance  EO & EC   DD DLS 1 x 1'    AIREX HR/TR 20x    AIREX Marches 20x each    AIREX 3 way hip    AIREX HS Curls    Sit-Stands  Required CGA this date   Sidesteps 5 laps With obstacles.  (Hurdles and Blue balance beam)   SLS HRaises     amb without AD  CGA from therapist   BOSU flat side balance  CGA from therapist, eyes open   BOSU Round Side Balance Marching 15x each    BOSU flat side + 15x each    Mini Lunges CGA- Min A from therapist   Retro walk  3 laps   Offset Feet with Balloon Toss     NUSTEP  Seat 10 LV6   Seated marches Seated on SBall, CGA from therapist   EITAN    Step taps F,L 2\"   Squats  1x20 each on  AIREX,  1 HHA. Ball between legs. Dot maneuverability   Mike. 6 dots spread out, instructing patient to step fwd/laterally/backwards to stand on desired dot   Reach + Step  Hop Scotch Mat; 1>4>7>10; 2/3>5/6>8/9   Walk along curved beam     Cybex leg press/ 130#   TM  walking 8'   Hip abd/add Theresa Dove; GREEN   [] Provided verbal/tactile cueing for activities related to strengthening, flexibility, endurance, ROM. (59424)   [x] Provided verbal/tactile cueing for activities related to improving balance, coordination, kinesthetic sense, posture, motor skill, proprioception. (26583)    Therapeutic Activities:     [] Therapeutic activities, direct (one-on-one) patient contact (use of dynamic activities to improve functional performance). (96042)    Gait:   [x] Provided training and instruction to the patient for ambulation re-education. (50381)  Verbal cues for proper swing phase bilaterally this date and to take moderate steps at a slow to moderate pace in order to avoid losing balance anteriorly. Self-Care/ADL's  [] Self-care/home management training and compensatory training, meal preparation, safety procedures, and instructions in use of assistive technology devices/adaptive equipment, direct one-on-one contact. (42736)    Home Exercise Program:      [] Reviewed/Progressed HEP activities related to strengthening, flexibility, endurance, ROM. (32431)  [x] Reviewed/Progressed HEP activities related to improving balance, coordination, kinesthetic sense, posture, motor skill, proprioception.  (96617)    Manual Treatments:    [] Provided manual therapy to mobilize soft tissue/joints for the purpose of modulating pain, promoting relaxation,  increasing ROM, reducing/eliminating soft tissue swelling/inflammation/restriction, improving soft tissue extensibility.  (52976)    Service Based Modalities:       Timed Code Treatment Minutes:  39'

## 2019-06-28 ENCOUNTER — HOSPITAL ENCOUNTER (OUTPATIENT)
Dept: PHYSICAL THERAPY | Age: 44
Setting detail: THERAPIES SERIES
Discharge: HOME | End: 2019-06-28
Payer: MEDICARE

## 2019-06-28 PROCEDURE — 97112 NEUROMUSCULAR REEDUCATION: CPT

## 2019-06-28 NOTE — FLOWSHEET NOTE
Physical Therapy Daily Treatment Note    Date:  2019    Patient Name:  Kennedy Lorenzana    :  1975  MRN: [id number]  Restrictions/Precautions:      Medical/Treatment Diagnosis Information:   · Diagnosis: Multiple Sclerosis  · Treatment Diagnosis: multiple sclerosis-weakness  Insurance/Certification information:  PT Insurance Information: [address] [facility]  Physician Information:  Referring Practitioner: Edmund Silva MD   Plan of care signed (Y/N):  N       Visit# / total visits: 3/10  (6th POC) 63 total   Pain level: 0/10       Time In: 2:28   Time Out:   3:15     Progress Note: []  Yes  [x]  No  Next due by: Visit #10  Or by 19    Subjective: Pt rpts to clinic with no current complaints of pain or soreness. Agrees to test goals this date. · Objective: Pt tolerated todays session of testing well indicating poor tolerance to tandem stance exhibiting multiple LOB episodes and being challenged by simple ambulation this date. Pt agreed to continue therapy as pt demonstrated poor progression this date. · Observation: Rpts with RW and stiff legged gait pattern. KAFO dongaudencio. Test measurements:  Tandum balance tested       RLE in front able to maintain balance for 1 minute      LLE in front able to maintain balance for 45 seconds    Exercises:   Exercise/Equipment Resistance/Repetitions Other comments   Offset Balance  EO & EC   DD DLS 1 x 1'    AIREX HR/TR 20x    AIREX Marches 20x each    AIREX 3 way hip    AIREX HS Curls    Sit-Stands  Required CGA this date   Sidesteps 5 laps With obstacles.  (Hurdles and Blue balance beam)   SLS HRaises     amb without AD  CGA from therapist   BOSU flat side balance  CGA from therapist, eyes open   BOSU Round Side Balance Marching 15x each    BOSU flat side + 15x each    Mini Lunges CGA- Min A from therapist   Retro walk  3 laps   Offset Feet with Balloon Toss     NUSTEP  Seat 10 LV6   Seated marches  Seated on SBall, CGA from therapist   LAQ    Step taps F,L 2\"   Squats  1x20 each on  AIREX,  1 HHA. Ball between legs. Dot maneuverability   Mike. 6 dots spread out, instructing patient to step fwd/laterally/backwards to stand on desired dot   Reach + Step  Hop Scotch Mat; 1>4>7>10; 2/3>5/6>8/9   Walk along curved beam     Cybex leg press/ 130#   TM  walking 8'   Hip abd/add Brendan Mouse; GREEN   [] Provided verbal/tactile cueing for activities related to strengthening, flexibility, endurance, ROM. (49069)   [x] Provided verbal/tactile cueing for activities related to improving balance, coordination, kinesthetic sense, posture, motor skill, proprioception. (50063)    Therapeutic Activities:     [] Therapeutic activities, direct (one-on-one) patient contact (use of dynamic activities to improve functional performance). (41165)    Gait:   [x] Provided training and instruction to the patient for ambulation re-education. (05441)  Verbal cues for proper swing phase bilaterally this date and to take moderate steps at a slow to moderate pace in order to avoid losing balance anteriorly. Self-Care/ADL's  [] Self-care/home management training and compensatory training, meal preparation, safety procedures, and instructions in use of assistive technology devices/adaptive equipment, direct one-on-one contact. (91984)    Home Exercise Program:      [] Reviewed/Progressed HEP activities related to strengthening, flexibility, endurance, ROM. (74419)  [x] Reviewed/Progressed HEP activities related to improving balance, coordination, kinesthetic sense, posture, motor skill, proprioception.  (90502)    Manual Treatments:    [] Provided manual therapy to mobilize soft tissue/joints for the purpose of modulating pain, promoting relaxation,  increasing ROM, reducing/eliminating soft tissue swelling/inflammation/restriction, improving soft tissue extensibility.  (20129)    Service Based Modalities:       Timed Code Treatment Minutes:  52' neuro re-ed            Total Treatment Minutes: 52'    Treatment/Activity Tolerance:  [x] Patient tolerated treatment well [] Patient limited by fatique  [] Patient limited by pain  [] Patient limited by other medical complications  [] Other:     Prognosis: [] Good [x] Fair  [] Poor    Patient Requires Follow-up: [x] Yes  [] No      Goals:  Short term goals  Time Frame for Short term goals: 3 weeks  Short term goal 1: Initiate HEP MET  Short term goal 2: Tandem balance >15\" bilaterally for improved ease with ADL and yard work MET 08FEB  Short term goal 3: Improve TUG time to <15\" for improved balance and safety in home and community MET 08FEB    Long term goals  Time Frame for Long term goals : 6 weeks   Long term goal 1: Indep with HEP   Long term goal 2: Tandem balance >25\" bilaterally for improved ease with ADL and yard work MET 6/24/19. Exhibited poor performance this date. Long term goal 3: Improve TUG time to <13\" for improved balance and safety in home and community (MET Performed TUG in 12\" this date. 28JUN)   Long term goal 4: Pt to amb 48' with least restrictive AD to improve home/community ambulation (Able to gait train [de-identified]' without AD and CGA. Multiple bouts of off balance requires assistance.)  Long term goal 5: Improve Tinetti score to >21 for improved home/community ambulation and safety (Scored 12/28 this date. 28JUN)       Plan:   [x] Continue per plan of care [] Alter current plan (see comments)  [] Plan of care initiated [] Hold pending MD visit [] Discharge    Plan for Next Session:  Monitor response and progress as tolerated.      Electronically signed by:  Rashaun Kern PTA

## 2019-07-01 ENCOUNTER — HOSPITAL ENCOUNTER (OUTPATIENT)
Dept: PHYSICAL THERAPY | Age: 44
Setting detail: THERAPIES SERIES
Discharge: HOME | End: 2019-07-01
Payer: MEDICARE

## 2019-07-01 PROCEDURE — 97112 NEUROMUSCULAR REEDUCATION: CPT

## 2019-07-01 NOTE — FLOWSHEET NOTE
Physical Therapy Daily Treatment Note    Date:  2019    Patient Name:  Sarah Reed    :  1975  MRN: [id number]  Restrictions/Precautions:      Medical/Treatment Diagnosis Information:   · Diagnosis: Multiple Sclerosis  · Treatment Diagnosis: multiple sclerosis-weakness  Insurance/Certification information:  PT Insurance Information: [address] [facility]  Physician Information:  Referring Practitioner: Peewee Silva MD   Plan of care signed (Y/N):  N       Visit# / total visits: 4/10  (6th POC) 64 total   Pain level: 0/10       Time In: 2:47   Time Out:   3:30     Progress Note: []  Yes  [x]  No  Next due by: Visit #10  Or by 19    Subjective:   Pt rpts to clinic with no current complaints of fatigue, pain, or dysfunction stating \"I already mowed my grass today so I feel pretty good\". · Objective: Pt tolerated todays session well indicating improved balance from last session and improved endurance as pt required no rest breaks this date. Pt was able to progress multiple exercises including stool scoots and DB OH press with good tolerance noted. · Observation: Rpts with RW and stiff legged gait pattern. KAFO donned. Test measurements:      Exercises:   Exercise/Equipment Resistance/Repetitions Other comments   Offset Balance  EO & EC   DD DLS 1 x 1'    AIREX HR/TR 20x    AIREX Marches 20x each    AIREX 3 way hip    AIREX HS Curls    Sit-Stands  Required CGA this date   Sidesteps 5 laps With obstacles. (Hurdles and Blue balance beam)   SLS HRaises     amb without AD  CGA from therapist   BOSU flat side balance  CGA from therapist, eyes open   BOSU Round Side Balance Marching 15x each    BOSU flat side + 15x each    Mini Lunges CGA- Min A from therapist   Retro walk  3 laps   Offset Feet with Balloon Toss     NUSTEP  Seat 10 LV6   Seated marches  Seated on SBall, CGA from therapist   LAQ    Step taps F,L 2\"   Squats  1x20 each on  AIREX,  1 HHA. Ball between legs. Dot maneuverability   Mike.  6

## 2019-07-02 ENCOUNTER — HOSPITAL ENCOUNTER (OUTPATIENT)
Dept: PHYSICAL THERAPY | Age: 44
Setting detail: THERAPIES SERIES
Discharge: HOME | End: 2019-07-02
Payer: MEDICARE

## 2019-07-02 PROCEDURE — 97112 NEUROMUSCULAR REEDUCATION: CPT

## 2019-07-03 ENCOUNTER — HOSPITAL ENCOUNTER (OUTPATIENT)
Dept: PHYSICAL THERAPY | Age: 44
Setting detail: THERAPIES SERIES
Discharge: HOME | End: 2019-07-03
Payer: MEDICARE

## 2019-07-03 PROCEDURE — 97112 NEUROMUSCULAR REEDUCATION: CPT

## 2019-07-05 ENCOUNTER — HOSPITAL ENCOUNTER (OUTPATIENT)
Dept: PHYSICAL THERAPY | Age: 44
Setting detail: THERAPIES SERIES
Discharge: HOME | End: 2019-07-05
Payer: MEDICARE

## 2019-07-05 PROCEDURE — 97112 NEUROMUSCULAR REEDUCATION: CPT

## 2019-07-08 ENCOUNTER — HOSPITAL ENCOUNTER (OUTPATIENT)
Dept: PHYSICAL THERAPY | Age: 44
Setting detail: THERAPIES SERIES
Discharge: HOME | End: 2019-07-08
Payer: MEDICARE

## 2019-07-08 PROCEDURE — 97112 NEUROMUSCULAR REEDUCATION: CPT | Performed by: PHYSICAL THERAPIST

## 2019-07-08 NOTE — FLOWSHEET NOTE
Mat; 1>4>7>10; 2/3>5/6>8/9   Walk along curved beam     Cybex leg press/ 130#   TM incline walking 8'8% incline   Hip abd/add Mona ; GREEN       Row machiene 3x1070 lbs   Bicep curls 2x1015 lbs   Abduction  [id number]   Overhead Triceps ext 10x 4 lbs   Bent rows 15x 10 lbs           [] Provided verbal/tactile cueing for activities related to strengthening, flexibility, endurance, ROM. (98586)   [x] Provided verbal/tactile cueing for activities related to improving balance, coordination, kinesthetic sense, posture, motor skill, proprioception. (10985)    Therapeutic Activities:     [] Therapeutic activities, direct (one-on-one) patient contact (use of dynamic activities to improve functional performance). (75707)    Gait:   [x] Provided training and instruction to the patient for ambulation re-education. (69735)  Verbal cues for proper swing phase bilaterally this date and to take moderate steps at a slow to moderate pace in order to avoid losing balance anteriorly. Self-Care/ADL's  [] Self-care/home management training and compensatory training, meal preparation, safety procedures, and instructions in use of assistive technology devices/adaptive equipment, direct one-on-one contact. (77680)    Home Exercise Program:      [] Reviewed/Progressed HEP activities related to strengthening, flexibility, endurance, ROM. (12907)  [x] Reviewed/Progressed HEP activities related to improving balance, coordination, kinesthetic sense, posture, motor skill, proprioception.  (56558)    Manual Treatments:    [] Provided manual therapy to mobilize soft tissue/joints for the purpose of modulating pain, promoting relaxation,  increasing ROM, reducing/eliminating soft tissue swelling/inflammation/restriction, improving soft tissue extensibility.  (77725)    Service Based Modalities:        Timed Code Treatment Minutes:  58' neuro re-ed            Total Treatment Minutes:  58'    Treatment/Activity Tolerance:  [x] Patient tolerated

## 2019-07-09 ENCOUNTER — HOSPITAL ENCOUNTER (OUTPATIENT)
Dept: PHYSICAL THERAPY | Age: 44
Setting detail: THERAPIES SERIES
Discharge: HOME | End: 2019-07-09
Payer: MEDICARE

## 2019-07-09 PROCEDURE — 97112 NEUROMUSCULAR REEDUCATION: CPT

## 2019-07-09 NOTE — FLOWSHEET NOTE
Total Treatment Minutes:  52'    Treatment/Activity Tolerance:  [x] Patient tolerated treatment well [] Patient limited by fatique  [] Patient limited by pain  [] Patient limited by other medical complications  [] Other:     Prognosis: [] Good [x] Fair  [] Poor    Patient Requires Follow-up: [x] Yes  [] No    Goals:  Short term goals  Time Frame for Short term goals: 3 weeks  Short term goal 1: Initiate HEP MET  Short term goal 2: Tandem balance >15\" bilaterally for improved ease with ADL and yard work MET 08FEB  Short term goal 3: Improve TUG time to <15\" for improved balance and safety in home and community MET 08FEB    Long term goals  Time Frame for Long term goals : 6 weeks    Long term goal 1: Indep with HEP   Long term goal 2: Tandem balance >25\" bilaterally for improved ease with ADL and yard work MET 6/24/19. Exhibited poor performance this date. Long term goal 3: Improve TUG time to <13\" for improved balance and safety in home and community (MET Performed TUG in 12\" this date. 28JUN)   Long term goal 4: Pt to amb 48' with least restrictive AD to improve home/community ambulation (Able to gait train [de-identified]' without AD and CGA. Multiple bouts of off balance requires assistance.)  Long term goal 5: Improve Tinetti score to >21 for improved home/community ambulation and safety (Scored 12/28 this date. 28JUN)       Plan:   [x] Continue per plan of care [] Alter current plan (see comments)  [] Plan of care initiated [] Hold pending MD visit [] Discharge    Plan for Next Session: Test Goals Next Session.      Electronically signed by:  Chintan Martini PTA

## 2019-07-11 ENCOUNTER — HOSPITAL ENCOUNTER (OUTPATIENT)
Dept: PHYSICAL THERAPY | Age: 44
Setting detail: THERAPIES SERIES
Discharge: HOME | End: 2019-07-11
Payer: MEDICARE

## 2019-07-11 PROCEDURE — 97112 NEUROMUSCULAR REEDUCATION: CPT

## 2019-07-11 NOTE — FLOWSHEET NOTE
therapist   EITAN    Step taps F 8\"   Squats   1 HHA. Ball between legs. Dot maneuverability   Mike. 6 dots spread out, instructing patient to step fwd/laterally/backwards to stand on desired dot   Reach + Step  Hop Scotch Mat; 1>4>7>10; 2/3>5/6>8/9   Walk along curved beam     Cybex leg press/ 130#   TM incline walking 8' @ 1. 1mph8% incline   Hip abd/add Chepe Tobar; GREEN       Row machiene 3x1070 lbs   Bicep curls 3x1015 lbs   Abduction  [id number]   Overhead Triceps ext 3x10 4 lbs   Bent rows 2x10 15 lbs   DB OH press        [] Provided verbal/tactile cueing for activities related to strengthening, flexibility, endurance, ROM. (96204)   [x] Provided verbal/tactile cueing for activities related to improving balance, coordination, kinesthetic sense, posture, motor skill, proprioception. (17035)    Therapeutic Activities:     [] Therapeutic activities, direct (one-on-one) patient contact (use of dynamic activities to improve functional performance). (83354)    Gait:   [x] Provided training and instruction to the patient for ambulation re-education. (06263)  Verbal cues for proper swing phase bilaterally this date and to take moderate steps at a slow to moderate pace in order to avoid losing balance anteriorly. Self-Care/ADL's  [] Self-care/home management training and compensatory training, meal preparation, safety procedures, and instructions in use of assistive technology devices/adaptive equipment, direct one-on-one contact.  (12992)    Home Exercise Program:      [] Reviewed/Progressed HEP activities related to strengthening, flexibility, endurance, ROM. (40120)  [x] Reviewed/Progressed HEP activities related to improving balance, coordination, kinesthetic sense, posture, motor skill, proprioception.  (58415)    Manual Treatments:    [] Provided manual therapy to mobilize soft tissue/joints for the purpose of modulating pain, promoting relaxation,  increasing ROM, reducing/eliminating soft tissue

## 2019-07-12 ENCOUNTER — HOSPITAL ENCOUNTER (OUTPATIENT)
Dept: PHYSICAL THERAPY | Age: 44
Setting detail: THERAPIES SERIES
Discharge: HOME | End: 2019-07-12
Payer: MEDICARE

## 2019-07-12 PROCEDURE — 97112 NEUROMUSCULAR REEDUCATION: CPT | Performed by: PHYSICAL THERAPIST

## 2019-07-12 NOTE — FLOWSHEET NOTE
Physical Therapy Daily Treatment Note    Date:  2019    Patient Name:  Katina Sifuentes    :  1975  MRN: [id number]  Restrictions/Precautions:      Medical/Treatment Diagnosis Information:   · Diagnosis: Multiple Sclerosis  · Treatment Diagnosis: multiple sclerosis-weakness  Insurance/Certification information:  PT Insurance Information: [employer]  Physician Information:  Referring Practitioner: Eusebio Silva MD   Plan of care signed (Y/N):  N       Visit# / total visits: 1/10  (7th POC) 70 total   Pain level: 0/10       Time In: 2:06   Time Out: 3:01     Progress Note: []  Yes  [x]  No  Next due by: Visit #10  Or by 19    Subjective: \"I'm feeling pretty good today. I'm trying to walk a little further and a little more often without my walker at home, but I still use the furniture or the walls to help support me a lot of the time. \"      Objective: Pt tolerated todays session well indicating no increase in pain or fatigue post session. Pt was able to perform OH tricep extension with one dumbbell and significantly improve form with change. Most challenged by obstacle walking this date requiring CGA d/t multiple instances of LOB. Agreed to test all goals next session. Observation:   · Rpts with RW and stiff legged gait pattern. ANTIONETTE delgadillo. Test measurements:      Exercises:   Exercise/Equipment Resistance/Repetitions Other comments   Offset Balance  EO & EC   DD offset balance 1x 1'    DD DLS 1 x 1\" EC   AIREX HR/TR     AIREX Marches 20x each on DD    AIREX 3 way hip    AIREX HS Curls    Sit-Stands 15x with ball b/t LEs Required CGA this date   Sidesteps  With obstacles.  (Hurdles and Blue balance beam, 1 darion with foam piece under)   SLS HRaises     amb with single cane  CGA from therapist   BOSU flat side balance  CGA from therapist, eyes open   BOSU Round Side Balance     BOSU flat side +     Mini Lunges CGA- Min A from therapist   Retro walk  3 lapsAttempted no HHA, but still

## 2019-07-15 ENCOUNTER — HOSPITAL ENCOUNTER (OUTPATIENT)
Dept: PHYSICAL THERAPY | Age: 44
Setting detail: THERAPIES SERIES
Discharge: HOME | End: 2019-07-15
Payer: MEDICARE

## 2019-07-15 PROCEDURE — 97112 NEUROMUSCULAR REEDUCATION: CPT

## 2019-07-15 NOTE — FLOWSHEET NOTE
Physical Therapy Daily Treatment Note    Date:  7/15/2019    Patient Name:  French Coburn    :  1975  MRN: [id number]  Restrictions/Precautions:      Medical/Treatment Diagnosis Information:   · Diagnosis: Multiple Sclerosis  · Treatment Diagnosis: multiple sclerosis-weakness  Insurance/Certification information:  PT Insurance Information: [address] [facility]  Physician Information:  Referring Practitioner: Ian Scales. MD Shira   Plan of care signed (Y/N):  N       Visit# / total visits: 2/10  (7th POC) 71 total   Pain level: 0/10       Time In: 2:03   Time Out:  2:50     Progress Note: []  Yes  [x]  No  Next due by: Visit #10  Or by 19    Subjective: Pt rpts to clinic with no current complaints of pain or fatigue stating \"I didn't do much over the weekend\". Objective: Pt tolerated todays session well indicating no increase in pain or fatigue post session. Showed decrease in leg press #'s this date as pt was very fatigued by exercise. Most challenged by dot navigation. Required CGA to ensure absence of falls. Observation:   · Rpts with RW and stiff legged gait pattern. KAFO donned. Test measurements:      Exercises:   Exercise/Equipment Resistance/Repetitions Other comments   Offset Balance  EO & EC   DD offset balance 1x 1'    DD DLS 1 x 1\" EC   AIREX HR/TR     AIREX Marches 20x each on DD    AIREX 3 way hip    AIREX HS Curls    Sit-Stands 15x with ball b/t LEs Required CGA this date   Sidesteps  With obstacles. (Hurdles and Blue balance beam, 1 darion with foam piece under)   SLS HRaises     amb with single cane  CGA from therapist   BOSU flat side balance  CGA from therapist, eyes open   BOSU Round Side Balance     BOSU flat side +     Mini Lunges CGA- Min A from therapist   Retro walk  3 lapsAttempted no HHA, but still requires 1 HHA   Offset Feet with Balloon Toss     NUSTEP  Seat 10 LV6   Step taps F 8\"   Squats  1x20 each on  AIREX, DD, BOSU flat side up  Picking up cones 1 HHA.  Ball between

## 2019-07-16 ENCOUNTER — HOSPITAL ENCOUNTER (OUTPATIENT)
Dept: PHYSICAL THERAPY | Age: 44
Setting detail: THERAPIES SERIES
Discharge: HOME | End: 2019-07-16
Payer: MEDICARE

## 2019-07-16 PROCEDURE — 97112 NEUROMUSCULAR REEDUCATION: CPT | Performed by: PHYSICAL THERAPIST

## 2019-07-16 NOTE — FLOWSHEET NOTE
+ Step  Hop Scotch Mat; 1>4>7>10; 2/3>5/6>8/9   Walk along curved beam     Cybex leg press/ 130#   TM incline walking 8' @ 1. 1mph9% incline   Hip abd/add Chepe Tobar; GREEN       Row machiene 3x1070 lbs   Bicep curls 3x1015 lbs   Abduction  [id number]   Overhead Triceps ext 3x10 10 lbs   Bent rows 2x10 15 lbs   DB OH press        [] Provided verbal/tactile cueing for activities related to strengthening, flexibility, endurance, ROM. (52626)   [x] Provided verbal/tactile cueing for activities related to improving balance, coordination, kinesthetic sense, posture, motor skill, proprioception. (43479)    Therapeutic Activities:     [] Therapeutic activities, direct (one-on-one) patient contact (use of dynamic activities to improve functional performance). (24129)    Gait:   [x] Provided training and instruction to the patient for ambulation re-education. (00025)  Verbal cues for proper swing phase bilaterally this date and to take moderate steps at a slow to moderate pace in order to avoid losing balance anteriorly. Self-Care/ADL's  [] Self-care/home management training and compensatory training, meal preparation, safety procedures, and instructions in use of assistive technology devices/adaptive equipment, direct one-on-one contact. (12546)    Home Exercise Program:      [] Reviewed/Progressed HEP activities related to strengthening, flexibility, endurance, ROM. (46799)  [x] Reviewed/Progressed HEP activities related to improving balance, coordination, kinesthetic sense, posture, motor skill, proprioception.  (33611)    Manual Treatments:    [] Provided manual therapy to mobilize soft tissue/joints for the purpose of modulating pain, promoting relaxation,  increasing ROM, reducing/eliminating soft tissue swelling/inflammation/restriction, improving soft tissue extensibility.  (77099)    Service Based Modalities:        Timed Code Treatment Minutes:  47' neuro re-ed          Total Treatment Minutes:  47'    Treatment/Activity

## 2019-07-17 NOTE — PROGRESS NOTES
Physical Therapy  [facility]  Rehabilitation and Sports Medicine    [x] [place]  Phone: [phone]  Fax: [phone]      [] [place]  Phone: [phone]  Fax: [phone]    Physical Therapy Progress Note  Date: 2019        Patient Name:  Taz Valerio    :  1975  MRN: [id number]  Restrictions/Precautions:      Medical/Treatment Diagnosis Information:   · Diagnosis: Multiple Sclerosis  · Treatment Diagnosis: multiple sclerosis-weakness  Insurance/Certification information:  PT Insurance Information: [employer]  Physician Information:  Referring Practitioner: Selin Fajardo. MD Shira   Plan of care signed (Y/N):  N       Visit# / total visits: 1/10  (6th POC) 61 total   Pain level:      0/10         Time Period for Report:  19 - 19  Cancels/No-shows to date:  0    Plan of Care/Treatment to date:  [x] Therapeutic Exercise    [] Modalities:  [x] Therapeutic Activity     [] Ultrasound  [] Electrical Stimulation  [x] Gait Training      [] Cervical Traction    [] Lumbar Traction  [x] Neuromuscular Re-education  [] Cold/hotpack [] Iontophoresis  [x] Instruction in HEP      Other:  [x] Manual Therapy       []    [] Aquatic Therapy       []                    ? Subjective:  Patient reports to therapy with no new complaints. No recent changes to report. Objective:  ·   Observation: Rpts with RW and stiff legged gait pattern. · Gait laps around clinic without AD required CGA/min A due to losses of balance.  Gait belt used to maintain upright posture and prevent a fall.     · Test measurements:   Tandem Stance: Left Leading: x15\", Right leading: 10\" (19)  · Tinetti Score  (19)  · CGA with all balance activities and ambulation without AD this date due to decreased LE/core/trunk control.     TU\"  With rolling walker  Tinetti:  (current)   ( initial eval)    · Muscle fasciculations palpable in right hip    · Observation: Rpts with RW and stiff legged gait pattern. · Gait laps around clinic without AD required CGA/min A due to losses of balance. Gait belt used to maintain upright and prevent a fall.     · Test measurements:     · Test measurements:  Tandum balance tested                                                   RLE in front able to maintain balance for 1 minute                                                  LLE in front able to maintain balance for 45 seconds      Assessment:    June Lopez is making progress with dynamic balance and stability, although still limited and continues to lack functional strength and endurance needed for ambulation without a rolling walker. Is progressing with ease of ambulation with SP cane and no AD with therapist in clinic, although not yet capable of doing these indep at home at this time. Plan:    Continue per POC 3-4x per week for 4 weeks to increase strength, stability, dynamic balance and proprioception for improved independence with gait and safety with home and community management. Goals:    Short term goals  Time Frame for Short term goals: 3 weeks  Short term goal 1: Initiate HEP MET  Short term goal 2: Tandem balance >15\" bilaterally for improved ease with ADL and yard work MET 08FEB  Short term goal 3: Improve TUG time to <15\" for improved balance and safety in home and community MET 08FEB     Long term goals  Time Frame for Long term goals : 6 weeks   Long term goal 1: Indep with HEP MET   Long term goal 2: Tandem balance >25\" bilaterally for improved ease with ADL and yard work (Not met. Only held 15\". 25MAR)  Long term goal 3: Improve TUG time to <13\" for improved balance and safety in home and community (Performed TUG in 12\" this date. 25MAR) MET  Long term goal 4: Pt to amb 48' with least restrictive AD to improve home/community ambulation (Pt is able to ambulate without AD but requires CGA. Slight imbalance still present.  25MAR) In progress   Long term goal 5: Improve Tinetti score to >21 for improved home/community ambulation and safety (Scored 24/28 this date. 25MAR)  MET    Current Frequency/Duration: 6/25/19 - 7/25/19  # Days per week:       [] 1 day  # Weeks:        [] 1 week            [x] 4 weeks                                                [] 2 days? [] 2 weeks          [] 5 weeks                                                [x] 3-4 days                         [] 3 weeks          [] 6 weeks          Rehab Potential: [] Excellent [x] Good [] Fair  [] Poor     Goal Status:  [] Achieved [x] In Progress  [] Not Achieved     Patient Status: [] Continue per initial plan of Care     [] Patient now discharged     [x] Additional visits requested, Please re-certify for additional visits:      Requested frequency/duration:  3-4X/week for 4 weeks    Electronically signed by:  Yris Salomon PT, DPT    If you have any questions or concerns, please don't hesitate to call.   Thank you for your referral.    Physician Signature:________________________________Date:__________________  By signing above, therapists plan is approved by physician

## 2019-07-18 ENCOUNTER — HOSPITAL ENCOUNTER (OUTPATIENT)
Dept: PHYSICAL THERAPY | Age: 44
Setting detail: THERAPIES SERIES
Discharge: HOME | End: 2019-07-18
Payer: MEDICARE

## 2019-07-18 PROCEDURE — 97112 NEUROMUSCULAR REEDUCATION: CPT

## 2019-07-18 NOTE — FLOWSHEET NOTE
Physical Therapy Daily Treatment Note    Date:  2019    Patient Name:  Yuri Merlos    :  1975  MRN: [id number]  Restrictions/Precautions:      Medical/Treatment Diagnosis Information:   · Diagnosis: Multiple Sclerosis  · Treatment Diagnosis: multiple sclerosis-weakness  Insurance/Certification information:  PT Insurance Information: [address] [facility]  Physician Information:  Referring Practitioner: Alan Winter. MD Shira   Plan of care signed (Y/N):  N       Visit# / total visits: 3/10  (7th POC) 72 total   Pain level: 0/10       Time In: 1:55   Time Out: 2:53     Progress Note: []  Yes  [x]  No  Next due by: Visit #10  Or by 19    Subjective: Patient reports no complaints at this time. Voices that he is upset     Objective: Pt tolerated todays session well indicating no increase in pain or fatigue post session. Most challenged eyes closed balance this date. Required CGA to ensure absence of falls. Observation:   · Rpts with RW and stiff legged gait pattern. KAFO donned. Test measurements:      Exercises:   Exercise/Equipment Resistance/Repetitions Other comments   Offset Balance  EO & EC   DD offset balance 1x 1' EC   DD DLS 1 x 1\" EC   AIREX HR/TR     AIREX Marches     AIREX 3 way hip    AIREX HS Curls    Sit-Stands  Required CGA this date   Sidesteps  With obstacles. (Hurdles and Blue balance beam, 1 darion with foam piece under)   SLS HRaises     amb with single cane  CGA from therapist   BOSU flat side balance  CGA from therapist, eyes open   BOSU Round Side Balance     BOSU flat side + 15x each    Mini Lunges CGA- Min A from therapist   Retro walk  Attempted no HHA, but still requires 1 HHA   Offset Feet with Balloon Toss     NUSTEP  Seat 10 LV6   Step taps F 8\"   Squats  1x20 each on   BOSU flat side up   1 HHA. Ball between legs. Cones  3 sets Kicking over and picking up     Dot maneuverability   Mike.  6 dots spread out, instructing patient to step fwd/laterally/backwards to stand on

## 2019-07-19 ENCOUNTER — HOSPITAL ENCOUNTER (OUTPATIENT)
Dept: PHYSICAL THERAPY | Age: 44
Setting detail: THERAPIES SERIES
Discharge: HOME | End: 2019-07-19
Payer: MEDICARE

## 2019-07-19 PROCEDURE — 97112 NEUROMUSCULAR REEDUCATION: CPT | Performed by: PHYSICAL THERAPIST

## 2019-07-19 NOTE — FLOWSHEET NOTE
1>4>7>10; 2/3>5/6>8/9   Walk along curved beam 4 laps     Cybex leg press/ 130#   TM incline walking 8' @ 1. 1mph9% incline   Hip abd/add Ball; GREEN       Tband SPO Seated, green-10xea   Row machiene 3x1570 lbs   Bicep curls  3x1010 lbs   Abduction  10lbs   Overhead Triceps ext 3x10 10 lbs   Bent rows  20 lbs   DB OH press    Dips At rail- 2x15   [] Provided verbal/tactile cueing for activities related to strengthening, flexibility, endurance, ROM. (38201)   [x] Provided verbal/tactile cueing for activities related to improving balance, coordination, kinesthetic sense, posture, motor skill, proprioception. (42350)    Therapeutic Activities:     [] Therapeutic activities, direct (one-on-one) patient contact (use of dynamic activities to improve functional performance). (49321)    Gait:   [x] Provided training and instruction to the patient for ambulation re-education. (91814)  Verbal cues for proper swing phase bilaterally this date and to take moderate steps at a slow to moderate pace in order to avoid losing balance anteriorly. Self-Care/ADL's  [] Self-care/home management training and compensatory training, meal preparation, safety procedures, and instructions in use of assistive technology devices/adaptive equipment, direct one-on-one contact. (01103)    Home Exercise Program:      [] Reviewed/Progressed HEP activities related to strengthening, flexibility, endurance, ROM. (51319)  [x] Reviewed/Progressed HEP activities related to improving balance, coordination, kinesthetic sense, posture, motor skill, proprioception.  (78144)    Manual Treatments:    [] Provided manual therapy to mobilize soft tissue/joints for the purpose of modulating pain, promoting relaxation,  increasing ROM, reducing/eliminating soft tissue swelling/inflammation/restriction, improving soft tissue extensibility.  (76920)    Service Based Modalities:        Timed Code Treatment Minutes:  61' neuro re-ed          Total Treatment Minutes: 61'    Treatment/Activity Tolerance:  [x] Patient tolerated treatment well [] Patient limited by fatique  [] Patient limited by pain  [] Patient limited by other medical complications  [] Other:     Prognosis: [] Good [x] Fair  [] Poor    Patient Requires Follow-up: [x] Yes  [] No    Goals:  Short term goals  Time Frame for Short term goals: 3 weeks  Short term goal 1: Initiate HEP MET  Short term goal 2: Tandem balance >15\" bilaterally for improved ease with ADL and yard work MET 08FEB  Short term goal 3: Improve TUG time to <15\" for improved balance and safety in home and community MET 08FEB    Long term goals  Time Frame for Long term goals : 6 weeks    Long term goal 1: Indep with HEP   Long term goal 2: Tandem balance >25\" bilaterally for improved ease with ADL and yard work MET 6/24/19. Exhibited poor performance this date. Long term goal 3: Improve TUG time to <13\" for improved balance and safety in home and community (MET Performed TUG in 12\" this date. 28JUN)   Long term goal 4: Pt to amb 48' with least restrictive AD to improve home/community ambulation (Able to gait train [de-identified]' without AD and CGA. Multiple bouts of off balance requires assistance.)  Long term goal 5: Improve Tinetti score to >21 for improved home/community ambulation and safety (Scored 12/28 this date. 28JUN)       Plan:   [x] Continue per plan of care [] Alter current plan (see comments)  [] Plan of care initiated [] Hold pending MD visit [] Discharge    Plan for Next Session: Continue and progress as tolerated. Patient treated by Tate RANGEL under direct, one-on-one supervision of licensed PT.   Electronically signed by:  Germain Coello PT,DPT

## 2019-07-22 ENCOUNTER — HOSPITAL ENCOUNTER (OUTPATIENT)
Dept: PHYSICAL THERAPY | Age: 44
Setting detail: THERAPIES SERIES
Discharge: HOME | End: 2019-07-22
Payer: MEDICARE

## 2019-07-22 NOTE — PROGRESS NOTES
Physical Therapy    Outpatient Physical Therapy    [x] [place]  Phone: [phone]  Fax: [phone]      [] [place]  Phone: [phone]  Fax: [phone]    Physical Therapy  Cancellation/No-show Note  Patient Name:  Shanika Ramirez  :  1975   Date:  2019  Cancelled visits to date: 1  No-shows to date: 0    For today's appointment patient:  [x]  Cancelled  []  Rescheduled appointment  []  No-show     Reason given by patient:  []  Patient ill  []  Conflicting appointment  []  No transportation    []  Conflict with work  [x]  No reason given  []  Other:     Comments:      Electronically signed by: Miguel Braden PTA

## 2019-07-23 ENCOUNTER — HOSPITAL ENCOUNTER (OUTPATIENT)
Dept: PHYSICAL THERAPY | Age: 44
Setting detail: THERAPIES SERIES
Discharge: HOME | End: 2019-07-23
Payer: MEDICARE

## 2019-07-23 PROCEDURE — 97110 THERAPEUTIC EXERCISES: CPT | Performed by: PHYSICAL THERAPIST

## 2019-07-25 ENCOUNTER — HOSPITAL ENCOUNTER (OUTPATIENT)
Dept: PHYSICAL THERAPY | Age: 44
Setting detail: THERAPIES SERIES
Discharge: HOME | End: 2019-07-25
Payer: MEDICARE

## 2019-07-25 PROCEDURE — 97112 NEUROMUSCULAR REEDUCATION: CPT | Performed by: PHYSICAL THERAPIST

## 2019-07-25 NOTE — FLOWSHEET NOTE
62'    Treatment/Activity Tolerance:  [x] Patient tolerated treatment well [] Patient limited by fatique  [] Patient limited by pain  [] Patient limited by other medical complications  [] Other:     Prognosis: [] Good [x] Fair  [] Poor    Patient Requires Follow-up: [x] Yes  [] No    Goals:  Short term goals  Time Frame for Short term goals: 3 weeks  Short term goal 1: Initiate HEP MET  Short term goal 2: Tandem balance >15\" bilaterally for improved ease with ADL and yard work MET 08FEB  Short term goal 3: Improve TUG time to <15\" for improved balance and safety in home and community MET 08FEB    Long term goals  Time Frame for Long term goals : 6 weeks    Long term goal 1: Indep with HEP   Long term goal 2: Tandem balance >25\" bilaterally for improved ease with ADL and yard work MET 6/24/19. Exhibited poor performance this date. Long term goal 3: Improve TUG time to <13\" for improved balance and safety in home and community (MET Performed TUG in 12\" this date. 28JUN)   Long term goal 4: Pt to amb 48' with least restrictive AD to improve home/community ambulation (Able to gait train 150' SP cane and CGA. Multiple bouts of off balance requires assistance.)  Long term goal 5: Improve Tinetti score to >21 for improved home/community ambulation and safety (Scored 13/28 this date. 23JUL)       Plan:   [x] Continue per plan of care [] Alter current plan (see comments)  [] Plan of care initiated [] Hold pending MD visit [] Discharge    Plan for Next Session: Continue and progress as tolerated. Patient treated by Malvin RANGEL under direct, one-on-one supervision of licensed PT.   Electronically signed by:  Lynne Logan, PT,DPT

## 2019-07-26 ENCOUNTER — APPOINTMENT (OUTPATIENT)
Dept: PHYSICAL THERAPY | Age: 44
End: 2019-07-26
Payer: MEDICARE

## 2019-07-31 ENCOUNTER — HOSPITAL ENCOUNTER (OUTPATIENT)
Dept: PHYSICAL THERAPY | Age: 44
Setting detail: THERAPIES SERIES
Discharge: HOME | End: 2019-07-31
Payer: MEDICARE

## 2019-07-31 PROCEDURE — 97112 NEUROMUSCULAR REEDUCATION: CPT

## 2019-08-01 ENCOUNTER — HOSPITAL ENCOUNTER (OUTPATIENT)
Dept: PHYSICAL THERAPY | Age: 44
Setting detail: THERAPIES SERIES
Discharge: HOME | End: 2019-08-01
Payer: MEDICARE

## 2019-08-01 PROCEDURE — 97112 NEUROMUSCULAR REEDUCATION: CPT

## 2019-08-01 NOTE — FLOWSHEET NOTE
Physical Therapy Daily Treatment Note    Date:  2019    Patient Name:  Bin Acosta    :  1975  MRN: [id number]  Restrictions/Precautions:      Medical/Treatment Diagnosis Information:   · Diagnosis: Multiple Sclerosis  · Treatment Diagnosis: multiple sclerosis-weakness  Insurance/Certification information:  PT Insurance Information: [address] [facility]  Physician Information:  Referring Practitioner: Bin Silva MD   Plan of care signed (Y/N):  N       Visit# / total visits: 4/10  (8th POC) 77 total   Pain level: 0/10       Time In: 1:55   Time Out: 2:49      Progress Note: []  Yes  [x]  No  Next due by: Visit #10  Or by 19    Subjective: Continues to feel stronger. No other complaints at this time. Objective: Pt tolerated todays session well indicating no increase in pain or fatigue post session. Most challenged eyes closed balance this date. Required CGA to ensure absence of falls. Observation:   · Rpts with RW and stiff legged gait pattern. KAFO donned. Test measurements: taken on 19   Tinetti   TUG 18 seconds    Exercises:   Exercise/Equipment Resistance/Repetitions Other comments   Offset Balance  & EC   DD offset balance EC   DD DLS EC   AIREX HR/TR 20x     AIREX Marches 20x each     AIREX 3 way hip 20x each bilat    AIREX HS Curls 15x each     Sit-Stands 3x10 with ball b/t LEs Required CGA this date   Sidesteps  With obstacles. (Hurdles and Blue balance beam, )   SLS HRaises 15x each    amb with single cane  CGA from therapist   BOSU flat side balance  CGA from therapist, eyes open   BOSU Round Side Balance     BOSU flat side + 15x each    Mini Lunges bosu round side x15ea   Retro walk  1 lap no HHA, 2 laps 1 HHA   x2' each   NUSTEP  Seat 10 LV6   Step taps F 8\"   Squats   1 HHA. Ball between legs. Cones   Kicking over and picking up     Dot maneuverability   Mike.  6 dots spread out, instructing patient to step fwd one foot on each dot   Reach + Step  Hop Scotch Mat;

## 2019-08-02 ENCOUNTER — HOSPITAL ENCOUNTER (OUTPATIENT)
Dept: PHYSICAL THERAPY | Age: 44
Setting detail: THERAPIES SERIES
Discharge: HOME | End: 2019-08-02
Payer: MEDICARE

## 2019-08-02 PROCEDURE — 97112 NEUROMUSCULAR REEDUCATION: CPT

## 2019-08-02 NOTE — FLOWSHEET NOTE
extensibility. (22496)    Service Based Modalities:        Timed Code Treatment Minutes: 46' neuro re-ed          Total Treatment Minutes:  46'    Treatment/Activity Tolerance:  [x] Patient tolerated treatment well [] Patient limited by fatique  [] Patient limited by pain  [] Patient limited by other medical complications  [] Other:     Prognosis: [] Good [x] Fair  [] Poor    Patient Requires Follow-up: [x] Yes  [] No    Goals:  Short term goals  Time Frame for Short term goals: 3 weeks  Short term goal 1: Initiate HEP MET  Short term goal 2: Tandem balance >15\" bilaterally for improved ease with ADL and yard work MET 08FEB  Short term goal 3: Improve TUG time to <15\" for improved balance and safety in home and community MET 08FEB    Long term goals  Time Frame for Long term goals : 6 weeks    Long term goal 1: Indep with HEP   Long term goal 2: Tandem balance >25\" bilaterally for improved ease with ADL and yard work MET 6/24/19. Exhibited poor performance this date. Long term goal 3: Improve TUG time to <13\" for improved balance and safety in home and community (MET Performed TUG in 12\" this date. 28JUN)   Long term goal 4: Pt to amb 48' with least restrictive AD to improve home/community ambulation (Able to gait train 150' SP cane and CGA. Multiple bouts of off balance requires assistance.)  Long term goal 5: Improve Tinetti score to >21 for improved home/community ambulation and safety (Scored 13/28 this date. 23JUL)       Plan:   [x] Continue per plan of care [] Alter current plan (see comments)  [] Plan of care initiated [] Hold pending MD visit [] Discharge    Plan for Next Session: Continue and progress as tolerated.       Electronically signed by:  Stella Gibbons PTA

## 2019-08-05 ENCOUNTER — HOSPITAL ENCOUNTER (OUTPATIENT)
Dept: PHYSICAL THERAPY | Age: 44
Setting detail: THERAPIES SERIES
Discharge: HOME | End: 2019-08-05
Payer: MEDICARE

## 2019-08-05 PROCEDURE — 97112 NEUROMUSCULAR REEDUCATION: CPT

## 2019-08-05 NOTE — FLOWSHEET NOTE
picking up     Dot maneuverability   Mike. 6 dots spread out, instructing patient to step fwd one foot on each dot   Reach + Step  Hop Scotch Mat; 1>4>7>10; 2/3>5/6>8/9   Walk along curved beam x2 laps each  Fwd + lateral   Cybex leg press/ 3x10110#   TM incline walking 8' @ 1. 1mph9% incline   Hip abd/add Ball; GREEN   Plate squeeze chest press 2 5# plates. Tband SPO Seated, blue-20xea   Row machiene 75 lbs   Bicep curls 10 lbs   Abduction  [id number]   Overhead Triceps ext 10 lbs   Bent rows 20 lbs   DB OH press [id number]   Dips    [] Provided verbal/tactile cueing for activities related to strengthening, flexibility, endurance, ROM. (07338)   [x] Provided verbal/tactile cueing for activities related to improving balance, coordination, kinesthetic sense, posture, motor skill, proprioception. (30855)    Therapeutic Activities:     [] Therapeutic activities, direct (one-on-one) patient contact (use of dynamic activities to improve functional performance). (14607)    Gait:   [x] Provided training and instruction to the patient for ambulation re-education. (53615)  Verbal cues for proper swing phase bilaterally this date and to take moderate steps at a slow to moderate pace in order to avoid losing balance anteriorly. Self-Care/ADL's  [] Self-care/home management training and compensatory training, meal preparation, safety procedures, and instructions in use of assistive technology devices/adaptive equipment, direct one-on-one contact.  (65618)    Home Exercise Program:      [] Reviewed/Progressed HEP activities related to strengthening, flexibility, endurance, ROM. (61363)  [x] Reviewed/Progressed HEP activities related to improving balance, coordination, kinesthetic sense, posture, motor skill, proprioception.  (42379)    Manual Treatments:    [] Provided manual therapy to mobilize soft tissue/joints for the purpose of modulating pain, promoting relaxation,  increasing ROM, reducing/eliminating soft tissue swelling/inflammation/restriction, improving soft tissue extensibility. (70865)    Service Based Modalities:        Timed Code Treatment Minutes: 44' neuro re-ed          Total Treatment Minutes:  44'    Treatment/Activity Tolerance:  [x] Patient tolerated treatment well [] Patient limited by fatique  [] Patient limited by pain  [] Patient limited by other medical complications  [] Other:     Prognosis: [] Good [x] Fair  [] Poor    Patient Requires Follow-up: [x] Yes  [] No    Goals:  Short term goals  Time Frame for Short term goals: 3 weeks  Short term goal 1: Initiate HEP MET  Short term goal 2: Tandem balance >15\" bilaterally for improved ease with ADL and yard work MET 08FEB  Short term goal 3: Improve TUG time to <15\" for improved balance and safety in home and community MET 08FEB    Long term goals  Time Frame for Long term goals : 6 weeks    Long term goal 1: Indep with HEP   Long term goal 2: Tandem balance >25\" bilaterally for improved ease with ADL and yard work MET 6/24/19. Exhibited poor performance this date. Long term goal 3: Improve TUG time to <13\" for improved balance and safety in home and community (MET Performed TUG in 12\" this date. 28JUN)   Long term goal 4: Pt to amb 48' with least restrictive AD to improve home/community ambulation (Able to gait train 150' SP cane and CGA. Multiple bouts of off balance requires assistance.)  Long term goal 5: Improve Tinetti score to >21 for improved home/community ambulation and safety (Scored 13/28 this date.  23JUL)       Plan:   [x] Continue per plan of care [] Alter current plan (see comments)  [] Plan of care initiated [] Hold pending MD visit [] Discharge    Plan for Next Session: LE strength focus     Electronically signed by:  Poornima Teague PTA

## 2019-08-06 ENCOUNTER — HOSPITAL ENCOUNTER (OUTPATIENT)
Dept: PHYSICAL THERAPY | Age: 44
Setting detail: THERAPIES SERIES
Discharge: HOME | End: 2019-08-06
Payer: MEDICARE

## 2019-08-06 PROCEDURE — 97112 NEUROMUSCULAR REEDUCATION: CPT | Performed by: PHYSICAL THERAPIST

## 2019-08-06 NOTE — FLOWSHEET NOTE
each    BOSU flat side + 15x each    Mini Lunges bosu round side x15ea   Retro walk  1 lap no HHA, 2 laps 1 HHA   x2' each   NUSTEP  Seat 10 LV6   Step taps F 8\"   Squats   1 HHA. Ball between legs. Cones   Kicking over and picking up     Dot maneuverability   Mike. 6 dots spread out, instructing patient to step fwd one foot on each dot   Reach + Step  Hop Scotch Mat; 1>4>7>10; 2/3>5/6>8/9   Walk along curved beam x2 laps each  Fwd + lateral   Cybex leg press/ 3x10110#   TM incline walking 8' @ 1. 1mph9% incline   Hip abd/add [id number]; GREEN   Plate squeeze chest press 2 5# plates. Tband SPO Seated, blue-20xea   Row machiene 75 lbs   Bicep curls 10 lbs   Abduction  [id number]   Overhead Triceps ext 10 lbs   Bent rows 20 lbs   DB OH press [id number]   Dips    [] Provided verbal/tactile cueing for activities related to strengthening, flexibility, endurance, ROM. (79592)   [x] Provided verbal/tactile cueing for activities related to improving balance, coordination, kinesthetic sense, posture, motor skill, proprioception. (44768)    Therapeutic Activities:     [] Therapeutic activities, direct (one-on-one) patient contact (use of dynamic activities to improve functional performance). (92834)    Gait:   [x] Provided training and instruction to the patient for ambulation re-education. (20472)  Verbal cues for proper swing phase bilaterally this date and to take moderate steps at a slow to moderate pace in order to avoid losing balance anteriorly. Self-Care/ADL's  [] Self-care/home management training and compensatory training, meal preparation, safety procedures, and instructions in use of assistive technology devices/adaptive equipment, direct one-on-one contact.  (41142)    Home Exercise Program:      [] Reviewed/Progressed HEP activities related to strengthening, flexibility, endurance, ROM. (50686)  [x] Reviewed/Progressed HEP activities related to improving balance, coordination, kinesthetic sense, posture, motor

## 2019-08-07 ENCOUNTER — HOSPITAL ENCOUNTER (OUTPATIENT)
Dept: PHYSICAL THERAPY | Age: 44
Setting detail: THERAPIES SERIES
Discharge: HOME | End: 2019-08-07
Payer: MEDICARE

## 2019-08-07 PROCEDURE — 97112 NEUROMUSCULAR REEDUCATION: CPT | Performed by: PHYSICAL THERAPIST

## 2019-08-08 ENCOUNTER — APPOINTMENT (OUTPATIENT)
Dept: PHYSICAL THERAPY | Age: 44
End: 2019-08-08
Payer: MEDICARE

## 2019-08-09 ENCOUNTER — HOSPITAL ENCOUNTER (OUTPATIENT)
Dept: PHYSICAL THERAPY | Age: 44
Setting detail: THERAPIES SERIES
Discharge: HOME | End: 2019-08-09
Payer: MEDICARE

## 2019-08-09 PROCEDURE — 97110 THERAPEUTIC EXERCISES: CPT

## 2019-08-09 NOTE — FLOWSHEET NOTE
Physical Therapy Daily Treatment Note    Date:  2019    Patient Name:  Yajaira Galindo    :  1975  MRN: [id number]  Restrictions/Precautions:      Medical/Treatment Diagnosis Information:   · Diagnosis: Multiple Sclerosis  · Treatment Diagnosis: multiple sclerosis-weakness  Insurance/Certification information:  PT Insurance Information: [address] [facility]  Physician Information:  Referring Practitioner: Elizabeth Flores. MD Shira   Plan of care signed (Y/N):  N        Visit# / total visits: 8/10  (8th POC) 81 total   Pain level: 0/10       Time In: 2:31   Time Out:   3:18     Progress Note: []  Yes  [x]  No  Next due by: Visit #10  Or by 19    Subjective: Pt rpts to clinic with no current complaints of pain or fatigue stating \"I feel good today. Not sore since the last session\". Objective: Pt tolerated todays session well indicating fatigue with UE exercises as pt progressed in wt this date. Able to initiate UBE to end session noting fatigue and no pain. No LOB noted with ambulation still requiring CGA and demonstrating slow and steady gait with much compensation. Observation:   · Rpts with RW and stiff legged gait pattern. KAFO donned. Test measurements: taken on 19   Tinetti   TUG 18 seconds    Exercises:   Exercise/Equipment Resistance/Repetitions Other comments   Offset Balance 3 x 30\"  & EC   DD offset balance 1x 1' EC   DD DLS 1 x 1\" EC   AIREX HR/TR 20x     AIREX Marches 20x each     AIREX 3 way hip 20x each bilat    AIREX HS Curls 15x each     Sit-Stands 3x10 with ball b/t LEs Required CGA this date   Sidesteps 4 laps With obstacles.  (Hurdles and Blue balance beam, )   hurdles 3 laps Mod/max A from therapist to clear over darion   SLS HRaises 15x each    amb with single cane 2 laps CGA from therapist   BOSU flat side balance 3x1' CGA from therapist, eyes open   BOSU Round Side Balance Marching 15x each    BOSU flat side + 15x each    Mini Lunges bosu round side x15ea   Retro walk  1 lap no HHA, 2 laps 1 HHA   x2' each   NUSTEP  Seat 10 LV6   Step taps F 8\"   Squats   1 HHA. Ball between legs. Cones   Kicking over and picking up     Dot maneuverability   Mike. 6 dots spread out, instructing patient to step fwd one foot on each dot   Reach + Step  Hop Scotch Mat; 1>4>7>10; 2/3>5/6>8/9   Walk along curved beam x2 laps each  Fwd + lateral   Cybex leg press/ 3x10110#   TM incline walking 8' @ 1. 1mph9% incline   Hip abd/add [id number]; GREEN   Plate squeeze chest press 2 5# plates. Tband SPO Seated, blue-20xea   Row machiene 75 lbs   Bicep curls 10 lbs   Abduction  [id number]   Overhead Triceps ext 10 lbs   Bent rows 20 lbs   DB OH press [id number]   Dips    [] Provided verbal/tactile cueing for activities related to strengthening, flexibility, endurance, ROM. (88530)   [x] Provided verbal/tactile cueing for activities related to improving balance, coordination, kinesthetic sense, posture, motor skill, proprioception. (94055)    Therapeutic Activities:     [] Therapeutic activities, direct (one-on-one) patient contact (use of dynamic activities to improve functional performance). (28617)    Gait:   [x] Provided training and instruction to the patient for ambulation re-education. (36344)  Verbal cues for proper swing phase bilaterally this date and to take moderate steps at a slow to moderate pace in order to avoid losing balance anteriorly. Self-Care/ADL's  [] Self-care/home management training and compensatory training, meal preparation, safety procedures, and instructions in use of assistive technology devices/adaptive equipment, direct one-on-one contact.  (18164)    Home Exercise Program:      [] Reviewed/Progressed HEP activities related to strengthening, flexibility, endurance, ROM. (38433)  [x] Reviewed/Progressed HEP activities related to improving balance, coordination, kinesthetic sense, posture, motor skill, proprioception.  (27188)    Manual Treatments:    [] Provided manual therapy to mobilize soft tissue/joints for the purpose of modulating pain, promoting relaxation,  increasing ROM, reducing/eliminating soft tissue swelling/inflammation/restriction, improving soft tissue extensibility. (02834)    Service Based Modalities:        Timed Code Treatment Minutes: 52' neuro re-ed          Total Treatment Minutes:  52'    Treatment/Activity Tolerance:  [x] Patient tolerated treatment well [] Patient limited by fatique  [] Patient limited by pain  [] Patient limited by other medical complications  [] Other:     Prognosis: [] Good [x] Fair  [] Poor    Patient Requires Follow-up: [x] Yes  [] No    Goals:  Short term goals  Time Frame for Short term goals: 3 weeks  Short term goal 1: Initiate HEP MET  Short term goal 2: Tandem balance >15\" bilaterally for improved ease with ADL and yard work MET 08FEB  Short term goal 3: Improve TUG time to <15\" for improved balance and safety in home and community MET 08FEB    Long term goals  Time Frame for Long term goals : 6 weeks    Long term goal 1: Indep with HEP   Long term goal 2: Tandem balance >25\" bilaterally for improved ease with ADL and yard work MET 6/24/19. Exhibited poor performance this date. Long term goal 3: Improve TUG time to <13\" for improved balance and safety in home and community (MET Performed TUG in 12\" this date. 28JUN)   Long term goal 4: Pt to amb 48' with least restrictive AD to improve home/community ambulation (Able to gait train 150' SP cane and CGA. Multiple bouts of off balance requires assistance.)  Long term goal 5: Improve Tinetti score to >21 for improved home/community ambulation and safety (Scored 13/28 this date. 23JUL)       Plan:   [x] Continue per plan of care [] Alter current plan (see comments)  [] Plan of care initiated [] Hold pending MD visit [] Discharge    Plan for Next Session: Progress to tolerance.      Electronically signed by:  Pablo Barone PTA

## 2019-08-12 ENCOUNTER — HOSPITAL ENCOUNTER (OUTPATIENT)
Dept: PHYSICAL THERAPY | Age: 44
Setting detail: THERAPIES SERIES
Discharge: HOME | End: 2019-08-12
Payer: MEDICARE

## 2019-08-12 PROCEDURE — 97110 THERAPEUTIC EXERCISES: CPT

## 2019-08-14 ENCOUNTER — APPOINTMENT (OUTPATIENT)
Dept: PHYSICAL THERAPY | Age: 44
End: 2019-08-14
Payer: MEDICARE

## 2019-08-15 ENCOUNTER — HOSPITAL ENCOUNTER (OUTPATIENT)
Dept: PHYSICAL THERAPY | Age: 44
Setting detail: THERAPIES SERIES
Discharge: HOME | End: 2019-08-15
Payer: MEDICARE

## 2019-08-16 ENCOUNTER — HOSPITAL ENCOUNTER (OUTPATIENT)
Dept: PHYSICAL THERAPY | Age: 44
Setting detail: THERAPIES SERIES
Discharge: HOME | End: 2019-08-16
Payer: MEDICARE

## 2019-08-16 PROCEDURE — 97112 NEUROMUSCULAR REEDUCATION: CPT

## 2019-08-16 NOTE — FLOWSHEET NOTE
tissue/joints for the purpose of modulating pain, promoting relaxation,  increasing ROM, reducing/eliminating soft tissue swelling/inflammation/restriction, improving soft tissue extensibility. (60123)    Service Based Modalities:        Timed Code Treatment Minutes: 47' neuro re-ed          Total Treatment Minutes:  47'    Treatment/Activity Tolerance:  [x] Patient tolerated treatment well [] Patient limited by fatique  [] Patient limited by pain  [] Patient limited by other medical complications  [] Other:     Prognosis: [] Good [x] Fair  [] Poor    Patient Requires Follow-up: [x] Yes  [] No    Goals:  Short term goals  Time Frame for Short term goals: 3 weeks  Short term goal 1: Initiate HEP MET  Short term goal 2: Tandem balance >15\" bilaterally for improved ease with ADL and yard work MET 08FEB  Short term goal 3: Improve TUG time to <15\" for improved balance and safety in home and community MET 08FEB    Long term goals  Time Frame for Long term goals : 6 weeks    Long term goal 1: Indep with HEP    Long term goal 2: Tandem balance >25\" bilaterally for improved ease with ADL and yard work MET 6/24/19. Exhibited poor performance this date. Long term goal 3: Improve TUG time to <13\" for improved balance and safety in home and community (MET Performed TUG in 13\" this date. 16AUG)    Long term goal 4: Pt to amb 48' with least restrictive AD to improve home/community ambulation (Able to gait train 150' SP cane and CGA. Multiple bouts of off balance requires assistance.)  Long term goal 5: Improve Tinetti score to >21 for improved home/community ambulation and safety (Scored 19/28 this date. 16AUG)        Plan:   [x] Continue per plan of care [] Alter current plan (see comments)   [] Plan of care initiated [] Hold pending MD visit [] Discharge    Plan for Next Session: Progress to tolerance.      Electronically signed by:  Patti Juares PTA

## 2019-08-19 ENCOUNTER — HOSPITAL ENCOUNTER (OUTPATIENT)
Dept: PHYSICAL THERAPY | Age: 44
Setting detail: THERAPIES SERIES
Discharge: HOME | End: 2019-08-19
Payer: MEDICARE

## 2019-08-19 PROCEDURE — 97112 NEUROMUSCULAR REEDUCATION: CPT

## 2019-08-20 ENCOUNTER — HOSPITAL ENCOUNTER (OUTPATIENT)
Dept: PHYSICAL THERAPY | Age: 44
Setting detail: THERAPIES SERIES
Discharge: HOME | End: 2019-08-20
Payer: MEDICARE

## 2019-08-20 PROCEDURE — 97112 NEUROMUSCULAR REEDUCATION: CPT

## 2019-08-20 NOTE — FLOWSHEET NOTE
Cones   Kicking over and picking up     Dot maneuverability   Mike. 6 dots spread out, instructing patient to step fwd one foot on each dot   Reach + Step  Hop Scotch Mat; 1>4>7>10; 2/3>5/6>8/9   Walk along curved beam x2 laps each  Fwd + lateral   Cybex leg press/ 3x10110#   TM incline walking 8' @ 1. 1mph9% incline   Hip abd/add [id number]; GREEN   Plate squeeze chest press 2 5# plates. Tband SPO Seated, blue-20xea   Row machiene 75 lbs   Bicep curls 10 lbs   Abduction  [id number]   Overhead Triceps ext 10 lbs   Bent rows 20 lbs   DB OH press [id number]   Dips    [] Provided verbal/tactile cueing for activities related to strengthening, flexibility, endurance, ROM. (35509)    [x] Provided verbal/tactile cueing for activities related to improving balance, coordination, kinesthetic sense, posture, motor skill, proprioception. (32874)    Therapeutic Activities:     [] Therapeutic activities, direct (one-on-one) patient contact (use of dynamic activities to improve functional performance). (85224)    Gait:   [x] Provided training and instruction to the patient for ambulation re-education. (48605)  Verbal cues for proper swing phase bilaterally this date and to take moderate steps at a slow to moderate pace in order to avoid losing balance anteriorly. Self-Care/ADL's  [] Self-care/home management training and compensatory training, meal preparation, safety procedures, and instructions in use of assistive technology devices/adaptive equipment, direct one-on-one contact.  (25159)    Home Exercise Program:      [] Reviewed/Progressed HEP activities related to strengthening, flexibility, endurance, ROM. (32772)  [x] Reviewed/Progressed HEP activities related to improving balance, coordination, kinesthetic sense, posture, motor skill, proprioception.  (54840)    Manual Treatments:    [] Provided manual therapy to mobilize soft tissue/joints for the purpose of modulating pain, promoting relaxation,  increasing ROM,

## 2019-08-21 ENCOUNTER — HOSPITAL ENCOUNTER (OUTPATIENT)
Dept: PHYSICAL THERAPY | Age: 44
Setting detail: THERAPIES SERIES
Discharge: HOME | End: 2019-08-21
Payer: MEDICARE

## 2019-08-22 ENCOUNTER — HOSPITAL ENCOUNTER (OUTPATIENT)
Dept: PHYSICAL THERAPY | Age: 44
Setting detail: THERAPIES SERIES
Discharge: HOME | End: 2019-08-22
Payer: MEDICARE

## 2019-08-22 PROCEDURE — 97112 NEUROMUSCULAR REEDUCATION: CPT

## 2019-08-23 ENCOUNTER — HOSPITAL ENCOUNTER (OUTPATIENT)
Dept: PHYSICAL THERAPY | Age: 44
Setting detail: THERAPIES SERIES
Discharge: HOME | End: 2019-08-23
Payer: MEDICARE

## 2019-08-23 PROCEDURE — 97112 NEUROMUSCULAR REEDUCATION: CPT

## 2019-08-26 ENCOUNTER — HOSPITAL ENCOUNTER (OUTPATIENT)
Dept: PHYSICAL THERAPY | Age: 44
Setting detail: THERAPIES SERIES
Discharge: HOME | End: 2019-08-26
Payer: MEDICARE

## 2019-08-26 PROCEDURE — 97112 NEUROMUSCULAR REEDUCATION: CPT

## 2019-08-27 ENCOUNTER — HOSPITAL ENCOUNTER (OUTPATIENT)
Dept: PHYSICAL THERAPY | Age: 44
Setting detail: THERAPIES SERIES
Discharge: HOME | End: 2019-08-27
Payer: MEDICARE

## 2019-08-27 PROCEDURE — 97112 NEUROMUSCULAR REEDUCATION: CPT | Performed by: PHYSICAL THERAPIST

## 2019-08-29 ENCOUNTER — HOSPITAL ENCOUNTER (OUTPATIENT)
Dept: PHYSICAL THERAPY | Age: 44
Setting detail: THERAPIES SERIES
Discharge: HOME | End: 2019-08-29
Payer: MEDICARE

## 2019-08-29 PROCEDURE — 97112 NEUROMUSCULAR REEDUCATION: CPT

## 2019-08-29 NOTE — FLOWSHEET NOTE
Physical Therapy Daily Treatment Note    Date:  2019    Patient Name:  Stephane Temple    :  1975  MRN: [id number]  Restrictions/Precautions:      Medical/Treatment Diagnosis Information:   · Diagnosis: Multiple Sclerosis   · Treatment Diagnosis: multiple sclerosis-weakness  Insurance/Certification information:  PT Insurance Information: [address] [facility]  Physician Information:  Referring Practitioner: Susan Figueroa. MD Shira   Plan of care signed (Y/N):  N          Visit# / total visits: 7/10  ( POC) 89 total    Pain level: 0/10       Time In: 2:35   Time Out:  3:22     Progress Note: []  Yes  [x]  No  Next due by: Visit #10  Or by 19    Subjective: Pt rpts to clinic with no complaints of pain or fatigue stating \"I have no issues. The last session was good\". Objective: Pt tolerated todays session well only indicating fatigue with standing bicep curls and OH press this date. Pt exhibits good ambulation with no LOB and able to bend and  cones well. One rest break needed this date. · Observation:   · Rpts with RW and stiff legged gait pattern. KAMCKINLEY [email or web address]. Test measurements:  Tinetti   TUG 13 seconds   Lap around gym: 24 seconds with 4 [employer]     Exercises:   Exercise/Equipment Resistance/Repetitions Other comments   Offset Balance  & EC   DD offset balance EC   DD DLS EC   AIREX HR/TR 20x     AIREX Marches 20x each     AIREX 3 way hip 20x each bilat    AIREX HS Curls 15x each     Sit-Stands 3x10 with ball b/t LEs Required CGA this date   Sidesteps 4 laps With obstacles.  (Hurdles and Blue balance beam, )   hurdles Mod/max A from therapist to clear over darion   SLS HRaises 15x each    amb with single cane 3 laps CGA from therapist   BOSU flat side balance 3x1' CGA from therapist, eyes open   BOSU Round Side Balance Marching 15x each    BOSU flat side + 15x each Gentle perturbations from therapist   Mini Lunges    Retro walk  1 lap no HHA, 2 laps 1 HHA   x   Tandem walk on 8' line 3 laps

## 2019-08-30 ENCOUNTER — HOSPITAL ENCOUNTER (OUTPATIENT)
Dept: PHYSICAL THERAPY | Age: 44
Setting detail: THERAPIES SERIES
Discharge: HOME | End: 2019-08-30
Payer: MEDICARE

## 2019-08-30 PROCEDURE — 97112 NEUROMUSCULAR REEDUCATION: CPT

## 2019-08-30 NOTE — FLOWSHEET NOTE
Dot maneuverability   Mike. 6 dots spread out, instructing patient to step fwd one foot on each dot   Reach + Step  Hop Scotch Mat; 1>4>7>10; 2/3>5/6>8/9   Walk along curved beam Fwd + lateral   Cybex leg press/ 110#   TM incline walking 8' @ 1. 1mph9% incline   Hip abd/add Ball; GREEN   Plate squeeze chest press 3x152 5# plates. Stool Scoot x1 lap fwd, x1lap reverse   Tband SPO    Row machiene 90 lbs   Bicep curls 10 lbs   Abduction  10lbs   Overhead Triceps ext 25 lbs   Bent rows 25 lbs   DB OH press 10lbs   Pushups At rail- 4x15   Dips At rail- 4x15   [] Provided verbal/tactile cueing for activities related to strengthening, flexibility, endurance, ROM. (26465)    [x] Provided verbal/tactile cueing for activities related to improving balance, coordination, kinesthetic sense, posture, motor skill, proprioception. (59251)    Therapeutic Activities:     [] Therapeutic activities, direct (one-on-one) patient contact (use of dynamic activities to improve functional performance). (38024)    Gait:   [x] Provided training and instruction to the patient for ambulation re-education. (26146)  Verbal cues for proper swing phase bilaterally this date and to take moderate steps at a slow to moderate pace in order to avoid losing balance anteriorly. Self-Care/ADL's  [] Self-care/home management training and compensatory training, meal preparation, safety procedures, and instructions in use of assistive technology devices/adaptive equipment, direct one-on-one contact.  (38609)    Home Exercise Program:      [] Reviewed/Progressed HEP activities related to strengthening, flexibility, endurance, ROM. (60340)  [x] Reviewed/Progressed HEP activities related to improving balance, coordination, kinesthetic sense, posture, motor skill, proprioception.  (22890)    Manual Treatments:    [] Provided manual therapy to mobilize soft tissue/joints for the purpose of modulating pain, promoting relaxation,  increasing ROM,

## 2019-09-03 ENCOUNTER — HOSPITAL ENCOUNTER (OUTPATIENT)
Dept: PHYSICAL THERAPY | Age: 44
Setting detail: THERAPIES SERIES
Discharge: HOME | End: 2019-09-03
Payer: MEDICARE

## 2019-09-03 PROCEDURE — 97112 NEUROMUSCULAR REEDUCATION: CPT

## 2019-09-04 ENCOUNTER — HOSPITAL ENCOUNTER (OUTPATIENT)
Dept: PHYSICAL THERAPY | Age: 44
Setting detail: THERAPIES SERIES
Discharge: HOME | End: 2019-09-04
Payer: MEDICARE

## 2019-09-04 PROCEDURE — 97112 NEUROMUSCULAR REEDUCATION: CPT

## 2019-09-06 ENCOUNTER — HOSPITAL ENCOUNTER (OUTPATIENT)
Dept: PHYSICAL THERAPY | Age: 44
Setting detail: THERAPIES SERIES
Discharge: HOME | End: 2019-09-06
Payer: MEDICARE

## 2019-09-06 PROCEDURE — 97112 NEUROMUSCULAR REEDUCATION: CPT

## 2019-09-06 NOTE — FLOWSHEET NOTE
Physical Therapy Daily Treatment Note    Date:  2019    Patient Name:  Oscar Bobby    :  1975  MRN: [id number]  Restrictions/Precautions:      Medical/Treatment Diagnosis Information:   · Diagnosis: Multiple Sclerosis   · Treatment Diagnosis: multiple sclerosis-weakness  Insurance/Certification information:  PT Insurance Information: [employer]  Physician Information:  Referring Practitioner: Krista Silva MD   Plan of care signed (Y/N):  N          Visit# / total visits: 1/10  (10th POC) 93 total    Pain level: 0/10       Time In: 2:30  Time Out: 3:15     Progress Note: []  Yes  [x]  No  Next due by: Visit #10  Or by 19    Subjective: Pt rpts no issues with pain, fatigue, or soreness. · Objective: Pt tolerated todays session well indicating UE fatigue with seated row and standing bicep curls and OH press. Pt was able to perform all ALEX requiring little vc to ensure proper performance. Most challenged by stool scoots completing exercise slowly. · Observation:   · Rpts with RW and stiff legged gait pattern. ANTIONETTE delgadillo. Test measurements:  Tinetti   TUG 13 seconds   Lap around gym: 24 seconds with 4 [employer]     Exercises:   Exercise/Equipment Resistance/Repetitions Other comments   Offset Balance  & EC   DD offset balance EC   DD DLS EC   AIREX HR/TR    AIREX Marches    AIREX 3 way hip    AIREX HS Curls    Sit-Stands Required CGA this date   Sidesteps 4 laps With obstacles. (Hurdles and Blue balance beam, )   hurdles 2 lapsMinA from therapist to clear over darion   SLS HRaises    amb with single cane CGA from therapist   BOSU flat side balance CGA from therapist, eyes open   BOSU Round Side Balance Marching 15x each    BOSU flat side +  Gentle perturbations from therapist   Mini Lunges bosu round side x15ea   Retro walk  1 lap no HHA, 2 laps 1 HHA   x   Tandem walk on 8' line  Fwd, with therapist CGA and SP cane   Step taps F 8\"   Squats   1 HHA. Ball between legs.     Cones  3 sets reducing/eliminating soft tissue swelling/inflammation/restriction, improving soft tissue extensibility. (65423)    Service Based Modalities:        Timed Code Treatment Minutes: 39' neuro re-ed          Total Treatment Minutes:  39'    Treatment/Activity Tolerance:  [x] Patient tolerated treatment well [] Patient limited by fatique  [] Patient limited by pain  [] Patient limited by other medical complications  [] Other:     Prognosis: [] Good [x] Fair  [] Poor    Patient Requires Follow-up: [x] Yes  [] No    Goals:  Short term goals  Time Frame for Short term goals: 3 weeks   Short term goal 1: Initiate HEP MET  Short term goal 2: Tandem balance >15\" bilaterally for improved ease with ADL and yard work MET 08FEB  Short term goal 3: Improve TUG time to <15\" for improved balance and safety in home and community MET 08FEB    Long term goals  Time Frame for Long term goals : 6 weeks    Long term goal 1: Indep with HEP    Long term goal 2: Tandem balance >25\" bilaterally for improved ease with ADL and yard work MET 6/24/19. Exhibited poor performance this date. Long term goal 3: Improve TUG time to <13\" for improved balance and safety in home and community (MET Performed TUG in 13\" this date. 04SEP)    Long term goal 4: Pt to amb 48' with least restrictive AD to improve home/community ambulation (Able to gait train 150' SP cane and CGA. Multiple bouts of off balance requires assistance.)  Long term goal 5: Improve Tinetti score to >21 for improved home/community ambulation and safety (Scored 23/28 this date. 04SEP)        Plan:    [x] Continue per plan of care [] Alter current plan (see comments)   [] Plan of care initiated [] Hold pending MD visit [] Discharge    Plan for Next Session: Progress to tolerance.      Electronically signed by:  Vasile Santo PTA

## 2019-09-09 ENCOUNTER — HOSPITAL ENCOUNTER (OUTPATIENT)
Dept: PHYSICAL THERAPY | Age: 44
Setting detail: THERAPIES SERIES
Discharge: HOME | End: 2019-09-09
Payer: MEDICARE

## 2019-09-09 PROCEDURE — 97112 NEUROMUSCULAR REEDUCATION: CPT

## 2019-09-09 NOTE — FLOWSHEET NOTE
Step taps F 8\"   Squats   1 HHA. Ball between legs. Cones  3 sets picking up     Dot maneuverability   Mike. 6 dots spread out, instructing patient to step fwd one foot on each dot   Reach + Step  Hop Scotch Mat; 1>4>7>10; 2/3>5/6>8/9   Walk along curved beam Fwd + lateral   Cybex leg press/ 110#   TM incline walking 8' @ 1. 1mph9% incline   Hip abd/add Ball; GREEN   Plate squeeze chest press 3x152 5# plates. Stool Scoot x1 lap fwd, x1lap reverse   Tband SPO    Row machiene 90 lbs   Bicep curls 10 lbs   Abduction  10lbs   Overhead Triceps ext 25 lbs   Bent rows 25 lbs   DB OH press 10lbs   Pushups At rail- 4x15   Dips At rail- 4x15   [] Provided verbal/tactile cueing for activities related to strengthening, flexibility, endurance, ROM. (96663)    [x] Provided verbal/tactile cueing for activities related to improving balance, coordination, kinesthetic sense, posture, motor skill, proprioception. (11105)    Therapeutic Activities:     [] Therapeutic activities, direct (one-on-one) patient contact (use of dynamic activities to improve functional performance). (39275)    Gait:   [x] Provided training and instruction to the patient for ambulation re-education. (93390)  Verbal cues for proper swing phase bilaterally this date and to take moderate steps at a slow to moderate pace in order to avoid losing balance anteriorly. Self-Care/ADL's  [] Self-care/home management training and compensatory training, meal preparation, safety procedures, and instructions in use of assistive technology devices/adaptive equipment, direct one-on-one contact.  (74642)    Home Exercise Program:      [] Reviewed/Progressed HEP activities related to strengthening, flexibility, endurance, ROM. (78447)  [x] Reviewed/Progressed HEP activities related to improving balance, coordination, kinesthetic sense, posture, motor skill, proprioception.  (27617)    Manual Treatments:    [] Provided manual therapy to mobilize soft tissue/joints for

## 2019-09-10 ENCOUNTER — HOSPITAL ENCOUNTER (OUTPATIENT)
Dept: PHYSICAL THERAPY | Age: 44
Setting detail: THERAPIES SERIES
Discharge: HOME | End: 2019-09-10
Payer: MEDICARE

## 2019-09-10 PROCEDURE — 97112 NEUROMUSCULAR REEDUCATION: CPT

## 2019-09-12 ENCOUNTER — HOSPITAL ENCOUNTER (OUTPATIENT)
Dept: PHYSICAL THERAPY | Age: 44
Setting detail: THERAPIES SERIES
Discharge: HOME | End: 2019-09-12
Payer: MEDICARE

## 2019-09-12 PROCEDURE — 97112 NEUROMUSCULAR REEDUCATION: CPT

## 2019-09-12 NOTE — FLOWSHEET NOTE
relaxation,  increasing ROM, reducing/eliminating soft tissue swelling/inflammation/restriction, improving soft tissue extensibility. (67040)    Service Based Modalities:        Timed Code Treatment Minutes: 39' neuro re-ed          Total Treatment Minutes:  39'    Treatment/Activity Tolerance:  [x] Patient tolerated treatment well [] Patient limited by fatique  [] Patient limited by pain  [] Patient limited by other medical complications  [] Other:     Prognosis: [] Good [x] Fair  [] Poor    Patient Requires Follow-up: [x] Yes  [] No    Goals:  Short term goals  Time Frame for Short term goals: 3 weeks   Short term goal 1: Initiate HEP MET  Short term goal 2: Tandem balance >15\" bilaterally for improved ease with ADL and yard work MET 08FEB  Short term goal 3: Improve TUG time to <15\" for improved balance and safety in home and community MET 08FEB    Long term goals  Time Frame for Long term goals : 6 weeks    Long term goal 1: Indep with HEP    Long term goal 2: Tandem balance >25\" bilaterally for improved ease with ADL and yard work MET 6/24/19. Exhibited poor performance this date. Long term goal 3: Improve TUG time to <13\" for improved balance and safety in home and community (MET Performed TUG in 13\" this date. 04SEP)    Long term goal 4: Pt to amb 48' with least restrictive AD to improve home/community ambulation (Able to gait train 150' SP cane and CGA. Multiple bouts of off balance requires assistance.)  Long term goal 5: Improve Tinetti score to >21 for improved home/community ambulation and safety (Scored 23/28 this date. 04SEP)        Plan:    [x] Continue per plan of care [] Alter current plan (see comments)   [] Plan of care initiated [] Hold pending MD visit [] Discharge    Plan for Next Session: Progress to tolerance.      Electronically signed by:  Suzi Garcia PTA

## 2019-09-13 ENCOUNTER — HOSPITAL ENCOUNTER (OUTPATIENT)
Dept: PHYSICAL THERAPY | Age: 44
Setting detail: THERAPIES SERIES
Discharge: HOME | End: 2019-09-13
Payer: MEDICARE

## 2019-09-13 NOTE — PROGRESS NOTES
Physical Therapy    Outpatient Physical Therapy    [x] [place]  Phone: [phone]  Fax: [phone]      [] [place]  Phone: [phone]  Fax: [phone]    Physical Therapy  Cancellation/No-show Note  Patient Name:  Gricelda Lopez  :  1975   Date:  2019  Cancelled visits to date: 4  No-shows to date: 0    For today's appointment patient:  [x]  Cancelled  []  Rescheduled appointment  []  No-show     Reason given by patient:  []  Patient ill  []  Conflicting appointment  []  No transportation    []  Conflict with work  []  No reason given  [x]  Other:     Comments:   Family issues    Electronically signed by: Ana Grant, PT, DPT

## 2019-09-18 ENCOUNTER — HOSPITAL ENCOUNTER (OUTPATIENT)
Dept: PHYSICAL THERAPY | Age: 44
Setting detail: THERAPIES SERIES
Discharge: HOME | End: 2019-09-18
Payer: MEDICARE

## 2019-09-18 PROCEDURE — 97112 NEUROMUSCULAR REEDUCATION: CPT

## 2019-09-20 ENCOUNTER — HOSPITAL ENCOUNTER (OUTPATIENT)
Dept: PHYSICAL THERAPY | Age: 44
Setting detail: THERAPIES SERIES
Discharge: HOME | End: 2019-09-20
Payer: MEDICARE

## 2019-09-20 PROCEDURE — 97112 NEUROMUSCULAR REEDUCATION: CPT | Performed by: PHYSICAL THERAPIST

## 2019-09-20 NOTE — FLOWSHEET NOTE
activities related to improving balance, coordination, kinesthetic sense, posture, motor skill, proprioception.  (02439)    Manual Treatments:    [] Provided manual therapy to mobilize soft tissue/joints for the purpose of modulating pain, promoting relaxation,  increasing ROM, reducing/eliminating soft tissue swelling/inflammation/restriction, improving soft tissue extensibility. (37539)    Service Based Modalities:        Timed Code Treatment Minutes: 54' neuro re-ed          Total Treatment Minutes:  54'    Treatment/Activity Tolerance:  [x] Patient tolerated treatment well [] Patient limited by fatique  [] Patient limited by pain  [] Patient limited by other medical complications  [] Other:     Prognosis: [] Good [x] Fair  [] Poor    Patient Requires Follow-up: [x] Yes  [] No    Goals:  Short term goals  Time Frame for Short term goals: 3 weeks   Short term goal 1: Initiate HEP MET  Short term goal 2: Tandem balance >15\" bilaterally for improved ease with ADL and yard work MET 08FEB  Short term goal 3: Improve TUG time to <15\" for improved balance and safety in home and community MET 08FEB    Long term goals  Time Frame for Long term goals : 6 weeks    Long term goal 1: Indep with HEP    Long term goal 2: Tandem balance >25\" bilaterally for improved ease with ADL and yard work MET 6/24/19. Exhibited poor performance this date. Long term goal 3: Improve TUG time to <13\" for improved balance and safety in home and community (MET Performed TUG in 13\" this date. 04SEP)    Long term goal 4: Pt to amb 48' with least restrictive AD to improve home/community ambulation (Able to gait train 150' SP cane and CGA. Multiple bouts of off balance requires assistance.)  Long term goal 5: Improve Tinetti score to >21 for improved home/community ambulation and safety (Scored 23/28 this date.  04SEP)        Plan:    [x] Continue per plan of care [] Alter current plan (see comments)   [] Plan of care initiated [] Hold pending MD

## 2019-09-23 ENCOUNTER — HOSPITAL ENCOUNTER (OUTPATIENT)
Dept: PHYSICAL THERAPY | Age: 44
Setting detail: THERAPIES SERIES
Discharge: HOME | End: 2019-09-23
Payer: MEDICARE

## 2019-09-23 PROCEDURE — 97112 NEUROMUSCULAR REEDUCATION: CPT

## 2019-09-25 ENCOUNTER — APPOINTMENT (OUTPATIENT)
Dept: PHYSICAL THERAPY | Age: 44
End: 2019-09-25
Payer: MEDICARE

## 2019-09-25 NOTE — DISCHARGE SUMMARY
Nicole Valles can you please complete pt's office note from 9/24/19 I need it to send over to Dr Amezquita's office           Objective:    Tinetti:     TU\"     Hip strength flexion:3/5 B                          Abd: 2+/5     Knee strength flexion:4+/5 ext 4+/5            Assessment:   Randal Calix has improved in his functional and dynamic strength and stability as well as improved balance and proprioception, leading to increased safety in home and community    Plan:    D/C to indep with HEp due to meeting all current goals. Discussed possible need in the future for routine \"tune ups\" to address functional issues down the road, which patient agreed. Goals:    Long term goals  Time Frame for Long term goals : 4 weeks  Long term goal 1: Indep with HEP MET  Long term goal 2:  Increase bilat LE strength to [facility] for improved ease with ADL and home/community management (SEE ABOVE) MET  Long term goal 3: Pt to perform balance activities without UE support for 45 seconds or more for improved balance and stability and iincreased ease with home/community ADL  (Pt able to stand for more than 45 sec w/o UE support) MET  Long term goal 4: Pt to be able to ambulate short home distances without RW to improve ease with home management and ADL  MET  Long term goal 5: TUG/TInetti scores within age-accepted norms (13 sec TUG, > Tinetti) for improved safety and ease with home and community management  MET           Percentage of Goals Met:             Discharge Prognosis: [] Excellent [] Good [] Fair  [] Poor     Goal Status:  [] Achieved [] Partially Achieved  [] Not Achieved       Electronically signed by:  Jefferson Amos, PT, DPT

## 2019-09-27 ENCOUNTER — HOSPITAL ENCOUNTER (OUTPATIENT)
Dept: PHYSICAL THERAPY | Age: 44
Setting detail: THERAPIES SERIES
Discharge: HOME | End: 2019-09-27
Payer: MEDICARE

## 2019-09-27 PROCEDURE — 97112 NEUROMUSCULAR REEDUCATION: CPT

## 2019-09-27 NOTE — FLOWSHEET NOTE
Physical Therapy Daily Treatment Note    Date:  2019    Patient Name:  Xenia Sanchez    :  1975  MRN: [id number]  Restrictions/Precautions:      Medical/Treatment Diagnosis Information:   · Diagnosis: Multiple Sclerosis   · Treatment Diagnosis: multiple sclerosis-weakness  Insurance/Certification information:  PT Insurance Information: [employer]  Physician Information:  Referring Practitioner: Jung Pitt. MD Shira   Plan of care signed (Y/N):  N          Visit# / total visits: 8/10  (10th POC) 100 total    Pain level: 0/10       Time In: 2:25  Time Out:  3:27      Progress Note: []  Yes  [x]  No  Next due by: Visit #10  Or by 19    Subjective: Pt rpts to clinic with no complaints of pain or fatigue stating \"I have nothing negative to report. Still moving well at home\". · Objective: Pt completed all ALEX per flow chart to increase overall strength, stability, and balance with vc required for proper performance. Attempted heavy UE exercises this date with good tolerance noted but fatigued quickly with activity. Most challenged by standing bicep curls. Slow ambulation to maintain balance exhibited this date. · Observation:    · Rpts with RW and stiff legged gait pattern. KAFO donned. Mild/moderate balance checks throughout ambulation with CGA from therapist this date (19)  Test measurements:  Tinetti   TUG 13 seconds   Lap around gym: 24 seconds with 4 WW     Exercises:   Exercise/Equipment Resistance/Repetitions Other comments   Offset Balance  & EC   DD offset balance EC   DD DLS EC   AIREX HR/TR    AIREX Marches 20x each     AIREX 3 way hip 20x each bilat    AIREX HS Curls    Sit-Stands Required CGA this date   Sidesteps 5 laps With obstacles.  (Hurdles and Blue balance beam, )   hurdles 2 lapsMinA from therapist to clear over darion   SLS HRaises    amb with single cane CGA from therapist   BOSU flat side balance 3x1'CGA from therapist, eyes open   BOSU Round Side Balance endurance, ROM. (36121)  [x] Reviewed/Progressed HEP activities related to improving balance, coordination, kinesthetic sense, posture, motor skill, proprioception.  (75303)    Manual Treatments:    [] Provided manual therapy to mobilize soft tissue/joints for the purpose of modulating pain, promoting relaxation,  increasing ROM, reducing/eliminating soft tissue swelling/inflammation/restriction, improving soft tissue extensibility. (23598)    Service Based Modalities:        Timed Code Treatment Minutes: 58' neuro re-ed          Total Treatment Minutes:  58'    Treatment/Activity Tolerance:  [x] Patient tolerated treatment well [] Patient limited by fatique  [] Patient limited by pain  [] Patient limited by other medical complications  [] Other:     Prognosis: [] Good [x] Fair  [] Poor    Patient Requires Follow-up: [x] Yes  [] No    Goals:  Short term goals  Time Frame for Short term goals: 3 weeks   Short term goal 1: Initiate HEP MET  Short term goal 2: Tandem balance >15\" bilaterally for improved ease with ADL and yard work MET 08FEB  Short term goal 3: Improve TUG time to <15\" for improved balance and safety in home and community MET 08FEB    Long term goals  Time Frame for Long term goals : 6 weeks    Long term goal 1: Indep with HEP    Long term goal 2: Tandem balance >25\" bilaterally for improved ease with ADL and yard work MET 6/24/19. Exhibited poor performance this date. Long term goal 3: Improve TUG time to <13\" for improved balance and safety in home and community (MET Performed TUG in 13\" this date. 04SEP)    Long term goal 4: Pt to amb 48' with least restrictive AD to improve home/community ambulation (Able to gait train 150' SP cane and CGA. Multiple bouts of off balance requires assistance.)  Long term goal 5: Improve Tinetti score to >21 for improved home/community ambulation and safety (Scored 23/28 this date.  04SEP)        Plan:    [x] Continue per plan of care [] Alter current plan (see

## 2019-09-30 ENCOUNTER — HOSPITAL ENCOUNTER (OUTPATIENT)
Dept: PHYSICAL THERAPY | Age: 44
Setting detail: THERAPIES SERIES
Discharge: HOME | End: 2019-09-30
Payer: MEDICARE

## 2019-10-01 ENCOUNTER — HOSPITAL ENCOUNTER (OUTPATIENT)
Dept: PHYSICAL THERAPY | Age: 44
Setting detail: THERAPIES SERIES
Discharge: HOME | End: 2019-10-01
Payer: MEDICARE

## 2019-10-01 PROCEDURE — 97112 NEUROMUSCULAR REEDUCATION: CPT

## 2019-10-03 ENCOUNTER — HOSPITAL ENCOUNTER (OUTPATIENT)
Dept: PHYSICAL THERAPY | Age: 44
Setting detail: THERAPIES SERIES
Discharge: HOME | End: 2019-10-03
Payer: MEDICARE

## 2019-10-03 PROCEDURE — 97112 NEUROMUSCULAR REEDUCATION: CPT

## 2019-10-04 ENCOUNTER — HOSPITAL ENCOUNTER (OUTPATIENT)
Dept: PHYSICAL THERAPY | Age: 44
Setting detail: THERAPIES SERIES
Discharge: HOME | End: 2019-10-04
Payer: MEDICARE

## 2019-10-04 PROCEDURE — 97112 NEUROMUSCULAR REEDUCATION: CPT

## 2019-10-07 ENCOUNTER — HOSPITAL ENCOUNTER (OUTPATIENT)
Dept: PHYSICAL THERAPY | Age: 44
Setting detail: THERAPIES SERIES
Discharge: HOME | End: 2019-10-07
Payer: MEDICARE

## 2019-10-07 PROCEDURE — 97112 NEUROMUSCULAR REEDUCATION: CPT | Performed by: PHYSICAL THERAPIST

## 2019-10-08 ENCOUNTER — HOSPITAL ENCOUNTER (OUTPATIENT)
Dept: PHYSICAL THERAPY | Age: 44
Setting detail: THERAPIES SERIES
Discharge: HOME | End: 2019-10-08
Payer: MEDICARE

## 2019-10-08 PROCEDURE — 97112 NEUROMUSCULAR REEDUCATION: CPT | Performed by: PHYSICAL THERAPIST

## 2019-10-10 ENCOUNTER — HOSPITAL ENCOUNTER (OUTPATIENT)
Dept: PHYSICAL THERAPY | Age: 44
Setting detail: THERAPIES SERIES
Discharge: HOME | End: 2019-10-10
Payer: MEDICARE

## 2019-10-10 PROCEDURE — 97112 NEUROMUSCULAR REEDUCATION: CPT

## 2019-10-11 ENCOUNTER — HOSPITAL ENCOUNTER (OUTPATIENT)
Dept: PHYSICAL THERAPY | Age: 44
Setting detail: THERAPIES SERIES
Discharge: HOME | End: 2019-10-11
Payer: MEDICARE

## 2019-10-11 PROCEDURE — 97112 NEUROMUSCULAR REEDUCATION: CPT

## 2019-10-14 ENCOUNTER — HOSPITAL ENCOUNTER (OUTPATIENT)
Dept: PHYSICAL THERAPY | Age: 44
Setting detail: THERAPIES SERIES
Discharge: HOME | End: 2019-10-14
Payer: MEDICARE

## 2019-10-15 ENCOUNTER — HOSPITAL ENCOUNTER (OUTPATIENT)
Dept: PHYSICAL THERAPY | Age: 44
Setting detail: THERAPIES SERIES
Discharge: HOME | End: 2019-10-15
Payer: MEDICARE

## 2019-10-15 PROCEDURE — 97112 NEUROMUSCULAR REEDUCATION: CPT

## 2019-10-17 ENCOUNTER — HOSPITAL ENCOUNTER (OUTPATIENT)
Dept: PHYSICAL THERAPY | Age: 44
Setting detail: THERAPIES SERIES
Discharge: HOME | End: 2019-10-17
Payer: MEDICARE

## 2019-10-17 PROCEDURE — 97112 NEUROMUSCULAR REEDUCATION: CPT

## 2019-10-18 ENCOUNTER — HOSPITAL ENCOUNTER (OUTPATIENT)
Dept: PHYSICAL THERAPY | Age: 44
Setting detail: THERAPIES SERIES
Discharge: HOME | End: 2019-10-18
Payer: MEDICARE

## 2019-10-18 PROCEDURE — 97112 NEUROMUSCULAR REEDUCATION: CPT

## 2019-10-21 ENCOUNTER — HOSPITAL ENCOUNTER (OUTPATIENT)
Dept: PHYSICAL THERAPY | Age: 44
Setting detail: THERAPIES SERIES
Discharge: HOME | End: 2019-10-21
Payer: MEDICARE

## 2019-10-21 PROCEDURE — 97112 NEUROMUSCULAR REEDUCATION: CPT | Performed by: PHYSICAL THERAPIST

## 2019-10-22 ENCOUNTER — HOSPITAL ENCOUNTER (OUTPATIENT)
Dept: PHYSICAL THERAPY | Age: 44
Setting detail: THERAPIES SERIES
Discharge: HOME | End: 2019-10-22
Payer: MEDICARE

## 2019-10-22 PROCEDURE — 97150 GROUP THERAPEUTIC PROCEDURES: CPT

## 2019-10-24 ENCOUNTER — HOSPITAL ENCOUNTER (OUTPATIENT)
Dept: PHYSICAL THERAPY | Age: 44
Setting detail: THERAPIES SERIES
Discharge: HOME | End: 2019-10-24
Payer: MEDICARE

## 2019-10-24 PROCEDURE — 97112 NEUROMUSCULAR REEDUCATION: CPT

## 2019-10-25 ENCOUNTER — HOSPITAL ENCOUNTER (OUTPATIENT)
Dept: PHYSICAL THERAPY | Age: 44
Setting detail: THERAPIES SERIES
Discharge: HOME | End: 2019-10-25
Payer: MEDICARE

## 2019-10-25 PROCEDURE — 97112 NEUROMUSCULAR REEDUCATION: CPT

## 2019-10-28 ENCOUNTER — HOSPITAL ENCOUNTER (OUTPATIENT)
Dept: PHYSICAL THERAPY | Age: 44
Setting detail: THERAPIES SERIES
Discharge: HOME | End: 2019-10-28
Payer: MEDICARE

## 2019-10-28 PROCEDURE — 97112 NEUROMUSCULAR REEDUCATION: CPT

## 2019-10-29 ENCOUNTER — HOSPITAL ENCOUNTER (OUTPATIENT)
Dept: PHYSICAL THERAPY | Age: 44
Setting detail: THERAPIES SERIES
Discharge: HOME | End: 2019-10-29
Payer: MEDICARE

## 2019-10-29 PROCEDURE — 97112 NEUROMUSCULAR REEDUCATION: CPT | Performed by: PHYSICAL THERAPIST

## 2019-10-31 ENCOUNTER — HOSPITAL ENCOUNTER (OUTPATIENT)
Dept: PHYSICAL THERAPY | Age: 44
Setting detail: THERAPIES SERIES
Discharge: HOME | End: 2019-10-31
Payer: MEDICARE

## 2019-11-01 ENCOUNTER — HOSPITAL ENCOUNTER (OUTPATIENT)
Dept: PHYSICAL THERAPY | Age: 44
Setting detail: THERAPIES SERIES
Discharge: HOME | End: 2019-11-01
Payer: MEDICARE

## 2019-11-01 PROCEDURE — 97112 NEUROMUSCULAR REEDUCATION: CPT

## 2019-11-04 ENCOUNTER — HOSPITAL ENCOUNTER (OUTPATIENT)
Dept: PHYSICAL THERAPY | Age: 44
Setting detail: THERAPIES SERIES
Discharge: HOME | End: 2019-11-04
Payer: MEDICARE

## 2019-11-04 PROCEDURE — 97112 NEUROMUSCULAR REEDUCATION: CPT

## 2019-11-05 ENCOUNTER — HOSPITAL ENCOUNTER (OUTPATIENT)
Dept: PHYSICAL THERAPY | Age: 44
Setting detail: THERAPIES SERIES
Discharge: HOME | End: 2019-11-05
Payer: MEDICARE

## 2019-11-05 PROCEDURE — 97112 NEUROMUSCULAR REEDUCATION: CPT

## 2019-11-07 ENCOUNTER — HOSPITAL ENCOUNTER (OUTPATIENT)
Dept: PHYSICAL THERAPY | Age: 44
Setting detail: THERAPIES SERIES
Discharge: HOME | End: 2019-11-07
Payer: MEDICARE

## 2019-11-07 PROCEDURE — 97112 NEUROMUSCULAR REEDUCATION: CPT

## 2019-11-08 ENCOUNTER — HOSPITAL ENCOUNTER (OUTPATIENT)
Dept: PHYSICAL THERAPY | Age: 44
Setting detail: THERAPIES SERIES
Discharge: HOME | End: 2019-11-08
Payer: MEDICARE

## 2019-11-08 PROCEDURE — 97112 NEUROMUSCULAR REEDUCATION: CPT

## 2019-11-11 ENCOUNTER — HOSPITAL ENCOUNTER (OUTPATIENT)
Dept: PHYSICAL THERAPY | Age: 44
Setting detail: THERAPIES SERIES
Discharge: HOME | End: 2019-11-11
Payer: MEDICARE

## 2019-11-11 PROCEDURE — 97112 NEUROMUSCULAR REEDUCATION: CPT

## 2019-11-12 ENCOUNTER — HOSPITAL ENCOUNTER (OUTPATIENT)
Dept: PHYSICAL THERAPY | Age: 44
Setting detail: THERAPIES SERIES
Discharge: HOME | End: 2019-11-12
Payer: MEDICARE

## 2019-11-12 PROCEDURE — 97112 NEUROMUSCULAR REEDUCATION: CPT

## 2019-11-12 NOTE — PLAN OF CARE
for Short term goals: 3 weeks  Short term goal 1: Initiate HEP MET  Short term goal 2: Tandem balance >15\" bilaterally for improved ease with ADL and yard work MET 08FEB  Short term goal 3: Improve TUG time to <15\" for improved balance and safety in home and community MET 08FEB     Long term goals  Time Frame for Long term goals : 6 weeks    Long term goal 1: Indep with HEP   Long term goal 2: Tandem balance >25\" bilaterally for improved ease with ADL and yard work MET 6/24/19. Exhibited poor performance this date. Long term goal 3: Improve TUG time to <13\" for improved balance and safety in home and community (MET Performed TUG in 12\" this date. 28JUN)   Long term goal 4: Pt to amb 48' with least restrictive AD to improve home/community ambulation (Able to gait train 150' SP cane and CGA. Multiple bouts of off balance requires assistance.)  Long term goal 5: Improve Tinetti score to >21 for improved home/community ambulation and safety (Scored 13/28 this date. 23JUL)                 Current Frequency/Duration:8/25/19-10/25/19  # Days per week: [] 1 day # Weeks: [] 1 week [] 4 weeks      [] 2 days? [] 2 weeks [] 5 weeks      [x] 4 days   [] 3 weeks [x] 8 weeks     Rehab Potential: [] Excellent [x] Good [] Fair  [] Poor     Goal Status:  [] Achieved [x] Partially Achieved  [] Not Achieved     Patient Status: [] Continue per initial plan of Care     [] Patient now discharged     [x] Additional visits requested, Please re-certify for additional visits:      Requested frequency/duration: 4 X/week for 4 weeks    Electronically signed by:  Annalee Shore PT, DPT    If you have any questions or concerns, please don't hesitate to call.   Thank you for your referral.    Physician Signature:________________________________Date:__________________  By signing above, therapists plan is approved by physician

## 2019-11-13 ENCOUNTER — HOSPITAL ENCOUNTER (OUTPATIENT)
Dept: PHYSICAL THERAPY | Age: 44
Setting detail: THERAPIES SERIES
Discharge: HOME | End: 2019-11-13
Payer: MEDICARE

## 2019-11-13 PROCEDURE — 97112 NEUROMUSCULAR REEDUCATION: CPT

## 2019-11-14 ENCOUNTER — HOSPITAL ENCOUNTER (OUTPATIENT)
Dept: PHYSICAL THERAPY | Age: 44
Setting detail: THERAPIES SERIES
Discharge: HOME | End: 2019-11-14
Payer: MEDICARE

## 2019-11-14 PROCEDURE — 97112 NEUROMUSCULAR REEDUCATION: CPT

## 2019-11-14 PROCEDURE — 97112 NEUROMUSCULAR REEDUCATION: CPT | Performed by: PHYSICAL THERAPIST

## 2019-11-15 ENCOUNTER — APPOINTMENT (OUTPATIENT)
Dept: PHYSICAL THERAPY | Age: 44
End: 2019-11-15
Payer: MEDICARE

## 2019-11-18 ENCOUNTER — HOSPITAL ENCOUNTER (OUTPATIENT)
Dept: PHYSICAL THERAPY | Age: 44
Setting detail: THERAPIES SERIES
Discharge: HOME | End: 2019-11-18
Payer: MEDICARE

## 2019-11-18 PROCEDURE — 97112 NEUROMUSCULAR REEDUCATION: CPT

## 2019-11-19 ENCOUNTER — HOSPITAL ENCOUNTER (OUTPATIENT)
Dept: PHYSICAL THERAPY | Age: 44
Setting detail: THERAPIES SERIES
Discharge: HOME | End: 2019-11-19
Payer: MEDICARE

## 2019-11-19 PROCEDURE — 97112 NEUROMUSCULAR REEDUCATION: CPT | Performed by: PHYSICAL THERAPIST

## 2019-11-21 ENCOUNTER — HOSPITAL ENCOUNTER (OUTPATIENT)
Dept: PHYSICAL THERAPY | Age: 44
Setting detail: THERAPIES SERIES
Discharge: HOME | End: 2019-11-21
Payer: MEDICARE

## 2019-11-22 ENCOUNTER — APPOINTMENT (OUTPATIENT)
Dept: PHYSICAL THERAPY | Age: 44
End: 2019-11-22
Payer: MEDICARE

## 2019-11-25 ENCOUNTER — HOSPITAL ENCOUNTER (OUTPATIENT)
Dept: PHYSICAL THERAPY | Age: 44
Setting detail: THERAPIES SERIES
Discharge: HOME | End: 2019-11-25
Payer: MEDICARE

## 2019-11-25 PROCEDURE — 97112 NEUROMUSCULAR REEDUCATION: CPT

## 2019-11-26 ENCOUNTER — HOSPITAL ENCOUNTER (OUTPATIENT)
Dept: PHYSICAL THERAPY | Age: 44
Setting detail: THERAPIES SERIES
Discharge: HOME | End: 2019-11-26
Payer: MEDICARE

## 2019-11-26 PROCEDURE — 97112 NEUROMUSCULAR REEDUCATION: CPT

## 2019-11-27 ENCOUNTER — HOSPITAL ENCOUNTER (OUTPATIENT)
Dept: PHYSICAL THERAPY | Age: 44
Setting detail: THERAPIES SERIES
Discharge: HOME | End: 2019-11-27
Payer: MEDICARE

## 2019-11-27 PROCEDURE — 97112 NEUROMUSCULAR REEDUCATION: CPT

## 2019-11-29 ENCOUNTER — HOSPITAL ENCOUNTER (OUTPATIENT)
Dept: PHYSICAL THERAPY | Age: 44
Setting detail: THERAPIES SERIES
Discharge: HOME | End: 2019-11-29
Payer: MEDICARE

## 2019-11-29 PROCEDURE — 97112 NEUROMUSCULAR REEDUCATION: CPT

## 2019-12-02 ENCOUNTER — HOSPITAL ENCOUNTER (OUTPATIENT)
Dept: PHYSICAL THERAPY | Age: 44
Setting detail: THERAPIES SERIES
Discharge: HOME | End: 2019-12-02
Payer: MEDICARE

## 2019-12-02 PROCEDURE — 97112 NEUROMUSCULAR REEDUCATION: CPT

## 2019-12-03 ENCOUNTER — HOSPITAL ENCOUNTER (OUTPATIENT)
Dept: PHYSICAL THERAPY | Age: 44
Setting detail: THERAPIES SERIES
Discharge: HOME | End: 2019-12-03
Payer: MEDICARE

## 2019-12-03 PROCEDURE — 97112 NEUROMUSCULAR REEDUCATION: CPT

## 2019-12-05 ENCOUNTER — HOSPITAL ENCOUNTER (OUTPATIENT)
Dept: PHYSICAL THERAPY | Age: 44
Setting detail: THERAPIES SERIES
Discharge: HOME | End: 2019-12-05
Payer: MEDICARE

## 2019-12-05 PROCEDURE — 97112 NEUROMUSCULAR REEDUCATION: CPT

## 2019-12-09 ENCOUNTER — HOSPITAL ENCOUNTER (OUTPATIENT)
Dept: PHYSICAL THERAPY | Age: 44
Setting detail: THERAPIES SERIES
Discharge: HOME | End: 2019-12-09
Payer: MEDICARE

## 2019-12-09 PROCEDURE — 97112 NEUROMUSCULAR REEDUCATION: CPT

## 2019-12-10 ENCOUNTER — HOSPITAL ENCOUNTER (OUTPATIENT)
Dept: PHYSICAL THERAPY | Age: 44
Setting detail: THERAPIES SERIES
Discharge: HOME | End: 2019-12-10
Payer: MEDICARE

## 2019-12-12 ENCOUNTER — HOSPITAL ENCOUNTER (OUTPATIENT)
Dept: PHYSICAL THERAPY | Age: 44
Setting detail: THERAPIES SERIES
Discharge: HOME | End: 2019-12-12
Payer: MEDICARE

## 2019-12-16 ENCOUNTER — HOSPITAL ENCOUNTER (OUTPATIENT)
Dept: PHYSICAL THERAPY | Age: 44
Setting detail: THERAPIES SERIES
Discharge: HOME | End: 2019-12-16
Payer: MEDICARE

## 2019-12-17 ENCOUNTER — APPOINTMENT (OUTPATIENT)
Dept: PHYSICAL THERAPY | Age: 44
End: 2019-12-17
Payer: MEDICARE

## 2019-12-19 ENCOUNTER — APPOINTMENT (OUTPATIENT)
Dept: PHYSICAL THERAPY | Age: 44
End: 2019-12-19
Payer: MEDICARE

## 2019-12-24 ENCOUNTER — HOSPITAL ENCOUNTER (OUTPATIENT)
Dept: PHYSICAL THERAPY | Age: 44
Setting detail: THERAPIES SERIES
Discharge: HOME | End: 2019-12-24
Payer: MEDICARE

## 2019-12-24 PROCEDURE — 97112 NEUROMUSCULAR REEDUCATION: CPT | Performed by: PHYSICAL THERAPIST

## 2019-12-30 ENCOUNTER — APPOINTMENT (OUTPATIENT)
Dept: PHYSICAL THERAPY | Age: 44
End: 2019-12-30
Payer: MEDICARE

## 2019-12-31 ENCOUNTER — APPOINTMENT (OUTPATIENT)
Dept: PHYSICAL THERAPY | Age: 44
End: 2019-12-31
Payer: MEDICARE

## 2020-01-03 ENCOUNTER — HOSPITAL ENCOUNTER (OUTPATIENT)
Dept: PHYSICAL THERAPY | Age: 45
Setting detail: THERAPIES SERIES
Discharge: HOME | End: 2020-01-03
Payer: MEDICARE

## 2020-01-03 PROCEDURE — 97112 NEUROMUSCULAR REEDUCATION: CPT | Performed by: PHYSICAL THERAPIST

## 2020-01-08 ENCOUNTER — TELEPHONE (OUTPATIENT)
Dept: PHYSICAL THERAPY | Age: 45
End: 2020-01-08

## 2020-01-13 ENCOUNTER — TELEPHONE (OUTPATIENT)
Dept: PHYSICAL THERAPY | Age: 45
End: 2020-01-13

## 2020-02-26 ENCOUNTER — OFFICE VISIT (OUTPATIENT)
Dept: OPTOMETRY | Age: 45
End: 2020-02-26
Payer: COMMERCIAL

## 2020-02-26 PROCEDURE — 92004 COMPRE OPH EXAM NEW PT 1/>: CPT | Performed by: OPTOMETRIST

## 2020-02-26 ASSESSMENT — REFRACTION_WEARINGRX
OD_SPHERE: -1.50
OS_AXIS: 158
OS_CYLINDER: -1.25
OD_AXIS: 014
OD_CYLINDER: -1.00
OS_SPHERE: -2.00

## 2020-02-26 ASSESSMENT — REFRACTION_MANIFEST
OD_AXIS: 013
OS_ADD: +1.50
OD_SPHERE: -1.50
OD_ADD: +1.50
OS_SPHERE: -2.00
OD_CYLINDER: -1.75
OS_AXIS: 163
OS_CYLINDER: -1.75

## 2020-02-26 ASSESSMENT — VISUAL ACUITY
OS_CC: 20/30
METHOD: SNELLEN - LINEAR

## 2020-02-26 ASSESSMENT — SLIT LAMP EXAM - LIDS
COMMENTS: NORMAL
COMMENTS: NORMAL

## 2020-02-26 ASSESSMENT — TONOMETRY
OD_IOP_MMHG: 13
IOP_METHOD: NON-CONTACT AIR PUFF
OS_IOP_MMHG: 11

## 2020-02-26 NOTE — PROGRESS NOTES
Gregoria Soto presents today for   Chief Complaint   Patient presents with    Vision Exam   .    HPI     Last Vision Exam: 2015  Last Ophthalmology Exam:   Last Filled Glasses Rx: 2015   5 years  Insurance:[employer]  Update: glasses  Patient is here for new glasses if needed. Patient reports visual changes that started about a month ago. It cleared up for a while, then came back. Current Outpatient Medications   Medication Sig Dispense Refill    oxybutynin (DITROPAN XL) 15 MG extended release tablet Take 1 tablet by mouth daily 90 tablet 3    oxybutynin (DITROPAN-XL) 10 MG extended release tablet 10 mg daily       megestrol (MEGACE) 20 MG tablet 20 mg daily      Biotin 1000 MCG CHEW Take 100 mg by mouth daily      RA VITAMIN D-3 5000 units CAPS capsule 5,000 Units daily  1    valACYclovir (VALTREX) 500 MG tablet Take 500 mg by mouth 2 times daily      omeprazole (PRILOSEC) 40 MG delayed release capsule take 1 capsule by mouth once daily  0    modafinil (PROVIGIL) 100 MG tablet Take 100 mg by mouth daily.  Vitamin E 400 units TABS Take by mouth daily      Cyanocobalamin (B-12) 3000 MCG SUBL Place under the tongue daily      Potassium Gluconate 595 (99 K) MG TABS Take by mouth daily      Multiple Vitamins-Minerals (MULTIVITAMIN ADULT PO) Take 1 tablet by mouth daily      Ascorbic Acid (VITAMIN C) 250 MG tablet Take 250 mg by mouth daily      gabapentin (NEURONTIN) 300 MG capsule Take 300 mg by mouth nightly      ocrelizumab (OCREVUS) 300 MG/10ML SOLN injection Infuse 300 mg intravenously once 2 times a year/ every 6 months      bisacodyl (DULCOLAX) 5 MG EC tablet Take 2 tablets by mouth daily as needed for Constipation. Patient is to purchase Dulcolax tablets over the counter if not covered by [employer]. Take four Dulcolax tablets as directed. Please follow further instructions as listed on your Colonoscopy Preparation sheet.  30 tablet 4    baclofen (LIORESAL) 10 MG tablet 10 mg 3

## 2020-03-05 ENCOUNTER — HOSPITAL ENCOUNTER (INPATIENT)
Age: 45
LOS: 2 days | Discharge: NURSING, REHAB OR LONG-TERM CARE | DRG: 552 | End: 2020-03-08
Attending: EMERGENCY MEDICINE | Admitting: INTERNAL MEDICINE
Payer: MEDICARE

## 2020-03-05 ENCOUNTER — APPOINTMENT (OUTPATIENT)
Dept: CT IMAGING | Age: 45
DRG: 552 | End: 2020-03-05
Payer: MEDICARE

## 2020-03-05 ENCOUNTER — APPOINTMENT (OUTPATIENT)
Dept: GENERAL RADIOLOGY | Age: 45
DRG: 552 | End: 2020-03-05
Payer: MEDICARE
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
## 2020-03-05 PROCEDURE — G0378 HOSPITAL OBSERVATION PER HR: HCPCS

## 2020-03-05 PROCEDURE — 99223 1ST HOSP IP/OBS HIGH 75: CPT | Performed by: INTERNAL MEDICINE

## 2020-03-05 PROCEDURE — 94760 N-INVAS EAR/PLS OXIMETRY 1: CPT

## 2020-03-05 PROCEDURE — 6360000002 HC RX W HCPCS: Performed by: EMERGENCY MEDICINE

## 2020-03-05 PROCEDURE — 6370000000 HC RX 637 (ALT 250 FOR IP): Performed by: INTERNAL MEDICINE

## 2020-03-05 PROCEDURE — 72131 CT LUMBAR SPINE W/O DYE: CPT

## 2020-03-05 PROCEDURE — 96375 TX/PRO/DX INJ NEW DRUG ADDON: CPT

## 2020-03-05 PROCEDURE — 99285 EMERGENCY DEPT VISIT HI MDM: CPT

## 2020-03-05 PROCEDURE — 72192 CT PELVIS W/O DYE: CPT

## 2020-03-05 PROCEDURE — 99218 PR INITIAL OBSERVATION CARE/DAY 30 MINUTES: CPT | Performed by: ORTHOPAEDIC SURGERY

## 2020-03-05 PROCEDURE — 6370000000 HC RX 637 (ALT 250 FOR IP): Performed by: EMERGENCY MEDICINE

## 2020-03-05 PROCEDURE — 96374 THER/PROPH/DIAG INJ IV PUSH: CPT

## 2020-03-05 PROCEDURE — 72100 X-RAY EXAM L-S SPINE 2/3 VWS: CPT

## 2020-03-05 PROCEDURE — 6370000000 HC RX 637 (ALT 250 FOR IP): Performed by: ORTHOPAEDIC SURGERY

## 2020-03-05 PROCEDURE — 94761 N-INVAS EAR/PLS OXIMETRY MLT: CPT

## 2020-03-05 PROCEDURE — 72170 X-RAY EXAM OF PELVIS: CPT

## 2020-03-05 PROCEDURE — 2580000003 HC RX 258: Performed by: INTERNAL MEDICINE

## 2020-03-05 RX ORDER — MORPHINE SULFATE 2 MG/ML
2 INJECTION, SOLUTION INTRAMUSCULAR; INTRAVENOUS
Status: DISCONTINUED | OUTPATIENT
Start: 2020-03-05 | End: 2020-03-08 | Stop reason: HOSPADM

## 2020-03-05 RX ORDER — HYDROCODONE BITARTRATE AND ACETAMINOPHEN 5; 325 MG/1; MG/1
1 TABLET ORAL EVERY 4 HOURS PRN
Status: DISCONTINUED | OUTPATIENT
Start: 2020-03-05 | End: 2020-03-06

## 2020-03-05 RX ORDER — SODIUM CHLORIDE 0.9 % (FLUSH) 0.9 %
10 SYRINGE (ML) INJECTION PRN
Status: DISCONTINUED | OUTPATIENT
Start: 2020-03-05 | End: 2020-03-08 | Stop reason: HOSPADM

## 2020-03-05 RX ORDER — GABAPENTIN 300 MG/1
300 CAPSULE ORAL NIGHTLY
Status: DISCONTINUED | OUTPATIENT
Start: 2020-03-05 | End: 2020-03-08 | Stop reason: HOSPADM

## 2020-03-05 RX ORDER — IBUPROFEN 200 MG
400 TABLET ORAL ONCE
Status: COMPLETED | OUTPATIENT
Start: 2020-03-05 | End: 2020-03-05

## 2020-03-05 RX ORDER — CYANOCOBALAMIN (VITAMIN B-12) 500 MCG
400 LOZENGE ORAL DAILY
Status: DISCONTINUED | OUTPATIENT
Start: 2020-03-05 | End: 2020-03-06

## 2020-03-05 RX ORDER — SODIUM CHLORIDE 0.9 % (FLUSH) 0.9 %
10 SYRINGE (ML) INJECTION EVERY 12 HOURS SCHEDULED
Status: DISCONTINUED | OUTPATIENT
Start: 2020-03-05 | End: 2020-03-08 | Stop reason: HOSPADM

## 2020-03-05 RX ORDER — VITAMIN B COMPLEX
5000 TABLET ORAL DAILY
Status: DISCONTINUED | OUTPATIENT
Start: 2020-03-05 | End: 2020-03-05

## 2020-03-05 RX ORDER — PANTOPRAZOLE SODIUM 40 MG/1
40 TABLET, DELAYED RELEASE ORAL
Status: DISCONTINUED | OUTPATIENT
Start: 2020-03-06 | End: 2020-03-08 | Stop reason: HOSPADM

## 2020-03-05 RX ORDER — ACETAMINOPHEN 325 MG/1
650 TABLET ORAL EVERY 4 HOURS PRN
Status: DISCONTINUED | OUTPATIENT
Start: 2020-03-05 | End: 2020-03-08 | Stop reason: HOSPADM

## 2020-03-05 RX ORDER — PERPHENAZINE/AMITRIPTYLINE HCL 2 MG-25 MG
1 TABLET ORAL DAILY
Status: DISCONTINUED | OUTPATIENT
Start: 2020-03-06 | End: 2020-03-08 | Stop reason: HOSPADM

## 2020-03-05 RX ORDER — ONDANSETRON 2 MG/ML
4 INJECTION INTRAMUSCULAR; INTRAVENOUS ONCE
Status: COMPLETED | OUTPATIENT
Start: 2020-03-05 | End: 2020-03-05

## 2020-03-05 RX ORDER — MULTIVITAMIN WITH FOLIC ACID 400 MCG
1 TABLET ORAL DAILY
Status: DISCONTINUED | OUTPATIENT
Start: 2020-03-05 | End: 2020-03-06

## 2020-03-05 RX ORDER — OXYBUTYNIN CHLORIDE 5 MG/1
15 TABLET, EXTENDED RELEASE ORAL DAILY
Status: DISCONTINUED | OUTPATIENT
Start: 2020-03-05 | End: 2020-03-08 | Stop reason: HOSPADM

## 2020-03-05 RX ORDER — MODAFINIL 100 MG/1
100 TABLET ORAL DAILY PRN
COMMUNITY
End: 2020-03-13 | Stop reason: SDUPTHER

## 2020-03-05 RX ORDER — BIOTIN 1000 MCG
100 TABLET,CHEWABLE ORAL DAILY
Status: DISCONTINUED | OUTPATIENT
Start: 2020-03-05 | End: 2020-03-05 | Stop reason: ALTCHOICE

## 2020-03-05 RX ORDER — ASCORBIC ACID 500 MG
250 TABLET ORAL DAILY
Status: DISCONTINUED | OUTPATIENT
Start: 2020-03-05 | End: 2020-03-06

## 2020-03-05 RX ORDER — ONDANSETRON 2 MG/ML
4 INJECTION INTRAMUSCULAR; INTRAVENOUS EVERY 6 HOURS PRN
Status: DISCONTINUED | OUTPATIENT
Start: 2020-03-05 | End: 2020-03-06

## 2020-03-05 RX ORDER — MEGESTROL ACETATE 20 MG/1
20 TABLET ORAL DAILY
Status: DISCONTINUED | OUTPATIENT
Start: 2020-03-05 | End: 2020-03-08 | Stop reason: HOSPADM

## 2020-03-05 RX ORDER — HYDROCODONE BITARTRATE AND ACETAMINOPHEN 5; 325 MG/1; MG/1
2 TABLET ORAL EVERY 4 HOURS PRN
Status: DISCONTINUED | OUTPATIENT
Start: 2020-03-05 | End: 2020-03-06

## 2020-03-05 RX ADMIN — HYDROMORPHONE HYDROCHLORIDE 0.5 MG: 1 INJECTION, SOLUTION INTRAMUSCULAR; INTRAVENOUS; SUBCUTANEOUS at 10:31

## 2020-03-05 RX ADMIN — OXYCODONE HYDROCHLORIDE AND ACETAMINOPHEN 250 MG: 500 TABLET ORAL at 14:57

## 2020-03-05 RX ADMIN — MEGESTROL ACETATE 20 MG: 20 TABLET ORAL at 18:31

## 2020-03-05 RX ADMIN — OXYBUTYNIN CHLORIDE 15 MG: 5 TABLET, EXTENDED RELEASE ORAL at 14:57

## 2020-03-05 RX ADMIN — THERA TABS 1 TABLET: TAB at 14:57

## 2020-03-05 RX ADMIN — IBUPROFEN 400 MG: 200 TABLET, FILM COATED ORAL at 08:41

## 2020-03-05 RX ADMIN — SODIUM CHLORIDE, PRESERVATIVE FREE 10 ML: 5 INJECTION INTRAVENOUS at 20:22

## 2020-03-05 RX ADMIN — HYDROCODONE BITARTRATE AND ACETAMINOPHEN 2 TABLET: 5; 325 TABLET ORAL at 16:42

## 2020-03-05 RX ADMIN — ONDANSETRON 4 MG: 2 INJECTION INTRAMUSCULAR; INTRAVENOUS at 10:26

## 2020-03-05 RX ADMIN — VITAMIN D, TAB 1000IU (100/BT) 5000 UNITS: 25 TAB at 14:58

## 2020-03-05 RX ADMIN — GABAPENTIN 300 MG: 300 CAPSULE ORAL at 20:22

## 2020-03-05 ASSESSMENT — PAIN SCALES - GENERAL
PAINLEVEL_OUTOF10: 6
PAINLEVEL_OUTOF10: 0
PAINLEVEL_OUTOF10: 5
PAINLEVEL_OUTOF10: 2
PAINLEVEL_OUTOF10: 7
PAINLEVEL_OUTOF10: 0
PAINLEVEL_OUTOF10: 6
PAINLEVEL_OUTOF10: 6

## 2020-03-05 ASSESSMENT — PAIN DESCRIPTION - LOCATION
LOCATION: BUTTOCKS
LOCATION: HIP;PELVIS

## 2020-03-05 ASSESSMENT — PAIN DESCRIPTION - PAIN TYPE
TYPE: ACUTE PAIN
TYPE: ACUTE PAIN

## 2020-03-05 ASSESSMENT — PAIN DESCRIPTION - ORIENTATION
ORIENTATION: LEFT
ORIENTATION: LEFT

## 2020-03-05 NOTE — H&P
(36.4 °C) (Tympanic)   Resp 14   Ht 6' 2\" (1.88 m)   Wt 130 lb 12.8 oz (59.3 kg)   SpO2 95%   BMI 16.79 kg/m²     HEENT: Normocephalic and Atraumatic  Neck: Supple, No Masses, Tenderness, Nodularity and No Lymphadenopathy  Chest/Lungs: Clear to Auscultation without Rales, Rhonchi, or Wheezes  Cardiac: Regular Rate and Rhythm  GI/Abdomen: Bowel Sounds Present and Soft, Non-tender, without Guarding or Rebound Tenderness  : Not examined  EXT/Skin: extreme pain lettock and sacrum with minimal movement----, No Edema, No Cyanosis and No Clubbing  Neuro: other than decreased ROM--generalized weakness---due to fall-fracture and MS---, Alert and Oriented and No Localizing Signs/Symptoms        LABS:    CBC with Differential:  No results found for: WBC, RBC, HGB, HCT, PLT, MCV, MCH, MCHC, RDW, NRBC, SEGSPCT, BANDSPCT, BLASTSPCT, METASPCT, LYMPHOPCT, PROMYELOPCT, MONOPCT, MYELOPCT, EOSPCT, BASOPCT, MONOSABS, LYMPHSABS, EOSABS, BASOSABS, DIFFTYPE  BMP:  No results found for: NA, K, CL, CO2, BUN, LABALBU, CREATININE, CALCIUM, GFRAA, LABGLOM, GLUCOSE    ASSESSMENT:      Patient Active Problem List   Diagnosis    Multiple sclerosis ([facility] [address]    Fall at home, initial encounter    Sacral fracture, closed ([facility] [address]   TEMPLE.   YESICA   P.      38  WM  [jemma Og ]  FULL CODE       NO ANTICOAGULATION---RETROPERITONEAL BLEEDING    Left ALA fracture---3.5.2020---retroperitoneal bleeding----compression fracture S2  Fall at home--3.5.2020            CT LS spine---3.5.2020---small hemorrhage left sacral ala fracture--                                      non-displaced fracture of left sacral ala---no acute fracture                                      lumbar spine             CT pelvis---3.5.2020----cortical buckling anterior sacrum S2 suggestive                                       of compression fracture  Incomplete emptying bladder--CIC  Multiple sclerosis          Gait-balance instability---orthostatic Ocrevus--ocrelizumab = current treatment              Has recd mitoxantrone--Cytoxan--now dc'd  Hypothyroidism   GERD  Tobacco abuse----quit----6. 1.2015  vaping---quit---6. 1.2015  PMH:  muscle twitch, ED, kidney stones  PSH:  colonoscopy, EGD    Allergies:        prednisone  Intolerances:  Excedrin Migraine--ASA--APAP--caffeine          PLAN:    1. Orthopedics  2. Pain control--Dilaudid IV  3. No anticoagulation due to hemorrhage  4. Home medications reviewed  5.   See orders     Note that over 50 minutes was spent in evaluation of the patient, review of the chart and pertinent records, discussion with family/staff, etc    Katy Vega MD  3:32 PM  3/5/2020

## 2020-03-05 NOTE — ED PROVIDER NOTES
[facility] ED  EMERGENCY DEPARTMENT ENCOUNTER      Pt Name: Mai Paulino  MRN: [id number]  [place] 1975  Date of evaluation: 3/5/2020  Provider: Fariha Tran MD    CHIEF COMPLAINT     Chief Complaint   Patient presents with    Fall     fell in home at 0600, 3/5/2020    Hip Pain     left hip         HISTORY OF PRESENT ILLNESS   (Location/Symptom, Timing/Onset, Context/Setting,Quality, Duration, Modifying Factors, Severity)  Note limiting factors. Mai Paulino is a 38 y.o. male who presents to the emergency department with a chief complaint of left-sided pelvic pain after a fall that he had this morning. Patient has multiple sclerosis and has difficulty maintaining his balance at times. This happened around 6 AM.  He has not been able to bear weight on his leg since the fall and localizes pain posteriorly over the left hemipelvis stating that the pain radiates across to the right side. 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 He self catheterizes 3-4 times a day. He denies any other area of injury. The history is provided by the patient, a relative and medical records. Nursing Notes werereviewed. REVIEW OF SYSTEMS    (2-9 systems for level 4, 10 or more for level 5)     Review of Systems   Musculoskeletal: Positive for gait problem and myalgias. All other systems reviewed and are negative. Except as noted above the remainder of the review of systems was reviewed and negative.        PAST MEDICAL HISTORY     Past Medical History:   Diagnosis Date    MS (multiple sclerosis) ([facility] [address]          SURGICALHISTORY       Past Surgical History:   Procedure Laterality Date    COLONOSCOPY      UPPER GASTROINTESTINAL ENDOSCOPY           CURRENT MEDICATIONS       Previous Medications    ASCORBIC ACID (VITAMIN C) 250 MG TABLET    Take 250 mg by mouth daily    BACLOFEN (LIORESAL) 10 MG TABLET    10 mg 3 times daily     BIOTIN 1000 MCG CHEW Notes

 

Note:                         

Notes:

CC was able to confirm all necessary apts:



Follow up with: 



[facility]

[address], 

[place], [place] [id number] [phone]





Next Medication Apt: Tuesday April 2nd (04/02/19) with Dr. Hoskins, check in 10:15am (at address 

above).*** 



Next Apt: on April 1st (01/01/19) with Driss Juan at 10:45am (at the above address). **Please 

call Driss as needed [phone].**



**Check into Dani Diallo ([facility]), with counselor on duty**between the hours of 8:30am to 

11am** Check in at .**







___________________________________________





BCFM: [facility] 

Bria Juares M.D  (PCP)

[address] [place] [id number] 

PH: [phone] 

FAX: [phone]



Next Apt: Friday April 5th (04/15/19) with Dr. Juares 10:30am. He needs to be there at 10:15 to check 


in.******

 

Date Signed:  03/28/2019 11:33 AM

Electronically Signed By:Reynaldo Gordillo Take 100 mg by mouth daily    BISACODYL (DULCOLAX) 5 MG EC TABLET    Take 2 tablets by mouth daily as needed for Constipation. Patient is to purchase Dulcolax tablets over the counter if not covered by [employer]. Take four Dulcolax tablets as directed. Please follow further instructions as listed on your Colonoscopy Preparation sheet. CYANOCOBALAMIN (B-12) 3000 MCG SUBL    Place under the tongue daily    GABAPENTIN (NEURONTIN) 300 MG CAPSULE    Take 300 mg by mouth nightly    MEGESTROL (MEGACE) 20 MG TABLET    20 mg daily    MIDODRINE (PROAMATINE) 5 MG TABLET    Take 5 mg by mouth 2 times daily     MODAFINIL (PROVIGIL) 100 MG TABLET    Take 100 mg by mouth daily. MULTIPLE VITAMINS-MINERALS (MULTIVITAMIN ADULT PO)    Take 1 tablet by mouth daily    OCRELIZUMAB (OCREVUS) 300 MG/10ML SOLN INJECTION    Infuse 300 mg intravenously once 2 times a year/ every 6 months    OMEPRAZOLE (PRILOSEC) 40 MG CAPSULE    Take 1 capsule by mouth daily for 90 days. OMEPRAZOLE (PRILOSEC) 40 MG DELAYED RELEASE CAPSULE    take 1 capsule by mouth once daily    OXYBUTYNIN (DITROPAN XL) 15 MG EXTENDED RELEASE TABLET    Take 1 tablet by mouth daily    OXYBUTYNIN (DITROPAN-XL) 10 MG EXTENDED RELEASE TABLET    10 mg daily     POTASSIUM GLUCONATE 595 (99 K) MG TABS    Take by mouth daily    RA VITAMIN D-3 5000 UNITS CAPS CAPSULE    5,000 Units daily    SILDENAFIL (VIAGRA) 100 MG TABLET    Take 100 mg by mouth as needed.     VALACYCLOVIR (VALTREX) 500 MG TABLET    Take 500 mg by mouth 2 times daily    VITAMIN E 400 UNITS TABS    Take by mouth daily       ALLERGIES     Excedrin migraine  [asa-apap-caff buffered] and Prednisone    FAMILY HISTORY       Family History   Problem Relation Age of Onset    Cancer Paternal Uncle     Cancer Paternal Grandmother           SOCIAL HISTORY       Social History     Socioeconomic History    Marital status: Single     Spouse name: None    Number of children: None    Years of education: None    Normal heart sounds. Pulmonary:      Effort: Pulmonary effort is normal.      Breath sounds: Normal breath sounds. Abdominal:      Palpations: Abdomen is soft. Tenderness: There is no abdominal tenderness. Musculoskeletal:      Comments: The clock is negative. Tenderness localizes posteriorly over the left hemipelvis. Patient has a long leg splint over the right leg and a brace to stabilize the right ankle. He is unable to raise either leg without assistance which is his baseline motor function. Any movement of the left leg is accompanied with pain referred to the back of his pelvis. Skin:     General: Skin is warm and dry. Coloration: Skin is not pale. Findings: No rash. Neurological:      Mental Status: He is alert and oriented to person, place, and time. Mental status is at baseline. DIAGNOSTIC RESULTS     EKG: All EKG's are interpreted by the Emergency Department Physician who either signs orCo-signs this chart in the absence of a cardiologist.    RADIOLOGY:   Non-plain film images such as CT, Ultrasound and MRI are read by the radiologist. Plain radiographic images are visualized and preliminarily interpreted by the emergency physician with the below findings:    Interpretation per the Radiologist below, ifavailable at the time of this note:    CT Lumbar Spine WO Contrast   Final Result   1. Nondisplaced fracture of the left sacral ala with a small amount of   overlying hemorrhage within the retroperitoneum. 2. No acute fracture of the lumbar spine evident. CT PELVIS WO CONTRAST Additional Contrast? None   Final Result   Nondisplaced left sacral ala fracture. Cortical buckling of the anterior   sacrum, suggestive of compression fracture. .         XR LUMBAR SPINE (2-3 VIEWS)   Final Result   1. No acute fracture or listhesis of the lumbar spine evident. 2. Mild degenerative facet arthropathy at L5-S1.          XR PELVIS (1-2 VIEWS)   Final Result   No pelvic Efforts were made to edit the dictations but occasionally words are mis-transcribed.)    Victorino Cuenca MD (electronically signed)  Attending Emergency Physician            Victorino Cuenca MD  03/05/20 0081

## 2020-03-06 PROBLEM — S32.10XA: Status: ACTIVE | Noted: 2020-03-06

## 2020-03-06 PROBLEM — S32.10XG: Status: ACTIVE | Noted: 2020-03-06

## 2020-03-06 PROBLEM — S34.139A: Status: ACTIVE | Noted: 2020-03-06

## 2020-03-06 LAB
ANION GAP SERPL CALCULATED.3IONS-SCNC: 11 MMOL/L (ref 9–17)
BUN BLDV-MCNC: 16 MG/DL (ref 6–20)
BUN/CREAT BLD: 21 (ref 9–20)
CALCIUM SERPL-MCNC: 9.1 MG/DL (ref 8.6–10.4)
CHLORIDE BLD-SCNC: 101 MMOL/L (ref 98–107)
CO2: 24 MMOL/L (ref 20–31)
CREAT SERPL-MCNC: 0.77 MG/DL (ref 0.7–1.2)
GFR AFRICAN AMERICAN: >60 ML/MIN
GFR NON-AFRICAN AMERICAN: >60 ML/MIN
GFR SERPL CREATININE-BSD FRML MDRD: ABNORMAL ML/MIN/{1.73_M2}
GFR SERPL CREATININE-BSD FRML MDRD: ABNORMAL ML/MIN/{1.73_M2}
GLUCOSE BLD-MCNC: 99 MG/DL (ref 70–99)
HCT VFR BLD CALC: 43.6 % (ref 40.7–50.3)
HEMOGLOBIN: 14.2 G/DL (ref 13–17)
MCH RBC QN AUTO: 27.9 PG (ref 25.2–33.5)
MCHC RBC AUTO-ENTMCNC: 32.6 G/DL (ref 25.2–33.5)
MCV RBC AUTO: 85.7 FL (ref 82.6–102.9)
NRBC AUTOMATED: 0 PER 100 WBC
PDW BLD-RTO: 13.7 % (ref 11.8–14.4)
PLATELET # BLD: 261 K/UL (ref 138–453)
PMV BLD AUTO: 9.3 FL (ref 8.1–13.5)
POTASSIUM SERPL-SCNC: 3.9 MMOL/L (ref 3.7–5.3)
RBC # BLD: 5.09 M/UL (ref 4.21–5.77)
SODIUM BLD-SCNC: 136 MMOL/L (ref 135–144)
WBC # BLD: 7.4 K/UL (ref 3.5–11.3)

## 2020-03-06 PROCEDURE — 85027 COMPLETE CBC AUTOMATED: CPT

## 2020-03-06 PROCEDURE — 80048 BASIC METABOLIC PNL TOTAL CA: CPT

## 2020-03-06 PROCEDURE — 96376 TX/PRO/DX INJ SAME DRUG ADON: CPT

## 2020-03-06 PROCEDURE — 1200000000 HC SEMI PRIVATE

## 2020-03-06 PROCEDURE — 2580000003 HC RX 258: Performed by: INTERNAL MEDICINE

## 2020-03-06 PROCEDURE — 6360000002 HC RX W HCPCS: Performed by: INTERNAL MEDICINE

## 2020-03-06 PROCEDURE — 6370000000 HC RX 637 (ALT 250 FOR IP): Performed by: INTERNAL MEDICINE

## 2020-03-06 PROCEDURE — 97165 OT EVAL LOW COMPLEX 30 MIN: CPT

## 2020-03-06 PROCEDURE — 36415 COLL VENOUS BLD VENIPUNCTURE: CPT

## 2020-03-06 PROCEDURE — 97161 PT EVAL LOW COMPLEX 20 MIN: CPT | Performed by: PHYSICAL THERAPIST

## 2020-03-06 PROCEDURE — 6370000000 HC RX 637 (ALT 250 FOR IP): Performed by: ORTHOPAEDIC SURGERY

## 2020-03-06 PROCEDURE — 99232 SBSQ HOSP IP/OBS MODERATE 35: CPT | Performed by: INTERNAL MEDICINE

## 2020-03-06 PROCEDURE — 2060000000 HC ICU INTERMEDIATE R&B

## 2020-03-06 RX ORDER — DOCUSATE SODIUM 100 MG/1
100 CAPSULE, LIQUID FILLED ORAL 2 TIMES DAILY
Status: DISCONTINUED | OUTPATIENT
Start: 2020-03-06 | End: 2020-03-08 | Stop reason: HOSPADM

## 2020-03-06 RX ORDER — BISACODYL 10 MG
10 SUPPOSITORY, RECTAL RECTAL DAILY PRN
Status: DISCONTINUED | OUTPATIENT
Start: 2020-03-06 | End: 2020-03-08 | Stop reason: HOSPADM

## 2020-03-06 RX ORDER — LIDOCAINE 4 G/G
1 PATCH TOPICAL DAILY
Status: DISCONTINUED | OUTPATIENT
Start: 2020-03-06 | End: 2020-03-08 | Stop reason: HOSPADM

## 2020-03-06 RX ORDER — TRAMADOL HYDROCHLORIDE 50 MG/1
100 TABLET ORAL EVERY 6 HOURS PRN
Status: DISCONTINUED | OUTPATIENT
Start: 2020-03-06 | End: 2020-03-08 | Stop reason: HOSPADM

## 2020-03-06 RX ORDER — LIDOCAINE 50 MG/G
1 PATCH TOPICAL DAILY
Status: DISCONTINUED | OUTPATIENT
Start: 2020-03-06 | End: 2020-03-06 | Stop reason: RX

## 2020-03-06 RX ORDER — TRAMADOL HYDROCHLORIDE 50 MG/1
50 TABLET ORAL EVERY 6 HOURS PRN
Status: DISCONTINUED | OUTPATIENT
Start: 2020-03-06 | End: 2020-03-08 | Stop reason: HOSPADM

## 2020-03-06 RX ORDER — ONDANSETRON 2 MG/ML
8 INJECTION INTRAMUSCULAR; INTRAVENOUS EVERY 4 HOURS PRN
Status: DISCONTINUED | OUTPATIENT
Start: 2020-03-06 | End: 2020-03-08 | Stop reason: HOSPADM

## 2020-03-06 RX ORDER — FAMOTIDINE 20 MG/1
20 TABLET, FILM COATED ORAL 2 TIMES DAILY
Status: DISCONTINUED | OUTPATIENT
Start: 2020-03-06 | End: 2020-03-08 | Stop reason: HOSPADM

## 2020-03-06 RX ADMIN — MEGESTROL ACETATE 20 MG: 20 TABLET ORAL at 10:09

## 2020-03-06 RX ADMIN — SODIUM CHLORIDE, PRESERVATIVE FREE 10 ML: 5 INJECTION INTRAVENOUS at 11:38

## 2020-03-06 RX ADMIN — HYDROCODONE BITARTRATE AND ACETAMINOPHEN 1 TABLET: 5; 325 TABLET ORAL at 06:44

## 2020-03-06 RX ADMIN — DOCUSATE SODIUM 100 MG: 100 CAPSULE, LIQUID FILLED ORAL at 20:25

## 2020-03-06 RX ADMIN — FAMOTIDINE 20 MG: 20 TABLET ORAL at 20:25

## 2020-03-06 RX ADMIN — FAMOTIDINE 20 MG: 20 TABLET ORAL at 12:41

## 2020-03-06 RX ADMIN — ONDANSETRON 4 MG: 2 INJECTION INTRAMUSCULAR; INTRAVENOUS at 06:38

## 2020-03-06 RX ADMIN — GABAPENTIN 300 MG: 300 CAPSULE ORAL at 20:25

## 2020-03-06 RX ADMIN — DOCUSATE SODIUM 100 MG: 100 CAPSULE, LIQUID FILLED ORAL at 10:06

## 2020-03-06 RX ADMIN — OXYCODONE HYDROCHLORIDE AND ACETAMINOPHEN 250 MG: 500 TABLET ORAL at 10:07

## 2020-03-06 RX ADMIN — TRAMADOL HYDROCHLORIDE 100 MG: 50 TABLET, FILM COATED ORAL at 12:40

## 2020-03-06 RX ADMIN — ONDANSETRON 8 MG: 2 INJECTION INTRAMUSCULAR; INTRAVENOUS at 11:36

## 2020-03-06 RX ADMIN — PANTOPRAZOLE SODIUM 40 MG: 40 TABLET, DELAYED RELEASE ORAL at 05:57

## 2020-03-06 RX ADMIN — HYDROCODONE BITARTRATE AND ACETAMINOPHEN 2 TABLET: 5; 325 TABLET ORAL at 00:03

## 2020-03-06 RX ADMIN — OXYBUTYNIN CHLORIDE 15 MG: 5 TABLET, EXTENDED RELEASE ORAL at 10:07

## 2020-03-06 RX ADMIN — THERA TABS 1 TABLET: TAB at 10:08

## 2020-03-06 RX ADMIN — BISACODYL 5 MG: 5 TABLET, COATED ORAL at 10:06

## 2020-03-06 ASSESSMENT — PAIN DESCRIPTION - DESCRIPTORS
DESCRIPTORS: SHARP
DESCRIPTORS: ACHING
DESCRIPTORS: SHARP

## 2020-03-06 ASSESSMENT — PAIN DESCRIPTION - LOCATION
LOCATION: BACK;BUTTOCKS
LOCATION: BACK;BUTTOCKS
LOCATION: HIP;PELVIS

## 2020-03-06 ASSESSMENT — PAIN SCALES - GENERAL
PAINLEVEL_OUTOF10: 9
PAINLEVEL_OUTOF10: 9
PAINLEVEL_OUTOF10: 7
PAINLEVEL_OUTOF10: 0
PAINLEVEL_OUTOF10: 3
PAINLEVEL_OUTOF10: 6
PAINLEVEL_OUTOF10: 4
PAINLEVEL_OUTOF10: 7

## 2020-03-06 ASSESSMENT — PAIN DESCRIPTION - PAIN TYPE
TYPE: ACUTE PAIN

## 2020-03-06 ASSESSMENT — PAIN DESCRIPTION - ORIENTATION
ORIENTATION: RIGHT;LEFT
ORIENTATION: RIGHT;LEFT
ORIENTATION: LEFT

## 2020-03-06 NOTE — PROGRESS NOTES
Physical Therapy    Facility/Department: [facility]  PROGRESSIVE CARE  Initial Assessment    NAME: Mai Paulino  : 1975  MRN: [id number]    Date of Service: 3/6/2020    Discharge Recommendations:  ECF with PT        Assessment   Body structures, Functions, Activity limitations: Decreased functional mobility ; Decreased balance  Prognosis: Fair  Decision Making: Low Complexity  REQUIRES PT FOLLOW UP: Yes  Activity Tolerance  Activity Tolerance: Patient limited by pain;Treatment limited secondary to medical complications (free text)       Patient Diagnosis(es): The encounter diagnosis was Closed fracture of sacrum, unspecified portion of sacrum, initial encounter ([facility] [address] has a past medical history of Chronic kidney disease, GERD (gastroesophageal reflux disease), and MS (multiple sclerosis) ([facility] [address] has a past surgical history that includes Colonoscopy and Upper gastrointestinal endoscopy.     Restrictions     Vision/Hearing        Subjective  General  Chart Reviewed: Yes  Patient assessed for rehabilitation services?: Yes  Response To Previous Treatment: Not applicable  Family / Caregiver Present: No  Follows Commands: Within Functional Limits  Pain Screening  Patient Currently in Pain: Yes  Pain Assessment  Pain Assessment: 0-10  Pain Level: 9  Pain Type: Acute pain  Pain Location: Back;Buttocks  Pain Orientation: Right;Left  Pain Descriptors: Sharp  Vital Signs  Patient Currently in Pain: Yes       Orientation  Orientation  Overall Orientation Status: Within Normal Limits  Social/Functional History  Social/Functional History  Lives With: Family  Type of Home: House  Home Equipment: Rolling walker(R LLB)  Receives Help From: Family  ADL Assistance: Independent  Ambulation Assistance: Needs assistance  Transfer Assistance: Needs assistance  Active : Yes  IADL Comments: Has used RW and R  LLB for a long time due to MS  Cognition        Objective          AROM RLE (degrees)  RLE AROM: WFL  AROM LLE

## 2020-03-06 NOTE — PROGRESS NOTES
kg)   SpO2 98%   BMI 16.43 kg/m²   24 hour intake/output:    Intake/Output Summary (Last 24 hours) at 3/6/2020 1252  Last data filed at 3/6/2020 0426  Gross per 24 hour   Intake 240 ml   Output 275 ml   Net -35 ml     Last 3 weights: Wt Readings from Last 3 Encounters:   03/06/20 128 lb (58.1 kg)   05/20/19 140 lb (63.5 kg)   05/20/19 139 lb 15.9 oz (63.5 kg)     HEENT: Normocephalic and Atraumatic  Neck: Supple, No Masses, Tenderness, Nodularity and No Lymphadenopathy  Chest/Lungs: Clear to Auscultation without Rales, Rhonchi, or Wheezes  Cardiac: Regular Rate and Rhythm  GI/Abdomen: Bowel Sounds Present and Soft, Non-tender, without Guarding or Rebound Tenderness  : Not examined  EXT/Skin: No Edema, No Cyanosis and No Clubbing  Neuro: pain with minimal movement back--legs and Alert and Oriented      Assessment:    Active Problems:    Fall at home, initial encounter    Sacral fracture, closed ([address]  Resolved Problems:    * No resolved hospital problems. *    TEMPLE. YESICA   P.      38  WM  [jemma Og ]  FULL CODE       NO ANTICOAGULATION---RETROPERITONEAL BLEEDING    Left ALA fracture---3.5.2020---retroperitoneal bleeding----compression fracture S2  Fall at home--3.5.2020            CT LS spine---3.5.2020---small hemorrhage left sacral ala fracture--                                      non-displaced fracture of left sacral ala---no acute fracture                                      lumbar spine             CT pelvis---3.5.2020----cortical buckling anterior sacrum S2 suggestive                                       of compression fracture  nausea---3.65.2020  Incomplete bladder emptying--[facility]  Multiple sclerosis           Gait-balance instability---orthostatic              Ocrevus--ocrelizumab = current treatment               Has recd mitoxantrone--Cytoxan---now dcd   Hypothyroidism   GERD   Tobacco abuse----quit----6. 1.2015  Vaping--quit--6. 1.2015  PMH:  muscle twitch, ED, kidney stones  PSH:

## 2020-03-06 NOTE — PLAN OF CARE
Problem: Falls - Risk of:  Goal: Will remain free from falls  Description  Will remain free from falls  3/5/2020 2029 by Gloria Spain RN  Outcome: Ongoing  3/5/2020 1315 by Karen Ornelas RN  Outcome: Ongoing  Goal: Absence of physical injury  Description  Absence of physical injury  3/5/2020 2029 by Gloria Spain RN  Outcome: Ongoing  3/5/2020 1315 by Karen Ornelas RN  Outcome: Ongoing     Problem: Pain:  Goal: Pain level will decrease  Description  Pain level will decrease  3/5/2020 2029 by Gloria Spain RN  Outcome: Ongoing  3/5/2020 1315 by Karen Ornelas RN  Outcome: Ongoing  Goal: Control of acute pain  Description  Control of acute pain  3/5/2020 2029 by Gloria Spain RN  Outcome: Ongoing  3/5/2020 1315 by Karen Ornelas RN  Outcome: Ongoing  Goal: Control of chronic pain  Description  Control of chronic pain  3/5/2020 2029 by Gloria Spain RN  Outcome: Ongoing  3/5/2020 1315 by Karen Ornelas RN  Outcome: Ongoing     Problem: Discharge Planning:  Goal: Discharged to appropriate level of care  Description  Discharged to appropriate level of care  3/5/2020 2029 by Gloria Spain RN  Outcome: Ongoing  3/5/2020 1315 by Karen Ornelas RN  Outcome: Ongoing     Problem: Injury - Risk of, Postfracture Complications:  Goal: Absence of fat embolism  Description  Absence of fat embolism  3/5/2020 2029 by Gloria Spain RN  Outcome: Ongoing  3/5/2020 1315 by Karen Ornelas RN  Outcome: Ongoing  Goal: Absence of compartment syndrome signs and symptoms  Description  Absence of compartment syndrome signs and symptoms  3/5/2020 2029 by Gloria Spain RN  Outcome: Ongoing  3/5/2020 1315 by Karen Ornelas RN  Outcome: Ongoing     Problem: Mobility - Impaired:  Goal: Mobility will improve to maximum level  Description  Mobility will improve to maximum level  3/5/2020 2029 by Gloria Spain RN  Outcome: Ongoing  3/5/2020 1315 by Karen Ornelas RN  Outcome: Ongoing     Problem: Venous Thromboembolism:  Goal: Absence of deep vein thrombosis  Description  Absence of deep vein thrombosis  3/5/2020 2029 by Gloria Spain RN  Outcome: Ongoing  3/5/2020 1315 by Karen Ornelas RN  Outcome: Ongoing  Goal: Absence of signs or symptoms of impaired coagulation  Description  Absence of signs or symptoms of impaired coagulation  3/5/2020 2029 by Gloria Spain RN  Outcome: Ongoing  3/5/2020 1315 by Karen Ornelas RN  Outcome: Ongoing  Goal: Will show no signs or symptoms of venous thromboembolism  Description  Will show no signs or symptoms of venous thromboembolism  3/5/2020 2029 by Gloria Spain RN  Outcome: Ongoing  3/5/2020 1315 by Karen Ornelas RN  Outcome: Ongoing

## 2020-03-07 LAB
ABSOLUTE EOS #: 0.15 K/UL (ref 0–0.44)
ABSOLUTE IMMATURE GRANULOCYTE: 0.05 K/UL (ref 0–0.3)
ABSOLUTE LYMPH #: 0.99 K/UL (ref 1.1–3.7)
ABSOLUTE MONO #: 1.01 K/UL (ref 0.1–1.2)
ANION GAP SERPL CALCULATED.3IONS-SCNC: 17 MMOL/L (ref 9–17)
BASOPHILS # BLD: 0 % (ref 0–2)
BASOPHILS ABSOLUTE: 0.03 K/UL (ref 0–0.2)
BUN BLDV-MCNC: 11 MG/DL (ref 6–20)
BUN/CREAT BLD: 15 (ref 9–20)
CALCIUM SERPL-MCNC: 9.1 MG/DL (ref 8.6–10.4)
CHLORIDE BLD-SCNC: 97 MMOL/L (ref 98–107)
CO2: 20 MMOL/L (ref 20–31)
CREAT SERPL-MCNC: 0.74 MG/DL (ref 0.7–1.2)
DIFFERENTIAL TYPE: ABNORMAL
EOSINOPHILS RELATIVE PERCENT: 2 % (ref 1–4)
GFR AFRICAN AMERICAN: >60 ML/MIN
GFR NON-AFRICAN AMERICAN: >60 ML/MIN
GFR SERPL CREATININE-BSD FRML MDRD: ABNORMAL ML/MIN/{1.73_M2}
GFR SERPL CREATININE-BSD FRML MDRD: ABNORMAL ML/MIN/{1.73_M2}
GLUCOSE BLD-MCNC: 117 MG/DL (ref 70–99)
HCT VFR BLD CALC: 41.4 % (ref 40.7–50.3)
HEMOGLOBIN: 14.1 G/DL (ref 13–17)
IMMATURE GRANULOCYTES: 1 %
LYMPHOCYTES # BLD: 10 % (ref 24–43)
MCH RBC QN AUTO: 28.4 PG (ref 25.2–33.5)
MCHC RBC AUTO-ENTMCNC: 34.1 G/DL (ref 25.2–33.5)
MCV RBC AUTO: 83.3 FL (ref 82.6–102.9)
MONOCYTES # BLD: 10 % (ref 3–12)
NRBC AUTOMATED: 0 PER 100 WBC
PDW BLD-RTO: 13.6 % (ref 11.8–14.4)
PLATELET # BLD: 260 K/UL (ref 138–453)
PLATELET ESTIMATE: ABNORMAL
PMV BLD AUTO: 9.5 FL (ref 8.1–13.5)
POTASSIUM SERPL-SCNC: 3.6 MMOL/L (ref 3.7–5.3)
RBC # BLD: 4.97 M/UL (ref 4.21–5.77)
RBC # BLD: ABNORMAL 10*6/UL
SEG NEUTROPHILS: 78 % (ref 36–65)
SEGMENTED NEUTROPHILS ABSOLUTE COUNT: 7.84 K/UL (ref 1.5–8.1)
SODIUM BLD-SCNC: 134 MMOL/L (ref 135–144)
WBC # BLD: 10.1 K/UL (ref 3.5–11.3)
WBC # BLD: ABNORMAL 10*3/UL

## 2020-03-07 PROCEDURE — 6370000000 HC RX 637 (ALT 250 FOR IP): Performed by: FAMILY MEDICINE

## 2020-03-07 PROCEDURE — 1200000000 HC SEMI PRIVATE

## 2020-03-07 PROCEDURE — 97110 THERAPEUTIC EXERCISES: CPT

## 2020-03-07 PROCEDURE — 97530 THERAPEUTIC ACTIVITIES: CPT

## 2020-03-07 PROCEDURE — 6370000000 HC RX 637 (ALT 250 FOR IP): Performed by: INTERNAL MEDICINE

## 2020-03-07 PROCEDURE — 80048 BASIC METABOLIC PNL TOTAL CA: CPT

## 2020-03-07 PROCEDURE — 85025 COMPLETE CBC W/AUTO DIFF WBC: CPT

## 2020-03-07 PROCEDURE — 36415 COLL VENOUS BLD VENIPUNCTURE: CPT

## 2020-03-07 PROCEDURE — 99232 SBSQ HOSP IP/OBS MODERATE 35: CPT | Performed by: FAMILY MEDICINE

## 2020-03-07 RX ORDER — SODIUM PHOSPHATE, DIBASIC AND SODIUM PHOSPHATE, MONOBASIC 7; 19 G/133ML; G/133ML
1 ENEMA RECTAL
Status: COMPLETED | OUTPATIENT
Start: 2020-03-07 | End: 2020-03-07

## 2020-03-07 RX ADMIN — TRAMADOL HYDROCHLORIDE 100 MG: 50 TABLET, FILM COATED ORAL at 09:12

## 2020-03-07 RX ADMIN — SODIUM PHOSPHATE, DIBASIC AND SODIUM PHOSPHATE, MONOBASIC 1 ENEMA: 7; 19 ENEMA RECTAL at 15:33

## 2020-03-07 RX ADMIN — DOCUSATE SODIUM 100 MG: 100 CAPSULE, LIQUID FILLED ORAL at 20:52

## 2020-03-07 RX ADMIN — MEGESTROL ACETATE 20 MG: 20 TABLET ORAL at 09:15

## 2020-03-07 RX ADMIN — FAMOTIDINE 20 MG: 20 TABLET ORAL at 20:52

## 2020-03-07 RX ADMIN — FAMOTIDINE 20 MG: 20 TABLET ORAL at 09:12

## 2020-03-07 RX ADMIN — OXYBUTYNIN CHLORIDE 15 MG: 5 TABLET, EXTENDED RELEASE ORAL at 09:12

## 2020-03-07 RX ADMIN — TRAMADOL HYDROCHLORIDE 100 MG: 50 TABLET, FILM COATED ORAL at 21:57

## 2020-03-07 RX ADMIN — DOCUSATE SODIUM 100 MG: 100 CAPSULE, LIQUID FILLED ORAL at 09:12

## 2020-03-07 RX ADMIN — PANTOPRAZOLE SODIUM 40 MG: 40 TABLET, DELAYED RELEASE ORAL at 06:02

## 2020-03-07 RX ADMIN — GABAPENTIN 300 MG: 300 CAPSULE ORAL at 20:52

## 2020-03-07 RX ADMIN — TRAMADOL HYDROCHLORIDE 100 MG: 50 TABLET, FILM COATED ORAL at 02:30

## 2020-03-07 ASSESSMENT — PAIN SCALES - GENERAL
PAINLEVEL_OUTOF10: 7
PAINLEVEL_OUTOF10: 4
PAINLEVEL_OUTOF10: 7
PAINLEVEL_OUTOF10: 7
PAINLEVEL_OUTOF10: 2
PAINLEVEL_OUTOF10: 6
PAINLEVEL_OUTOF10: 7

## 2020-03-07 ASSESSMENT — PAIN DESCRIPTION - LOCATION: LOCATION: BACK

## 2020-03-07 ASSESSMENT — PAIN DESCRIPTION - DESCRIPTORS: DESCRIPTORS: ACHING;CONSTANT

## 2020-03-07 ASSESSMENT — PAIN DESCRIPTION - FREQUENCY: FREQUENCY: CONTINUOUS

## 2020-03-07 ASSESSMENT — PAIN DESCRIPTION - ORIENTATION: ORIENTATION: LOWER

## 2020-03-07 ASSESSMENT — PAIN DESCRIPTION - PAIN TYPE: TYPE: ACUTE PAIN

## 2020-03-07 NOTE — PLAN OF CARE
Problem: Falls - Risk of:  Goal: Will remain free from falls  Description: Will remain free from falls  3/7/2020 1723 by Reema Candelario RN  Outcome: Ongoing  3/7/2020 0408 by Jonny Joy RN  Outcome: Ongoing  Goal: Absence of physical injury  Description: Absence of physical injury  3/7/2020 1723 by Reema Candelario RN  Outcome: Ongoing  3/7/2020 0408 by Jonny Joy RN  Outcome: Ongoing     Problem: Pain:  Goal: Pain level will decrease  Description: Pain level will decrease  3/7/2020 1723 by Reema Candelario RN  Outcome: Ongoing  3/7/2020 0408 by Jonny Joy RN  Outcome: Ongoing  Goal: Control of acute pain  Description: Control of acute pain  3/7/2020 1723 by Reema Candelario RN  Outcome: Ongoing  3/7/2020 0408 by Jonny Joy RN  Outcome: Ongoing  Goal: Control of chronic pain  Description: Control of chronic pain  3/7/2020 1723 by Reema Candelario RN  Outcome: Ongoing  3/7/2020 0408 by Jonny Joy RN  Outcome: Ongoing     Problem: Discharge Planning:  Goal: Discharged to appropriate level of care  Description: Discharged to appropriate level of care  3/7/2020 1723 by Reema Candelario RN  Outcome: Ongoing  3/7/2020 0408 by Jonny Joy RN  Outcome: Ongoing     Problem: Injury - Risk of, Postfracture Complications:  Goal: Absence of fat embolism  Description: Absence of fat embolism  3/7/2020 1723 by Reema Candelario RN  Outcome: Ongoing  3/7/2020 0408 by Jonny Joy RN  Outcome: Ongoing  Goal: Absence of compartment syndrome signs and symptoms  Description: Absence of compartment syndrome signs and symptoms  3/7/2020 1723 by Reema Candelario RN  Outcome: Ongoing  3/7/2020 0408 by Jonny Joy RN  Outcome: Ongoing     Problem: Mobility - Impaired:  Goal: Mobility will improve to maximum level  Description: Mobility will improve to maximum level  3/7/2020 1723 by Reema Candelario RN  Outcome: Ongoing  3/7/2020 0408 by Jonny Joy RN  Outcome: Ongoing     Problem: Venous Thromboembolism:  Goal: Absence of

## 2020-03-07 NOTE — PLAN OF CARE
Problem: Falls - Risk of:  Goal: Will remain free from falls  Description: Will remain free from falls  Outcome: Ongoing  Goal: Absence of physical injury  Description: Absence of physical injury  Outcome: Ongoing     Problem: Pain:  Goal: Pain level will decrease  Description: Pain level will decrease  Outcome: Ongoing  Goal: Control of acute pain  Description: Control of acute pain  Outcome: Ongoing  Goal: Control of chronic pain  Description: Control of chronic pain  Outcome: Ongoing     Problem: Discharge Planning:  Goal: Discharged to appropriate level of care  Description: Discharged to appropriate level of care  Outcome: Ongoing     Problem: Injury - Risk of, Postfracture Complications:  Goal: Absence of fat embolism  Description: Absence of fat embolism  Outcome: Ongoing  Goal: Absence of compartment syndrome signs and symptoms  Description: Absence of compartment syndrome signs and symptoms  Outcome: Ongoing     Problem: Mobility - Impaired:  Goal: Mobility will improve to maximum level  Description: Mobility will improve to maximum level  Outcome: Ongoing     Problem: Venous Thromboembolism:  Goal: Absence of deep vein thrombosis  Description: Absence of deep vein thrombosis  Outcome: Ongoing  Goal: Absence of signs or symptoms of impaired coagulation  Description: Absence of signs or symptoms of impaired coagulation  Outcome: Ongoing  Goal: Will show no signs or symptoms of venous thromboembolism  Description: Will show no signs or symptoms of venous thromboembolism  Outcome: Ongoing     Problem: IP BALANCE  Goal: LTG - Patient will maintain balance to allow for safe/functional mobility  Outcome: Ongoing     Problem: DISCHARGE BARRIERS  Goal: Patient's continuum of care needs are met  Outcome: Ongoing

## 2020-03-07 NOTE — PROGRESS NOTES
rate and rhythm, S1, S2 normal, no murmur, click, rub or gallop  Abdomen: soft, non-tender; bowel sounds normal; no masses,  no organomegaly  Extremities: extremities normal, atraumatic, no cyanosis or edema  Neurologic: Mental status: Alert, oriented, thought content appropriate    Prophylaxis:   DVT with  [] lovenox        [] heparin        [] Scd        [x] none:     Radiology:  Xr Lumbar Spine (2-3 Views)    Result Date: 3/5/2020  EXAMINATION: THREE XRAY VIEWS OF THE LUMBAR SPINE 3/5/2020 8:26 am COMPARISON: None. HISTORY: ORDERING SYSTEM PROVIDED HISTORY: fall TECHNOLOGIST PROVIDED HISTORY: fall Reason for Exam: Fall this morning, lower back and left sided hip pain Acuity: Acute Type of Exam: Initial FINDINGS: There is normal alignment of the lumbar spine. There is no acute fracture or listhesis present. Vertebral body heights and disc spaces are preserved. There is mild facet arthropathy at L5-S1.     1. No acute fracture or listhesis of the lumbar spine evident. 2. Mild degenerative facet arthropathy at L5-S1. Xr Pelvis (1-2 Views)    Result Date: 3/5/2020  EXAMINATION: ONE XRAY VIEW OF THE PELVIS 3/5/2020 8:26 am COMPARISON: None. HISTORY: ORDERING SYSTEM PROVIDED HISTORY: fall TECHNOLOGIST PROVIDED HISTORY: fall Reason for Exam: Fall this morning, left lateral pelvis and hip pain. Acuity: Acute Type of Exam: Initial FINDINGS: The bony pelvis is intact. The femoral heads overlying the acetabula bilaterally. The sacroiliac joints are normal in appearance. No pelvic fracture identified. Ct Lumbar Spine Wo Contrast    Result Date: 3/5/2020  EXAMINATION: CT OF THE LUMBAR SPINE WITHOUT CONTRAST  3/5/2020 TECHNIQUE: CT of the lumbar spine was performed without the administration of intravenous contrast. Multiplanar reformatted images are provided for review.  Dose modulation, iterative reconstruction, and/or weight based adjustment of the mA/kV was utilized to reduce the radiation dose to as low as reasonably achievable. COMPARISON: None HISTORY: ORDERING SYSTEM PROVIDED HISTORY: trauma TECHNOLOGIST PROVIDED HISTORY: trauma Reason for Exam: R/o fx after fall Acuity: Acute Type of Exam: Initial FINDINGS: BONES/ALIGNMENT: There is normal alignment of the lumbar spine. There is no acute fracture of the lumbar spine evident. There is a nondisplaced fracture of the left sacral ala. No other fractures are identified. DEGENERATIVE CHANGES: No significant degenerative changes are identified. There is no significant spinal canal stenosis or neural foraminal narrowing. SOFT TISSUES/RETROPERITONEUM: There is a small amount of hemorrhage overlying the left sacral ala fracture. There is a 2 cm simple hepatic cyst measuring an average density of 10 Hounsfield units. 1. Nondisplaced fracture of the left sacral ala with a small amount of overlying hemorrhage within the retroperitoneum. 2. No acute fracture of the lumbar spine evident. Ct Pelvis Wo Contrast Additional Contrast? None    Result Date: 3/5/2020  EXAMINATION: CT OF THE PELVIS WITHOUT CONTRAST 3/5/2020 9:25 am TECHNIQUE: CT of the pelvis was performed without the administration of intravenous contrast.  Multiplanar reformatted images are provided for review. Dose modulation, iterative reconstruction, and/or weight based adjustment of the mA/kV was utilized to reduce the radiation dose to as low as reasonably achievable. COMPARISON: None. HISTORY: ORDERING SYSTEM PROVIDED HISTORY: trauma, pain to bony pelvis TECHNOLOGIST PROVIDED HISTORY: trauma, pain to bony pelvis Reason for Exam: R/o fx after fall Acuity: Acute Type of Exam: Initial FINDINGS: Bones/joints: There is anterior cortical buckling of the S2 vertebral body which is new from previous study of 11/26/2013. This is best seen on the sagittal reconstructions. Additionally, there is a nondisplaced left sacral ala fracture. No additional fractures are identified.  Soft tissues: Limited evaluation

## 2020-03-07 NOTE — PROGRESS NOTES
Physical Therapy  Facility/Department: [facility]  PROGRESSIVE CARE  Daily Treatment Note  NAME: Adryan Gallardo  : 1975  MRN: [id number]    Date of Service: 3/7/2020    Discharge Recommendations:  Continue to assess pending progress        Assessment   Body structures, Functions, Activity limitations: Decreased functional mobility ; Decreased strength;Decreased balance;Decreased endurance  Prognosis: Good  REQUIRES PT FOLLOW UP: Yes  Activity Tolerance  Activity Tolerance: Patient limited by fatigue;Patient limited by endurance; Patient limited by pain     Patient Diagnosis(es): The encounter diagnosis was Closed fracture of sacrum, unspecified portion of sacrum, initial encounter ([facility] [address] has a past medical history of Chronic kidney disease, GERD (gastroesophageal reflux disease), and MS (multiple sclerosis) ([facility] [address] has a past surgical history that includes Colonoscopy and Upper gastrointestinal endoscopy. Restrictions     Subjective   General  Response To Previous Treatment: Patient with no complaints from previous session. Subjective  Subjective: Pt found supine in bed upon arrival. Agreeable to therapy.    Pain Screening  Patient Currently in Pain: Yes  Pain Assessment  Pain Assessment: 0-10  Pain Level: 7  Pain Type: Acute pain  Pain Location: Back  Pain Orientation: Lower  Pain Descriptors: Aching;Constant  Pain Frequency: Continuous  Vital Signs  Patient Currently in Pain: Yes       Orientation  Orientation  Overall Orientation Status: Within Normal Limits  Cognition      Objective   Bed mobility  Bridging: Unable to assess  Rolling to Left: Stand by assistance  Rolling to Right: Stand by assistance  Supine to Sit: Stand by assistance  Sit to Supine: Stand by assistance  Scooting: Stand by assistance  Transfers  Sit to Stand: Maximum Assistance  Stand to sit: Maximum Assistance  Bed to Chair: Unable to assess  Stand Pivot Transfers: Unable to assess  Squat Pivot Transfers: Unable to assess  Lateral Transfers: Unable to assess  Comment: Pt performed sit to stands x5 with fatigue noted. Ambulation  Ambulation?: No  More Ambulation?: No  Ambulation 1  Surface: level tile  Device: Rolling Walker  Assistance: Moderate assistance  Comments: Pt only performed sit to stands x5 with prolonged standing. Unable to ambulate d/t weakness. Stairs/Curb  Stairs?: No  Neuromuscular Education  NDT Treatment: Lower extremity; Sitting;Standing  Balance  Posture: Fair  Sitting - Static: Good  Sitting - Dynamic: Good  Standing - Static: Fair  Standing - Dynamic: Fair  Exercises  Quad Sets: x15  Heelslides: x15  Gluteal Sets: x15  Hip Abduction: x15  Comments: Limited by foot drop and weakness  Other exercises  Other exercises?: No                        G-Code     OutComes Score                                                     AM-PAC Score             Goals  Short term goals  Time Frame for Short term goals: 1 day  Short term goal 1: Assess functional status  Long term goals  Time Frame for Long term goals : 3 days  Long term goal 1: Transfer mod assist +1 person  Long term goal 2: Pivot mod assist +1 person  Long term goal 3: Gait with RW and R leg brace 5-10 ft    Plan    Plan  Times per day: Daily  Current Treatment Recommendations: Strengthening, Balance Training, Functional Mobility Training, Transfer Training, Endurance Training, Gait Training  Safety Devices  Type of devices:  All fall risk precautions in place, Call light within reach, Bed alarm in place, Gait belt, Patient at risk for falls, Left in bed, Nurse notified     Therapy Time   Individual Concurrent Group Co-treatment   Time In 0928         Time Out 0955         Minutes 27         Timed Code Treatment Minutes: [facility], [facility]

## 2020-03-08 VITALS
HEIGHT: 74 IN | TEMPERATURE: 98.3 F | SYSTOLIC BLOOD PRESSURE: 135 MMHG | OXYGEN SATURATION: 94 % | RESPIRATION RATE: 18 BRPM | WEIGHT: 128 LBS | HEART RATE: 98 BPM | BODY MASS INDEX: 16.43 KG/M2 | DIASTOLIC BLOOD PRESSURE: 61 MMHG

## 2020-03-08 LAB
ABSOLUTE EOS #: 0.19 K/UL (ref 0–0.44)
ABSOLUTE IMMATURE GRANULOCYTE: 0.06 K/UL (ref 0–0.3)
ABSOLUTE LYMPH #: 1.29 K/UL (ref 1.1–3.7)
ABSOLUTE MONO #: 0.84 K/UL (ref 0.1–1.2)
ANION GAP SERPL CALCULATED.3IONS-SCNC: 16 MMOL/L (ref 9–17)
BASOPHILS # BLD: 1 % (ref 0–2)
BASOPHILS ABSOLUTE: 0.04 K/UL (ref 0–0.2)
BUN BLDV-MCNC: 10 MG/DL (ref 6–20)
BUN/CREAT BLD: 15 (ref 9–20)
CALCIUM SERPL-MCNC: 9 MG/DL (ref 8.6–10.4)
CHLORIDE BLD-SCNC: 97 MMOL/L (ref 98–107)
CO2: 23 MMOL/L (ref 20–31)
CREAT SERPL-MCNC: 0.67 MG/DL (ref 0.7–1.2)
DIFFERENTIAL TYPE: ABNORMAL
EOSINOPHILS RELATIVE PERCENT: 3 % (ref 1–4)
GFR AFRICAN AMERICAN: >60 ML/MIN
GFR NON-AFRICAN AMERICAN: >60 ML/MIN
GFR SERPL CREATININE-BSD FRML MDRD: ABNORMAL ML/MIN/{1.73_M2}
GFR SERPL CREATININE-BSD FRML MDRD: ABNORMAL ML/MIN/{1.73_M2}
GLUCOSE BLD-MCNC: 102 MG/DL (ref 70–99)
HCT VFR BLD CALC: 40.6 % (ref 40.7–50.3)
HEMOGLOBIN: 13.6 G/DL (ref 13–17)
IMMATURE GRANULOCYTES: 1 %
LYMPHOCYTES # BLD: 18 % (ref 24–43)
MCH RBC QN AUTO: 28 PG (ref 25.2–33.5)
MCHC RBC AUTO-ENTMCNC: 33.5 G/DL (ref 25.2–33.5)
MCV RBC AUTO: 83.5 FL (ref 82.6–102.9)
MONOCYTES # BLD: 12 % (ref 3–12)
NRBC AUTOMATED: 0 PER 100 WBC
PDW BLD-RTO: 13.4 % (ref 11.8–14.4)
PLATELET # BLD: 267 K/UL (ref 138–453)
PLATELET ESTIMATE: ABNORMAL
PMV BLD AUTO: 9.4 FL (ref 8.1–13.5)
POTASSIUM SERPL-SCNC: 3.8 MMOL/L (ref 3.7–5.3)
RBC # BLD: 4.86 M/UL (ref 4.21–5.77)
RBC # BLD: ABNORMAL 10*6/UL
SEG NEUTROPHILS: 66 % (ref 36–65)
SEGMENTED NEUTROPHILS ABSOLUTE COUNT: 4.69 K/UL (ref 1.5–8.1)
SODIUM BLD-SCNC: 136 MMOL/L (ref 135–144)
WBC # BLD: 7.1 K/UL (ref 3.5–11.3)
WBC # BLD: ABNORMAL 10*3/UL

## 2020-03-08 PROCEDURE — 6370000000 HC RX 637 (ALT 250 FOR IP): Performed by: INTERNAL MEDICINE

## 2020-03-08 PROCEDURE — 97110 THERAPEUTIC EXERCISES: CPT

## 2020-03-08 PROCEDURE — 99238 HOSP IP/OBS DSCHRG MGMT 30/<: CPT | Performed by: FAMILY MEDICINE

## 2020-03-08 PROCEDURE — 80048 BASIC METABOLIC PNL TOTAL CA: CPT

## 2020-03-08 PROCEDURE — 36415 COLL VENOUS BLD VENIPUNCTURE: CPT

## 2020-03-08 PROCEDURE — 85025 COMPLETE CBC W/AUTO DIFF WBC: CPT

## 2020-03-08 RX ORDER — LIDOCAINE 4 G/G
1 PATCH TOPICAL DAILY
Qty: 30 PATCH | Refills: 1 | Status: SHIPPED | OUTPATIENT
Start: 2020-03-09 | End: 2020-04-08

## 2020-03-08 RX ADMIN — OXYBUTYNIN CHLORIDE 15 MG: 5 TABLET, EXTENDED RELEASE ORAL at 08:43

## 2020-03-08 RX ADMIN — FAMOTIDINE 20 MG: 20 TABLET ORAL at 08:43

## 2020-03-08 RX ADMIN — MEGESTROL ACETATE 20 MG: 20 TABLET ORAL at 08:43

## 2020-03-08 RX ADMIN — DOCUSATE SODIUM 100 MG: 100 CAPSULE, LIQUID FILLED ORAL at 08:43

## 2020-03-08 RX ADMIN — PANTOPRAZOLE SODIUM 40 MG: 40 TABLET, DELAYED RELEASE ORAL at 06:08

## 2020-03-08 RX ADMIN — TRAMADOL HYDROCHLORIDE 100 MG: 50 TABLET, FILM COATED ORAL at 16:28

## 2020-03-08 ASSESSMENT — PAIN DESCRIPTION - FREQUENCY: FREQUENCY: CONTINUOUS

## 2020-03-08 ASSESSMENT — PAIN DESCRIPTION - ORIENTATION: ORIENTATION: LOWER

## 2020-03-08 ASSESSMENT — PAIN DESCRIPTION - PAIN TYPE: TYPE: ACUTE PAIN

## 2020-03-08 ASSESSMENT — PAIN DESCRIPTION - DESCRIPTORS: DESCRIPTORS: ACHING;CONSTANT

## 2020-03-08 ASSESSMENT — PAIN DESCRIPTION - LOCATION: LOCATION: BACK

## 2020-03-08 ASSESSMENT — PAIN SCALES - GENERAL
PAINLEVEL_OUTOF10: 10
PAINLEVEL_OUTOF10: 7

## 2020-03-08 NOTE — PLAN OF CARE
Problem: Falls - Risk of:  Goal: Will remain free from falls  Description: Will remain free from falls  3/7/2020 2302 by Nydia Ramirez RN  Outcome: Ongoing  3/7/2020 1723 by Jeimy Wright RN  Outcome: Ongoing  Goal: Absence of physical injury  Description: Absence of physical injury  3/7/2020 2302 by Nydia Ramirez RN  Outcome: Ongoing  3/7/2020 1723 by Jeimy Wright RN  Outcome: Ongoing     Problem: Pain:  Goal: Pain level will decrease  Description: Pain level will decrease  3/7/2020 2302 by Nydia Ramirez RN  Outcome: Ongoing  3/7/2020 1723 by Jeimy Wright RN  Outcome: Ongoing  Goal: Control of acute pain  Description: Control of acute pain  3/7/2020 2302 by Nydia Ramirez RN  Outcome: Ongoing  3/7/2020 1723 by Jeimy Wright RN  Outcome: Ongoing  Goal: Control of chronic pain  Description: Control of chronic pain  3/7/2020 2302 by Nydia Ramirez RN  Outcome: Ongoing  3/7/2020 1723 by Jeimy Wright RN  Outcome: Ongoing     Problem: Discharge Planning:  Goal: Discharged to appropriate level of care  Description: Discharged to appropriate level of care  3/7/2020 2302 by Nydia Ramirez RN  Outcome: Ongoing  3/7/2020 1723 by Jeimy Wright RN  Outcome: Ongoing     Problem: Injury - Risk of, Postfracture Complications:  Goal: Absence of fat embolism  Description: Absence of fat embolism  3/7/2020 2302 by Nydia Ramirez RN  Outcome: Ongoing  3/7/2020 1723 by Jeimy Wright RN  Outcome: Ongoing  Goal: Absence of compartment syndrome signs and symptoms  Description: Absence of compartment syndrome signs and symptoms  3/7/2020 2302 by Nydia Ramirez RN  Outcome: Ongoing  3/7/2020 1723 by Jeimy Wright RN  Outcome: Ongoing     Problem: Mobility - Impaired:  Goal: Mobility will improve to maximum level  Description: Mobility will improve to maximum level  3/7/2020 2302 by Nydia Ramirez RN  Outcome: Ongoing  3/7/2020 1723 by Jeimy Wright RN  Outcome: Ongoing     Problem: Venous Thromboembolism:  Goal: Absence of

## 2020-03-08 NOTE — PROGRESS NOTES
1510- Report called to Isabela RN at [facility]. Pt to be discharged later to facility.

## 2020-03-08 NOTE — PROGRESS NOTES
Physical Therapy  Facility/Department: [facility]  PROGRESSIVE CARE  Daily Treatment Note  NAME: Terese Obrien  : 1975  MRN: [id number]    Date of Service: 3/8/2020    Discharge Recommendations:  Subacute/Skilled Nursing Facility        Assessment   Body structures, Functions, Activity limitations: Decreased functional mobility ; Decreased strength;Decreased balance;Decreased endurance  Prognosis: Good;Excellent  PT Education: Transfer Training  REQUIRES PT FOLLOW UP: No  Activity Tolerance  Activity Tolerance: Patient Tolerated treatment well;Patient limited by fatigue;Patient limited by pain     Patient Diagnosis(es): The encounter diagnosis was Closed fracture of sacrum, unspecified portion of sacrum, initial encounter ([facility] [address] has a past medical history of Chronic kidney disease, GERD (gastroesophageal reflux disease), and MS (multiple sclerosis) ([facility] has a past surgical history that includes Colonoscopy and Upper gastrointestinal endoscopy. Restrictions     Subjective   General  Chart Reviewed: Yes  Response To Previous Treatment: Patient with no complaints from previous session. Family / Caregiver Present: No  Subjective  Subjective: Pt found supine in bed upon arrival. Agreeable to therapy.    Pain Screening  Patient Currently in Pain: Yes  Pain Assessment  Pain Assessment: 0-10  Pain Level: 7  Patient's Stated Pain Goal: No pain  Pain Type: Acute pain  Pain Location: Back  Pain Orientation: Lower  Pain Descriptors: Aching;Constant  Pain Frequency: Continuous  Vital Signs  Patient Currently in Pain: Yes       Orientation  Orientation  Overall Orientation Status: Within Normal Limits  Cognition      Objective   Bed mobility  Bridging: Unable to assess  Rolling to Left: Minimal assistance;Contact guard assistance  Rolling to Right: Contact guard assistance;Minimal assistance  Supine to Sit: Contact guard assistance;Minimal assistance  Sit to Supine: Contact guard assistance;Minimal assistance  Scooting:

## 2020-03-08 NOTE — FLOWSHEET NOTE
stopped by patient's room while rounding. Patient was not available because he was being prepared for transfer.

## 2020-03-08 NOTE — PROGRESS NOTES
0830- Writer to room. Pt voiced frustration with not being able to sleep much due to being checked on. Pt requesting lights off and not to be checked on until he pushes the call light. Perla Brooks  In to talk w/ pt. Pt notified that a sign will be put on the door so no one opens doors to wake him. Bed alarm on.

## 2020-03-08 NOTE — DISCHARGE SUMMARY
Hospitalist Discharge Summary    Humble Shaw  :  1975  MRN:  [id number]    Admit date:  3/5/2020  Discharge date: 3/8/20     Admitting Physician:  Lorina Sandhoff    Discharge Diagnoses:   Patient Active Problem List   Diagnosis    Multiple sclerosis ([facility] [address]   James Fall at home, initial encounter    Sacral fracture, closed ([facility] [address]    Closed compression fracture of sacrum, with delayed healing, subsequent encounter    Fx sacrum-closed w/ cord inj ([facility] [address]        Admission Condition:  fair      Discharged Condition:  fair    Hospital Course/Treatments   38 yr old w/m admitted with h/o of multiple sclerosis and had a fall and sustaining non displaced fx of sacrum,pt was seen by ortho and was advised wt bearing as tolerated, pt on walker for ambulation, doing slightly better, therapy worked on him today, pt will be sent to rehab for therapy    Discharge Medications:       [place] Medication Instructions [id number]    Printed on:20 2221   Medication Information                      Ascorbic Acid (VITAMIN C) 250 MG tablet  Take 250 mg by mouth daily             baclofen (LIORESAL) 10 MG tablet  10 mg 3 times daily as needed              Biotin 1000 MCG CHEW  Take 100 mg by mouth daily             bisacodyl (DULCOLAX) 5 MG EC tablet  Take 2 tablets by mouth daily as needed for Constipation. Patient is to purchase Dulcolax tablets over the counter if not covered by [employer]. Take four Dulcolax tablets as directed. Please follow further instructions as listed on your Colonoscopy Preparation sheet. Cyanocobalamin (B-12) 3000 MCG SUBL  Take by mouth daily              gabapentin (NEURONTIN) 300 MG capsule  Take 300 mg by mouth nightly             lidocaine 4 % external patch  Place 1 patch onto the skin daily             megestrol (MEGACE) 20 MG tablet  20 mg daily             modafinil (PROVIGIL) 100 MG tablet  Take 100 mg by mouth daily as needed.              Multiple Vitamins-Minerals (MULTIVITAMIN ADULT PO)  Take 1 tablet by mouth daily             ocrelizumab (OCREVUS) 300 MG/10ML SOLN injection  Infuse 300 mg intravenously once 2 times a year/ every 6 months             omeprazole (PRILOSEC) 40 MG delayed release capsule  take 1 capsule by mouth once daily             oxybutynin (DITROPAN XL) 15 MG extended release tablet  Take 1 tablet by mouth daily             RA VITAMIN D-3 5000 units CAPS capsule  5,000 Units daily             valACYclovir (VALTREX) 500 MG tablet  Take 500 mg by mouth 2 times daily as needed              Vitamin E 400 units TABS  Take by mouth daily                 Consults:  IP CONSULT TO ORTHOPEDIC SURGERY    Significant Diagnostic Studies:  Xr Lumbar Spine (2-3 Views)    Result Date: 3/5/2020  EXAMINATION: THREE XRAY VIEWS OF THE LUMBAR SPINE 3/5/2020 8:26 am COMPARISON: None. HISTORY: ORDERING SYSTEM PROVIDED HISTORY: fall TECHNOLOGIST PROVIDED HISTORY: fall Reason for Exam: Fall this morning, lower back and left sided hip pain Acuity: Acute Type of Exam: Initial FINDINGS: There is normal alignment of the lumbar spine. There is no acute fracture or listhesis present. Vertebral body heights and disc spaces are preserved. There is mild facet arthropathy at L5-S1.     1. No acute fracture or listhesis of the lumbar spine evident. 2. Mild degenerative facet arthropathy at L5-S1. Xr Pelvis (1-2 Views)    Result Date: 3/5/2020  EXAMINATION: ONE XRAY VIEW OF THE PELVIS 3/5/2020 8:26 am COMPARISON: None. HISTORY: ORDERING SYSTEM PROVIDED HISTORY: fall TECHNOLOGIST PROVIDED HISTORY: fall Reason for Exam: Fall this morning, left lateral pelvis and hip pain. Acuity: Acute Type of Exam: Initial FINDINGS: The bony pelvis is intact. The femoral heads overlying the acetabula bilaterally. The sacroiliac joints are normal in appearance. No pelvic fracture identified.      Ct Lumbar Spine Wo Contrast    Result Date: 3/5/2020  EXAMINATION: CT OF THE LUMBAR Initial FINDINGS: Bones/joints: There is anterior cortical buckling of the S2 vertebral body which is new from previous study of 11/26/2013. This is best seen on the sagittal reconstructions. Additionally, there is a nondisplaced left sacral ala fracture. No additional fractures are identified. Soft tissues: Limited evaluation of the intrapelvic structures reveals no acute abnormality. No pelvic free fluid. Nondisplaced left sacral ala fracture. Cortical buckling of the anterior sacrum, suggestive of compression fracture. .       Disposition:   SNF    Discharge Instructions: Activity: activity as tolerated  Diet:  regular diet    Follow up with Dianna Lara in 1 weeks.     Signed:  Angle Kraft  3/8/2020, 1:25 PM    Time spent in discharge of this pt is more than 30 minutes in examination,evaluvation,  counseling and review of medication and discharge plan

## 2020-03-08 NOTE — DISCHARGE INSTR - COC
Continuity of Care Form    Patient Name: Nate Babcock   :  1975  MRN:  [id number]    Admit date:  3/5/2020  Discharge date:  ***    Code Status Order: Full Code   Advance Directives:   Advance Care Flowsheet Documentation     Date/Time Healthcare Directive Type of Healthcare Directive Copy in [address] Agent's Name Healthcare Agent's Phone Number    20 1255  No, patient does not have an advance directive for healthcare treatment -- -- -- -- --          Admitting Physician:  Rachel Whyte  PCP: Cornelia Lang    Discharging Nurse: [facility] Unit/Room#: 0202/0202-01  Discharging Unit Phone Number: ***    Emergency Contact:   Extended Emergency Contact Information  Primary Emergency Contact: [facility]  Address: [address] [place] [address], [address] [place]  Home Phone: [phone]  Relation: Other  Secondary Emergency Contact: [address] [place] [address] Phone: [phone]  Relation: Brother/Sister    Past Surgical History:  Past Surgical History:   Procedure Laterality Date    COLONOSCOPY      UPPER GASTROINTESTINAL ENDOSCOPY         Immunization History: There is no immunization history on file for this patient. Active Problems:  Patient Active Problem List   Diagnosis Code    Multiple sclerosis ([facility] [address] G35    Fall at home, initial encounter W19. Brandi Ashley, Y92.009    Sacral fracture, closed ([facility] [address] S32.10XA    Closed compression fracture of sacrum, with delayed healing, subsequent encounter S32.10XG    Fx sacrum-closed w/ cord inj ([facility] [address] S34.139A, S32.10XA       Isolation/Infection:   Isolation          No Isolation        Patient Infection Status     None to display          Nurse Assessment:  Last Vital Signs: /61   Pulse 98   Temp 98.3 °F (36.8 °C) (Oral)   Resp 18   Ht 6' 2\" (1.88 m)   Wt 128 lb (58.1 kg)   SpO2 94%   BMI 16.43 kg/m²     Last documented pain score (0-10 scale): Pain Level: 10  Last Weight:   Wt Readings from Last 1 Encounters:   20 128 lb (58.1 kg)     Mental Status:  {IP PT MENTAL STATUS:65606}    IV Access:  { NASH IV ACCESS:729882441}    Nursing Mobility/ADLs:  Walking   {P DME VXSN:761691276}  Transfer  {CHP DME EUEY:684633038}  Bathing  {CHP DME BLKQ:829075604}  Dressing  {CHP DME DPPF:044961946}  Toileting  {P DME MOXL:407114874}  Feeding  {P DME PGUT:700084452}  Med Admin  {P DME HYPK:415801358}  Med Delivery   { NASH MED Delivery:164740108}    Wound Care Documentation and Therapy:        Elimination:  Continence:   · Bowel: {YES / AV:52983}  · Bladder: {YES / KD:64566}  Urinary Catheter: {Urinary Catheter:303012530}   Colostomy/Ileostomy/Ileal Conduit: {YES / EK:74877}       Date of Last BM: ***    Intake/Output Summary (Last 24 hours) at 3/8/2020 1703  Last data filed at 3/8/2020 1224  Gross per 24 hour   Intake --   Output 725 ml   Net -725 ml     I/O last 3 completed shifts:  In: -   Out: 725 [Urine:725]    Safety Concerns:     [address] Safety Concerns:181892853}    Impairments/Disabilities:      [address] EDAN Impairments/Disabilities:891122488}    Nutrition Therapy:  Current Nutrition Therapy:   [address] EDAN Diet List:197000113}    Routes of Feeding: {[facility] DME Other Feedings:918887728}  Liquids: {Slp liquid thickness:99882}  Daily Fluid Restriction: {CHP DME Yes amt example:302142466}  Last Modified Barium Swallow with Video (Video Swallowing Test): {Done Not Done VZNJ:935797982}    Treatments at the Time of Hospital Discharge:   Respiratory Treatments: ***  Oxygen Therapy:  {Therapy; copd oxygen:10947}  Ventilator:    {[facility] Vent FMEQ:259888260}    Rehab Therapies: {THERAPEUTIC INTERVENTION:3597112146}  Weight Bearing Status/Restrictions: [employer] Weight Bearin}  Other Medical Equipment (for information only, NOT a DME order):  {EQUIPMENT:760394827}  Other Treatments: ***    Patient's personal belongings (please select all that are sent with patient):  {P DME Belongings:503189820}    RN SIGNATURE: {Esignature:696335987}    CASE MANAGEMENT/SOCIAL WORK SECTION    Inpatient Status Date: ***    Readmission Risk Assessment Score:  Readmission Risk              Risk of Unplanned Readmission:        8           Discharging to Facility/ Agency   · Name:   · Address:  · Phone:  · Fax:    Dialysis Facility (if applicable)   · Name:  · Address:  · Dialysis Schedule:  · Phone:  · Fax:    / signature: {Esignature:109787496}    PHYSICIAN SECTION    Prognosis: {Prognosis:5275958040}    Condition at Discharge: [address] [place] [address] Patient Condition:124342538}    Rehab Potential (if transferring to Rehab): {Prognosis:7600623566}    Recommended Labs or Other Treatments After Discharge: ***    Physician Certification: I certify the above information and transfer of Terese Obrien  is necessary for the continuing treatment of the diagnosis listed and that he requires {Admit to Appropriate Level of Care:10081} for {GREATER/LESS:292749261} 30 days.      Update Admission H&P: {CHP DME Changes in SZCYS:410006187}    PHYSICIAN SIGNATURE:  {Esignature:300007742}

## 2020-03-08 NOTE — FLOWSHEET NOTE
Pt woke up. Called for help to get pants on. Pt. Stated he slept well for 4 hours. Physical therapy to room to work with pt.

## 2020-03-09 ENCOUNTER — TELEPHONE (OUTPATIENT)
Dept: INTERNAL MEDICINE | Age: 45
End: 2020-03-09

## 2020-03-11 ENCOUNTER — TELEPHONE (OUTPATIENT)
Dept: INTERNAL MEDICINE | Age: 45
End: 2020-03-11

## 2020-03-12 ENCOUNTER — OUTSIDE SERVICES (OUTPATIENT)
Dept: INTERNAL MEDICINE | Age: 45
End: 2020-03-12
Payer: MEDICARE

## 2020-03-12 PROCEDURE — 99308 SBSQ NF CARE LOW MDM 20: CPT | Performed by: NURSE PRACTITIONER

## 2020-03-12 NOTE — PROGRESS NOTES
Occupational History    Not on file   Social Needs    Financial resource strain: Not on file    Food insecurity     Worry: Not on file     Inability: Not on file    Transportation needs     Medical: Not on file     Non-medical: Not on file   Tobacco Use    Smoking status: Former Smoker     Packs/day: 1.00     Last attempt to quit: 2015     Years since quittin.7    Smokeless tobacco: Never Used   Substance and Sexual Activity    Alcohol use: No    Drug use: No    Sexual activity: Not on file   Lifestyle    Physical activity     Days per week: Not on file     Minutes per session: Not on file    Stress: Not on file   Relationships    Social connections     Talks on phone: Not on file     Gets together: Not on file     Attends Anabaptist service: Not on file     Active member of club or organization: Not on file     Attends meetings of clubs or organizations: Not on file     Relationship status: Not on file    Intimate partner violence     Fear of current or ex partner: Not on file     Emotionally abused: Not on file     Physically abused: Not on file     Forced sexual activity: Not on file   Other Topics Concern    Not on file   Social History Narrative    Not on file       Review of Systems   Musculoskeletal: Positive for arthralgias, gait problem and myalgias. Neurological: Positive for weakness. All other systems reviewed and are negative. Physical Exam  Vitals signs and nursing note reviewed. Constitutional:       General: He is not in acute distress. Appearance: He is well-developed. He is not diaphoretic. HENT:      Head: Normocephalic and atraumatic. Eyes:      General:         Right eye: No discharge. Left eye: No discharge. Neck:      Trachea: No tracheal deviation. Cardiovascular:      Rate and Rhythm: Normal rate and regular rhythm. Heart sounds: No murmur. Pulmonary:      Effort: Pulmonary effort is normal. No respiratory distress.       Breath

## 2020-03-13 ENCOUNTER — TELEPHONE (OUTPATIENT)
Dept: INTERNAL MEDICINE | Age: 45
End: 2020-03-13

## 2020-03-13 RX ORDER — MODAFINIL 100 MG/1
100 TABLET ORAL DAILY PRN
Qty: 30 TABLET | Refills: 0 | Status: SHIPPED | OUTPATIENT
Start: 2020-03-13 | End: 2020-04-12

## 2020-03-13 RX ORDER — MIRTAZAPINE 7.5 MG/1
7.5 TABLET, FILM COATED ORAL NIGHTLY
Qty: 30 TABLET | Refills: 0 | Status: SHIPPED | OUTPATIENT
Start: 2020-03-13 | End: 2020-10-26

## 2020-03-13 RX ORDER — OXYBUTYNIN CHLORIDE 15 MG/1
15 TABLET, EXTENDED RELEASE ORAL DAILY
Qty: 30 TABLET | Refills: 0 | Status: SHIPPED | OUTPATIENT
Start: 2020-03-13 | End: 2020-06-01 | Stop reason: SDUPTHER

## 2020-03-13 NOTE — TELEPHONE ENCOUNTER
[place]AR[place] checked today    Controlled Substance Monitoring:    Acute and Chronic Pain Monitoring:   RX Monitoring 3/13/2020   Periodic Controlled Substance Monitoring No signs of potential drug abuse or diversion identified.

## 2020-03-23 RX ORDER — OXYBUTYNIN CHLORIDE 15 MG/1
TABLET, EXTENDED RELEASE ORAL
Qty: 30 TABLET | Refills: 11 | OUTPATIENT
Start: 2020-03-23

## 2020-04-14 NOTE — TELEPHONE ENCOUNTER
Spoke with [facility] ([phone]) regarding new order for PT. Informed Orval Zahraa that patient was recently DC'd to [facility] three days prior and has no need for new rehab orders at this time.  Understanding vocalized

## 2020-05-05 ENCOUNTER — TELEPHONE (OUTPATIENT)
Dept: UROLOGY | Age: 45
End: 2020-05-05

## 2020-06-01 ENCOUNTER — VIRTUAL VISIT (OUTPATIENT)
Dept: UROLOGY | Age: 45
End: 2020-06-01
Payer: MEDICARE

## 2020-06-01 VITALS — HEIGHT: 74 IN | BODY MASS INDEX: 16.44 KG/M2 | WEIGHT: 128.09 LBS

## 2020-06-01 PROCEDURE — G8427 DOCREV CUR MEDS BY ELIG CLIN: HCPCS | Performed by: UROLOGY

## 2020-06-01 PROCEDURE — 99214 OFFICE O/P EST MOD 30 MIN: CPT | Performed by: UROLOGY

## 2020-06-01 RX ORDER — OXYBUTYNIN CHLORIDE 15 MG/1
15 TABLET, EXTENDED RELEASE ORAL DAILY
Qty: 90 TABLET | Refills: 3 | Status: SHIPPED | OUTPATIENT
Start: 2020-06-01 | End: 2021-08-18

## 2020-06-01 NOTE — PROGRESS NOTES
negative   Neurological: negative  Hematological/Lymphatic: negative  Psychological: negative        Physical Exam:    This a 38 y.o. male  There were no vitals filed for this visit. Body mass index is 16.44 kg/m². Constitutional: Patient in no acute distress;           Assessment and Plan        1. Neurogenic bladder    2. Benign localized prostatic hyperplasia with lower urinary tract symptoms (LUTS)    3. Erectile dysfunction, unspecified erectile dysfunction type    4. Anejaculation               Plan:      No new UTI. No problems with catheterizing  There was an element of prostatic hypertrophy. However, pt has not been able to void for > 25 years on his own and it his voiding dysfunction is primarily neurogenic in etiology and not obstructive  Continue oxybutynin 15 daily  ED worsening. undergoing trimix injections. Left trimix injection out of refrigerator. Recommend increasing dosage. Refill dosage. Appropriate instructions provided (including presenting to ED if prolonged erection)  Anejaculation- possibly neurogenic component. No treatment option for patient. Follow up in one year       Kiki Rojas was evaluated by a Virtual Visit (video  visit) encounter to address concerns as mentioned above. A caregiver was present when appropriate. Due to this being a TeleHealth encounter (During [facility] public health emergency), evaluation of the following organ systems was limited: Vitals/Constitutional/EENT/Resp/CV/GI//MS/Neuro/Skin/Heme-Lymph-Imm. Pursuant to the emergency declaration under the [facility] authority and the [employer] Act, this Virtual Visit was conducted with patient's (and/or legal guardian's) consent, to reduce the patient's risk of exposure to COVID-19 and provide necessary medical care.   The patient (and/or legal guardian) has also been advised to contact this office for worsening

## 2020-06-03 ENCOUNTER — HOSPITAL ENCOUNTER (EMERGENCY)
Age: 45
Discharge: HOME | End: 2020-06-03
Attending: EMERGENCY MEDICINE
Payer: MEDICARE

## 2020-06-03 VITALS
BODY MASS INDEX: 16.04 KG/M2 | HEART RATE: 89 BPM | OXYGEN SATURATION: 96 % | DIASTOLIC BLOOD PRESSURE: 70 MMHG | TEMPERATURE: 97.5 F | WEIGHT: 125 LBS | SYSTOLIC BLOOD PRESSURE: 133 MMHG | RESPIRATION RATE: 14 BRPM

## 2020-06-03 PROCEDURE — 99283 EMERGENCY DEPT VISIT LOW MDM: CPT

## 2020-06-03 PROCEDURE — 6360000002 HC RX W HCPCS: Performed by: EMERGENCY MEDICINE

## 2020-06-03 PROCEDURE — 96372 THER/PROPH/DIAG INJ SC/IM: CPT

## 2020-06-03 RX ORDER — TERBUTALINE SULFATE 1 MG/ML
0.5 INJECTION, SOLUTION SUBCUTANEOUS ONCE
Status: COMPLETED | OUTPATIENT
Start: 2020-06-03 | End: 2020-06-03

## 2020-06-03 RX ADMIN — TERBUTALINE SULFATE 0.5 MG: 1 INJECTION SUBCUTANEOUS at 11:14

## 2020-06-03 ASSESSMENT — PAIN DESCRIPTION - LOCATION: LOCATION: GROIN

## 2020-06-03 ASSESSMENT — PAIN SCALES - GENERAL: PAINLEVEL_OUTOF10: 2

## 2020-06-03 ASSESSMENT — PAIN DESCRIPTION - DESCRIPTORS: DESCRIPTORS: DISCOMFORT

## 2020-06-03 NOTE — ED PROVIDER NOTES
[facility] ED  [address]  [place] [address]  Phone: [phone]    Pt Name: Dami Carrizales  MRN: [id number]  [place] 1975  Date of evaluation: 6/3/2020      CHIEF COMPLAINT       Chief Complaint   Patient presents with    Other     used trimex at [id number] 75 last night to creat erection and it has not gone down - usually takes pseudephed to help it release but so far it is not helping- has been able to self catherize to empty bladder         HISTORY OF PRESENT ILLNESS     Dami Carrizales is a 38 y.o. male who presents with a prolonged erection. He takes a medicine called Trimex which he injects into his penis to gain an erection. He used this at 11:30 last night and woke up this morning with continued erection. .  He has been self cathing as he has a history of MS with lower extremity weakness. No urinary complaints. No urethral discharge. No other HEENT complaints. His vital signs are normal upon admission. He has a 2 out of a 10 penile pain at this time. REVIEW OF SYSTEMS         REVIEW OF SYSTEMS    Constitutional:  Denies fever, chills, or weakness   Eyes:  Denies vision changes  HEENT:  Denies sore throat or nasal congestion  Respiratory:  Denies cough or shortness of breath   Cardiovascular:  Denies chest pain  GI:  Denies abdominal pain, nausea, vomiting, or diarrhea   Musculoskeletal:  Denies back pain   Skin:  No rash on exposed surfaces   Neurologic:  Normal baseline mentation. No new deficits. Lymphatic:   No nodes or infection  Psychiatric:  No psychosis. Alert and interacting normally. Other ROS negative except as noted above. PAST MEDICAL HISTORY    has a past medical history of Chronic kidney disease, GERD (gastroesophageal reflux disease), and MS (multiple sclerosis) ([facility] [address] SURGICAL HISTORY      has a past surgical history that includes Colonoscopy and Upper gastrointestinal endoscopy.     CURRENT MEDICATIONS       Previous Medications    ASCORBIC ACID Constitutional: The patient is alert and well-developed, vital signs as noted. Psychiatric: Oriented to time, place and person, affect is appropriate for age. Eyes: Pupils equal and reactive to light. EOMI. Ears, nose, and throat: Oropharynx clear  Neck: No masses, trachea midline, and no thyromegaly. Respiratory: Clear to auscultation, full aeration throughout all fields. Cardiovascular: No murmurs, heart sounds normal, no thrills. Gastrointestinal: No masses, no hepatosplenomegaly, bowel sounds positive. No tenderness. Skin: No rashes or lesions on exposed surfaces, good skin turgor. Male genitourinary demonstrates a erection which is not tender at this time. There is no testicular pain or urethral discharge. No inguinal adenopathy is noted. .  Extremities: Good range of motion, no edema. DIFFERENTIAL DIAGNOSIS/ MEDICAL DECISION MAKING:     Terbutaline subcu was ineffective    Iraj-Synephrine 500 µg injected to the right cavernosa resulted in resolution of his erection    Follow Exit Care instructions closely. I have reviewed the disposition diagnosis with the patient and or their family/guardian. I have answered their questions and given discharge instructions. They voiced understanding of these instructions and did not have any further questions or complaints. DIAGNOSTIC RESULTS     RADIOLOGY:   Non-plain film images such as CT, Ultrasound and MRI are read by the radiologist. Plain radiographic images are visualized and preliminarily interpreted by the emergency physician with the below findings:  No orders to display       LABS:  No results found for this visit on 06/03/20. ABNORMAL LABS:  Labs Reviewed - No data to display     EKG:  All EKG's are interpreted by the Emergency Department Physician who either signs or Co-signs this chart in the absence of a cardiologist.        EMERGENCY DEPARTMENT COURSE:   Vitals:    Vitals:    06/03/20 1002   BP: 133/70   Pulse: 89   Resp: 14 Temp: 97.5 °F (36.4 °C)   TempSrc: Tympanic   SpO2: 96%   Weight: 125 lb (56.7 kg)     -------------------------  BP: 133/70, Temp: 97.5 °F (36.4 °C), Pulse: 89, Resp: 14    See DDX/MDM (Differential Diagnosis/Medical Decision Making) above      FINAL IMPRESSION      1.  Priapism          DISPOSITION/PLAN   DISPOSITION Decision To Discharge 06/03/2020 12:29:08 PM      Condition on Disposition    Fair    PATIENT REFERRED TO:  Miguel Ángel Steward  [address]    In 2 days        DISCHARGE MEDICATIONS:  New Prescriptions    No medications on file       (Please note that portions of this note were completed with a voice recognition program.  Efforts were made to edit the dictations but occasionally words are mis-transcribed.)    Suly Card,   Attending Emergency Physician       [address] [place] [address],   06/03/20 6692

## 2020-06-20 ENCOUNTER — HOSPITAL ENCOUNTER (EMERGENCY)
Age: 45
Discharge: HOME | End: 2020-06-20
Attending: EMERGENCY MEDICINE
Payer: MEDICARE

## 2020-06-20 VITALS
RESPIRATION RATE: 18 BRPM | SYSTOLIC BLOOD PRESSURE: 125 MMHG | OXYGEN SATURATION: 98 % | TEMPERATURE: 96.6 F | HEIGHT: 74 IN | DIASTOLIC BLOOD PRESSURE: 61 MMHG | BODY MASS INDEX: 16.68 KG/M2 | WEIGHT: 130 LBS | HEART RATE: 72 BPM

## 2020-06-20 PROCEDURE — 2580000003 HC RX 258: Performed by: EMERGENCY MEDICINE

## 2020-06-20 PROCEDURE — 54220 IRRG CRPRA CAVRNOSA PRIAPISM: CPT

## 2020-06-20 PROCEDURE — 99283 EMERGENCY DEPT VISIT LOW MDM: CPT

## 2020-06-20 PROCEDURE — 6360000002 HC RX W HCPCS: Performed by: EMERGENCY MEDICINE

## 2020-06-20 PROCEDURE — 96372 THER/PROPH/DIAG INJ SC/IM: CPT

## 2020-06-20 RX ORDER — PHENYLEPHRINE HYDROCHLORIDE 10 MG/ML
INJECTION INTRAVENOUS
Status: DISCONTINUED
Start: 2020-06-20 | End: 2020-06-20 | Stop reason: HOSPADM

## 2020-06-20 RX ORDER — TERBUTALINE SULFATE 1 MG/ML
0.5 INJECTION, SOLUTION SUBCUTANEOUS ONCE
Status: COMPLETED | OUTPATIENT
Start: 2020-06-20 | End: 2020-06-20

## 2020-06-20 RX ADMIN — TERBUTALINE SULFATE 0.5 MG: 1 INJECTION SUBCUTANEOUS at 13:52

## 2020-06-20 RX ADMIN — PHENYLEPHRINE HYDROCHLORIDE: 10 INJECTION INTRAVENOUS at 16:15

## 2020-06-20 RX ADMIN — PHENYLEPHRINE HYDROCHLORIDE: 10 INJECTION INTRAVENOUS at 15:15

## 2020-06-20 ASSESSMENT — ENCOUNTER SYMPTOMS
SORE THROAT: 0
BACK PAIN: 0
TROUBLE SWALLOWING: 0
ABDOMINAL PAIN: 0
DIARRHEA: 0
SHORTNESS OF BREATH: 0
VOMITING: 0
WHEEZING: 0
NAUSEA: 0

## 2020-06-20 ASSESSMENT — PAIN SCALES - GENERAL: PAINLEVEL_OUTOF10: 2

## 2020-06-20 NOTE — ED PROVIDER NOTES
[facility]  eMERGENCY dEPARTMENT eNCOUnter      Pt Name: Kae Lesch  MRN: [id number]  [place] 1975  Date of evaluation: 6/20/2020      CHIEF COMPLAINT       Chief Complaint   Patient presents with    Other     priapism since 0800         HISTORY OF PRESENT ILLNESS    Kae Lesch is a 38 y.o. male who presents with priapism, he used Trimix to achieve erection this morning around 8:00 it is not gone down he is taken to [facility] which normally will bring it down and has not this been no fevers no chills no cough no difficulty breathing he does have a history of MS      REVIEW OF SYSTEMS         Review of Systems   Constitutional: Negative for chills and fever. HENT: Negative for congestion, dental problem, sore throat and trouble swallowing. Eyes: Negative for visual disturbance. Respiratory: Negative for shortness of breath and wheezing. Cardiovascular: Negative for chest pain, palpitations and leg swelling. Gastrointestinal: Negative for abdominal pain, diarrhea, nausea and vomiting. Genitourinary: Positive for penile pain and penile swelling. Negative for difficulty urinating, dysuria and testicular pain. Musculoskeletal: Positive for gait problem. Negative for back pain, joint swelling and neck pain. Patient uses a leg brace on his right leg and uses a walker secondary to his MS   Skin: Negative for rash. Neurological: Negative for dizziness, syncope, weakness and headaches. Hematological: Negative for adenopathy. Does not bruise/bleed easily. Psychiatric/Behavioral: Negative for confusion and suicidal ideas. PAST MEDICAL HISTORY    has a past medical history of Chronic kidney disease, GERD (gastroesophageal reflux disease), and MS (multiple sclerosis) ([facility] [address] SURGICAL HISTORY      has a past surgical history that includes Colonoscopy and Upper gastrointestinal endoscopy.     CURRENT MEDICATIONS       Previous Medications    ASCORBIC ACID (VITAMIN C) 250 MG TABLET    Take 250 mg by mouth daily    BACLOFEN (LIORESAL) 10 MG TABLET    10 mg 3 times daily as needed     BIOTIN 1000 MCG CHEW    Take 100 mg by mouth daily    CYANOCOBALAMIN (B-12) 3000 MCG SUBL    Take by mouth daily     GABAPENTIN (NEURONTIN) 300 MG CAPSULE    Take 300 mg by mouth nightly    MEGESTROL (MEGACE) 20 MG TABLET    20 mg daily    MIRTAZAPINE (REMERON) 7.5 MG TABLET    Take 1 tablet by mouth nightly    MULTIPLE VITAMINS-MINERALS (MULTIVITAMIN ADULT PO)    Take 1 tablet by mouth daily    NEEDLE, DISP, 28G X 1/2\" MISC    1 box by Does not apply route as needed (as needed to inject corpora cavernosa for trimix medication for erectile dysfunction)    OCRELIZUMAB (OCREVUS) 300 MG/10ML SOLN INJECTION    Infuse 300 mg intravenously once 2 times a year/ every 6 months    OMEPRAZOLE (PRILOSEC) 40 MG DELAYED RELEASE CAPSULE    take 1 capsule by mouth once daily    OXYBUTYNIN (DITROPAN XL) 15 MG EXTENDED RELEASE TABLET    Take 1 tablet by mouth daily    RA VITAMIN D-3 5000 UNITS CAPS CAPSULE    5,000 Units daily    VALACYCLOVIR (VALTREX) 500 MG TABLET    Take 500 mg by mouth 2 times daily as needed     VITAMIN E 400 UNITS TABS    Take by mouth daily       ALLERGIES     is allergic to excedrin migraine  [asa-apap-caff buffered] and prednisone. FAMILY HISTORY     He indicated that the status of his paternal grandmother is unknown. He indicated that the status of his paternal uncle is unknown.     family history includes Cancer in his paternal grandmother and paternal uncle. SOCIAL HISTORY      reports that he quit smoking about 5 years ago. He smoked 1.00 pack per day. He has never used smokeless tobacco. He reports that he does not drink alcohol or use drugs. PHYSICAL EXAM     INITIAL VITALS:  height is 6' 2\" (1.88 m) and weight is 130 lb (59 kg). His tympanic temperature is 96.6 °F (35.9 °C). His blood pressure is 125/61 and his pulse is 72. His respiration is 18 and oxygen saturation is 98%. Physical Exam  Constitutional:       Appearance: He is well-developed. HENT:      Head: Normocephalic and atraumatic. Right Ear: External ear normal.      Left Ear: External ear normal.   Eyes:      Pupils: Pupils are equal, round, and reactive to light. Neck:      Musculoskeletal: Normal range of motion and neck supple. Cardiovascular:      Rate and Rhythm: Normal rate and regular rhythm. Pulmonary:      Effort: Pulmonary effort is normal.      Breath sounds: Normal breath sounds. Abdominal:      General: Bowel sounds are normal.      Palpations: Abdomen is soft. Genitourinary:     Comments: Patient has an erection  Musculoskeletal: Normal range of motion. Comments: Patient is wearing a leg brace of the right leg   Skin:     General: Skin is warm and dry. Neurological:      Mental Status: He is alert and oriented to person, place, and time.    Psychiatric:         Behavior: Behavior normal.           DIFFERENTIAL DIAGNOSIS/ MDM:     Priapism    DIAGNOSTIC RESULTS     EKG: All EKG's are interpreted by the Emergency Department Physician who either signs or Co-signs this chart in the absence of a cardiologist.        RADIOLOGY:   I directly visualized the following  images and reviewed the radiologist interpretations:          ED BEDSIDE ULTRASOUND:       LABS:  Labs Reviewed - No data to display        EMERGENCY DEPARTMENT COURSE:   Vitals:    Vitals:    06/20/20 1342   BP: 125/61   Pulse: 72   Resp: 18   Temp: 96.6 °F (35.9 °C)   TempSrc: Tympanic   SpO2: 98%   Weight: 130 lb (59 kg)   Height: 6' 2\" (1.88 m)     -------------------------  BP: 125/61, Temp: 96.6 °F (35.9 °C), Pulse: 72, Resp: 18        Re-evaluation Notes    Reevaluation after the second injection in the left corpus he has detumesced and feels like he is ready to leave    CRITICAL CARE:   None        CONSULTS: Urology Dr. Maliha Nieves was consulted, he recommended phenylephrine injection and aspiration      PROCEDURES:  Penile

## 2020-07-10 ENCOUNTER — HOSPITAL ENCOUNTER (OUTPATIENT)
Dept: PHYSICAL THERAPY | Age: 45
Setting detail: THERAPIES SERIES
Discharge: HOME | End: 2020-07-10
Payer: MEDICARE

## 2020-07-10 PROCEDURE — 97110 THERAPEUTIC EXERCISES: CPT | Performed by: PHYSICAL THERAPIST

## 2020-07-10 PROCEDURE — 97162 PT EVAL MOD COMPLEX 30 MIN: CPT | Performed by: PHYSICAL THERAPIST

## 2020-07-10 PROCEDURE — 97112 NEUROMUSCULAR REEDUCATION: CPT | Performed by: PHYSICAL THERAPIST

## 2020-07-10 NOTE — PROGRESS NOTES
Physical Therapy  Initial Assessment  Date: 7/10/2020  Patient Name: Richard Gurrola  MRN: [id number]  : 1975     Treatment Diagnosis: general weakness    Restrictions       Subjective   General  Chart Reviewed: Yes  Patient assessed for rehabilitation services?: Yes  Response To Previous Treatment: Not applicable  Family / Caregiver Present: No  Referring Practitioner: Anca Hall CNP  Referral Date : 20  Diagnosis: multiple sclerosis  Follows Commands: Within Functional Limits  PT Visit Information  Onset Date: 20  PT Insurance Information: [facility] Medicare  Subjective  Subjective: \"I fell back in March or April. I feel like it was from weakness and decreased balance. It happened as I was turning. I broke a bone in my pelvis. I had home health therapy from right after that until earlier this week (Tuesday) and they said I had met all my goals and to start outpatient therapy. Everything feels way off, weak and decreased balance and endurance. I just feel like I have regressed at least 50% from where I was when I last finished OP therapy here. \"                 Vision/Hearing       Orientation  Orientation  Overall Orientation Status: Within Normal Limits    Social/Functional History  Social/Functional History  Lives With: Significant other  Type of Home: House  Home Layout: One level  Home Access: Ramped entrance  Bathroom Shower/Tub: Walk-in shower  Bathroom Toilet: Handicap height  Bathroom Equipment: Grab bars in shower  Bathroom Accessibility: Accessible  Home Equipment: 4 wheeled walker  Receives Help From: Family  ADL Assistance: Independent  Homemaking Assistance: Independent  Homemaking Responsibilities: Yes  Meal Prep Responsibility: Primary  Laundry Responsibility: Primary  Cleaning Responsibility: Secondary(gonzalo does most cleaning and grocery shopping, but he does do them at times)  Shopping Responsibility: Secondary  Ambulation Assistance: Independent  Transfer Assistance: Independent  Active : Yes  Mode of Transportation: [employer]  Occupation: On disability  Leisure & Hobbies: going out to eat, play with grandson, visit friends/family    Objective     Observation/Palpation  Posture: Fair  Observation: mildly forward flexed at hips/pelvis but able to stand upright if supported. Ambulates indep with a 4WW, decreased balance and stability (see below)    PROM RLE (degrees)  RLE PROM: WNL  AROM RLE (degrees)  RLE AROM: WNL  PROM LLE (degrees)  LLE PROM: WNL  AROM LLE (degrees)  LLE AROM : WNL    Strength RLE  Comment: grossly 4-/5  Strength LLE  Comment: grossly 4-/5, except knee extension and flexion 4+/5     Additional Measures  Special Tests: TU seconds   Tinetti: (see scan in chart)     Ambulation  Ambulation?: Yes  Ambulation 1  Surface: level tile  Device: Rolling Walker(4WW)  Quality of Gait: decreased DF of Left foot during swing phase, decreased knee flexion bilaterally during bilat swing phases, has mild cicumduction bilaterally to clear feet through gait cycle  Distance: 150'  Balance  Posture: Good  Sitting - Static: Good  Sitting - Dynamic: Good  Standing - Static: Fair  Standing - Dynamic: Fair;-     Assessment   Conditions Requiring Skilled Therapeutic Intervention  Body structures, Functions, Activity limitations: Decreased functional mobility ; Decreased ADL status; Decreased strength;Decreased balance;Decreased coordination;Decreased endurance;Decreased posture  Treatment Diagnosis: general weakness  Prognosis: Good  Decision Making: Medium Complexity  REQUIRES PT FOLLOW UP: Yes  Activity Tolerance  Activity Tolerance: Patient limited by endurance; Patient Tolerated treatment well     Plan   Plan  Times per week: 2  Plan weeks: 6  Current Treatment Recommendations: Strengthening, ADL/Self-care Training, Gait Training, Balance Training, Functional Mobility Training, Transfer Training, Neuromuscular Re-education, Home Exercise Program    Goals  Short term goals  Time Frame for Short term goals: 3 weeks  Short term goal 1: Initiate HEP  Short term goal 2: Increase bilat hip flexion strength and left DF strength to 4/5 for improved ease with ADL and ambulation  Short term goal 3: Pt to maintain FTEC for 25+ seconds with minimal postural sway for improved balance and stability  Long term goals  Time Frame for Long term goals : 6 weeks  Long term goal 1: Indep with HEP  Long term goal 2:  Increase bilat hip flexion strength and left DF strength to 4+/5 for improved ease with ADL and ambulation  Long term goal 3: Pt to maintain modified tandem/offset stance with eyes closed for 25+ seconds with minimal postural sway for improved balance and stability  Long term goal 4: Pt to score >22/28 on Tinetti test for improved dynamci balance and stability  Long term goal 5: Pt to perform 12 sit-stands in 30 seconds or less without use of UEs for improved ease with functional transfers     Therapy Time   Individual Concurrent Group Co-treatment   Time In 1101         Time Out 1158         Minutes 57         Timed Code Treatment Minutes: 25 Minutes     Marlin Hudson, PT, DPT

## 2020-07-10 NOTE — FLOWSHEET NOTE
Physical Therapy Daily Treatment Note    Date:  7/10/2020    Patient Name:  Zehar Kraus    :  1975  MRN: [id number]  Restrictions/Precautions:     Medical/Treatment Diagnosis Information:   · Diagnosis: multiple sclerosis  · Treatment Diagnosis: general weakness  Insurance/Certification information:  PT Insurance Information: [facility] Medicare  Physician Information:  Referring Practitioner: Chas Doty CNP  Plan of care signed (Y/N):  N  Visit# / total visits:    Pain level: 0/10     Time In: 11:01   Time Out: 11:58    Progress Note: [x]  Yes  []  No  Next due by: Visit #12  Or by 2020    Subjective:   See eval    Objective: see eval  Observation:   Test measurements:      Exercises:   Exercise/Equipment Resistance/Repetitions Other comments   Nu-Step     Counter Ex 15x each    Sidesteps 2 laps each    FTEC/FTEO 2 x 30\" each    Offset Feet Balance 2 x 30\" each         Seated marches 15x    Seated LAQ 15x each    Hip ADD/ABD GREEN 15x each                              [x] Provided verbal/tactile cueing for activities related to strengthening, flexibility, endurance, ROM. (12736)  [x] Provided verbal/tactile cueing for activities related to improving balance, coordination, kinesthetic sense, posture, motor skill, proprioception. (86424)    Therapeutic Activities:     [] Therapeutic activities, direct (one-on-one) patient contact (use of dynamic activities to improve functional performance). (66126)    Gait:   [] Provided training and instruction to the patient for ambulation re-education. (56094)    Self-Care/ADL's  [] Self-care/home management training and compensatory training, meal preparation, safety procedures, and instructions in use of assistive technology devices/adaptive equipment, direct one-on-one contact.  (37461)    Home Exercise Program:     [x] Reviewed/Progressed HEP activities related to strengthening, flexibility, endurance, ROM. (71140)  [x] Reviewed/Progressed HEP activities Electronically signed by:  Nia Dumont DPT

## 2020-07-10 NOTE — PLAN OF CARE
[facility]    [x] [place]  Phone: [phone]  Fax: [phone]      [] [place]  Phone: [phone]  Fax: [phone]        To: Referring Practitioner: Maxx Ambriz CNP      Patient: Malvin Payan  : 1975   MRN: [id number]  Evaluation Date: 7/10/2020      Diagnosis Information:  · Diagnosis: multiple sclerosis   · Treatment Diagnosis: general weakness     Physical Therapy Certification Form  Dear Ms Zulma Garcia  The following patient has been evaluated for physical therapy services and for therapy to continue, insurance requires monthly physician review of the treatment plan. Please review the attached evaluation and/or summary of the patient's plan of care, and verify that you agree therapy should continue by signing the attached document and sending it back to our office. Plan of Care/Treatment to date:  [x] Therapeutic Exercise    [] Modalities:  [x] Therapeutic Activity     [] Ultrasound  [] Electrical Stimulation  [x] Gait Training      [] Cervical Traction [] Lumbar Traction  [x] Neuromuscular Re-education    [] Cold/hotpack [] Iontophoresis   [x] Instruction in HEP     Other:  [] Manual Therapy      []             [] Aquatic Therapy      []           ? Goals:  Short term goals  Time Frame for Short term goals: 3 weeks  Short term goal 1: Initiate HEP  Short term goal 2: Increase bilat hip flexion strength and left DF strength to 4/5 for improved ease with ADL and ambulation  Short term goal 3: Pt to maintain FTEC for 25+ seconds with minimal postural sway for improved balance and stability    Long term goals  Time Frame for Long term goals : 6 weeks  Long term goal 1: Indep with HEP  Long term goal 2:  Increase bilat hip flexion strength and left DF strength to 4+/5 for improved ease with ADL and ambulation  Long term goal 3: Pt to maintain modified tandem/offset stance with eyes closed for 25+ seconds with minimal postural sway

## 2020-07-14 ENCOUNTER — HOSPITAL ENCOUNTER (OUTPATIENT)
Dept: PHYSICAL THERAPY | Age: 45
Setting detail: THERAPIES SERIES
Discharge: HOME | End: 2020-07-14
Payer: MEDICARE

## 2020-07-14 PROCEDURE — 97110 THERAPEUTIC EXERCISES: CPT

## 2020-07-14 PROCEDURE — 97112 NEUROMUSCULAR REEDUCATION: CPT

## 2020-07-14 NOTE — FLOWSHEET NOTE
Physical Therapy Daily Treatment Note    Date:  2020    Patient Name:  Jeri Garcia    :  1975  MRN: [id number]  Restrictions/Precautions:     Medical/Treatment Diagnosis Information:   · Diagnosis: multiple sclerosis  · Treatment Diagnosis: general weakness  Insurance/Certification information:  PT Insurance Information: [facility] Medicare  Physician Information:  Referring Practitioner: Myla Snell CNP  Plan of care signed (Y/N):  N  Visit# / total visits:     Pain level: 0/10      Time In: 12:36   Time Out: 1:34    Progress Note: []  Yes  [x]  No  Next due by: Visit #12  Or by 2020    Subjective: Pt rpts to clinic with no complaints of pain stating \"I have no pain anymore. I just can feel that I have gotten pretty weak\". Objective: Pt tolerated todays first full PT session well indicating no increase in pain post session but noting quick onset of fatigue with start of standing activity. Pt was able to perform all ALEX per flow chart with vc required for proper performance and multiple rest breaks throughout session. Most challenged by side steps exhibiting much compensation and antalgia with extreme use of UE and forward flexed posture. Observation: Rpts with rollator.  Antalgia present   Test measurements:      Exercises:   Exercise/Equipment Resistance/Repetitions Other comments   Nu-Step 8' lv4   Counter Ex 20x each    Sidesteps 3 laps each    FTEC/FTEO 2 x 30\" each    Offset Feet Balance 2 x 30\" each    SQUATS 3X10    Seated marches 20x    Seated LAQ 20x each    Hip ADD/ABD GREEN 20x each                              [] Provided verbal/tactile cueing for activities related to strengthening, flexibility, endurance, ROM. (07770)  [x] Provided verbal/tactile cueing for activities related to improving balance, coordination, kinesthetic sense, posture, motor skill, proprioception. (51514)    Therapeutic Activities:     [] Therapeutic activities, direct (one-on-one) patient contact (use of dynamic activities to improve functional performance). (63891)    Gait:   [] Provided training and instruction to the patient for ambulation re-education. (49409)    Self-Care/ADL's  [] Self-care/home management training and compensatory training, meal preparation, safety procedures, and instructions in use of assistive technology devices/adaptive equipment, direct one-on-one contact. (34429)    Home Exercise Program:     [x] Reviewed/Progressed HEP activities related to strengthening, flexibility, endurance, ROM. (84456)  [x] Reviewed/Progressed HEP activities related to improving balance, coordination, kinesthetic sense, posture, motor skill, proprioception.  (21915)    Manual Treatments:    [] Provided manual therapy to mobilize soft tissue/joints for the purpose of modulating pain, promoting relaxation,  increasing ROM, reducing/eliminating soft tissue swelling/inflammation/restriction, improving soft tissue extensibility. (36542)    Service Based Modalities:      Timed Code Treatment Minutes:   27' there-ex       28' Neuro re-ed    Total Treatment Minutes:   62'    Treatment/Activity Tolerance:  [x] Patient tolerated treatment well [] Patient limited by fatique  [] Patient limited by pain  [] Patient limited by other medical complications  [] Other:     Prognosis: [x] Good [] Fair  [] Poor    Patient Requires Follow-up: [x] Yes  [] No      Goals:  Short term goals  Time Frame for Short term goals: 3 weeks   Short term goal 1: Initiate HEP  Short term goal 2: Increase bilat hip flexion strength and left DF strength to 4/5 for improved ease with ADL and ambulation  Short term goal 3: Pt to maintain FTEC for 25+ seconds with minimal postural sway for improved balance and stability    Long term goals  Time Frame for Long term goals : 6 weeks  Long term goal 1: Indep with HEP  Long term goal 2:  Increase bilat hip flexion strength and left DF strength to 4+/5 for improved ease with ADL and ambulation  Long term goal 3: Pt to maintain modified tandem/offset stance with eyes closed for 25+ seconds with minimal postural sway for improved balance and stability  Long term goal 4: Pt to score >22/28 on Tinetti test for improved dynamci balance and stability  Long term goal 5: Pt to perform 12 sit-stands in 30 seconds or less without use of UEs for improved ease with functional transfers    Plan:   [x] Continue per plan of care [] Alter current plan (see comments)  [] Plan of care initiated [] Hold pending MD visit [] Discharge    Plan for Next Session: Progress as tolerated.      Electronically signed by:  Edwige Tucker PTA

## 2020-07-16 ENCOUNTER — HOSPITAL ENCOUNTER (OUTPATIENT)
Dept: PHYSICAL THERAPY | Age: 45
Setting detail: THERAPIES SERIES
Discharge: HOME | End: 2020-07-16
Payer: MEDICARE

## 2020-07-16 PROCEDURE — 97112 NEUROMUSCULAR REEDUCATION: CPT

## 2020-07-16 PROCEDURE — 97110 THERAPEUTIC EXERCISES: CPT

## 2020-07-16 NOTE — FLOWSHEET NOTE
Physical Therapy Daily Treatment Note    Date:  2020    Patient Name:  Malvin Payan    :  1975  MRN: [id number]  Restrictions/Precautions:     Medical/Treatment Diagnosis Information:   · Diagnosis: multiple sclerosis  · Treatment Diagnosis: general weakness  Insurance/Certification information:  PT Insurance Information: [facility] Medicare  Physician Information:  Referring Practitioner: Maxx Ambriz CNP  Plan of care signed (Y/N):  N  Visit# / total visits:  3/12   Pain level: 0/10      Time In: 3:30   Time Out: 4:25    Progress Note: []  Yes  [x]  No  Next due by: Visit #12  Or by 2020    Subjective: Pt rpts to clinic with no complaints of pain stating \"I have no pain. I was pretty sore after the last session. That's the first time I've really worked it in a long time\". Objective: Pt tolerated todays session well indicating no increase in pain post session. Pt noted fatigue throughout session but requiring no rest breaks and little vc required for proper performance. Most challenged by sit to stands requiring 1 HHA. Still exhibits instability with standing exercises but had no LOB episodes during session. Initiated step ups with good tolerance. Observation: Rpts with rollator.  Antalgia present   Test measurements:      Exercises:   Exercise/Equipment Resistance/Repetitions Other comments   Nu-Step 8' lv6   Counter Ex 20x each    Sidesteps 5 laps each    FTEC/FTEO 2 x 30\" each    Offset Feet Balance 2 x 30\" each    SQUATS 3X10    Seated marches 20x    Seated LAQ 20x each    Hip ADD/ABD GREEN 20x each     sit to stands 2x10    Step ups x10 2\"                  [] Provided verbal/tactile cueing for activities related to strengthening, flexibility, endurance, ROM. (43305)  [x] Provided verbal/tactile cueing for activities related to improving balance, coordination, kinesthetic sense, posture, motor skill, proprioception. (49653)    Therapeutic Activities:     [] Therapeutic activities, direct (one-on-one) patient contact (use of dynamic activities to improve functional performance). (11483)    Gait:   [] Provided training and instruction to the patient for ambulation re-education. (48158)    Self-Care/ADL's  [] Self-care/home management training and compensatory training, meal preparation, safety procedures, and instructions in use of assistive technology devices/adaptive equipment, direct one-on-one contact. (87199)    Home Exercise Program:     [x] Reviewed/Progressed HEP activities related to strengthening, flexibility, endurance, ROM. (43400)  [x] Reviewed/Progressed HEP activities related to improving balance, coordination, kinesthetic sense, posture, motor skill, proprioception.  (00716)    Manual Treatments:    [] Provided manual therapy to mobilize soft tissue/joints for the purpose of modulating pain, promoting relaxation,  increasing ROM, reducing/eliminating soft tissue swelling/inflammation/restriction, improving soft tissue extensibility. (06946)    Service Based Modalities:      Timed Code Treatment Minutes:   27' there-ex       25' Neuro re-ed    Total Treatment Minutes:   54'    Treatment/Activity Tolerance:  [x] Patient tolerated treatment well [] Patient limited by fatique  [] Patient limited by pain  [] Patient limited by other medical complications  [] Other:     Prognosis: [x] Good [] Fair  [] Poor    Patient Requires Follow-up: [x] Yes  [] No      Goals:  Short term goals  Time Frame for Short term goals: 3 weeks   Short term goal 1: Initiate HEP  Short term goal 2: Increase bilat hip flexion strength and left DF strength to 4/5 for improved ease with ADL and ambulation  Short term goal 3: Pt to maintain FTEC for 25+ seconds with minimal postural sway for improved balance and stability    Long term goals  Time Frame for Long term goals : 6 weeks  Long term goal 1: Indep with HEP  Long term goal 2:  Increase bilat hip flexion strength and left DF strength to 4+/5 for improved ease with ADL and ambulation  Long term goal 3: Pt to maintain modified tandem/offset stance with eyes closed for 25+ seconds with minimal postural sway for improved balance and stability  Long term goal 4: Pt to score >22/28 on Tinetti test for improved dynamci balance and stability  Long term goal 5: Pt to perform 12 sit-stands in 30 seconds or less without use of UEs for improved ease with functional transfers    Plan:   [x] Continue per plan of care [] Alter current plan (see comments)  [] Plan of care initiated [] Hold pending MD visit [] Discharge    Plan for Next Session: Progress as tolerated.      Electronically signed by:  Eileen Aguayo PTA

## 2020-07-22 ENCOUNTER — APPOINTMENT (OUTPATIENT)
Dept: PHYSICAL THERAPY | Age: 45
End: 2020-07-22
Payer: MEDICARE

## 2020-07-27 ENCOUNTER — APPOINTMENT (OUTPATIENT)
Dept: PHYSICAL THERAPY | Age: 45
End: 2020-07-27
Payer: MEDICARE

## 2020-07-28 ENCOUNTER — HOSPITAL ENCOUNTER (OUTPATIENT)
Dept: PHYSICAL THERAPY | Age: 45
Setting detail: THERAPIES SERIES
Discharge: HOME | End: 2020-07-28
Payer: MEDICARE

## 2020-07-28 PROCEDURE — 97112 NEUROMUSCULAR REEDUCATION: CPT

## 2020-07-28 PROCEDURE — 97110 THERAPEUTIC EXERCISES: CPT

## 2020-07-28 NOTE — FLOWSHEET NOTE
Physical Therapy Daily Treatment Note    Date:  2020    Patient Name:  Ivonne Johnson    :  1975  MRN: [id number]  Restrictions/Precautions:     Medical/Treatment Diagnosis Information:   · Diagnosis: multiple sclerosis  · Treatment Diagnosis: general weakness  Insurance/Certification information:  PT Insurance Information: [facility] Medicare  Physician Information:  Referring Practitioner: Isabela Jorgensen CNP  Plan of care signed (Y/N):  N  Visit# / total visits:     Pain level: 0/10      Time In: 12:45   Time Out: 1:34    Progress Note: []  Yes  [x]  No  Next due by: Visit #12  Or by 2020    Subjective:  Rpts with no pain and only slight complaints of fatigue after last session. Objective: Pt tolerated todays session well noting only slight fatigue post session. Required less rest breaks this date than previous session and was able to progress multiple exercises requiring vc to ensure proper performance. Most challenged by squats. Observation: Rpts with rollator. Antalgia present   Test measurements:      Exercises:   Exercise/Equipment Resistance/Repetitions Other comments   Nu-Step 8' lv6   Counter Ex 20x each    Sidesteps 5 laps each    FTEC/FTEO 2 x 30\" each    Offset Feet Balance 2 x 30\" each    SQUATS 3X15    Seated marches 20x    Seated LAQ 20x each    Hip ADD/ABD GREEN 20x each     sit to stands 2x10    Step ups x10 2\"                  [] Provided verbal/tactile cueing for activities related to strengthening, flexibility, endurance, ROM. (44989)  [x] Provided verbal/tactile cueing for activities related to improving balance, coordination, kinesthetic sense, posture, motor skill, proprioception. (17152)    Therapeutic Activities:     [] Therapeutic activities, direct (one-on-one) patient contact (use of dynamic activities to improve functional performance). (32817)    Gait:   [] Provided training and instruction to the patient for ambulation re-education. (63697)    Self-Care/ADL's  [] Self-care/home management training and compensatory training, meal preparation, safety procedures, and instructions in use of assistive technology devices/adaptive equipment, direct one-on-one contact. (86280)    Home Exercise Program:     [x] Reviewed/Progressed HEP activities related to strengthening, flexibility, endurance, ROM. (04574)  [x] Reviewed/Progressed HEP activities related to improving balance, coordination, kinesthetic sense, posture, motor skill, proprioception.  (54237)    Manual Treatments:    [] Provided manual therapy to mobilize soft tissue/joints for the purpose of modulating pain, promoting relaxation,  increasing ROM, reducing/eliminating soft tissue swelling/inflammation/restriction, improving soft tissue extensibility. (30402)    Service Based Modalities:      Timed Code Treatment Minutes:   27' there-ex       19' Neuro re-ed    Total Treatment Minutes:   52'    Treatment/Activity Tolerance:  [x] Patient tolerated treatment well [] Patient limited by fatique  [] Patient limited by pain  [] Patient limited by other medical complications  [] Other:     Prognosis: [x] Good [] Fair  [] Poor    Patient Requires Follow-up: [x] Yes  [] No      Goals:  Short term goals  Time Frame for Short term goals: 3 weeks   Short term goal 1: Initiate HEP  Short term goal 2: Increase bilat hip flexion strength and left DF strength to 4/5 for improved ease with ADL and ambulation  Short term goal 3: Pt to maintain FTEC for 25+ seconds with minimal postural sway for improved balance and stability    Long term goals  Time Frame for Long term goals : 6 weeks  Long term goal 1: Indep with HEP  Long term goal 2:  Increase bilat hip flexion strength and left DF strength to 4+/5 for improved ease with ADL and ambulation  Long term goal 3: Pt to maintain modified tandem/offset stance with eyes closed for 25+ seconds with minimal postural sway for improved balance and stability  Long term goal 4: Pt to score >22/28 on Tinetti test for improved dynamci balance and stability  Long term goal 5: Pt to perform 12 sit-stands in 30 seconds or less without use of UEs for improved ease with functional transfers    Plan:   [x] Continue per plan of care [] Alter current plan (see comments)  [] Plan of care initiated [] Hold pending MD visit [] Discharge    Plan for Next Session: Progress as tolerated.      Electronically signed by:  Marely Adams PTA

## 2020-07-29 ENCOUNTER — APPOINTMENT (OUTPATIENT)
Dept: PHYSICAL THERAPY | Age: 45
End: 2020-07-29
Payer: MEDICARE

## 2020-07-30 ENCOUNTER — HOSPITAL ENCOUNTER (OUTPATIENT)
Dept: PHYSICAL THERAPY | Age: 45
Setting detail: THERAPIES SERIES
Discharge: HOME | End: 2020-07-30
Payer: MEDICARE

## 2020-07-30 PROCEDURE — 97112 NEUROMUSCULAR REEDUCATION: CPT

## 2020-07-30 PROCEDURE — 97110 THERAPEUTIC EXERCISES: CPT

## 2020-07-30 NOTE — FLOWSHEET NOTE
Physical Therapy Daily Treatment Note    Date:  2020    Patient Name:  Elizabeth Taylor    :  1975  MRN: [id number]  Restrictions/Precautions:     Medical/Treatment Diagnosis Information:   · Diagnosis: multiple sclerosis  · Treatment Diagnosis: general weakness  Insurance/Certification information:  PT Insurance Information: [facility] Medicare   Physician Information:  Referring Practitioner: Christyne Cabot, CNP  Plan of care signed (Y/N):  N   Visit# / total visits:      Pain level: 0/10      Time In: 12:32   Time Out: 1:16    Progress Note: []  Yes  [x]  No  Next due by: Visit #12  Or by 2020    Subjective: Rpts with no complaints of pain or fatigue. Objective: Pt tolerated todays session well noting only slight fatigue post session. Required less rest breaks this date than previous session and was able to progress multiple exercises requiring vc to ensure proper performance. Most challenged by squats. Initiated walking progression with obstacles exhibiting minimal foot raise to clear objects. Observation: Rpts with rollator. Antalgia present   Test measurements:      Exercises:   Exercise/Equipment Resistance/Repetitions Other comments   Nu-Step 8' lv6   Counter Ex 20x each    Sidesteps 5 laps each    FTEC/FTEO 2 x 30\" each    Offset Feet Balance 2 x 30\" each    SQUATS 3X15    Seated marches 20x    Seated LAQ 20x each    Hip ADD/ABD GREEN 20x each     sit to stands 2x10    Step ups x10 2\"   BOSU navigation 5 laps //bars   Airex navigation 5 laps //bars        [] Provided verbal/tactile cueing for activities related to strengthening, flexibility, endurance, ROM. (59674)  [x] Provided verbal/tactile cueing for activities related to improving balance, coordination, kinesthetic sense, posture, motor skill, proprioception. (29394)    Therapeutic Activities:     [] Therapeutic activities, direct (one-on-one) patient contact (use of dynamic activities to improve functional performance). (12445)    Gait:   [] Provided training and instruction to the patient for ambulation re-education. (90579)    Self-Care/ADL's  [] Self-care/home management training and compensatory training, meal preparation, safety procedures, and instructions in use of assistive technology devices/adaptive equipment, direct one-on-one contact. (23342)    Home Exercise Program:     [x] Reviewed/Progressed HEP activities related to strengthening, flexibility, endurance, ROM. (73448)  [x] Reviewed/Progressed HEP activities related to improving balance, coordination, kinesthetic sense, posture, motor skill, proprioception.  (95980)    Manual Treatments:    [] Provided manual therapy to mobilize soft tissue/joints for the purpose of modulating pain, promoting relaxation,  increasing ROM, reducing/eliminating soft tissue swelling/inflammation/restriction, improving soft tissue extensibility. (68961)    Service Based Modalities:      Timed Code Treatment Minutes:   27' there-ex       14' Neuro re-ed    Total Treatment Minutes:   40'    Treatment/Activity Tolerance:  [x] Patient tolerated treatment well [] Patient limited by fatique  [] Patient limited by pain  [] Patient limited by other medical complications  [] Other:     Prognosis: [x] Good [] Fair  [] Poor    Patient Requires Follow-up: [x] Yes  [] No      Goals:  Short term goals  Time Frame for Short term goals: 3 weeks   Short term goal 1: Initiate HEP  Short term goal 2: Increase bilat hip flexion strength and left DF strength to 4/5 for improved ease with ADL and ambulation  Short term goal 3: Pt to maintain FTEC for 25+ seconds with minimal postural sway for improved balance and stability    Long term goals  Time Frame for Long term goals : 6 weeks  Long term goal 1: Indep with HEP  Long term goal 2:  Increase bilat hip flexion strength and left DF strength to 4+/5 for improved ease with ADL and ambulation  Long term goal 3: Pt to maintain modified tandem/offset stance with eyes closed for 25+ seconds with minimal postural sway for improved balance and stability  Long term goal 4: Pt to score >22/28 on Tinetti test for improved dynamci balance and stability  Long term goal 5: Pt to perform 12 sit-stands in 30 seconds or less without use of UEs for improved ease with functional transfers    Plan:   [x] Continue per plan of care [] Alter current plan (see comments)  [] Plan of care initiated [] Hold pending MD visit [] Discharge    Plan for Next Session: Progress as tolerated.      Electronically signed by:  Isaías Valentin PTA

## 2020-08-04 ENCOUNTER — HOSPITAL ENCOUNTER (OUTPATIENT)
Dept: PHYSICAL THERAPY | Age: 45
Setting detail: THERAPIES SERIES
Discharge: HOME | End: 2020-08-04
Payer: MEDICARE

## 2020-08-04 PROCEDURE — 97110 THERAPEUTIC EXERCISES: CPT | Performed by: PHYSICAL THERAPY ASSISTANT

## 2020-08-04 PROCEDURE — 97112 NEUROMUSCULAR REEDUCATION: CPT | Performed by: PHYSICAL THERAPY ASSISTANT

## 2020-08-04 NOTE — FLOWSHEET NOTE
Physical Therapy Daily Treatment Note    Date:  2020    Patient Name:  Brittney Dejesus    :  1975  MRN: [id number]  Restrictions/Precautions:     Medical/Treatment Diagnosis Information:   · Diagnosis: multiple sclerosis  · Treatment Diagnosis: general weakness  Insurance/Certification information:  PT Insurance Information: [facility] Medicare   Physician Information:  Referring Practitioner: Delisa Nieto CNP  Plan of care signed (Y/N):  N   Visit# / total visits:      Pain level: 0/10      Time In: 1249   Time Out: 150    Progress Note: []  Yes  [x]  No  Next due by: Visit #12  Or by 2020    Subjective: Pt. Relates pain this date rated 0/10 this date. Patient states general stiffness through LE's this date. Objective: ALEX complete per flow chart to facilitate strength and stability to allow ease with daily activities and ambulation. Verbal cuing for progression and technique with exercises. Verbal cuing for progression and technique with exercises. Difficulty with dynamic balance remains. Observation: Rpts with rollator.  Antalgia present   Test measurements:   Strength flexion: 3/5      Hip add: 4/5      Hip abd: -4/5  FTEC: 10''    Exercises:   Exercise/Equipment Resistance/Repetitions Other comments   Nu-Step 8' lv6   Counter Ex 20x each    Sidesteps 5 laps each    FTEC/FTEO 2 x 30\" each    Offset Feet Balance 2 x 30\" each    SQUATS 3X15    Seated marches 20x    Seated LAQ 20x each    Hip ADD/ABD GREEN 20x each     sit to stands 2x10    Step ups x10 2\"   BOSU navigation 5 laps //bars   Airex navigation 5 laps //bars        [] Provided verbal/tactile cueing for activities related to strengthening, flexibility, endurance, ROM. (35785)  [x] Provided verbal/tactile cueing for activities related to improving balance, coordination, kinesthetic sense, posture, motor skill, proprioception. (06082)    Therapeutic Activities:     [] Therapeutic activities, direct (one-on-one) patient contact (use of dynamic activities to improve functional performance). (99697)    Gait:   [] Provided training and instruction to the patient for ambulation re-education. (17597)    Self-Care/ADL's  [] Self-care/home management training and compensatory training, meal preparation, safety procedures, and instructions in use of assistive technology devices/adaptive equipment, direct one-on-one contact. (48312)    Home Exercise Program:     [x] Reviewed/Progressed HEP activities related to strengthening, flexibility, endurance, ROM. (09231)  [x] Reviewed/Progressed HEP activities related to improving balance, coordination, kinesthetic sense, posture, motor skill, proprioception.  (08022)    Manual Treatments:    [] Provided manual therapy to mobilize soft tissue/joints for the purpose of modulating pain, promoting relaxation,  increasing ROM, reducing/eliminating soft tissue swelling/inflammation/restriction, improving soft tissue extensibility. (18788)    Service Based Modalities:      Timed Code Treatment Minutes:   47 there-ex       14' Neuro re-ed    Total Treatment Minutes:   61    Treatment/Activity Tolerance:  [x] Patient tolerated treatment well [] Patient limited by fatique  [] Patient limited by pain  [] Patient limited by other medical complications  [] Other:     Prognosis: [x] Good [] Fair  [] Poor    Patient Requires Follow-up: [x] Yes  [] No      Goals:  Short term goals  Time Frame for Short term goals: 3 weeks   Short term goal 1: Initiate HEP (Patient relates understanding and compliance)  Short term goal 2: Increase bilat hip flexion strength and left DF strength to 4/5 for improved ease with ADL and ambulation (See above)  Short term goal 3: Pt to maintain FTEC for 25+ seconds with minimal postural sway for improved balance and stability (See above)    Long term goals  Time Frame for Long term goals : 6 weeks  Long term goal 1: Indep with HEP  Long term goal 2:  Increase bilat hip flexion strength and left DF strength to 4+/5 for improved ease with ADL and ambulation  Long term goal 3: Pt to maintain modified tandem/offset stance with eyes closed for 25+ seconds with minimal postural sway for improved balance and stability  Long term goal 4: Pt to score >22/28 on Tinetti test for improved dynamci balance and stability  Long term goal 5: Pt to perform 12 sit-stands in 30 seconds or less without use of UEs for improved ease with functional transfers    Plan:   [x] Continue per plan of care [] Alter current plan (see comments)  [] Plan of care initiated [] Hold pending MD visit [] Discharge    Plan for Next Session: Progress as tolerated. Electronically signed by:   Roscoe Levin PTA   '

## 2020-08-06 ENCOUNTER — HOSPITAL ENCOUNTER (OUTPATIENT)
Dept: PHYSICAL THERAPY | Age: 45
Setting detail: THERAPIES SERIES
Discharge: HOME | End: 2020-08-06
Payer: MEDICARE

## 2020-08-06 PROCEDURE — 97110 THERAPEUTIC EXERCISES: CPT

## 2020-08-06 PROCEDURE — 97112 NEUROMUSCULAR REEDUCATION: CPT

## 2020-08-06 NOTE — FLOWSHEET NOTE
Physical Therapy Daily Treatment Note    Date:  2020    Patient Name:  Annetta Kay    :  1975  MRN: [id number]  Restrictions/Precautions:     Medical/Treatment Diagnosis Information:   · Diagnosis: multiple sclerosis  · Treatment Diagnosis: general weakness  Insurance/Certification information:  PT Insurance Information: [facility] Medicare   Physician Information:  Referring Practitioner: Elin Cooper CNP  Plan of care signed (Y/N):  N   Visit# / total visits:      Pain level: 0/10      Time In: 12:32   Time Out: 1:15    Progress Note: []  Yes  [x]  No  Next due by: Visit #12  Or by 2020    Subjective: Pt rpts to clinic with no current complaints of pain or fatigue. Objective: Pt tolerated todays progressed session well requiring two rest breaks throughout d/t fatigue and no pain. Continues to exhibit LOB episodes but has good UE control to maintain balance. Initiated 4-way BOSU taps and SP cane ambulation with decent tolerance exhibiting decreased step height and length. Observation: Rpts with rollator.  Antalgia present    Test measurements:   Strength flexion: 3/5      Hip add: 4/5      Hip abd: -4/5  FTEC: 10''    Exercises:   Exercise/Equipment Resistance/Repetitions Other comments   Nu-Step 8' lv6   Counter Ex 20x each    Sidesteps 5 laps each    FTEC/FTEO 2 x 30\" each    Offset Feet Balance 2 x 30\" each    SQUATS 3X15    Seated marches 20x    Seated LAQ 20x each    Hip ADD/ABD GREEN 20x each     sit to stands 2x10    Step ups x10 2\"   BOSU navigation 5 laps //bars   Airex navigation 5 laps //bars   SP cane ambulation 2 laps // bars   BOSU 4-way tap x20    [] Provided verbal/tactile cueing for activities related to strengthening, flexibility, endurance, ROM. (84896)  [x] Provided verbal/tactile cueing for activities related to improving balance, coordination, kinesthetic sense, posture, motor skill, proprioception. (97713)    Therapeutic Activities:     [] Therapeutic activities, direct (one-on-one) patient contact (use of dynamic activities to improve functional performance). (24144)    Gait:   [] Provided training and instruction to the patient for ambulation re-education. (09793)    Self-Care/ADL's  [] Self-care/home management training and compensatory training, meal preparation, safety procedures, and instructions in use of assistive technology devices/adaptive equipment, direct one-on-one contact. (27011)    Home Exercise Program:     [x] Reviewed/Progressed HEP activities related to strengthening, flexibility, endurance, ROM. (40950)  [x] Reviewed/Progressed HEP activities related to improving balance, coordination, kinesthetic sense, posture, motor skill, proprioception.  (16903)    Manual Treatments:    [] Provided manual therapy to mobilize soft tissue/joints for the purpose of modulating pain, promoting relaxation,  increasing ROM, reducing/eliminating soft tissue swelling/inflammation/restriction, improving soft tissue extensibility. (14300)    Service Based Modalities:      Timed Code Treatment Minutes:   29' there-ex       15' Neuro re-ed    Total Treatment Minutes:   37'    Treatment/Activity Tolerance:  [x] Patient tolerated treatment well [] Patient limited by fatique  [] Patient limited by pain  [] Patient limited by other medical complications  [] Other:     Prognosis: [x] Good [] Fair  [] Poor    Patient Requires Follow-up: [x] Yes  [] No      Goals:  Short term goals  Time Frame for Short term goals: 3 weeks   Short term goal 1: Initiate HEP (Patient relates understanding and compliance)  Short term goal 2: Increase bilat hip flexion strength and left DF strength to 4/5 for improved ease with ADL and ambulation (See above)  Short term goal 3: Pt to maintain FTEC for 25+ seconds with minimal postural sway for improved balance and stability (See above)    Long term goals  Time Frame for Long term goals : 6 weeks  Long term goal 1: Indep with HEP  Long term goal 2:  Increase bilat hip flexion strength and left DF strength to 4+/5 for improved ease with ADL and ambulation  Long term goal 3: Pt to maintain modified tandem/offset stance with eyes closed for 25+ seconds with minimal postural sway for improved balance and stability  Long term goal 4: Pt to score >22/28 on Tinetti test for improved dynamci balance and stability  Long term goal 5: Pt to perform 12 sit-stands in 30 seconds or less without use of UEs for improved ease with functional transfers    Plan:   [x] Continue per plan of care [] Alter current plan (see comments)  [] Plan of care initiated [] Hold pending MD visit [] Discharge    Plan for Next Session: Progress as tolerated.      Electronically signed by:  Thiago Reed PTA   '

## 2020-08-11 ENCOUNTER — HOSPITAL ENCOUNTER (OUTPATIENT)
Dept: PHYSICAL THERAPY | Age: 45
Setting detail: THERAPIES SERIES
Discharge: HOME | End: 2020-08-11
Payer: MEDICARE

## 2020-08-11 PROCEDURE — 97110 THERAPEUTIC EXERCISES: CPT

## 2020-08-11 PROCEDURE — 97112 NEUROMUSCULAR REEDUCATION: CPT

## 2020-08-11 NOTE — FLOWSHEET NOTE
Physical Therapy Daily Treatment Note    Date:  2020    Patient Name:  Florecita Faustin    :  1975  MRN: [id number]  Restrictions/Precautions:     Medical/Treatment Diagnosis Information:   · Diagnosis: multiple sclerosis  · Treatment Diagnosis: general weakness  Insurance/Certification information:  PT Insurance Information: [facility] Medicare   Physician Information:  Referring Practitioner: Yariel Hi CNP  Plan of care signed (Y/N):  N   Visit# / total visits:       Pain level: 0/10      Time In: 12:40   Time Out: 1:30    Progress Note: []  Yes  [x]  No  Next due by: Visit #12  Or by 2020    Subjective: Pt rpts to clinic with no complaints of pain or fatigue noting good tolerance to previous session. Objective: Pt tolerated todays session well indicating no increase in pain post session but noting fatigue throughout. Pt was able to perform all ALEX per flow chart with vc required for proper performance. Most challenged by SP cane ambulation requiring CGA d/t instability and LOB. Few rest breaks required this date. Observation: Rpts with rollator.  Antalgia present     Test measurements:   Strength flexion: 3/5      Hip add: 4/5      Hip abd: -4/5   FTEC: 10''    Exercises:   Exercise/Equipment Resistance/Repetitions Other comments   Nu-Step 8' lv6   Counter Ex 20x each    Sidesteps 5 laps each    FTEC/FTEO 2 x 30\" each    Offset Feet Balance 2 x 30\" each    SQUATS 3X15    Seated marches 20x    Seated LAQ 20x each    Hip ADD/ABD GREEN 20x each     sit to stands 2x10    Step ups x10 4\"   BOSU navigation 5 laps //bars   Airex navigation 5 laps //bars   SP cane ambulation 2 laps // bars   BOSU 4-way tap x20    [x] Provided verbal/tactile cueing for activities related to strengthening, flexibility, endurance, ROM. (82037)  [x] Provided verbal/tactile cueing for activities related to improving balance, coordination, kinesthetic sense, posture, motor skill, proprioception. (23059)    Therapeutic Activities:     [] Therapeutic activities, direct (one-on-one) patient contact (use of dynamic activities to improve functional performance). (80384)    Gait:   [] Provided training and instruction to the patient for ambulation re-education. (99418)    Self-Care/ADL's  [] Self-care/home management training and compensatory training, meal preparation, safety procedures, and instructions in use of assistive technology devices/adaptive equipment, direct one-on-one contact. (43361)    Home Exercise Program:     [x] Reviewed/Progressed HEP activities related to strengthening, flexibility, endurance, ROM. (89789)  [x] Reviewed/Progressed HEP activities related to improving balance, coordination, kinesthetic sense, posture, motor skill, proprioception.  (47334)    Manual Treatments:    [] Provided manual therapy to mobilize soft tissue/joints for the purpose of modulating pain, promoting relaxation,  increasing ROM, reducing/eliminating soft tissue swelling/inflammation/restriction, improving soft tissue extensibility. (21781)    Service Based Modalities:      Timed Code Treatment Minutes:   28' there-ex       15' Neuro re-ed    Total Treatment Minutes:   48'    Treatment/Activity Tolerance:  [x] Patient tolerated treatment well [] Patient limited by fatique  [] Patient limited by pain  [] Patient limited by other medical complications  [] Other:     Prognosis: [x] Good [] Fair  [] Poor    Patient Requires Follow-up: [x] Yes  [] No      Goals:  Short term goals  Time Frame for Short term goals: 3 weeks   Short term goal 1: Initiate HEP (Patient relates understanding and compliance)  Short term goal 2:  Increase bilat hip flexion strength and left DF strength to 4/5 for improved ease with ADL and ambulation (See above)  Short term goal 3: Pt to maintain FTEC for 25+ seconds with minimal postural sway for improved balance and stability (See above)    Long term goals  Time Frame for Long term goals : 6 weeks  Long term goal 1: Indep with HEP  Long term goal 2: Increase bilat hip flexion strength and left DF strength to 4+/5 for improved ease with ADL and ambulation  Long term goal 3: Pt to maintain modified tandem/offset stance with eyes closed for 25+ seconds with minimal postural sway for improved balance and stability  Long term goal 4: Pt to score >22/28 on Tinetti test for improved dynamci balance and stability  Long term goal 5: Pt to perform 12 sit-stands in 30 seconds or less without use of UEs for improved ease with functional transfers    Plan:   [x] Continue per plan of care [] Alter current plan (see comments)  [] Plan of care initiated [] Hold pending MD visit [] Discharge    Plan for Next Session: Progress as tolerated.      Electronically signed by:  Tasia Carl PTA   '

## 2020-08-13 ENCOUNTER — HOSPITAL ENCOUNTER (OUTPATIENT)
Dept: PHYSICAL THERAPY | Age: 45
Setting detail: THERAPIES SERIES
Discharge: HOME | End: 2020-08-13
Payer: MEDICARE

## 2020-08-13 PROCEDURE — 97112 NEUROMUSCULAR REEDUCATION: CPT | Performed by: PHYSICAL THERAPY ASSISTANT

## 2020-08-13 PROCEDURE — 97110 THERAPEUTIC EXERCISES: CPT | Performed by: PHYSICAL THERAPY ASSISTANT

## 2020-08-13 NOTE — FLOWSHEET NOTE
Physical Therapy Daily Treatment Note    Date:  2020    Patient Name:  Laura Gonzales    :  1975  MRN: [id number]  Restrictions/Precautions:     Medical/Treatment Diagnosis Information:   · Diagnosis: multiple sclerosis  · Treatment Diagnosis: general weakness  Insurance/Certification information:  PT Insurance Information: [facility] Medicare   Physician Information:  Referring Practitioner: Dung Hollins CNP  Plan of care signed (Y/N):  N   Visit# / total visits:       Pain level: 0/10      Time In: 12:45   Time Out: 140    Progress Note: []  Yes  [x]  No  Next due by: Visit #12  Or by 2020    Subjective: Pt rpts to clinic with no complaints of pain this date. Objective: Pt tolerated todays session well indicating no increase in pain post session but noting fatigue throughout. Pt was able to perform all ALEX per flow chart with vc required for proper performance. Most challenged by SP cane ambulation requiring CGA-min d/t instability and LOB. Two LOB that required therapist intervention. Few rest breaks required this date. Observation: Rpts with rollator.  Antalgia present     Test measurements:  FTEC: 8-10''    Exercises:   Exercise/Equipment Resistance/Repetitions Other comments   Nu-Step 8' lv6   Counter Ex 20x each    Sidesteps 5 laps each    FTEC/FTEO 2 x 30\" each    Offset Feet Balance 2 x 30\" each    SQUATS 3X15    Seated marches 20x    Seated LAQ 20x each    Hip ADD/ABD GREEN 20x each     sit to stands 2x10    Step ups x10 4\" F   BOSU navigation  //bars   Airex navigation  //bars   SP cane ambulation 1 laps // bars   BOSU 4-way tap     [x] Provided verbal/tactile cueing for activities related to strengthening, flexibility, endurance, ROM. (73195)  [x] Provided verbal/tactile cueing for activities related to improving balance, coordination, kinesthetic sense, posture, motor skill, proprioception. (88984)    Therapeutic Activities:     [] Therapeutic activities, direct (one-on-one) patient contact (use of dynamic activities to improve functional performance). (33752)    Gait:   [] Provided training and instruction to the patient for ambulation re-education. (94909)    Self-Care/ADL's  [] Self-care/home management training and compensatory training, meal preparation, safety procedures, and instructions in use of assistive technology devices/adaptive equipment, direct one-on-one contact. (59552)    Home Exercise Program:     [x] Reviewed/Progressed HEP activities related to strengthening, flexibility, endurance, ROM. (89998)  [x] Reviewed/Progressed HEP activities related to improving balance, coordination, kinesthetic sense, posture, motor skill, proprioception.  (19788)    Manual Treatments:    [] Provided manual therapy to mobilize soft tissue/joints for the purpose of modulating pain, promoting relaxation,  increasing ROM, reducing/eliminating soft tissue swelling/inflammation/restriction, improving soft tissue extensibility. (86507)    Service Based Modalities:      Timed Code Treatment Minutes:   36' there-ex       15' Neuro re-ed    Total Treatment Minutes:   54    Treatment/Activity Tolerance:  [x] Patient tolerated treatment well [] Patient limited by fatique  [] Patient limited by pain  [] Patient limited by other medical complications  [] Other:     Prognosis: [x] Good [] Fair  [] Poor    Patient Requires Follow-up: [x] Yes  [] No      Goals:  Short term goals  Time Frame for Short term goals: 3 weeks   Short term goal 1: Initiate HEP (Patient relates understanding and compliance)  Short term goal 2: Increase bilat hip flexion strength and left DF strength to 4/5 for improved ease with ADL and ambulation   Short term goal 3: Pt to maintain FTEC for 25+ seconds with minimal postural sway for improved balance and stability (See above)    Long term goals  Time Frame for Long term goals : 6 weeks  Long term goal 1: Indep with HEP  Long term goal 2:  Increase bilat hip flexion strength and left DF strength to 4+/5 for improved ease with ADL and ambulation  Long term goal 3: Pt to maintain modified tandem/offset stance with eyes closed for 25+ seconds with minimal postural sway for improved balance and stability  Long term goal 4: Pt to score >22/28 on Tinetti test for improved dynamci balance and stability  Long term goal 5: Pt to perform 12 sit-stands in 30 seconds or less without use of UEs for improved ease with functional transfers    Plan:   [x] Continue per plan of care [] Alter current plan (see comments)  [] Plan of care initiated [] Hold pending MD visit [] Discharge    Plan for Next Session: Monitor tolerance and advance strength and balance, stability as able. Electronically signed by:   JAILYN Gruber

## 2020-08-18 ENCOUNTER — HOSPITAL ENCOUNTER (OUTPATIENT)
Dept: PHYSICAL THERAPY | Age: 45
Setting detail: THERAPIES SERIES
Discharge: HOME | End: 2020-08-18
Payer: MEDICARE

## 2020-08-18 PROCEDURE — 97110 THERAPEUTIC EXERCISES: CPT

## 2020-08-18 PROCEDURE — 97112 NEUROMUSCULAR REEDUCATION: CPT

## 2020-08-18 NOTE — FLOWSHEET NOTE
Physical Therapy Daily Treatment Note    Date:  2020    Patient Name:  Perico Isaac    :  1975  MRN: [id number]  Restrictions/Precautions:     Medical/Treatment Diagnosis Information:   · Diagnosis: multiple sclerosis  · Treatment Diagnosis: general weakness  Insurance/Certification information:  PT Insurance Information: [facility] Medicare   Physician Information:  Referring Practitioner: Rema Max CNP  Plan of care signed (Y/N):  N   Visit# / total visits:       Pain level: 0/10      Time In: 12:45   Time Out: 1:30    Progress Note: []  Yes  [x]  No  Next due by: Visit #12  Or by 2020    Subjective: Pt rpts to clinic with no complaints of pain or fatigue. Objective: Pt tolerated todays session well indicating little fatigue and no pain present this date. Pt was able to advance obstacle navigation this date with good tolerance. Most challenged by lateral step ups this date exhibiting challenged foot raise to clear box. Much compensation present with stepping activities. Observation: Rpts with rollator. Antalgia present.      Test measurements:  FTEC: 8-10''    Exercises:   Exercise/Equipment Resistance/Repetitions Other comments   Nu-Step 8' lv6   Counter Ex 20x each    Sidesteps 5 laps each    FTEC/FTEO 2 x 30\" each    Offset Feet Balance 2 x 30\" each    SQUATS 3X15    Seated marches 20x    Seated LAQ 20x each    Hip ADD/ABD GREEN 20x each     sit to stands 2x10    Step ups x10 4\" F   BOSU navigation  //bars   Airex navigation  //bars   SP cane ambulation 1 laps // bars   BOSU 4-way tap     [x] Provided verbal/tactile cueing for activities related to strengthening, flexibility, endurance, ROM. (77116)  [x] Provided verbal/tactile cueing for activities related to improving balance, coordination, kinesthetic sense, posture, motor skill, proprioception. (86605)    Therapeutic Activities:     [] Therapeutic activities, direct (one-on-one) patient contact (use of dynamic activities to improve functional performance). (92827)    Gait:   [] Provided training and instruction to the patient for ambulation re-education. (73128)    Self-Care/ADL's  [] Self-care/home management training and compensatory training, meal preparation, safety procedures, and instructions in use of assistive technology devices/adaptive equipment, direct one-on-one contact. (27881)    Home Exercise Program:  Continue current HEP  [x] Reviewed/Progressed HEP activities related to strengthening, flexibility, endurance, ROM. (72469)  [x] Reviewed/Progressed HEP activities related to improving balance, coordination, kinesthetic sense, posture, motor skill, proprioception.  (11618)    Manual Treatments:    [] Provided manual therapy to mobilize soft tissue/joints for the purpose of modulating pain, promoting relaxation,  increasing ROM, reducing/eliminating soft tissue swelling/inflammation/restriction, improving soft tissue extensibility. (83853)    Service Based Modalities:      Timed Code Treatment Minutes:   27' there-ex       15' Neuro re-ed    Total Treatment Minutes:   39'    Treatment/Activity Tolerance:  [x] Patient tolerated treatment well [] Patient limited by fatique  [] Patient limited by pain  [] Patient limited by other medical complications  [] Other:     Prognosis: [x] Good [] Fair  [] Poor    Patient Requires Follow-up: [x] Yes  [] No      Goals:  Short term goals  Time Frame for Short term goals: 3 weeks   Short term goal 1: Initiate HEP (Patient relates understanding and compliance)  Short term goal 2: Increase bilat hip flexion strength and left DF strength to 4/5 for improved ease with ADL and ambulation   Short term goal 3: Pt to maintain FTEC for 25+ seconds with minimal postural sway for improved balance and stability (See above)    Long term goals  Time Frame for Long term goals : 6 weeks  Long term goal 1: Indep with HEP  Long term goal 2:  Increase bilat hip flexion strength and left DF strength to 4+/5 for improved ease with ADL and ambulation  Long term goal 3: Pt to maintain modified tandem/offset stance with eyes closed for 25+ seconds with minimal postural sway for improved balance and stability  Long term goal 4: Pt to score >22/28 on Tinetti test for improved dynamci balance and stability  Long term goal 5: Pt to perform 12 sit-stands in 30 seconds or less without use of UEs for improved ease with functional transfers    Plan:   [x] Continue per plan of care [] Alter current plan (see comments)  [] Plan of care initiated [] Hold pending MD visit [] Discharge    Plan for Next Session: Monitor tolerance and advance strength and balance, stability as able.      Electronically signed by:  Lana Casey PTA     '

## 2020-08-20 ENCOUNTER — HOSPITAL ENCOUNTER (OUTPATIENT)
Dept: PHYSICAL THERAPY | Age: 45
Setting detail: THERAPIES SERIES
Discharge: HOME | End: 2020-08-20
Payer: MEDICARE

## 2020-08-20 PROCEDURE — 97112 NEUROMUSCULAR REEDUCATION: CPT

## 2020-08-20 PROCEDURE — 97110 THERAPEUTIC EXERCISES: CPT

## 2020-08-20 NOTE — FLOWSHEET NOTE
(29283)    Gait:   [] Provided training and instruction to the patient for ambulation re-education. (52037)    Self-Care/ADL's  [] Self-care/home management training and compensatory training, meal preparation, safety procedures, and instructions in use of assistive technology devices/adaptive equipment, direct one-on-one contact. (08911)    Home Exercise Program:  Continue current HEP  [x] Reviewed/Progressed HEP activities related to strengthening, flexibility, endurance, ROM. (50653)  [x] Reviewed/Progressed HEP activities related to improving balance, coordination, kinesthetic sense, posture, motor skill, proprioception.  (81076)    Manual Treatments:    [] Provided manual therapy to mobilize soft tissue/joints for the purpose of modulating pain, promoting relaxation,  increasing ROM, reducing/eliminating soft tissue swelling/inflammation/restriction, improving soft tissue extensibility. (02835)    Service Based Modalities:      Timed Code Treatment Minutes:   28' there-ex       15' Neuro re-ed    Total Treatment Minutes:   52'    Treatment/Activity Tolerance:  [x] Patient tolerated treatment well [] Patient limited by fatique  [] Patient limited by pain  [] Patient limited by other medical complications  [] Other:     Prognosis: [x] Good [] Fair  [] Poor    Patient Requires Follow-up: [x] Yes  [] No      Goals:  Short term goals  Time Frame for Short term goals: 3 weeks   Short term goal 1: Initiate HEP (Patient relates understanding and compliance)  Short term goal 2: Increase bilat hip flexion strength and left DF strength to 4/5 for improved ease with ADL and ambulation   Short term goal 3: Pt to maintain FTEC for 25+ seconds with minimal postural sway for improved balance and stability (See above)    Long term goals  Time Frame for Long term goals : 6 weeks  Long term goal 1: Indep with HEP  Long term goal 2:  Increase bilat hip flexion strength and left DF strength to 4+/5 for improved ease with ADL and ambulation  Long term goal 3: Pt to maintain modified tandem/offset stance with eyes closed for 25+ seconds with minimal postural sway for improved balance and stability  Long term goal 4: Pt to score >22/28 on Tinetti test for improved dynamci balance and stability  Long term goal 5: Pt to perform 12 sit-stands in 30 seconds or less without use of UEs for improved ease with functional transfers    Plan:   [x] Continue per plan of care [] Alter current plan (see comments)  [] Plan of care initiated [] Hold pending MD visit [] Discharge    Plan for Next Session: Monitor tolerance and advance strength and balance, stability as able.      Electronically signed by:  Bonita Stanton PTA     '

## 2020-08-25 ENCOUNTER — HOSPITAL ENCOUNTER (OUTPATIENT)
Dept: PHYSICAL THERAPY | Age: 45
Setting detail: THERAPIES SERIES
Discharge: HOME | End: 2020-08-25
Payer: MEDICARE

## 2020-08-25 NOTE — PROGRESS NOTES
Physical Therapy    Outpatient Physical Therapy    [x] [place]  Phone: [phone]  Fax: [phone]      [] [place]  Phone: [phone]  Fax: [phone]    Physical Therapy  Cancellation/No-show Note  Patient Name:  Myra Salcedo  :  1975   Date:  2020  Cancelled visits to date: 1   No-shows to date: 1    For today's appointment patient:  [x]  Cancelled  []  Rescheduled appointment  []  No-show     Reason given by patient:  [x]  Patient ill  []  Conflicting appointment  []  No transportation    []  Conflict with work  []  No reason given  []  Other:     Comments:  Hurt foot    Electronically signed by: Nataliia Saldana PTA

## 2020-08-27 ENCOUNTER — HOSPITAL ENCOUNTER (OUTPATIENT)
Dept: PHYSICAL THERAPY | Age: 45
Setting detail: THERAPIES SERIES
Discharge: HOME | End: 2020-08-27
Payer: MEDICARE

## 2020-08-27 PROCEDURE — 97112 NEUROMUSCULAR REEDUCATION: CPT | Performed by: PHYSICAL THERAPIST

## 2020-08-27 PROCEDURE — 97110 THERAPEUTIC EXERCISES: CPT | Performed by: PHYSICAL THERAPIST

## 2020-08-27 NOTE — FLOWSHEET NOTE
Physical Therapy Daily Treatment Note    Date:  2020    Patient Name:  Buster Fournier    :  1975  MRN: [id number]  Restrictions/Precautions:     Medical/Treatment Diagnosis Information:   · Diagnosis: multiple sclerosis    · Treatment Diagnosis: general weakness  Insurance/Certification information:  PT Insurance Information: [facility] Medicare   Physician Information:  Referring Practitioner: Christopher Jones CNP  Plan of care signed (Y/N):  N   Visit# / total visits:       Pain level: 0/10      Time In: 12:31   Time Out: 1:29     Progress Note: []  Yes  [x]  No  Next due by: Visit #12  Or by 2020    Subjective: \"Two days ago I stubbed my toe on the floor. I'm not sure if it bent all the way up or down, but one way and there has been some swelling and bruising around the big toe and the top part of the foot. \"    Objective: Pt tolerated todays session well indicating no increase in pain post session. Pt was able to perform all ALEX per flow chart and initiated TM  Walking with good tolerance. Most challenged by BOSU step navigation this date. Required one rest break throughout. Observation: Rpts with rollator. Antalgia present. Observable bruising and edema around toes and dorsum of distal foot. Applied KT tape for anti-edema properties to foot and instructed pt on wearing schedule and removal of tape     Test measurements:  FTEC: 8-10''    Exercises:   Exercise/Equipment Resistance/Repetitions Other comments   Nu-Step 8' lv6   Counter Ex 20x each    Sidesteps 5 laps each    FTEC/FTEO 2 x 30\" each    Offset Feet Balance 2 x 30\" each    SQUATS 3X15    Seated marches 20x    Seated LAQ 20x each    Hip ADD/ABD GREEN 20x each     sit to stands 2x10    Step ups x10 4\" F   BOSU navigation 5 laps //bars   Airex navigation 5 laps //bars   SP cane ambulation 1 laps // bars   BOSU 4-way tap     [x] Provided verbal/tactile cueing for activities related to strengthening, flexibility, endurance, ROM. (88696)  [x] Provided verbal/tactile cueing for activities related to improving balance, coordination, kinesthetic sense, posture, motor skill, proprioception. (91248)    Therapeutic Activities:     [] Therapeutic activities, direct (one-on-one) patient contact (use of dynamic activities to improve functional performance). (73970)    Gait:   [] Provided training and instruction to the patient for ambulation re-education. (25339)    Self-Care/ADL's  [] Self-care/home management training and compensatory training, meal preparation, safety procedures, and instructions in use of assistive technology devices/adaptive equipment, direct one-on-one contact. (47455)    Home Exercise Program:  Continue current HEP  [x] Reviewed/Progressed HEP activities related to strengthening, flexibility, endurance, ROM. (73355)  [x] Reviewed/Progressed HEP activities related to improving balance, coordination, kinesthetic sense, posture, motor skill, proprioception.  (93653)    Manual Treatments:    [] Provided manual therapy to mobilize soft tissue/joints for the purpose of modulating pain, promoting relaxation,  increasing ROM, reducing/eliminating soft tissue swelling/inflammation/restriction, improving soft tissue extensibility. (47288)    Service Based Modalities:      Timed Code Treatment Minutes:   37' there-ex       15' Neuro re-ed    Total Treatment Minutes:   62'    Treatment/Activity Tolerance:  [x] Patient tolerated treatment well [] Patient limited by fatique  [] Patient limited by pain  [] Patient limited by other medical complications  [] Other:     Prognosis: [x] Good [] Fair  [] Poor    Patient Requires Follow-up: [x] Yes  [] No      Goals:  Short term goals  Time Frame for Short term goals: 3 weeks   Short term goal 1: Initiate HEP (Patient relates understanding and compliance)  Short term goal 2:  Increase bilat hip flexion strength and left DF strength to 4/5 for improved ease with ADL and ambulation   Short term goal 3: Pt to maintain FTEC for 25+ seconds with minimal postural sway for improved balance and stability (See above)    Long term goals  Time Frame for Long term goals : 6 weeks  Long term goal 1: Indep with HEP  Long term goal 2: Increase bilat hip flexion strength and left DF strength to 4+/5 for improved ease with ADL and ambulation  Long term goal 3: Pt to maintain modified tandem/offset stance with eyes closed for 25+ seconds with minimal postural sway for improved balance and stability  Long term goal 4: Pt to score >22/28 on Tinetti test for improved dynamci balance and stability  Long term goal 5: Pt to perform 12 sit-stands in 30 seconds or less without use of UEs for improved ease with functional transfers    Plan:   [x] Continue per plan of care [] Alter current plan (see comments)  [] Plan of care initiated [] Hold pending MD visit [] Discharge    Plan for Next Session: Monitor tolerance and advance strength and balance, stability as able.      Electronically signed by:  Kindra Villagran, PT, DPT     '

## 2020-09-01 ENCOUNTER — HOSPITAL ENCOUNTER (OUTPATIENT)
Dept: PHYSICAL THERAPY | Age: 45
Setting detail: THERAPIES SERIES
Discharge: HOME | End: 2020-09-01
Payer: MEDICARE

## 2020-09-01 PROCEDURE — 97112 NEUROMUSCULAR REEDUCATION: CPT

## 2020-09-01 PROCEDURE — 97110 THERAPEUTIC EXERCISES: CPT

## 2020-09-01 NOTE — FLOWSHEET NOTE
Physical Therapy Daily Treatment Note    Date:  2020    Patient Name:  Malvin Payan    :  1975  MRN: [id number]  Restrictions/Precautions:     Medical/Treatment Diagnosis Information:   · Diagnosis: multiple sclerosis    · Treatment Diagnosis: general weakness  Insurance/Certification information:  PT Insurance Information: [facility] Medicare   Physician Information:  Referring Practitioner: Maxx Ambriz CNP  Plan of care signed (Y/N):  N     Visit# / total visits:  10/12     Pain level: 0/10      Time In: 12:35   Time Out:  1:23    Progress Note: []  Yes  [x]  No  Next due by: Visit #12  Or by 2020    Subjective: Pt rpts to clinic with no complaints of fatigue stating \"My foot still hurts from when I hurt it a few weeks ago but its getting better slowly\". Objective: Pt tolerated todays session well indicating no increase in pain post session. Pt was able to perform all ALEX per flow chart with vc required for proper performance. Most challenged by Bosu and balance beam navigation exhibiting much compensation with UE support and multiple attempts to complete steps. Minimal rest breaks required this date. Performed SP cane ambulation with good tolerance. Observation: Rpts with rollator. Antalgia present. Observable bruising and edema around toes and dorsum of distal foot.  Applied KT tape for anti-edema properties to foot and instructed pt on wearing schedule and removal of tape     Test measurements:  FTEC: 8-10''    Exercises:   Exercise/Equipment Resistance/Repetitions Other comments   Nu-Step 8' lv6   Counter Ex 20x each    Sidesteps 5 laps each    FTEC/FTEO 2 x 30\" each    Offset Feet Balance 2 x 30\" each    SQUATS 3X15    Seated marches 20x    Seated LAQ 20x each    Hip ADD/ABD GREEN 20x each     sit to stands 2x10    Step ups x10 4\" F   BOSU navigation 5 laps //bars   Airex navigation 5 laps //bars   SP cane ambulation 1 laps // bars   BOSU 4-way tap     [x] Provided verbal/tactile cueing for activities related to strengthening, flexibility, endurance, ROM. (65953)  [] Provided verbal/tactile cueing for activities related to improving balance, coordination, kinesthetic sense, posture, motor skill, proprioception. (12578)    Therapeutic Activities:     [] Therapeutic activities, direct (one-on-one) patient contact (use of dynamic activities to improve functional performance). (49998)    Gait:   [] Provided training and instruction to the patient for ambulation re-education. (86009)    Self-Care/ADL's  [] Self-care/home management training and compensatory training, meal preparation, safety procedures, and instructions in use of assistive technology devices/adaptive equipment, direct one-on-one contact. (16282)    Home Exercise Program:  Continue current HEP  [x] Reviewed/Progressed HEP activities related to strengthening, flexibility, endurance, ROM. (79059)  [] Reviewed/Progressed HEP activities related to improving balance, coordination, kinesthetic sense, posture, motor skill, proprioception.  (87840)    Manual Treatments:    [] Provided manual therapy to mobilize soft tissue/joints for the purpose of modulating pain, promoting relaxation,  increasing ROM, reducing/eliminating soft tissue swelling/inflammation/restriction, improving soft tissue extensibility. (09060)    Service Based Modalities:      Timed Code Treatment Minutes:   35' there-ex       15' Neuro re-ed    Total Treatment Minutes:   50'    Treatment/Activity Tolerance:  [x] Patient tolerated treatment well [] Patient limited by fatique  [] Patient limited by pain  [] Patient limited by other medical complications  [] Other:     Prognosis: [x] Good [] Fair  [] Poor    Patient Requires Follow-up: [x] Yes  [] No      Goals:  Short term goals  Time Frame for Short term goals: 3 weeks   Short term goal 1: Initiate HEP (Patient relates understanding and compliance)  Short term goal 2:  Increase bilat hip flexion strength and left DF strength to 4/5 for improved ease with ADL and ambulation   Short term goal 3: Pt to maintain FTEC for 25+ seconds with minimal postural sway for improved balance and stability (See above)    Long term goals  Time Frame for Long term goals : 6 weeks  Long term goal 1: Indep with HEP  Long term goal 2: Increase bilat hip flexion strength and left DF strength to 4+/5 for improved ease with ADL and ambulation  Long term goal 3: Pt to maintain modified tandem/offset stance with eyes closed for 25+ seconds with minimal postural sway for improved balance and stability  Long term goal 4: Pt to score >22/28 on Tinetti test for improved dynamci balance and stability  Long term goal 5: Pt to perform 12 sit-stands in 30 seconds or less without use of UEs for improved ease with functional transfers    Plan:   [x] Continue per plan of care [] Alter current plan (see comments)  [] Plan of care initiated [] Hold pending MD visit [] Discharge    Plan for Next Session: Test all goals next session.      Electronically signed by:  Magdalena Sotelo PTA    '

## 2020-09-03 ENCOUNTER — HOSPITAL ENCOUNTER (OUTPATIENT)
Dept: PHYSICAL THERAPY | Age: 45
Setting detail: THERAPIES SERIES
Discharge: HOME | End: 2020-09-03
Payer: MEDICARE

## 2020-09-03 PROCEDURE — 97112 NEUROMUSCULAR REEDUCATION: CPT | Performed by: PHYSICAL THERAPY ASSISTANT

## 2020-09-03 PROCEDURE — 97110 THERAPEUTIC EXERCISES: CPT | Performed by: PHYSICAL THERAPY ASSISTANT

## 2020-09-03 NOTE — FLOWSHEET NOTE
posture, motor skill, proprioception. (88236)    Therapeutic Activities:     [] Therapeutic activities, direct (one-on-one) patient contact (use of dynamic activities to improve functional performance). (42397)    Gait:   [] Provided training and instruction to the patient for ambulation re-education. (02114)    Self-Care/ADL's  [] Self-care/home management training and compensatory training, meal preparation, safety procedures, and instructions in use of assistive technology devices/adaptive equipment, direct one-on-one contact. (07845)    Home Exercise Program:  Continue current HEP  [x] Reviewed/Progressed HEP activities related to strengthening, flexibility, endurance, ROM. (97480)  [] Reviewed/Progressed HEP activities related to improving balance, coordination, kinesthetic sense, posture, motor skill, proprioception.  (15715)    Manual Treatments:    [] Provided manual therapy to mobilize soft tissue/joints for the purpose of modulating pain, promoting relaxation,  increasing ROM, reducing/eliminating soft tissue swelling/inflammation/restriction, improving soft tissue extensibility. (67854)    Service Based Modalities:      Timed Code Treatment Minutes:   28' there-ex       10' Neuro re-ed    Total Treatment Minutes:   43'    Treatment/Activity Tolerance:  [x] Patient tolerated treatment well [] Patient limited by fatique  [] Patient limited by pain  [] Patient limited by other medical complications  [] Other:     Prognosis: [x] Good [] Fair  [] Poor    Patient Requires Follow-up: [x] Yes  [] No      Goals:  Short term goals  Time Frame for Short term goals: 3 weeks   Short term goal 1: Initiate HEP (Patient relates understanding and compliance)  Short term goal 2:  Increase bilat hip flexion strength and left DF strength to 4/5 for improved ease with ADL and ambulation   Short term goal 3: Pt to maintain FTEC for 25+ seconds with minimal postural sway for improved balance and stability (See above)    Long

## 2020-09-08 ENCOUNTER — HOSPITAL ENCOUNTER (OUTPATIENT)
Dept: PHYSICAL THERAPY | Age: 45
Setting detail: THERAPIES SERIES
Discharge: HOME | End: 2020-09-08
Payer: MEDICARE

## 2020-09-08 PROCEDURE — 97112 NEUROMUSCULAR REEDUCATION: CPT

## 2020-09-08 PROCEDURE — 97110 THERAPEUTIC EXERCISES: CPT

## 2020-09-08 NOTE — PLAN OF CARE
[facility]    [x] [place]  Phone: [phone]  Fax: [phone]      [] [place]  Phone: [phone]  Fax: [phone]    Physical Therapy Progress Note  Date: 2020        Patient Name:  Jeri Garcia    :  1975  MRN: [id number]  Restrictions/Precautions:     Medical/Treatment Diagnosis Information:   · Diagnosis: multiple sclerosis    · Treatment Diagnosis: general weakness  Insurance/Certification information:  PT Insurance Information: [facility] Medicare   Physician Information:  Referring Practitioner: Myla Snell CNP  Plan of care signed (Y/N):  N     Visit# / total visits:       Pain level:      1-2/10     Time Period for Report: 7/10/2020 - 2020  Cancels/No-shows to date:  2    Plan of Care/Treatment to date:  [x] Therapeutic Exercise    [] Modalities:  [] Therapeutic Activity     [] Ultrasound  [] Electrical Stimulation  [x] Gait Training      [] Cervical Traction    [] Lumbar Traction  [x] Neuromuscular Re-education  [] Cold/hotpack [] Iontophoresis  [x] Instruction in HEP      Other:  [] Manual Therapy       []    [] Aquatic Therapy       []                    ? Subjective: Pt reports L foot pain with walking.      Objective: ALEX complete per flow chart to facilitate strength, motion an disability to allow ease with daily activities and ambulation. Verbal cuing for progression and technique with exercises. Minimal sway with FTEC/FTEO, balance difficulties with BOSU and airex navigation.       · Observation: Rpts with rollator. Antalgia present. · Several instances of LOB while gait training with straight cane that required intervention. · Test measurements:  Strength hip flexion: -3/5           Assessment:   Mikey Brooks continues to lack functional/dynamic and static strength of bilfaustino SUNG's but is working toward this goal along with balance and general safety and stability in home and community.     Plan:    Continue per POC signing above, therapists plan is approved by physician

## 2020-09-08 NOTE — FLOWSHEET NOTE
Physical Therapy Daily Treatment Note    Date:  2020    Patient Name:  Yari Barry    :  1975  MRN: [id number]  Restrictions/Precautions:     Medical/Treatment Diagnosis Information:   · Diagnosis: multiple sclerosis    · Treatment Diagnosis: general weakness  Insurance/Certification information:  PT Insurance Information: [facility] Medicare   Physician Information:  Referring Practitioner: Wilber Zavala CNP  Plan of care signed (Y/N):  N     Visit# / total visits:       Pain level: 1-2/10      Time In: 12:28   Time Out: 1:15    Progress Note: []  Yes  [x]  No  Next due by: Visit #12  Or by 2020    Subjective: Pt reports L foot pain with walking. Objective: ALEX complete per flow chart to facilitate strength, motion an disability to allow ease with daily activities and ambulation. Verbal cuing for progression and technique with exercises. Minimal sway with FTEC/FTEO, balance difficulties with BOSU and airex navigation. Observation: Rpts with rollator. Antalgia present. Several instances of LOB while gait training with straight cane that required intervention.    Test measurements:  Strength hip flexion: -3/5       DF: L: 4+/5    Exercises:   Exercise/Equipment Resistance/Repetitions Other comments   Nu-Step 8' lv6   Counter Ex 20x each    Sidesteps 5 laps each    FTEC/FTEO 2 x 30\" each    Offset Feet Balance 2 x 30\" each    SQUATS 3X15    Seated marches 20x    Seated LAQ 20x each    Hip ADD/ABD GREEN 20x each     sit to stands 2x10    Step ups x10 4\" F   BOSU navigation 5 laps //bars   Airex navigation 5 laps //bars   SP cane ambulation 1 laps // bars   BOSU 4-way tap     [x] Provided verbal/tactile cueing for activities related to strengthening, flexibility, endurance, ROM. (28330)  [] Provided verbal/tactile cueing for activities related to improving balance, coordination, kinesthetic sense, posture, motor skill, proprioception. (42488)    Therapeutic Activities:     [] Therapeutic activities, direct (one-on-one) patient contact (use of dynamic activities to improve functional performance). (86057)    Gait:   [] Provided training and instruction to the patient for ambulation re-education. (75844)    Self-Care/ADL's  [] Self-care/home management training and compensatory training, meal preparation, safety procedures, and instructions in use of assistive technology devices/adaptive equipment, direct one-on-one contact. (61760)    Home Exercise Program:  Continue current HEP  [x] Reviewed/Progressed HEP activities related to strengthening, flexibility, endurance, ROM. (19787)  [] Reviewed/Progressed HEP activities related to improving balance, coordination, kinesthetic sense, posture, motor skill, proprioception.  (91083)    Manual Treatments:    [] Provided manual therapy to mobilize soft tissue/joints for the purpose of modulating pain, promoting relaxation,  increasing ROM, reducing/eliminating soft tissue swelling/inflammation/restriction, improving soft tissue extensibility. (24880)    Service Based Modalities:      Timed Code Treatment Minutes:   28' there-ex       15' Neuro re-ed    Total Treatment Minutes:   52'    Treatment/Activity Tolerance:  [x] Patient tolerated treatment well [] Patient limited by fatique  [] Patient limited by pain  [] Patient limited by other medical complications  [] Other:     Prognosis: [x] Good [] Fair  [] Poor    Patient Requires Follow-up: [x] Yes  [] No      Goals:  Short term goals  Time Frame for Short term goals: 3 weeks   Short term goal 1: Initiate HEP (Patient relates understanding and compliance)  Short term goal 2:  Increase bilat hip flexion strength and left DF strength to 4/5 for improved ease with ADL and ambulation   Short term goal 3: Pt to maintain FTEC for 25+ seconds with minimal postural sway for improved balance and stability (See above)    Long term goals  Time Frame for Long term goals : 6 weeks  Long term goal 1: Indep with HEP  Long term goal 2: Increase bilat hip flexion strength and left DF strength to 4+/5 for improved ease with ADL and ambulation (See above)  Long term goal 3: Pt to maintain modified tandem/offset stance with eyes closed for 25+ seconds with minimal postural sway for improved balance and stability (  Long term goal 4: Pt to score >22/28 on Tinetti test for improved dynamci balance and stability   Long term goal 5: Pt to perform 12 sit-stands in 30 seconds or less without use of UEs for improved ease with functional transfers (Unable to perform sit to stand without UE assistance)    Plan:   [x] Continue per plan of care [] Alter current plan (see comments)  [] Plan of care initiated [] Hold pending MD visit [] Discharge    Plan for Next Session: Monitor tolerance and advance strength as able.      Electronically signed by:  Napoleon Miranda PTA      '

## 2020-09-10 ENCOUNTER — HOSPITAL ENCOUNTER (OUTPATIENT)
Dept: PHYSICAL THERAPY | Age: 45
Setting detail: THERAPIES SERIES
Discharge: HOME | End: 2020-09-10
Payer: MEDICARE

## 2020-09-10 PROCEDURE — 97110 THERAPEUTIC EXERCISES: CPT

## 2020-09-10 PROCEDURE — 97112 NEUROMUSCULAR REEDUCATION: CPT

## 2020-09-10 NOTE — FLOWSHEET NOTE
Physical Therapy Daily Treatment Note    Date:  9/10/2020    Patient Name:  Corin Santo    :  1975  MRN: [id number]  Restrictions/Precautions:     Medical/Treatment Diagnosis Information:   · Diagnosis: multiple sclerosis    · Treatment Diagnosis: general weakness  Insurance/Certification information:  PT Insurance Information: [facility] Medicare   Physician Information:  Referring Practitioner: Kelly Mayo CNP  Plan of care signed (Y/N):  Y     Visit# / total visits:     2nd POC 13 total   Pain level: 0/10      Time In: 12:23   Time Out: 1:19    Progress Note: []  Yes  [x]  No  Next due by: Visit #12  Or by 2020    Subjective: No changes to report. Objective: ALEX complete per flow chart to facilitate strength, motion an disability to allow ease with daily activities and ambulation. Verbal cuing for progression and technique with exercises. [employer] medical therapeutic yoga for core stability and dynamic balance. Demos use of neck accessory muscles for breathing. Poor to Fair balance noted. No complaints at end of session, reports feeling his balance was challenged with the yoga. Observation: Rpts with rollator. Antalgia present.   Test measurements:  Strength hip flexion: -3/5       DF: L: 4+/5    Exercises:   Exercise/Equipment Resistance/Repetitions Other comments   Nu-Step 8' lv6   Counter Ex 20x each    Sidesteps 5 laps each    FTEC/FTEO 2 x 30\" each    Offset Feet Balance 2 x 30\" each    SQUATS 3X15    Seated marches 20x    Seated LAQ 20x each    Hip ADD/ABD GREEN 20x each     sit to stands 2x10    Step ups x10 4\" F   BOSU navigation 5 laps //bars   Airex navigation 5 laps //bars   SP cane ambulation 1 laps // bars   BOSU 4-way tap           MT Yoga  25'     [x] Provided verbal/tactile cueing for activities related to strengthening, flexibility, endurance, ROM. (85152)  [] Provided verbal/tactile cueing for activities related to improving balance, coordination, kinesthetic sense, posture, motor skill, proprioception. (47842)    Therapeutic Activities:     [] Therapeutic activities, direct (one-on-one) patient contact (use of dynamic activities to improve functional performance). (77159)    Gait:   [] Provided training and instruction to the patient for ambulation re-education. (74441)    Self-Care/ADL's  [] Self-care/home management training and compensatory training, meal preparation, safety procedures, and instructions in use of assistive technology devices/adaptive equipment, direct one-on-one contact. (16551)    Home Exercise Program:  Continue current HEP  [x] Reviewed/Progressed HEP activities related to strengthening, flexibility, endurance, ROM. (45006)  [] Reviewed/Progressed HEP activities related to improving balance, coordination, kinesthetic sense, posture, motor skill, proprioception.  (99530)    Manual Treatments:    [] Provided manual therapy to mobilize soft tissue/joints for the purpose of modulating pain, promoting relaxation,  increasing ROM, reducing/eliminating soft tissue swelling/inflammation/restriction, improving soft tissue extensibility. (97909)    Service Based Modalities:      Timed Code Treatment Minutes:   32' there-ex       25' Neuro re-ed    Total Treatment Minutes:   64'    Treatment/Activity Tolerance:  [x] Patient tolerated treatment well [] Patient limited by fatique  [] Patient limited by pain  [] Patient limited by other medical complications  [] Other:     Prognosis: [x] Good [] Fair  [] Poor    Patient Requires Follow-up: [x] Yes  [] No      Goals:  Short term goals  Time Frame for Short term goals: 3 weeks   Short term goal 1: Initiate HEP (Patient relates understanding and compliance)  Short term goal 2:  Increase bilat hip flexion strength and left DF strength to 4/5 for improved ease with ADL and ambulation   Short term goal 3: Pt to maintain FTEC for 25+ seconds with minimal postural sway for improved balance and stability (See

## 2020-09-15 ENCOUNTER — HOSPITAL ENCOUNTER (OUTPATIENT)
Dept: PHYSICAL THERAPY | Age: 45
Setting detail: THERAPIES SERIES
Discharge: HOME | End: 2020-09-15
Payer: MEDICARE

## 2020-09-15 PROCEDURE — 97110 THERAPEUTIC EXERCISES: CPT

## 2020-09-15 PROCEDURE — 97112 NEUROMUSCULAR REEDUCATION: CPT

## 2020-09-15 NOTE — FLOWSHEET NOTE
Physical Therapy Daily Treatment Note    Date:  9/15/2020    Patient Name:  Laura Gonzales    :  1975  MRN: [id number]  Restrictions/Precautions:     Medical/Treatment Diagnosis Information:   · Diagnosis: multiple sclerosis    · Treatment Diagnosis: general weakness  Insurance/Certification information:  PT Insurance Information: [facility] Medicare   Physician Information:  Referring Practitioner: Dung Hollins CNP  Plan of care signed (Y/N):  Y     Visit# / total visits:     2nd POC 14 total   Pain level: 0/10      Time In: 12:45   Time Out: 1:35    Progress Note: []  Yes  [x]  No  Next due by: Visit #12  Or by 2020    Subjective: Pt rpts to clinic with no change of pain or fatigue stating \"What we tried last time was different. I'm not sure if it helped or not\". Objective: ALEX complete per flow chart to facilitate strength, motion an disability to allow ease with daily activities and ambulation. Verbal cuing for progression and technique with exercises. Initiated several UE strengthening exercises with good tolerance noted. Observation: Rpts with rollator. Antalgia present.   Test measurements:  Strength hip flexion: -3/5       DF: L: 4+/5    Exercises:   Exercise/Equipment Resistance/Repetitions Other comments   Nu-Step 8' lv6   Counter Ex 20x each    Sidesteps 5 laps each    FTEC/FTEO 2 x 30\" each    Offset Feet Balance 2 x 30\" each    SQUATS 3X15    Seated marches 20x    Seated LAQ 20x each    Hip ADD/ABD GREEN 20x each     sit to stands 2x10    Step ups x10 4\" F   BOSU navigation 5 laps //bars   Airex navigation 5 laps //bars   SP cane ambulation 1 laps // bars   BOSU 4-way tap      Shoulder OH press x15 10# DB   Bicep curls x15 10# DB   MT Yoga  25'     [x] Provided verbal/tactile cueing for activities related to strengthening, flexibility, endurance, ROM. (27388)  [] Provided verbal/tactile cueing for activities related to improving balance, coordination, kinesthetic sense, posture, motor skill, proprioception. (02836)    Therapeutic Activities:     [] Therapeutic activities, direct (one-on-one) patient contact (use of dynamic activities to improve functional performance). (52431)    Gait:   [] Provided training and instruction to the patient for ambulation re-education. (03450)    Self-Care/ADL's  [] Self-care/home management training and compensatory training, meal preparation, safety procedures, and instructions in use of assistive technology devices/adaptive equipment, direct one-on-one contact. (50625)    Home Exercise Program:  Continue current HEP  [x] Reviewed/Progressed HEP activities related to strengthening, flexibility, endurance, ROM. (75984)  [] Reviewed/Progressed HEP activities related to improving balance, coordination, kinesthetic sense, posture, motor skill, proprioception.  (11969)    Manual Treatments:    [] Provided manual therapy to mobilize soft tissue/joints for the purpose of modulating pain, promoting relaxation,  increasing ROM, reducing/eliminating soft tissue swelling/inflammation/restriction, improving soft tissue extensibility. (67173)    Service Based Modalities:      Timed Code Treatment Minutes:   25' there-ex       25' Neuro re-ed    Total Treatment Minutes:   48'    Treatment/Activity Tolerance:  [x] Patient tolerated treatment well [] Patient limited by fatique  [] Patient limited by pain  [] Patient limited by other medical complications  [] Other:     Prognosis: [x] Good [] Fair  [] Poor    Patient Requires Follow-up: [x] Yes  [] No      Goals:  Short term goals  Time Frame for Short term goals: 3 weeks   Short term goal 1: Initiate HEP (Patient relates understanding and compliance)  Short term goal 2:  Increase bilat hip flexion strength and left DF strength to 4/5 for improved ease with ADL and ambulation   Short term goal 3: Pt to maintain FTEC for 25+ seconds with minimal postural sway for improved balance and stability (See above)    Long term goals  Time Frame for Long term goals : 6 weeks  Long term goal 1: Indep with HEP  Long term goal 2: Increase bilat hip flexion strength and left DF strength to 4+/5 for improved ease with ADL and ambulation (See above)  Long term goal 3: Pt to maintain modified tandem/offset stance with eyes closed for 25+ seconds with minimal postural sway for improved balance and stability (  Long term goal 4: Pt to score >22/28 on Tinetti test for improved dynamci balance and stability   Long term goal 5: Pt to perform 12 sit-stands in 30 seconds or less without use of UEs for improved ease with functional transfers (Unable to perform sit to stand without UE assistance)    Plan:   [x] Continue per plan of care [] Alter current plan (see comments)  [] Plan of care initiated [] Hold pending MD visit [] Discharge    Plan for Next Session: Monitor tolerance and advance strength as able.      Electronically signed by:  Justin Camejo PTA      '

## 2020-09-17 ENCOUNTER — HOSPITAL ENCOUNTER (OUTPATIENT)
Dept: PHYSICAL THERAPY | Age: 45
Setting detail: THERAPIES SERIES
Discharge: HOME | End: 2020-09-17
Payer: MEDICARE

## 2020-09-17 PROCEDURE — 97112 NEUROMUSCULAR REEDUCATION: CPT

## 2020-09-17 PROCEDURE — 97110 THERAPEUTIC EXERCISES: CPT

## 2020-09-17 NOTE — FLOWSHEET NOTE
Physical Therapy Daily Treatment Note    Date:  2020    Patient Name:  Hansel Phillips    :  1975  MRN: [id number]  Restrictions/Precautions:     Medical/Treatment Diagnosis Information:   · Diagnosis: multiple sclerosis    · Treatment Diagnosis: general weakness  Insurance/Certification information:  PT Insurance Information: [facility] Medicare   Physician Information:  Referring Practitioner: Wolf Gonzalez CNP  Plan of care signed (Y/N):  Y     Visit# / total visits:     2nd POC 14 total   Pain level: 0/10      Time In: 12:32   Time Out: 1:15    Progress Note: []  Yes  [x]  No  Next due by: Visit #12  Or by 2020    Subjective: Pt rpts to clinic with no complaints of pain stating \"I'm feeling good. The days of having pain are over\". Objective: Pt tolerated todays session well indicating no increase in pain post session. Pt was able to complete all ALEX with little vc required and no rest breaks this date. Initiated lat and front shoulder raises this date with good tolerance. Most challenged by initiated shoulder raises noting fatigue. Still exhibits poor ambulation with ambulation even with SP cane this session. Observation: Rpts with rollator. Antalgia present. Test measurements:  Strength hip flexion: -3/5       DF: L: 4+/5    Exercises:   Exercise/Equipment Resistance/Repetitions Other comments   Nu-Step 8' lv6   Counter Ex 20x each    Sidesteps 5 laps each    FTEC/FTEO 2 x 30\" each    Offset Feet Balance 2 x 30\" each    SQUATS x50 BOSU   Seated marches 20x    Seated LAQ 20x each    Hip ADD/ABD GREEN 20x each     sit to stands 2x10    Step ups x10 4\" F   BOSU navigation 5 laps //bars   Airex navigation 5 laps //bars   SP cane ambulation 1 laps // bars   BOSU 4-way tap      Shoulder OH press x15 10# DB   Bicep curls x15 10# DB   Frnt/lat shoulder raise x20 10# db   MT Yoga       [x] Provided verbal/tactile cueing for activities related to strengthening, flexibility, endurance, ROM. (52153)  [] Provided verbal/tactile cueing for activities related to improving balance, coordination, kinesthetic sense, posture, motor skill, proprioception. (88874)    Therapeutic Activities:     [] Therapeutic activities, direct (one-on-one) patient contact (use of dynamic activities to improve functional performance). (77197)    Gait:   [] Provided training and instruction to the patient for ambulation re-education. (05002)    Self-Care/ADL's  [] Self-care/home management training and compensatory training, meal preparation, safety procedures, and instructions in use of assistive technology devices/adaptive equipment, direct one-on-one contact. (83515)    Home Exercise Program:  Continue current HEP  [x] Reviewed/Progressed HEP activities related to strengthening, flexibility, endurance, ROM. (83313)  [] Reviewed/Progressed HEP activities related to improving balance, coordination, kinesthetic sense, posture, motor skill, proprioception.  (28173)    Manual Treatments:    [] Provided manual therapy to mobilize soft tissue/joints for the purpose of modulating pain, promoting relaxation,  increasing ROM, reducing/eliminating soft tissue swelling/inflammation/restriction, improving soft tissue extensibility. (29233)    Service Based Modalities:      Timed Code Treatment Minutes:   21' there-ex       20' Neuro re-ed    Total Treatment Minutes:   37'    Treatment/Activity Tolerance:  [x] Patient tolerated treatment well [] Patient limited by fatique  [] Patient limited by pain  [] Patient limited by other medical complications  [] Other:     Prognosis: [x] Good [] Fair  [] Poor    Patient Requires Follow-up: [x] Yes  [] No      Goals:  Short term goals  Time Frame for Short term goals: 3 weeks   Short term goal 1: Initiate HEP (Patient relates understanding and compliance)  Short term goal 2:  Increase bilat hip flexion strength and left DF strength to 4/5 for improved ease with ADL and ambulation   Short term goal 3: Pt to maintain FTEC for 25+ seconds with minimal postural sway for improved balance and stability (See above)    Long term goals  Time Frame for Long term goals : 6 weeks  Long term goal 1: Indep with HEP  Long term goal 2: Increase bilat hip flexion strength and left DF strength to 4+/5 for improved ease with ADL and ambulation (See above)  Long term goal 3: Pt to maintain modified tandem/offset stance with eyes closed for 25+ seconds with minimal postural sway for improved balance and stability (Exhibits balance control with minimal sway for 10\". 17SEP)  Long term goal 4: Pt to score >22/28 on Tinetti test for improved dynamci balance and stability   Long term goal 5: Pt to perform 12 sit-stands in 30 seconds or less without use of UEs for improved ease with functional transfers (performed 5 in 30\". 17SEP)    Plan:   [x] Continue per plan of care [] Alter current plan (see comments)  [] Plan of care initiated [] Hold pending MD visit [] Discharge    Plan for Next Session: Monitor tolerance and advance strength as able.      Electronically signed by:  Julia Enamorado PTA      '

## 2020-09-22 ENCOUNTER — HOSPITAL ENCOUNTER (OUTPATIENT)
Dept: PHYSICAL THERAPY | Age: 45
Setting detail: THERAPIES SERIES
Discharge: HOME | End: 2020-09-22
Payer: MEDICARE

## 2020-09-22 PROCEDURE — 97110 THERAPEUTIC EXERCISES: CPT

## 2020-09-22 PROCEDURE — 97112 NEUROMUSCULAR REEDUCATION: CPT

## 2020-09-22 NOTE — FLOWSHEET NOTE
Physical Therapy Daily Treatment Note    Date:  2020    Patient Name:  Malvin Payan    :  1975  MRN: [id number]  Restrictions/Precautions:     Medical/Treatment Diagnosis Information:   · Diagnosis: multiple sclerosis    · Treatment Diagnosis: general weakness  Insurance/Certification information:  PT Insurance Information: [facility] Medicare   Physician Information:  Referring Practitioner: Maxx Ambriz CNP  Plan of care signed (Y/N):  Y     Visit# / total visits:  3   2nd POC 15 total   Pain level: 0/10       Time In: 12:45   Time Out: 1:30    Progress Note: []  Yes  [x]  No  Next due by: Visit #12  Or by 2020    Subjective: Pt rpts to clinic with no current complaints of pain or fatigue. Objective: Pt tolerated todays session well indicating only slight fatigue post session. Pt was able to perform all ALEX per flow chart with vc required for proper performance. Most challenged by general ambulation with SP cane exhibiting multiple LOB episodes. No rest breaks required this date. Observation: Rpts with rollator. Antalgia present.   Test measurements:  Strength hip flexion: -3/5       DF: L: 4+/5    Exercises:   Exercise/Equipment Resistance/Repetitions Other comments   Nu-Step 8' lv6   Counter Ex 20x each    Sidesteps 5 laps each    FTEC/FTEO 2 x 30\" each    Offset Feet Balance 2 x 30\" each    SQUATS x50 BOSU   Seated marches 20x    Seated LAQ 20x each    Hip ADD/ABD GREEN 20x each     sit to stands 2x10    Step ups x10 6\" F   BOSU navigation 5 laps //bars   Airex navigation 5 laps //bars   SP cane ambulation 1 laps // bars   BOSU 4-way tap      Shoulder OH press x15 10# DB   Bicep curls x15 10# DB   Frnt/lat shoulder raise x20 10# db   MT Yoga       [x] Provided verbal/tactile cueing for activities related to strengthening, flexibility, endurance, ROM. (99619)  [] Provided verbal/tactile cueing for activities related to improving balance, coordination, kinesthetic sense, posture, motor skill, proprioception. (70687)    Therapeutic Activities:     [] Therapeutic activities, direct (one-on-one) patient contact (use of dynamic activities to improve functional performance). (50430)    Gait:   [] Provided training and instruction to the patient for ambulation re-education. (86830)    Self-Care/ADL's  [] Self-care/home management training and compensatory training, meal preparation, safety procedures, and instructions in use of assistive technology devices/adaptive equipment, direct one-on-one contact. (13129)    Home Exercise Program:  Continue current HEP  [x] Reviewed/Progressed HEP activities related to strengthening, flexibility, endurance, ROM. (18453)  [] Reviewed/Progressed HEP activities related to improving balance, coordination, kinesthetic sense, posture, motor skill, proprioception.  (23648)    Manual Treatments:    [] Provided manual therapy to mobilize soft tissue/joints for the purpose of modulating pain, promoting relaxation,  increasing ROM, reducing/eliminating soft tissue swelling/inflammation/restriction, improving soft tissue extensibility. (88473)    Service Based Modalities:      Timed Code Treatment Minutes:   25' there-ex       20' Neuro re-ed    Total Treatment Minutes:   39'    Treatment/Activity Tolerance:  [x] Patient tolerated treatment well [] Patient limited by fatique  [] Patient limited by pain  [] Patient limited by other medical complications  [] Other:     Prognosis: [x] Good [] Fair  [] Poor    Patient Requires Follow-up: [x] Yes  [] No      Goals:  Short term goals  Time Frame for Short term goals: 3 weeks   Short term goal 1: Initiate HEP (Patient relates understanding and compliance)  Short term goal 2:  Increase bilat hip flexion strength and left DF strength to 4/5 for improved ease with ADL and ambulation   Short term goal 3: Pt to maintain FTEC for 25+ seconds with minimal postural sway for improved balance and stability (See above)    Long term goals  Time Frame for Long term goals : 6 weeks  Long term goal 1: Indep with HEP  Long term goal 2: Increase bilat hip flexion strength and left DF strength to 4+/5 for improved ease with ADL and ambulation (See above)  Long term goal 3: Pt to maintain modified tandem/offset stance with eyes closed for 25+ seconds with minimal postural sway for improved balance and stability (Exhibits balance control with minimal sway for 10\". 17SEP)  Long term goal 4: Pt to score >22/28 on Tinetti test for improved dynamci balance and stability   Long term goal 5: Pt to perform 12 sit-stands in 30 seconds or less without use of UEs for improved ease with functional transfers (performed 5 in 30\". 17SEP)    Plan:   [x] Continue per plan of care [] Alter current plan (see comments)  [] Plan of care initiated [] Hold pending MD visit [] Discharge    Plan for Next Session: Monitor tolerance and advance strength as able.      Electronically signed by:  Hoang De Jesus PTA      '

## 2020-09-24 ENCOUNTER — HOSPITAL ENCOUNTER (OUTPATIENT)
Dept: PHYSICAL THERAPY | Age: 45
Setting detail: THERAPIES SERIES
Discharge: HOME | End: 2020-09-24
Payer: MEDICARE

## 2020-09-24 PROCEDURE — 97110 THERAPEUTIC EXERCISES: CPT

## 2020-09-24 PROCEDURE — 97112 NEUROMUSCULAR REEDUCATION: CPT

## 2020-09-24 NOTE — FLOWSHEET NOTE
Physical Therapy Daily Treatment Note    Date:  2020    Patient Name:  Jeri Garcia    :  1975  MRN: [id number]  Restrictions/Precautions:     Medical/Treatment Diagnosis Information:   · Diagnosis: multiple sclerosis    · Treatment Diagnosis: general weakness  Insurance/Certification information:  PT Insurance Information: [facility]   Physician Information:  Referring Practitioner: Myla Snell CNP  Plan of care signed (Y/N):  Y     Visit# / total visits:     2nd POC 16 total   Pain level: 0/10       Time In: 12:31   Time Out: 1:20    Progress Note: []  Yes  [x]  No  Next due by: Visit #12  Or by 2020    Subjective: Pt rpts to clinic with general complaints of fatigue stating \"I just am tired. I've been doing a lot at home just trying to get odd jobs done\". Objective: Pt tolerated todays session of testing well noting fatigue post session. Pt was able to undergo Tinetti balance testing and sit to stand test exhibiting improvement in sit to stands but continues to require UE assistance. Unsteady balance without AD requiring SBA/CGA to maintain upright posture. Initiated several UE strengthening exercises with good tolerance noted. Observation: Rpts with rollator. Antalgia present.   Test measurements:  Strength hip flexion: -3/5       DF: L: 4+/5    Exercises:   Exercise/Equipment Resistance/Repetitions Other comments   Nu-Step 8' lv6   Counter Ex 20x each    Sidesteps 5 laps each    FTEC/FTEO 2 x 30\" each    Offset Feet Balance 2 x 30\" each    SQUATS x50 BOSU   Seated marches 20x    Seated LAQ 20x each    Hip ADD/ABD GREEN 20x each     sit to stands 2x10    Step ups x10 6\" F   BOSU navigation 5 laps //bars   Airex navigation 5 laps //bars   SP cane ambulation 1 laps // bars   BOSU 4-way tap      Shoulder OH press x15 10# DB   Bicep curls x15 10# DB   Standing bicep curls x30 ea 10#   Standing 1UE OH press x15 ea 10#   Standing bent over DB rows x10 ea 10#   Frnt/lat shoulder

## 2020-09-29 ENCOUNTER — HOSPITAL ENCOUNTER (OUTPATIENT)
Dept: PHYSICAL THERAPY | Age: 45
Setting detail: THERAPIES SERIES
Discharge: HOME | End: 2020-09-29
Payer: MEDICARE

## 2020-09-29 PROCEDURE — 97112 NEUROMUSCULAR REEDUCATION: CPT

## 2020-09-29 PROCEDURE — 97110 THERAPEUTIC EXERCISES: CPT

## 2020-09-29 NOTE — FLOWSHEET NOTE
Physical Therapy Daily Treatment Note    Date:  2020    Patient Name:  Zohaib Chiu    :  1975  MRN: [id number]  Restrictions/Precautions:     Medical/Treatment Diagnosis Information:   · Diagnosis: multiple sclerosis    · Treatment Diagnosis: general weakness  Insurance/Certification information:  PT Insurance Information: [facility] Medicare   Physician Information:  Referring Practitioner: Hiren Greene CNP  Plan of care signed (Y/N):  Y     Visit# / total visits:     2nd POC 17 total   Pain level: 0/10       Time In: 12:48   Time Out:  1:33    Progress Note: []  Yes  [x]  No  Next due by: Visit #12  Or by 2020    Subjective: Pt rpts to clinic with no complaints of pain or fatigue. Objective: Pt tolerated todays session well exhibiting better balance control this session and noting less fatigue overall. Pt was able to perform sit to stands without UE assistance this date with good tolerance. Most challenged by standing bicep curls this date noting much fatigue. Progressing well. Observation: Rpts with rollator. Antalgia present.   Test measurements:  Strength hip flexion: -3/5       DF: L: 4+/5    Exercises:   Exercise/Equipment Resistance/Repetitions Other comments   Nu-Step 8' lv6   Counter Ex 20x each    Sidesteps 5 laps each    FTEC/FTEO 2 x 30\" each    Offset Feet Balance 2 x 30\" each    SQUATS x50 BOSU   Seated marches 20x    Seated LAQ 20x each    Hip ADD/ABD GREEN 20x each     sit to stands 2x10    Step ups x10 6\" F   BOSU navigation 5 laps //bars   Airex navigation 5 laps //bars   SP cane ambulation 1 laps // bars   BOSU 4-way tap      Shoulder OH press x15 10# DB   Bicep curls x15 10# DB   Standing bicep curls x30 ea 10#   Standing 1UE OH press x15 ea 10#   Standing bent over DB rows x10 ea 10#   Frnt/lat shoulder raise x20 10# db   MT Yoga       [x] Provided verbal/tactile cueing for activities related to strengthening, flexibility, endurance, ROM. (77550)  [] Provided verbal/tactile cueing for activities related to improving balance, coordination, kinesthetic sense, posture, motor skill, proprioception. (62424)    Therapeutic Activities:     [] Therapeutic activities, direct (one-on-one) patient contact (use of dynamic activities to improve functional performance). (18593)    Gait:   [] Provided training and instruction to the patient for ambulation re-education. (98492)    Self-Care/ADL's  [] Self-care/home management training and compensatory training, meal preparation, safety procedures, and instructions in use of assistive technology devices/adaptive equipment, direct one-on-one contact. (55262)    Home Exercise Program:  Continue current HEP  [x] Reviewed/Progressed HEP activities related to strengthening, flexibility, endurance, ROM. (71147)  [] Reviewed/Progressed HEP activities related to improving balance, coordination, kinesthetic sense, posture, motor skill, proprioception.  (17902)    Manual Treatments:    [] Provided manual therapy to mobilize soft tissue/joints for the purpose of modulating pain, promoting relaxation,  increasing ROM, reducing/eliminating soft tissue swelling/inflammation/restriction, improving soft tissue extensibility. (35757)    Service Based Modalities:      Timed Code Treatment Minutes:   25' there-ex       20' Neuro re-ed    Total Treatment Minutes:   39'    Treatment/Activity Tolerance:  [x] Patient tolerated treatment well [] Patient limited by fatique  [] Patient limited by pain  [] Patient limited by other medical complications  [] Other:     Prognosis: [x] Good [] Fair  [] Poor    Patient Requires Follow-up: [x] Yes  [] No      Goals:  Short term goals  Time Frame for Short term goals: 3 weeks   Short term goal 1: Initiate HEP (Patient relates understanding and compliance)  Short term goal 2:  Increase bilat hip flexion strength and left DF strength to 4/5 for improved ease with ADL and ambulation   Short term goal 3: Pt to maintain FTEC for 25+ seconds with minimal postural sway for improved balance and stability (See above)    Long term goals  Time Frame for Long term goals : 6 weeks  Long term goal 1: Indep with HEP (Rpts 1x/day compliance. 24SEP)  Long term goal 2: Increase bilat hip flexion strength and left DF strength to 4+/5 for improved ease with ADL and ambulation (See above)  Long term goal 3: Pt to maintain modified tandem/offset stance with eyes closed for 25+ seconds with minimal postural sway for improved balance and stability (Exhibits balance control with minimal sway for 10\". 17SEP)  Long term goal 4: Pt to score >22/28 on Tinetti test for improved dynamci balance and stability (Scored 18/28. 24SEP)  Long term goal 5: Pt to perform 12 sit-stands in 30 seconds or less without use of UEs for improved ease with functional transfers (performed 9 in 30\". 1 arm assist. 24SEP)    Plan:   [x] Continue per plan of care [] Alter current plan (see comments)  [] Plan of care initiated [] Hold pending MD visit [] Discharge    Plan for Next Session: Monitor tolerance and advance strength as able.      Electronically signed by:  JAILYN Delgado

## 2020-10-01 ENCOUNTER — HOSPITAL ENCOUNTER (OUTPATIENT)
Dept: PHYSICAL THERAPY | Age: 45
Setting detail: THERAPIES SERIES
Discharge: HOME | End: 2020-10-01
Payer: MEDICARE

## 2020-10-01 PROCEDURE — 97110 THERAPEUTIC EXERCISES: CPT

## 2020-10-01 PROCEDURE — 97112 NEUROMUSCULAR REEDUCATION: CPT

## 2020-10-01 NOTE — FLOWSHEET NOTE
Physical Therapy Daily Treatment Note    Date:  10/1/2020    Patient Name:  Cristiano Webb    :  1975  MRN: [id number]  Restrictions/Precautions:     Medical/Treatment Diagnosis Information:   · Diagnosis: multiple sclerosis    · Treatment Diagnosis: general weakness  Insurance/Certification information:  PT Insurance Information: [facility] Medicare   Physician Information:  Referring Practitioner: Taylor Fisher CNP  Plan of care signed (Y/N):  Y     Visit# / total visits:     2nd POC 18 total   Pain level: 0/10       Time In: 12:30   Time Out:   1:20    Progress Note: []  Yes  [x]  No  Next due by: Visit #12  Or by 2020    Subjective: Rpts without complaints this date. No pain or fatigue present. Objective: Pt tolerated todays session well exhibiting better balance control this session and noting less fatigue overall. Pt was able to perform sit to stands without UE assistance this date with good tolerance. Most challenged by standing bicep curls this date noting much fatigue. Able to perform 6 sit to stands in 30\" without UE support maintaining balance throughout. Observation: Rpts with rollator. Antalgia present.   Test measurements:  Strength hip flexion: -3/5       DF: L: 4+/5    Exercises:   Exercise/Equipment Resistance/Repetitions Other comments   Nu-Step 8' lv6   Counter Ex 20x each    Sidesteps 5 laps each    FTEC/FTEO 2 x 30\" each    Offset Feet Balance 2 x 30\" each    SQUATS x50 BOSU   Seated marches 20x    Seated LAQ 20x each    Hip ADD/ABD GREEN 20x each     sit to stands 2x10    Step ups x10 6\" F, L   BOSU navigation 5 laps //bars   Airex navigation 5 laps //bars   SP cane ambulation 1 laps // bars   BOSU 4-way tap      Shoulder OH press x15 10# DB   Bicep curls x15 10# DB   Standing bicep curls x30 ea 10#   Standing 1UE OH press x15 ea 10#   Standing bent over DB rows x10 ea 10#   Frnt/lat shoulder raise x20 10# db   MT Yoga       [x] Provided verbal/tactile cueing for activities related to strengthening, flexibility, endurance, ROM. (51826)  [] Provided verbal/tactile cueing for activities related to improving balance, coordination, kinesthetic sense, posture, motor skill, proprioception. (66482)    Therapeutic Activities:     [] Therapeutic activities, direct (one-on-one) patient contact (use of dynamic activities to improve functional performance). (75867)    Gait:   [] Provided training and instruction to the patient for ambulation re-education. (19146)    Self-Care/ADL's  [] Self-care/home management training and compensatory training, meal preparation, safety procedures, and instructions in use of assistive technology devices/adaptive equipment, direct one-on-one contact. (24716)    Home Exercise Program:  Continue current HEP  [x] Reviewed/Progressed HEP activities related to strengthening, flexibility, endurance, ROM. (42471)  [] Reviewed/Progressed HEP activities related to improving balance, coordination, kinesthetic sense, posture, motor skill, proprioception.  (34524)    Manual Treatments:    [] Provided manual therapy to mobilize soft tissue/joints for the purpose of modulating pain, promoting relaxation,  increasing ROM, reducing/eliminating soft tissue swelling/inflammation/restriction, improving soft tissue extensibility. (92639)    Service Based Modalities:      Timed Code Treatment Minutes:   27' there-ex       20' Neuro re-ed    Total Treatment Minutes:   48'    Treatment/Activity Tolerance:  [x] Patient tolerated treatment well [] Patient limited by fatique  [] Patient limited by pain  [] Patient limited by other medical complications  [] Other:     Prognosis: [x] Good [] Fair  [] Poor    Patient Requires Follow-up: [x] Yes  [] No      Goals:  Short term goals  Time Frame for Short term goals: 3 weeks   Short term goal 1: Initiate HEP (Patient relates understanding and compliance)  Short term goal 2:  Increase bilat hip flexion strength and left DF strength to 4/5 for improved ease with ADL and ambulation   Short term goal 3: Pt to maintain FTEC for 25+ seconds with minimal postural sway for improved balance and stability (See above)    Long term goals  Time Frame for Long term goals : 6 weeks  Long term goal 1: Indep with HEP (Rpts 1x/day compliance. 24SEP)  Long term goal 2: Increase bilat hip flexion strength and left DF strength to 4+/5 for improved ease with ADL and ambulation (See above)  Long term goal 3: Pt to maintain modified tandem/offset stance with eyes closed for 25+ seconds with minimal postural sway for improved balance and stability (Exhibits balance control with minimal sway for 10\". 17SEP)  Long term goal 4: Pt to score >22/28 on Tinetti test for improved dynamci balance and stability (Scored 18/28. 24SEP)  Long term goal 5: Pt to perform 12 sit-stands in 30 seconds or less without use of UEs for improved ease with functional transfers (performed 6 in 30\". No UE assist. 30SEP)    Plan:   [x] Continue per plan of care [] Alter current plan (see comments)  [] Plan of care initiated [] Hold pending MD visit [] Discharge    Plan for Next Session: Monitor tolerance and advance strength as able.      Electronically signed by:  JAILYN Calle

## 2020-10-06 ENCOUNTER — HOSPITAL ENCOUNTER (OUTPATIENT)
Dept: PHYSICAL THERAPY | Age: 45
Setting detail: THERAPIES SERIES
Discharge: HOME | End: 2020-10-06
Payer: MEDICARE

## 2020-10-08 ENCOUNTER — HOSPITAL ENCOUNTER (OUTPATIENT)
Dept: PHYSICAL THERAPY | Age: 45
Setting detail: THERAPIES SERIES
Discharge: HOME | End: 2020-10-08
Payer: MEDICARE

## 2020-10-08 PROCEDURE — 97110 THERAPEUTIC EXERCISES: CPT

## 2020-10-08 PROCEDURE — 97112 NEUROMUSCULAR REEDUCATION: CPT

## 2020-10-08 NOTE — FLOWSHEET NOTE
Physical Therapy Daily Treatment Note    Date:  10/8/2020    Patient Name:  Yenny Yusuf    :  1975  MRN: [id number]  Restrictions/Precautions:     Medical/Treatment Diagnosis Information:   · Diagnosis: multiple sclerosis    · Treatment Diagnosis: general weakness  Insurance/Certification information:  PT Insurance Information: [place] Medicare   Physician Information:  Referring Practitioner: Marquis Dean CNP  Plan of care signed (Y/N):  Y     Visit# / total visits:     2nd POC 19 total   Pain level: 0/10       Time In: 12:32   Time Out:   1:20    Progress Note: []  Yes  [x]  No  Next due by: Visit #12  Or by 2020    Subjective: Pt rpts to clinic with no complaints of pain or fatigue stating \"I have no pain. I had to cancel last time because I just didn't feel good after I ate something\". Objective: Pt was instructed in future plan to d/c soon d/t no progression being made with PT. Pt was upset but understands why PT cannot be continued. Instructed pt that most safe form of transportation will be utilization of wc with verbal denial of not wanting to use wc. Instructed pt that possible public gym use could benefit for long term strength deficits. Will see PT for one more visit and to test goals in prep for d/c. Observation: Rpts with rollator. Antalgia present.   Test measurements:  Strength hip flexion: -3/5       DF: L: 4+/5    Exercises:   Exercise/Equipment Resistance/Repetitions Other comments   Nu-Step 8' lv6   Counter Ex 20x each    Sidesteps 5 laps each    FTEC/FTEO 2 x 30\" each    Offset Feet Balance 2 x 30\" each    SQUATS x50 BOSU   Seated marches 20x    Seated LAQ 20x each    Hip ADD/ABD GREEN 20x each     sit to stands 2x10    Step ups x10 6\" F, L   BOSU navigation 5 laps //bars   Airex navigation 5 laps //bars   SP cane ambulation 1 laps // bars   BOSU 4-way tap      Shoulder OH press x15 10# DB   Bicep curls x15 10# DB   Standing bicep curls x30 ea 10#   Standing 1UE OH press x15 ea 10#   Standing bent over DB rows x10 ea 10#   Frnt/lat shoulder raise x20 10# db   MT Yoga       [x] Provided verbal/tactile cueing for activities related to strengthening, flexibility, endurance, ROM. (75753)  [] Provided verbal/tactile cueing for activities related to improving balance, coordination, kinesthetic sense, posture, motor skill, proprioception. (27004)    Therapeutic Activities:     [] Therapeutic activities, direct (one-on-one) patient contact (use of dynamic activities to improve functional performance). (84155)    Gait:   [] Provided training and instruction to the patient for ambulation re-education. (34350)    Self-Care/ADL's  [] Self-care/home management training and compensatory training, meal preparation, safety procedures, and instructions in use of assistive technology devices/adaptive equipment, direct one-on-one contact. (26755)    Home Exercise Program:  Continue current HEP  [x] Reviewed/Progressed HEP activities related to strengthening, flexibility, endurance, ROM. (61992)  [] Reviewed/Progressed HEP activities related to improving balance, coordination, kinesthetic sense, posture, motor skill, proprioception.  (06263)    Manual Treatments:    [] Provided manual therapy to mobilize soft tissue/joints for the purpose of modulating pain, promoting relaxation,  increasing ROM, reducing/eliminating soft tissue swelling/inflammation/restriction, improving soft tissue extensibility.  (12101)    Service Based Modalities:      Timed Code Treatment Minutes:   29' there-ex       20' Neuro re-ed     Total Treatment Minutes:   50'    Treatment/Activity Tolerance:  [x] Patient tolerated treatment well [] Patient limited by fatique  [] Patient limited by pain  [] Patient limited by other medical complications  [] Other:     Prognosis: [x] Good [] Fair  [] Poor    Patient Requires Follow-up: [x] Yes  [] No      Goals:   Short term goals    Time Frame for Short term goals: 3 weeks    Short term goal 1: Initiate HEP (Patient relates understanding and compliance)  Short term goal 2: Increase bilat hip flexion strength and left DF strength to 4/5 for improved ease with ADL and ambulation   Short term goal 3: Pt to maintain FTEC for 25+ seconds with minimal postural sway for improved balance and stability (See above)    Long term goals   Time Frame for Long term goals : 6 weeks  Long term goal 1: Indep with HEP (Rpts 1x/day compliance. 24SEP)   Long term goal 2: Increase bilat hip flexion strength and left DF strength to 4+/5 for improved ease with ADL and ambulation (See above)  Long term goal 3: Pt to maintain modified tandem/offset stance with eyes closed for 25+ seconds with minimal postural sway for improved balance and stability (Exhibits balance control with minimal sway for 10\". 17SEP)  Long term goal 4: Pt to score >22/28 on Tinetti test for improved dynamci balance and stability (Scored 18/28. 24SEP)  Long term goal 5: Pt to perform 12 sit-stands in 30 seconds or less without use of UEs for improved ease with functional transfers (performed 6 in 30\". No UE assist. 30SEP)    Plan:   [x] Continue per plan of care [] Alter current plan (see comments)  [] Plan of care initiated [] Hold pending MD visit [] Discharge    Plan for Next Session: Monitor tolerance and advance strength as able.      Electronically signed by:  Makenna Patel PTA      '

## 2020-10-13 ENCOUNTER — APPOINTMENT (OUTPATIENT)
Dept: PHYSICAL THERAPY | Age: 45
End: 2020-10-13
Payer: MEDICARE

## 2020-10-15 ENCOUNTER — HOSPITAL ENCOUNTER (OUTPATIENT)
Dept: SPEECH THERAPY | Age: 45
Setting detail: THERAPIES SERIES
Discharge: HOME | End: 2020-10-15
Payer: MEDICARE

## 2020-10-15 ENCOUNTER — APPOINTMENT (OUTPATIENT)
Dept: PHYSICAL THERAPY | Age: 45
End: 2020-10-15
Payer: MEDICARE

## 2020-10-16 ENCOUNTER — HOSPITAL ENCOUNTER (OUTPATIENT)
Dept: PHYSICAL THERAPY | Age: 45
Setting detail: THERAPIES SERIES
Discharge: HOME | End: 2020-10-16
Payer: MEDICARE

## 2020-10-16 ENCOUNTER — HOSPITAL ENCOUNTER (OUTPATIENT)
Dept: SPEECH THERAPY | Age: 45
Setting detail: THERAPIES SERIES
Discharge: HOME | End: 2020-10-16
Payer: MEDICARE

## 2020-10-16 NOTE — PROGRESS NOTES
Speech Language Pathology   Facility/Department: [facility] PHYSICAL THERAPY  Initial Assessment    NAME:  Jen Arnold  :   1975  MRN:  [id number]  Date of Eval:  10/16/2020  Evaluating Therapist:   Margaret Anthony, [place], [address] [facility]  Referring physician:  Carolina Quinteros, Aprn - Cnp  [address] [facility], [address] [facility]  Patient Diagnosis(es):  Multiple Sclerosis, Memory loss    Primary Complaint: Memory loss    History:    ASSESSMENT   Standardized testing administered: ***    Hearing: Within functional limits    Vision: Within functional limits    Oral Motor: Gross observations made. Patient's facial appearance is symmetrical. No noted weakness bilaterally with tongue, cheek, lips, or velum. No drooling observed. Patient denies difficulty with swallowing. Articulation/Speech Intelligibility: Within functional limits    Receptive Language: A speech and language evaluation was given to the patient. Patient presents *** impaired receptive language skills. The results are as follows:   · Identifying body parts: ***/12  · Identifying simple, common objects: ***/4  · Following simple, multi-step, and complex commands: ***/5   · Identifying simple, common pictures: ***/10    Expressive Language: A speech and language evaluation was given to the patient. The patient presents *** impaired expressive language skills. The results are as follows:  · Use of signs, gestures, and sounds: ***/5  · Responding to simple/personal Y/N questions: ***/4  · Responding to complex Y/N questions: ***/4  · Automatic speech tasks: ***/6   · Answering simple WH-questions: ***/9   · Word repetition: ***/12  · Sentence completion: ***/4  · Synonyms: ***/2  · Antonyms: ***/2  · Categorization: ***/20  · Picture Naming:***/10  · Sentence/Thought Formulation: ***/2    Cognition: A speech and language evaluation was given to the patient. Patient presents *** impaired cognitive skills.  The results are as follows:   · Orientation (Temporal, Spatial, Personal): ***/7  · Short-Term Memory: ***/9  · Long-Term Memory: ***/3  · Sequencing: ***/3  · Problem Solving and Reasoning: ***/4   · Letter Identification: ***/6  · Number Identification: ***/6    Pragmatics: Within functional limits    Fluency: Within functional limits    Diagnosis/Impressions:     Prognosis:            Plan:   Recommendations:  1.  ***  2.  ***  3.  ***    Treatment frequency and duration:     Goals:   1.  ***  2.  ***  3.  ***  4.  ***      Patient/family involved in developing goals and treatment plan:       Timed Based evaluations:  [] Communication device evaluation (1st hour) (47361)  [] Communication device evaluation additional 30 minutes (77683)    [] Aphasia Assessment (each 1 hour) (09987)    [] Standardized cognitive performance testing (58335)    Timed Code Treatment Minutes:         Speech evaluations :  [] Eval speech fluency (60030)     [] Eval Sound Production (45322)    [x] Eval Sound Production, Language Comprehension and Expression (26784)     [] Behavioral & quantitative analysis of voice and resonance (18336)    [] Evaluation of voice prosthetic device (38074)    [] Evaluation of oral and pharyngeal swallow function (00314)    [] MBSS (43888)          Therapist Signature:  ***   Date: 10/16/2020

## 2020-10-16 NOTE — PROGRESS NOTES
Physical Therapy    Outpatient Physical Therapy    [x] [place]  Phone: [phone]  Fax: [phone]      [] [place]  Phone: [phone]  Fax: [phone]    Physical Therapy  Cancellation/No-show Note  Patient Name:  Barbara Vides  :  1975   Date:  10/16/2020  Cancelled visits to date: 3   No-shows to date: 1    For today's appointment patient:  [x]  Cancelled  []  Rescheduled appointment  []  No-show     Reason given by patient:  []  Patient ill  []  Conflicting appointment  []  No transportation    []  Conflict with work  []  No reason given  [x]  Other:     Comments:  \"Just woke up, can't make it\"    Electronically signed by: Gabrielle Keen, PT, DPT

## 2020-10-20 ENCOUNTER — APPOINTMENT (OUTPATIENT)
Dept: PHYSICAL THERAPY | Age: 45
End: 2020-10-20
Payer: MEDICARE

## 2020-10-22 ENCOUNTER — APPOINTMENT (OUTPATIENT)
Dept: PHYSICAL THERAPY | Age: 45
End: 2020-10-22
Payer: MEDICARE

## 2020-10-22 ENCOUNTER — APPOINTMENT (OUTPATIENT)
Dept: SPEECH THERAPY | Age: 45
End: 2020-10-22
Payer: MEDICARE

## 2020-10-26 ENCOUNTER — OFFICE VISIT (OUTPATIENT)
Dept: UROLOGY | Age: 45
End: 2020-10-26
Payer: MEDICARE

## 2020-10-26 VITALS
DIASTOLIC BLOOD PRESSURE: 68 MMHG | BODY MASS INDEX: 16.68 KG/M2 | RESPIRATION RATE: 12 BRPM | SYSTOLIC BLOOD PRESSURE: 102 MMHG | OXYGEN SATURATION: 98 % | HEIGHT: 74 IN | WEIGHT: 130 LBS | HEART RATE: 68 BPM

## 2020-10-26 PROCEDURE — G8484 FLU IMMUNIZE NO ADMIN: HCPCS | Performed by: UROLOGY

## 2020-10-26 PROCEDURE — 1036F TOBACCO NON-USER: CPT | Performed by: UROLOGY

## 2020-10-26 PROCEDURE — 99214 OFFICE O/P EST MOD 30 MIN: CPT | Performed by: UROLOGY

## 2020-10-26 PROCEDURE — G8427 DOCREV CUR MEDS BY ELIG CLIN: HCPCS | Performed by: UROLOGY

## 2020-10-26 PROCEDURE — G8419 CALC BMI OUT NRM PARAM NOF/U: HCPCS | Performed by: UROLOGY

## 2020-10-26 SDOH — HEALTH STABILITY: MENTAL HEALTH: HOW OFTEN DO YOU HAVE A DRINK CONTAINING ALCOHOL?: NEVER

## 2020-10-26 NOTE — PROGRESS NOTES
quittin.4   Smokeless Tobacco Never Used      (If patient a smoker, smoking cessation counseling offered)   Social History     Substance and Sexual Activity   Alcohol Use No    Frequency: Never    Binge frequency: Never       REVIEW OF SYSTEMS:  Constitutional: negative  Eyes: negative  Respiratory: negative  Cardiovascular: negative  Gastrointestinal: negative  Genitourinary: see HPI  Musculoskeletal: negative  Skin: negative   Neurological: negative  Hematological/Lymphatic: negative  Psychological: negative        Physical Exam:    This a 54 y.o. male  Vitals:    10/26/20 1050   BP: 102/68   Pulse: 68   Resp: 12   SpO2: 98%     Body mass index is 16.69 kg/m². Constitutional: Patient in no acute distress;           Assessment and Plan        1. Neurogenic bladder    2. Benign localized prostatic hyperplasia with lower urinary tract symptoms (LUTS)    3. Erectile dysfunction, unspecified erectile dysfunction type    4. Anejaculation               Plan:      Doing well with recurrent UTI's. No problems with catheterizing  There was an element of prostatic hypertrophy. However, pt has not been able to void for > 25 years on his own and it his voiding dysfunction is primarily neurogenic in etiology and not obstructive  Continue oxybutynin 15 daily  ED stable. undergoing trimix injections. Did have a recent priapism. Appropriate instructions provided (including presenting to ED if prolonged erection)  Anejaculation- possibly neurogenic component. No treatment options for patient. Did recommend referral to [facility] or [facility]  Follow up annually      Prescriptions Ordered:  No orders of the defined types were placed in this encounter. Orders Placed:  No orders of the defined types were placed in this encounter.            Micky Duvall MD

## 2020-10-26 NOTE — PROGRESS NOTES
images and verified the radiology reports from:    Renal ultrasound negative for hydronephrosis      PAST MEDICAL, FAMILY AND SOCIAL HISTORY:  Past Medical History:   Diagnosis Date    Chronic kidney disease     GERD (gastroesophageal reflux disease)     MS (multiple sclerosis) ([facility] [address]      Past Surgical History:   Procedure Laterality Date    COLONOSCOPY      UPPER GASTROINTESTINAL ENDOSCOPY       Family History   Problem Relation Age of Onset    Cancer Paternal Uncle     Cancer Paternal Grandmother      Outpatient Medications Marked as Taking for the 10/26/20 encounter (Office Visit) with Estrella Clinton MD   Medication Sig Dispense Refill    oxybutynin (DITROPAN XL) 15 MG extended release tablet Take 1 tablet by mouth daily 90 tablet 3    Needle, Disp, 28G X 1/2\" MISC 1 box by Does not apply route as needed (as needed to inject corpora cavernosa for trimix medication for erectile dysfunction) 1 each 1    Biotin 1000 MCG CHEW Take 100 mg by mouth daily      RA VITAMIN D-3 5000 units CAPS capsule 5,000 Units daily  1    omeprazole (PRILOSEC) 40 MG delayed release capsule take 1 capsule by mouth once daily  0    Vitamin E 400 units TABS Take by mouth daily      Cyanocobalamin (B-12) 3000 MCG SUBL Take by mouth daily       Multiple Vitamins-Minerals (MULTIVITAMIN ADULT PO) Take 1 tablet by mouth daily      Ascorbic Acid (VITAMIN C) 250 MG tablet Take 250 mg by mouth daily      gabapentin (NEURONTIN) 300 MG capsule Take 300 mg by mouth nightly      ocrelizumab (OCREVUS) 300 MG/10ML SOLN injection Infuse 300 mg intravenously once 2 times a year/ every 6 months      baclofen (LIORESAL) 10 MG tablet 10 mg 3 times daily as needed          Excedrin migraine  [asa-apap-caff buffered] and Prednisone  Social History     Tobacco Use   Smoking Status Former Smoker    Packs/day: 1.00    Years: 25.00    Pack years: 25.00    Start date: 1990    Last attempt to quit: 2015    Years since quittin.4 Smokeless Tobacco Never Used      (If patient a smoker, smoking cessation counseling offered)   Social History     Substance and Sexual Activity   Alcohol Use No    Frequency: Never    Binge frequency: Never       REVIEW OF SYSTEMS:  Constitutional: negative  Eyes: negative  Respiratory: negative  Cardiovascular: negative  Gastrointestinal: negative  Genitourinary: see HPI  Musculoskeletal: negative  Skin: negative   Neurological: negative  Hematological/Lymphatic: negative  Psychological: negative        Physical Exam:    This a 54 y.o. male  Vitals:    10/26/20 1050   BP: 102/68   Pulse: 68   Resp: 12   SpO2: 98%     Body mass index is 16.69 kg/m². Constitutional: Patient in no acute distress;           Assessment and Plan        1. Neurogenic bladder    2. Benign localized prostatic hyperplasia with lower urinary tract symptoms (LUTS)    3. Erectile dysfunction, unspecified erectile dysfunction type    4. Anejaculation               Plan:      No new UTI. No problems with catheterizing  There was an element of prostatic hypertrophy. However, pt has not been able to void for > 25 years on his own and it his voiding dysfunction is primarily neurogenic in etiology and not obstructive  Continue oxybutynin 15 daily  ED worsening. undergoing trimix injections. Left trimix injection out of refrigerator. Recommend increasing dosage. Refill dosage. Appropriate instructions provided (including presenting to ED if prolonged erection)  Anejaculation- possibly neurogenic component. No treatment option for patient. Follow up in one year       Prescriptions Ordered:  No orders of the defined types were placed in this encounter. Orders Placed:  No orders of the defined types were placed in this encounter.            Garo Moscoso MD

## 2020-10-27 ENCOUNTER — HOSPITAL ENCOUNTER (OUTPATIENT)
Dept: SPEECH THERAPY | Age: 45
Setting detail: THERAPIES SERIES
Discharge: HOME | End: 2020-10-27
Payer: MEDICARE

## 2020-10-27 PROCEDURE — 92523 SPEECH SOUND LANG COMPREHEN: CPT | Performed by: SPEECH-LANGUAGE PATHOLOGIST

## 2020-10-27 NOTE — FLOWSHEET NOTE
Outpatient Speech Therapy    [x] [place]  Phone: [phone]  Fax: [phone]      [] [place]  Phone: [phone]  Fax: [phone] THERAPY DAILY PROGRESS NOTE    Patient: Jarrod Love     History Number: [id number]  Age: 54 y.o.      : 1975     PCP: Umer Kimball     Onset date: 2020  Referring doctor: Radha Smiley, Aprn - Cnp  [address] [facility], [address] [place] [address]  Diagnosis:    Multiple Sclerosis  Memory deficit  Cognitive deficit secondary to multiple sclerosis        Precautions:  Fall risk     Date: 10/29/2020     Time in:  02:05 pm  Visit:  1/       Time out: 03:00 pm  Total Visits: 1  Insurance information:  [facility] Medicare, Medicaid  Plan of care signed (Y/N): n  Next re-certification due by: 2020       Subjective report:         Initial assessment complete. See progress report for complete details. Tika Collazo presents with a mild cognitive impairment with deficits in attention, memory, divergent thinking, and deductive reasoning. Further formal assessment into specific types of attention and memory is warranted for treatment planning. Tika Collazo noted his head hurt some towards end of assessment. His attention also started to fade as he asked for increased repetition.      Goal 1: Formally assess attention and memory. Goal 2: Complete deductive reasoning tasks with 80% accuracy independently.                    Patient education/  home program         New Education provided to patient/ family/ caregiver   [x] Yes              [] No   Comments: discussed results and recommendations of assessment  Continued review of prior education:  Method of Education:   [x] Discussion     [] Demonstration    [] Written     [] Other    Evaluation of Patients Response to Education:        [x] Patient and/or Caregiver verbalized understanding  [] Patient and/or Caregiver demonstrated without assistance  [] Patient and/or Caregiver demonstrated with assistance  [] Needs additional instruction to demonstrate understanding of education     Treatment/Response:               Patient tolerated todays treatment session:   [x] Good         []  Fair         []  Poor    Limitations/ difficulties with treatment session due to:          []Attention      []Pain             []Fatigue       []Other medical complications              []Other:                   Comments:     Plan/Goals:     [x]  Continue with current plan of care  []  Medical [facility]  [] [facility] per patient request  []  Change Treatment plan:     Assess attention and administer TOMAL   Next appointment scheduled 11/02/2020     Timed Based:  [] Cognitive Skills (58579)     Timed Code Treatment Minutes:         Speech :  [] Speech individual (99013)     [] Swallow/oral function treatment (98230)    [] Communication device modification (84926)       Electronically signed by:    Naye Min [facility], [address] [facility]         Date:10/29/2020

## 2020-10-27 NOTE — PLAN OF CARE
previous recommended Frequency of Treatment for therapy   [] Change Frequency:   [] Other:     Electronically signed by:    Rebecca Espino MS, CCC-SLP          Date:10/29/2020    Regulatory Requirements  I have reviewed this plan of care and certify a need for medically necessary rehabilitation services.     Physician Signature:  Date:    Please sign and return to [address]
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
## 2020-11-02 ENCOUNTER — HOSPITAL ENCOUNTER (OUTPATIENT)
Dept: SPEECH THERAPY | Age: 45
Setting detail: THERAPIES SERIES
Discharge: HOME | End: 2020-11-02
Payer: MEDICARE

## 2020-11-02 PROCEDURE — 97129 THER IVNTJ 1ST 15 MIN: CPT | Performed by: SPEECH-LANGUAGE PATHOLOGIST

## 2020-11-02 PROCEDURE — 97130 THER IVNTJ EA ADDL 15 MIN: CPT | Performed by: SPEECH-LANGUAGE PATHOLOGIST

## 2020-11-02 NOTE — FLOWSHEET NOTE
9. Digits Forward        10. Visual Selective Reminding        11. Letters Forward        12. Manual imitation        13. Digits Backward        14. Letters Backward        15. Memory for Stories Delayed        16. Word Selective Reminding Delayed  5 5          Goal 2: Complete deductive reasoning tasks with 80% accuracy independently.  NA-focused on assessment this date                  Patient education/  home program         New Education provided to patient/ family/ caregiver   [x] Yes              [] No   Comments:  shared information on [employer] games to assist with attention and memory  Continued review of prior education:  Method of Education:   [x] Discussion     [] Demonstration    [] Written     [] Other    Evaluation of Patients Response to Education:        [x] Patient and/or Caregiver verbalized understanding  [] Patient and/or Caregiver demonstrated without assistance  [] Patient and/or Caregiver demonstrated with assistance  [] Needs additional instruction to demonstrate understanding of education     Treatment/Response:               Patient tolerated todays treatment session:   [x] Good         []  Fair         []  Poor    Limitations/ difficulties with treatment session due to:          []Attention      []Pain             []Fatigue       []Other medical complications              []Other:                   Comments:     Plan/Goals:     [x]  Continue with current plan of care  []  Medical [facility]  [] [facility] per patient request  []  Change Treatment plan:     Continue assessment of attention and memory  Next appointment scheduled 11/12/2020     Timed Based:  [x] Cognitive Skills (22576)     Timed Code Treatment Minutes:  87 minutes (6 units)     Speech :  [] Speech individual (36812)     [] Swallow/oral function treatment (07888)    [] Communication device modification (34899)       Electronically signed by:    Naye Salinas William 87, [address]         Date:11/2/2020

## 2021-05-03 ENCOUNTER — OFFICE VISIT (OUTPATIENT)
Dept: PODIATRY | Age: 46
End: 2021-05-03
Payer: MEDICARE

## 2021-05-03 VITALS
DIASTOLIC BLOOD PRESSURE: 80 MMHG | HEIGHT: 74 IN | SYSTOLIC BLOOD PRESSURE: 120 MMHG | HEART RATE: 68 BPM | BODY MASS INDEX: 18.61 KG/M2 | WEIGHT: 145 LBS

## 2021-05-03 DIAGNOSIS — M20.42 HAMMER TOES OF BOTH FEET: ICD-10-CM

## 2021-05-03 DIAGNOSIS — M20.41 HAMMER TOES OF BOTH FEET: ICD-10-CM

## 2021-05-03 DIAGNOSIS — G35 MULTIPLE SCLEROSIS (HCC): ICD-10-CM

## 2021-05-03 DIAGNOSIS — L60.8 DEFORMITY OF NAIL BED: Primary | ICD-10-CM

## 2021-05-03 PROCEDURE — G8420 CALC BMI NORM PARAMETERS: HCPCS | Performed by: PODIATRIST

## 2021-05-03 PROCEDURE — 1036F TOBACCO NON-USER: CPT | Performed by: PODIATRIST

## 2021-05-03 PROCEDURE — 99203 OFFICE O/P NEW LOW 30 MIN: CPT | Performed by: PODIATRIST

## 2021-05-03 PROCEDURE — 99214 OFFICE O/P EST MOD 30 MIN: CPT | Performed by: PODIATRIST

## 2021-05-03 PROCEDURE — G8428 CUR MEDS NOT DOCUMENT: HCPCS | Performed by: PODIATRIST

## 2021-05-03 NOTE — PROGRESS NOTES
daily   Yes Historical Provider, MD   Ascorbic Acid (VITAMIN C) 250 MG tablet Take 250 mg by mouth daily   Yes Historical Provider, MD   gabapentin (NEURONTIN) 300 MG capsule Take 300 mg by mouth nightly   Yes Historical Provider, MD   ocrelizumab (OCREVUS) 300 MG/10ML SOLN injection Infuse 300 mg intravenously once 2 times a year/ every 6 months   Yes Historical Provider, MD   baclofen (LIORESAL) 10 MG tablet 10 mg 3 times daily as needed  13  Yes Historical Provider, MD       Past Surgical History:   Procedure Laterality Date    COLONOSCOPY      UPPER GASTROINTESTINAL ENDOSCOPY         Family History   Problem Relation Age of Onset    Cancer Paternal Uncle     Cancer Paternal Grandmother        Social History     Tobacco Use    Smoking status: Former Smoker     Packs/day: 1.00     Years: 25.00     Pack years: 25.00     Start date: 1990     Quit date: 2015     Years since quittin.9    Smokeless tobacco: Never Used   Substance Use Topics    Alcohol use: No     Frequency: Never     Binge frequency: Never       ROS: All 14 ROS systems reviewed and pertinent positives noted above, all others negative. Lower Extremity Physical Examination:     Vitals:   Vitals:    21 1457   BP: 120/80   Pulse: 68     General: AAO x 3 in NAD. Vascular: DP and PT pulses palpable 2/4, bilateral.  CFT <3 seconds, bilateral.  Hair growth present to the level of the digits, bilateral.  Edema absent, bilateral.  Varicosities absent, bilateral. Erythema absent, bilateral. Distal Rubor absent bilateral.  Temperature within normal limits bilateral. Hyperpigmentation absent bilateral. No atrophic skin.   Neurological: Sensation Impaired to light touch to level of digits, bilateral.  Protective sensation intact  10/10 sites via 5.07/10g Nelson-Nadege Monofilament, bilateral.  negative Tinel's, bilateral.  negative Valleix sign, bilateral.  Vibratory abnormal  bilateral.  Reflexes Decreased bilateral. Paresthesias positive. Dysthesias negative. Sharp/dull intact bilateral.  Musculoskeletal: Muscle strength 4/5, bilateral.  Pain absent upon palpation bilateral. Normal medial longitudinal arch, bilateral.  Ankle ROMdecreasedbilateral.  1st MPJ ROM within normal limits, bilateral.  Dorsally contracted digits present 2 b/l, reducible. . No other foot deformities. Integument: Warm, dry, supple, bilateral.  Open lesion absent, bilateral.  Interdigital maceration absent to web spaces bilateral  Nails left 1 and right 1 thickened, dystrophic distal 2/3 only. proximal nail healthy. .   Fissures absent, bilateral. Hyperkeratotic tissue is absent. Asessment: Patient is a 54 y.o. male with:    Diagnosis Orders   1. Deformity of nail bed     2. Hammer toes of both feet     3. Multiple sclerosis ([facility] [address]         Plan: Patient examined and evaluated. Current condition and treatment options discussed in detail. Pt given tx options for anti fungal therapy. Pt educated on Rx po lamisil, Rx topical Penlac, OTC home remedies or other OTC topical meds. Patient advised very low potential benefit in these treatments. Nails look more dystrophic in nature. Had a low level medical decision making overall. Stable chronic condition. No new testing ordered. Overall low risk of morbidity with current treatment course. No current need for nail procedure unless the nails would lead to a paronychia or pain due to the deformity. Patient will continue to keep the nails trimmed back. Offloading options discussed for hammertoes. Asymptomatic at this time no signs of irritation. We will look into further calluses, blistering , pain or wounds ever arise. Contact office with any questions/problems/concerns.   RTC in PRN

## 2021-07-26 NOTE — FLOWSHEET NOTE
Called to let us know he couldn't find his keys and would be running late. I let the patient know we would have to reschedule for next week. He apologized and said we will see him next week. Physical Therapy Daily Treatment Note    Date:  1/3/2020    Patient Name:  Nikkie Monge    :  1975  MRN: [id number]  Restrictions/Precautions:      Medical/Treatment Diagnosis Information:   · Diagnosis: Multiple Sclerosis   · Treatment Diagnosis: multiple sclerosis-weakness   Insurance/Certification information:  PT Insurance Information: [id number] [facility]  Physician Information:  Referring Practitioner: Jonas Silva MD   Plan of care signed (Y/N):  N          Visit# / total visits:   ( POC) 135 total    Pain level: 0/10       Time In: 2:25    Time Out: 3:30     Progress Note: []  Yes  [x]  No  Next due by: Visit #10  Or by 19     Subjective: \"My vision has changed a little, so I go to see my doctor in February to make sure that's okay. I don't think anything has really changed in my walking/ambulation in the past 2-4 weeks. \"    Objective:  Observation:    · Gait with RW, R AFO    Test measurements:  · Sit to stand from standard chair height, no arm assist, x10  · Squats in \"knock-kneed\" position due to lack of hip abd control. · Static standing 30\" eyes open, min sway or balnce loss.  Poor balance eye closed  · TU seconds  · Tandem Balance: 30 seconds bilat      Exercises:   Exercise/Equipment Resistance/Repetitions Other comments   Offset Balance 3 x 30\"EO    DD offset balance EO   DD DLS EO   HR/TR 41R     Marches 20x each     3 way hip 20x each bilat    HS Curls 20x each     Sit-Stands 3x10 with ball b/t LEs, eccentric descent Required UE support, SBA   Sidesteps 5 laps )   hurdles Mike from therapist to clear over darion   SLS HRaises    amb with single cane x1 lapCGA from therapist   BOSU flat side balance A-Z x1, HHACGA from therapist, eyes open   BOSU Round Side Balance Marching 15x eachImproved ability to lift R knee indep   BOSU flat side squats 3 sets of 10 reps   Mini Lunges    Retro walk  1 lap no HHA, 2 laps 1 HHA   Offset Feet with Balloon Toss   Balloon taps

## 2021-08-18 RX ORDER — OXYBUTYNIN CHLORIDE 15 MG/1
TABLET, EXTENDED RELEASE ORAL
Qty: 90 TABLET | Refills: 3 | Status: SHIPPED | OUTPATIENT
Start: 2021-08-18 | End: 2022-07-26

## 2021-11-01 ENCOUNTER — OFFICE VISIT (OUTPATIENT)
Dept: UROLOGY | Age: 46
End: 2021-11-01
Payer: MEDICARE

## 2021-11-01 VITALS
OXYGEN SATURATION: 98 % | HEIGHT: 74 IN | BODY MASS INDEX: 18.62 KG/M2 | HEART RATE: 97 BPM | SYSTOLIC BLOOD PRESSURE: 122 MMHG | DIASTOLIC BLOOD PRESSURE: 62 MMHG

## 2021-11-01 DIAGNOSIS — N40.1 BENIGN LOCALIZED PROSTATIC HYPERPLASIA WITH LOWER URINARY TRACT SYMPTOMS (LUTS): ICD-10-CM

## 2021-11-01 DIAGNOSIS — N31.9 NEUROGENIC BLADDER: Primary | ICD-10-CM

## 2021-11-01 DIAGNOSIS — N53.19 ANEJACULATION: ICD-10-CM

## 2021-11-01 DIAGNOSIS — N52.9 ERECTILE DYSFUNCTION, UNSPECIFIED ERECTILE DYSFUNCTION TYPE: ICD-10-CM

## 2021-11-01 PROCEDURE — G8427 DOCREV CUR MEDS BY ELIG CLIN: HCPCS | Performed by: UROLOGY

## 2021-11-01 PROCEDURE — G8420 CALC BMI NORM PARAMETERS: HCPCS | Performed by: UROLOGY

## 2021-11-01 PROCEDURE — 99213 OFFICE O/P EST LOW 20 MIN: CPT | Performed by: UROLOGY

## 2021-11-01 PROCEDURE — 99214 OFFICE O/P EST MOD 30 MIN: CPT | Performed by: UROLOGY

## 2021-11-01 PROCEDURE — 1036F TOBACCO NON-USER: CPT | Performed by: UROLOGY

## 2021-11-01 PROCEDURE — G8484 FLU IMMUNIZE NO ADMIN: HCPCS | Performed by: UROLOGY

## 2021-11-01 RX ORDER — MIDODRINE HYDROCHLORIDE 5 MG/1
TABLET ORAL
COMMUNITY
Start: 2021-08-16 | End: 2021-11-01

## 2021-11-01 RX ORDER — MIDODRINE HYDROCHLORIDE 5 MG/1
TABLET ORAL
COMMUNITY
Start: 2021-05-18

## 2021-11-01 NOTE — PROGRESS NOTES
Onelia Norton MD        [place] [address]  [address] [id number]  Dept: [phone]  Dept Fax: [phone]  Loc: [phone]      [facility] Urology Office Note - Follow up Patient    Patient:  Army Hennessy  YOB: 1975  Date: 11/1/2021    The patient is a 51 y.o. male who presents today for evaluation of the following problems:   Chief Complaint   Patient presents with    Erectile Dysfunction     1 year     referred/consultation requested by Stoney Ruhsing. HISTORY OF PRESENT ILLNESS:     Neurogenic bladder  No new issues. Recent cystoscopy with UDS. On ditropan  Prostate: lateral lobe hypertrophy (moderate), high riding bladder neck  Hx of MS. Catheterizes 3-4x. Generally has no problems catheterizing. Can feel discomfort when bladder is very full. Cant void on his own. Recurrent UTI- usually 2-3 UTI. No new issues. Overall doing well. ED- trimix intermittently effective. No priapism    Anejaculation- stable         Requested/reviewed records from [facility] office and/or outside [place] [address].    (Patient's old records have been requested, reviewed and pertinent findings summarized in today's note.)    Procedures Today:       Last several PSA's:  No results found for: PSA    Last total testosterone:  No results found for: TESTOSTERONE    Urinalysis today:  No results found for this visit on 11/01/21.     Last BUN and creatinine:  Lab Results   Component Value Date    BUN 10 03/08/2020     Lab Results   Component Value Date    CREATININE 0.67 (L) 03/08/2020       Additional Lab/Culture results: none    Imaging Reviewed during this Office Visit:   Onelia Norton MD independently reviewed the images and verified the radiology reports from:    Renal ultrasound negative for hydronephrosis      PAST MEDICAL, FAMILY AND SOCIAL HISTORY:  Past Medical History:   Diagnosis Date    Chronic kidney disease     GERD (gastroesophageal reflux disease)     [facility]      Past Surgical History:   Procedure Laterality Date    COLONOSCOPY      UPPER GASTROINTESTINAL ENDOSCOPY       Family History   Problem Relation Age of Onset    Cancer Paternal Uncle     Cancer Paternal Grandmother      Outpatient Medications Marked as Taking for the 21 encounter (Office Visit) with Gordon Hector MD   Medication Sig Dispense Refill    midodrine (PROAMATINE) 5 MG tablet take 1 tablet by mouth twice a day      zinc acetate 10 mg/mL oral syrup Take by mouth daily      oxybutynin (DITROPAN XL) 15 MG extended release tablet take 1 tablet by mouth once daily 90 tablet 3    Needle, Disp, 28G X 1/2\" MISC 1 box by Does not apply route as needed (as needed to inject corpora cavernosa for trimix medication for erectile dysfunction) 1 each 1    megestrol (MEGACE) 20 MG tablet 20 mg daily      RA VITAMIN D-3 5000 units CAPS capsule 5,000 Units daily  1    valACYclovir (VALTREX) 500 MG tablet Take 500 mg by mouth 2 times daily as needed       omeprazole (PRILOSEC) 40 MG delayed release capsule take 1 capsule by mouth once daily  0    Vitamin E 400 units TABS Take by mouth daily      Cyanocobalamin (B-12) 3000 MCG SUBL Take by mouth daily       Multiple Vitamins-Minerals (MULTIVITAMIN ADULT PO) Take 1 tablet by mouth daily      Ascorbic Acid (VITAMIN C) 250 MG tablet Take 250 mg by mouth daily      gabapentin (NEURONTIN) 300 MG capsule Take 300 mg by mouth nightly      ocrelizumab (OCREVUS) 300 MG/10ML SOLN injection Infuse 300 mg intravenously once 2 times a year/ every 6 months      baclofen (LIORESAL) 10 MG tablet 10 mg 3 times daily as needed          Excedrin migraine  [asa-apap-caff buffered] and Prednisone  Social History     Tobacco Use   Smoking Status Former Smoker    Packs/day: 1.00    Years: 25.00    Pack years: 25.00    Start date: 1990    Quit date: 2015    Years since quittin.4   Smokeless Tobacco Never Used      (If patient a smoker, smoking cessation counseling offered)   Social History     Substance and Sexual Activity   Alcohol Use No       REVIEW OF SYSTEMS:  Constitutional: negative  Eyes: negative  Respiratory: negative  Cardiovascular: negative  Gastrointestinal: negative  Genitourinary: see HPI  Musculoskeletal: negative  Skin: negative   Neurological: negative  Hematological/Lymphatic: negative  Psychological: negative        Physical Exam:    This a 51 y.o. male  Vitals:    11/01/21 1038   BP: 122/62   Pulse: 97   SpO2: 98%     Body mass index is 18.62 kg/m². Constitutional: Patient in no acute distress;           Assessment and Plan        1. Neurogenic bladder    2. Benign localized prostatic hyperplasia with lower urinary tract symptoms (LUTS)    3. Erectile dysfunction, unspecified erectile dysfunction type    4. Anejaculation               Plan:      Doing well with recurrent UTI's. No problems with catheterizing    Neurogenic bladder- stable. pt has a lot of questions about pelvic floor therapy. He would like to try this. Referral to mary restrepo PFT at [facility]. There was an element of prostatic hypertrophy. However, pt has not been able to void for > 25 years on his own and it his voiding dysfunction is primarily neurogenic in etiology and not obstructive  Continue oxybutynin     ED stable. undergoing trimix injections. No new priapism issues. Anejaculation- possibly neurogenic component. No treatment options for patient. Did recommend referral to [facility] or [facility]      Follow up in one year           Prescriptions Ordered:  No orders of the defined types were placed in this encounter. Orders Placed:  No orders of the defined types were placed in this encounter.            Tia Ma MD

## 2022-07-26 RX ORDER — OXYBUTYNIN CHLORIDE 15 MG/1
TABLET, EXTENDED RELEASE ORAL
Qty: 90 TABLET | Refills: 3 | Status: SHIPPED | OUTPATIENT
Start: 2022-07-26

## 2022-10-04 DIAGNOSIS — N52.9 ERECTILE DYSFUNCTION, UNSPECIFIED ERECTILE DYSFUNCTION TYPE: ICD-10-CM

## 2022-10-04 RX ORDER — IBUPROFEN 200 MG
1 TABLET ORAL DAILY
Qty: 100 EACH | Refills: 3 | Status: SHIPPED | OUTPATIENT
Start: 2022-10-04

## 2022-10-31 ENCOUNTER — OFFICE VISIT (OUTPATIENT)
Dept: UROLOGY | Age: 47
End: 2022-10-31
Payer: MEDICARE

## 2022-10-31 VITALS
RESPIRATION RATE: 12 BRPM | BODY MASS INDEX: 18.61 KG/M2 | OXYGEN SATURATION: 96 % | HEIGHT: 74 IN | SYSTOLIC BLOOD PRESSURE: 106 MMHG | HEART RATE: 78 BPM | WEIGHT: 145 LBS | DIASTOLIC BLOOD PRESSURE: 70 MMHG

## 2022-10-31 DIAGNOSIS — N40.1 BENIGN LOCALIZED PROSTATIC HYPERPLASIA WITH LOWER URINARY TRACT SYMPTOMS (LUTS): ICD-10-CM

## 2022-10-31 DIAGNOSIS — N31.9 NEUROGENIC BLADDER: ICD-10-CM

## 2022-10-31 DIAGNOSIS — N52.9 ERECTILE DYSFUNCTION, UNSPECIFIED ERECTILE DYSFUNCTION TYPE: Primary | ICD-10-CM

## 2022-10-31 DIAGNOSIS — N53.19 ANEJACULATION: ICD-10-CM

## 2022-10-31 PROCEDURE — 99214 OFFICE O/P EST MOD 30 MIN: CPT | Performed by: UROLOGY

## 2022-10-31 PROCEDURE — G8484 FLU IMMUNIZE NO ADMIN: HCPCS | Performed by: UROLOGY

## 2022-10-31 PROCEDURE — G8420 CALC BMI NORM PARAMETERS: HCPCS | Performed by: UROLOGY

## 2022-10-31 PROCEDURE — 1036F TOBACCO NON-USER: CPT | Performed by: UROLOGY

## 2022-10-31 PROCEDURE — G8427 DOCREV CUR MEDS BY ELIG CLIN: HCPCS | Performed by: UROLOGY

## 2022-10-31 NOTE — PROGRESS NOTES
Tremaine Shaw MD        [place] [address]  [address] [facility] [address] [address]  Dept: [phone]  Dept Fax: [phone]  Loc: [phone]      [facility] Urology Office Note - Follow up Patient    Patient:  Hilda Odom  YOB: 1975  Date: 10/31/2022    The patient is a 49 y.o. male who presents today for evaluation of the following problems:   Chief Complaint   Patient presents with    Benign Prostatic Hypertrophy     Neurogenic bladder- 1 year f/u    Erectile Dysfunction    referred/consultation requested by Burke Beasley MD.    HISTORY OF PRESENT ILLNESS:     Neurogenic bladder  No new issues. Recent cystoscopy with UDS. On ditropan  Prostate: lateral lobe hypertrophy (moderate), high riding bladder neck  Hx of MS. Catheterizes 3-4x. Generally has no problems catheterizing. Can feel discomfort when bladder is very full. Cant void on his own. Recurrent UTI- usually 2-3 UTI annually. No new issues. Overall stable. ED- trimix intermittently effective. No priapism    Anejaculation- stable         Requested/reviewed records from Burke Beasley MD office and/or outside physician/EMR    (Patient's old records have been requested, reviewed and pertinent findings summarized in today's note.)    Procedures Today:       Last several PSA's:  No results found for: PSA    Last total testosterone:  No results found for: TESTOSTERONE    Urinalysis today:  No results found for this visit on 10/31/22.     Last BUN and creatinine:  Lab Results   Component Value Date    BUN 10 03/08/2020     Lab Results   Component Value Date    CREATININE 0.67 (L) 03/08/2020       Additional Lab/Culture results: none    Imaging Reviewed during this Office Visit:   Tremaine Shaw MD independently reviewed the images and verified the radiology reports from:    Renal ultrasound negative for hydronephrosis      PAST MEDICAL, FAMILY AND SOCIAL HISTORY:  Past Medical History:   Diagnosis Date Chronic kidney disease     GERD (gastroesophageal reflux disease)     MS (multiple sclerosis) (HCC)      Past Surgical History:   Procedure Laterality Date    COLONOSCOPY      UPPER GASTROINTESTINAL ENDOSCOPY       Family History   Problem Relation Age of Onset    Cancer Paternal Uncle     Cancer Paternal Grandmother      Outpatient Medications Marked as Taking for the 10/31/22 encounter (Office Visit) with Oracio White MD   Medication Sig Dispense Refill    INSULIN SYRINGE 1CC/29G ([facility] INS SYRINGE 1CC/29G) 29G X 1/2\" 1 ML MISC 1 each by Does not apply route daily 100 each 3    oxybutynin (DITROPAN XL) 15 MG extended release tablet take 1 tablet by mouth once daily 90 tablet 3    midodrine (PROAMATINE) 5 MG tablet take 1 tablet by mouth twice a day      zinc acetate 10 mg/mL oral syrup Take by mouth daily      megestrol (MEGACE) 20 MG tablet 20 mg daily      RA VITAMIN D-3 5000 units CAPS capsule 5,000 Units daily  1    valACYclovir (VALTREX) 500 MG tablet Take 500 mg by mouth 2 times daily as needed       omeprazole (PRILOSEC) 40 MG delayed release capsule take 1 capsule by mouth once daily  0    Vitamin E 400 units TABS Take by mouth daily      Cyanocobalamin (B-12) 3000 MCG SUBL Take by mouth daily       Multiple Vitamins-Minerals (MULTIVITAMIN ADULT PO) Take 1 tablet by mouth daily      Ascorbic Acid (VITAMIN C) 250 MG tablet Take 250 mg by mouth daily      gabapentin (NEURONTIN) 300 MG capsule Take 600 mg by mouth nightly.       ocrelizumab (OCREVUS) 300 MG/10ML SOLN injection Infuse 300 mg intravenously once 2 times a year/ every 6 months      baclofen (LIORESAL) 10 MG tablet 10 mg 3 times daily as needed          Excedrin migraine  [asa-apap-caff buffered] and Prednisone  Social History     Tobacco Use   Smoking Status Former    Packs/day: 1.00    Years: 25.00    Pack years: 25.00    Types: Cigarettes    Start date: 1990    Quit date: 2015    Years since quittin.4   Smokeless Tobacco Never (If patient a smoker, smoking cessation counseling offered)   Social History     Substance and Sexual Activity   Alcohol Use No       REVIEW OF SYSTEMS:  Constitutional: negative  Eyes: negative  Respiratory: negative  Cardiovascular: negative  Gastrointestinal: negative  Genitourinary: see HPI  Musculoskeletal: negative  Skin: negative   Neurological: negative  Hematological/Lymphatic: negative  Psychological: negative        Physical Exam:    This a 49 y.o. male  Vitals:    10/31/22 1024   BP: 106/70   Pulse: 78   Resp: 12   SpO2: 96%     Body mass index is 18.62 kg/m². Constitutional: Patient in no acute distress;           Assessment and Plan        1. Erectile dysfunction, unspecified erectile dysfunction type    2. Benign localized prostatic hyperplasia with lower urinary tract symptoms (LUTS)    3. Neurogenic bladder    4. Anejaculation               Plan:      Recurrent UTI- Doing well with recurrent UTI's. No problems with catheterizing    Neurogenic bladder- stable. There was an element of prostatic hypertrophy. However, pt has not been able to void for > 25 years on his own and it his voiding dysfunction is primarily neurogenic in etiology and not obstructive. Continue oxybutynin     ED- stable. undergoing trimix injections. No new priapism issues. Anejaculation- possibly neurogenic component. No treatment options for patient. Did recommend referral to [facility] or [facility] sexual medicine in past      Follow up in one year           Prescriptions Ordered:  No orders of the defined types were placed in this encounter. Orders Placed:  No orders of the defined types were placed in this encounter.            Cindy Montoya MD

## 2023-02-13 ENCOUNTER — HOSPITAL ENCOUNTER (OUTPATIENT)
Age: 48
Discharge: HOME | End: 2023-02-13
Payer: MEDICARE

## 2023-02-13 ENCOUNTER — OFFICE VISIT (OUTPATIENT)
Dept: UROLOGY | Age: 48
End: 2023-02-13
Payer: MEDICARE

## 2023-02-13 VITALS
HEIGHT: 74 IN | SYSTOLIC BLOOD PRESSURE: 110 MMHG | BODY MASS INDEX: 17.58 KG/M2 | DIASTOLIC BLOOD PRESSURE: 82 MMHG | WEIGHT: 137 LBS | OXYGEN SATURATION: 94 % | HEART RATE: 78 BPM

## 2023-02-13 DIAGNOSIS — N53.19 ANEJACULATION: ICD-10-CM

## 2023-02-13 DIAGNOSIS — N31.9 NEUROGENIC BLADDER: ICD-10-CM

## 2023-02-13 DIAGNOSIS — N40.1 BENIGN LOCALIZED PROSTATIC HYPERPLASIA WITH LOWER URINARY TRACT SYMPTOMS (LUTS): ICD-10-CM

## 2023-02-13 DIAGNOSIS — N52.9 ERECTILE DYSFUNCTION, UNSPECIFIED ERECTILE DYSFUNCTION TYPE: Primary | ICD-10-CM

## 2023-02-13 LAB — PROSTATE SPECIFIC ANTIGEN: 3.35 NG/ML

## 2023-02-13 PROCEDURE — 36415 COLL VENOUS BLD VENIPUNCTURE: CPT

## 2023-02-13 PROCEDURE — 84153 ASSAY OF PSA TOTAL: CPT

## 2023-02-13 PROCEDURE — 99214 OFFICE O/P EST MOD 30 MIN: CPT | Performed by: UROLOGY

## 2023-02-13 PROCEDURE — 99213 OFFICE O/P EST LOW 20 MIN: CPT | Performed by: UROLOGY

## 2023-02-13 NOTE — PROGRESS NOTES
Maria Antonia Walton MD        [place] [address]  [address] [facility] [address]  Dept: [phone]  Dept Fax: [phone]  Loc: [phone]      [facility] Office Note - Follow up Patient    Patient:  Alannah Dotson  YOB: 1975  Date: 2/13/2023    The patient is a 49 y.o. male who presents today for evaluation of the following problems:   Chief Complaint   Patient presents with    Other     Feels as though there is a blockage when putting cath in times one week    referred/consultation requested by Mariela Wallace MD.    HISTORY OF PRESENT ILLNESS:     Neurogenic bladder  No new issues. Recent cystoscopy with UDS. On ditropan  Prostate: lateral lobe hypertrophy (moderate), high riding bladder neck  Hx of MS. Catheterizes 3-4x. Generally has no problems catheterizing. Can feel discomfort when bladder is very full. Cant void on his own. Recurrent UTI- usually 2-3 UTI annually. No new issues. Overall stable. ED- trimix intermittently effective. No priapism    Anejaculation- stable         Requested/reviewed records from Mariela Wallace MD office and/or outside physician/EMR    (Patient's old records have been requested, reviewed and pertinent findings summarized in today's note.)    Procedures Today:       Last several PSA's:  No results found for: PSA    Last total testosterone:  No results found for: TESTOSTERONE    Urinalysis today:  No results found for this visit on 02/13/23.     Last BUN and creatinine:  Lab Results   Component Value Date    BUN 10 03/08/2020     Lab Results   Component Value Date    CREATININE 0.67 (L) 03/08/2020       Additional Lab/Culture results: none    Imaging Reviewed during this Office Visit:   Maria Antonia Walton MD independently reviewed the images and verified the radiology reports from:    Renal ultrasound negative for hydronephrosis      PAST MEDICAL, FAMILY AND SOCIAL HISTORY:  Past Medical History:   Diagnosis Date    Chronic kidney disease     GERD (gastroesophageal reflux disease)     MS (multiple sclerosis) (HCC)      Past Surgical History:   Procedure Laterality Date    COLONOSCOPY      UPPER GASTROINTESTINAL ENDOSCOPY       Family History   Problem Relation Age of Onset    Cancer Paternal Uncle     Cancer Paternal Grandmother      Outpatient Medications Marked as Taking for the 23 encounter (Office Visit) with Ministerio Luong MD   Medication Sig Dispense Refill    INSULIN SYRINGE 1CC/29G ([place] [facility] INS SYRINGE 1CC/29G) 29G X 1/2\" 1 ML MISC 1 each by Does not apply route daily 100 each 3    oxybutynin (DITROPAN XL) 15 MG extended release tablet take 1 tablet by mouth once daily 90 tablet 3    midodrine (PROAMATINE) 5 MG tablet take 1 tablet by mouth twice a day      zinc acetate 10 mg/mL oral syrup Take by mouth daily      megestrol (MEGACE) 20 MG tablet 20 mg daily      RA VITAMIN D-3 5000 units CAPS capsule 5,000 Units daily  1    valACYclovir (VALTREX) 500 MG tablet Take 500 mg by mouth 2 times daily as needed       omeprazole (PRILOSEC) 40 MG delayed release capsule take 1 capsule by mouth once daily  0    Vitamin E 400 units TABS Take by mouth daily      Cyanocobalamin (B-12) 3000 MCG SUBL Take by mouth daily       Multiple Vitamins-Minerals (MULTIVITAMIN ADULT PO) Take 1 tablet by mouth daily      Ascorbic Acid (VITAMIN C) 250 MG tablet Take 250 mg by mouth daily      gabapentin (NEURONTIN) 300 MG capsule Take 600 mg by mouth nightly.       ocrelizumab (OCREVUS) 300 MG/10ML SOLN injection Infuse 300 mg intravenously once 2 times a year/ every 6 months      baclofen (LIORESAL) 10 MG tablet 10 mg 3 times daily as needed          Excedrin migraine  [asa-apap-caff buffered] and Prednisone  Social History     Tobacco Use   Smoking Status Former    Packs/day: 1.00    Years: 25.00    Pack years: 25.00    Types: Cigarettes    Start date: 1990    Quit date: 2015    Years since quittin.7   Smokeless Tobacco Never      (If patient a smoker, smoking cessation counseling offered)   Social History     Substance and Sexual Activity   Alcohol Use No       REVIEW OF SYSTEMS:  Constitutional: negative  Eyes: negative  Respiratory: negative  Cardiovascular: negative  Gastrointestinal: negative  Genitourinary: see HPI  Musculoskeletal: negative  Skin: negative   Neurological: negative  Hematological/Lymphatic: negative  Psychological: negative        Physical Exam:    This a 49 y.o. male  Vitals:    02/13/23 1203   BP: 110/82   Pulse: 78   SpO2: 94%     Body mass index is 17.59 kg/m². Constitutional: Patient in no acute distress;           Assessment and Plan        1. Erectile dysfunction, unspecified erectile dysfunction type    2. Neurogenic bladder    3. Benign localized prostatic hyperplasia with lower urinary tract symptoms (LUTS)    4. Anejaculation               Plan:        Difficulty catheterizing- needs cystoscopy to eval for stricture. Offered indwelling catheter but pt prefers to cont cic. Recurrent UTI- Doing well with recurrent UTI's. Now with problems cahtetering. Neurogenic bladder- stable. There was an element of prostatic hypertrophy. However, pt has not been able to void for > 25 years on his own and it his voiding dysfunction is primarily neurogenic in etiology and not obstructive. Continue oxybutynin     ED- stable. undergoing trimix injections. No new priapism issues. Anejaculation- possibly neurogenic component. No treatment options for patient. Did recommend referral to [facility] or [facility] sexual medicine in past          Psa and cystoscopy and f/u          Prescriptions Ordered:  No orders of the defined types were placed in this encounter. Orders Placed:  No orders of the defined types were placed in this encounter.            Tia Ma MD

## 2023-03-13 ENCOUNTER — HOSPITAL ENCOUNTER (OUTPATIENT)
Age: 48
Discharge: HOME | End: 2023-03-13
Payer: MEDICARE

## 2023-03-13 ENCOUNTER — HOSPITAL ENCOUNTER (OUTPATIENT)
Dept: GENERAL RADIOLOGY | Age: 48
Discharge: HOME | End: 2023-03-15
Payer: MEDICARE

## 2023-03-13 ENCOUNTER — PROCEDURE VISIT (OUTPATIENT)
Dept: UROLOGY | Age: 48
End: 2023-03-13
Payer: MEDICARE

## 2023-03-13 VITALS
HEART RATE: 102 BPM | BODY MASS INDEX: 17.58 KG/M2 | RESPIRATION RATE: 16 BRPM | WEIGHT: 137 LBS | HEIGHT: 74 IN | SYSTOLIC BLOOD PRESSURE: 118 MMHG | DIASTOLIC BLOOD PRESSURE: 72 MMHG | OXYGEN SATURATION: 98 %

## 2023-03-13 DIAGNOSIS — N40.1 BENIGN LOCALIZED PROSTATIC HYPERPLASIA WITH LOWER URINARY TRACT SYMPTOMS (LUTS): ICD-10-CM

## 2023-03-13 DIAGNOSIS — Z01.818 PRE-OP TESTING: ICD-10-CM

## 2023-03-13 DIAGNOSIS — N52.9 ERECTILE DYSFUNCTION, UNSPECIFIED ERECTILE DYSFUNCTION TYPE: Primary | ICD-10-CM

## 2023-03-13 DIAGNOSIS — N53.19 ANEJACULATION: ICD-10-CM

## 2023-03-13 DIAGNOSIS — N31.9 NEUROGENIC BLADDER: ICD-10-CM

## 2023-03-13 LAB
ABSOLUTE EOS #: 0.21 K/UL (ref 0–0.44)
ABSOLUTE IMMATURE GRANULOCYTE: 0.03 K/UL (ref 0–0.3)
ABSOLUTE LYMPH #: 1.5 K/UL (ref 1.1–3.7)
ABSOLUTE MONO #: 0.41 K/UL (ref 0.1–1.2)
ALBUMIN SERPL-MCNC: 4.5 G/DL (ref 3.5–5.2)
ALBUMIN/GLOBULIN RATIO: 1.6 (ref 1–2.5)
ALP SERPL-CCNC: 92 U/L (ref 40–129)
ALT SERPL-CCNC: 10 U/L (ref 5–41)
ANION GAP SERPL CALCULATED.3IONS-SCNC: 10 MMOL/L (ref 9–17)
AST SERPL-CCNC: 15 U/L
BASOPHILS # BLD: 1 % (ref 0–2)
BASOPHILS ABSOLUTE: 0.07 K/UL (ref 0–0.2)
BILIRUB SERPL-MCNC: 0.3 MG/DL (ref 0.3–1.2)
BUN SERPL-MCNC: 19 MG/DL (ref 6–20)
BUN/CREAT BLD: 27 (ref 9–20)
CALCIUM SERPL-MCNC: 9.4 MG/DL (ref 8.6–10.4)
CHLORIDE SERPL-SCNC: 105 MMOL/L (ref 98–107)
CO2 SERPL-SCNC: 27 MMOL/L (ref 20–31)
CREAT SERPL-MCNC: 0.7 MG/DL (ref 0.7–1.2)
EOSINOPHILS RELATIVE PERCENT: 3 % (ref 1–4)
GFR SERPL CREATININE-BSD FRML MDRD: >60 ML/MIN/1.73M2
GLUCOSE SERPL-MCNC: 88 MG/DL (ref 70–99)
HCT VFR BLD AUTO: 48.4 % (ref 40.7–50.3)
HGB BLD-MCNC: 15.7 G/DL (ref 13–17)
IMMATURE GRANULOCYTES: 1 %
LYMPHOCYTES # BLD: 23 % (ref 24–43)
MCH RBC QN AUTO: 28 PG (ref 25.2–33.5)
MCHC RBC AUTO-ENTMCNC: 32.4 G/DL (ref 25.2–33.5)
MCV RBC AUTO: 86.3 FL (ref 82.6–102.9)
MONOCYTES # BLD: 6 % (ref 3–12)
NRBC AUTOMATED: 0 PER 100 WBC
PDW BLD-RTO: 14.3 % (ref 11.8–14.4)
PLATELET # BLD AUTO: 318 K/UL (ref 138–453)
PMV BLD AUTO: 8.8 FL (ref 8.1–13.5)
POTASSIUM SERPL-SCNC: 4.4 MMOL/L (ref 3.7–5.3)
PROT SERPL-MCNC: 7.3 G/DL (ref 6.4–8.3)
RBC # BLD: 5.61 M/UL (ref 4.21–5.77)
SEG NEUTROPHILS: 66 % (ref 36–65)
SEGMENTED NEUTROPHILS ABSOLUTE COUNT: 4.34 K/UL (ref 1.5–8.1)
SODIUM SERPL-SCNC: 142 MMOL/L (ref 135–144)
WBC # BLD AUTO: 6.6 K/UL (ref 3.5–11.3)

## 2023-03-13 PROCEDURE — 87088 URINE BACTERIA CULTURE: CPT

## 2023-03-13 PROCEDURE — 80053 COMPREHEN METABOLIC PANEL: CPT

## 2023-03-13 PROCEDURE — 52000 CYSTOURETHROSCOPY: CPT | Performed by: UROLOGY

## 2023-03-13 PROCEDURE — 85025 COMPLETE CBC W/AUTO DIFF WBC: CPT

## 2023-03-13 PROCEDURE — 36415 COLL VENOUS BLD VENIPUNCTURE: CPT

## 2023-03-13 PROCEDURE — 71046 X-RAY EXAM CHEST 2 VIEWS: CPT

## 2023-03-13 PROCEDURE — 87086 URINE CULTURE/COLONY COUNT: CPT

## 2023-03-13 PROCEDURE — 87186 SC STD MICRODIL/AGAR DIL: CPT

## 2023-03-13 RX ORDER — GABAPENTIN 100 MG/1
CAPSULE ORAL
COMMUNITY
Start: 2023-02-16

## 2023-03-13 NOTE — PROGRESS NOTES
Cystoscopy    Operative Note    Patient:  Marianna Sánchez  MRN: [id number]  YOB: 1975    Date: 03/13/23  Surgeon: Denilson Zayas MD  Anesthesia: Frank Marina Local  Indications:     Difficulty catheterizing- needs cystoscopy to eval for stricture. Offered indwelling catheter but pt prefers to cont cic. Recurrent UTI- Doing well with recurrent UTI's. Now with problems cahtetering. Neurogenic bladder- stable. There was an element of prostatic hypertrophy. However, pt has not been able to void for > 25 years on his own and it his voiding dysfunction is primarily neurogenic in etiology and not obstructive. Continue oxybutynin     ED- stable. undergoing trimix injections. No new priapism issues. Anejaculation- possibly neurogenic component. No treatment options for patient. Did recommend referral to [facility] or [facility] sexual medicine in past          Psa and cystoscopy and f/u    Lab Results   Component Value Date    PSA 3.35 02/13/2023           Position: Supine  EBL: 0 ml    Findings:   The patient was prepped and draped in the usual sterile fashion. The flexible cystoscope was advanced through the urethra and into the bladder. The bladder was thoroughly inspected and the following was noted:    Residual Urine: significant\" \" . Urine clear, with no obvious infection  Urethra:  bulbar urethral stricture bypassed with scope but very tight. Also tight sphincter. Serenity Vega Urethral dilation was not performed. Prostate: lateral lobe hypertrophy + present, prostate moderately obstructing, intravesical extension of prostate not present. There was no previous TURP defect. Bladder: no tumor noted . Mild trabeculation noted. no bladder diverticulum. Large stone in bladder about 4 cm  Ureters: Orifices with normal configuration and location. The cystoscope was removed. The patient tolerated the procedure well.   Cystolithalopaxy with holmium laser (needs greenlight sheath for procedure) 30 general. Catheter overnight. Keep 14 f catheter for now. we will move to a stronger 16 [place].  Need order for 16 f with PNR

## 2023-03-14 ENCOUNTER — HOSPITAL ENCOUNTER (OUTPATIENT)
Dept: NON INVASIVE DIAGNOSTICS | Age: 48
Discharge: HOME | End: 2023-03-14
Payer: MEDICARE

## 2023-03-14 DIAGNOSIS — Z01.818 PRE-OP TESTING: ICD-10-CM

## 2023-03-14 PROCEDURE — 93005 ELECTROCARDIOGRAM TRACING: CPT

## 2023-03-15 LAB
EKG ATRIAL RATE: 76 BPM
EKG P AXIS: 81 DEGREES
EKG P-R INTERVAL: 134 MS
EKG Q-T INTERVAL: 374 MS
EKG QRS DURATION: 78 MS
EKG QTC CALCULATION (BAZETT): 420 MS
EKG R AXIS: 65 DEGREES
EKG T AXIS: 68 DEGREES
EKG VENTRICULAR RATE: 76 BPM
MICROORGANISM SPEC CULT: ABNORMAL
SPECIMEN DESCRIPTION: ABNORMAL

## 2023-03-16 ENCOUNTER — TELEPHONE (OUTPATIENT)
Dept: UROLOGY | Age: 48
End: 2023-03-16

## 2023-03-16 DIAGNOSIS — N31.9 NEUROGENIC BLADDER: Primary | ICD-10-CM

## 2023-03-16 RX ORDER — CEFDINIR 300 MG/1
300 CAPSULE ORAL 2 TIMES DAILY
Qty: 14 CAPSULE | Refills: 0 | Status: SHIPPED | OUTPATIENT
Start: 2023-03-16 | End: 2023-03-23

## 2023-03-16 NOTE — TELEPHONE ENCOUNTER
Patient called regarding his Cystolithalopaxy with holmium laser and getting it scheduled. He is to have an infusion on April 3rd and cant have the surgery before that so he just wanted the urology girls aware before they get him scheduled.

## 2023-04-26 ENCOUNTER — TELEPHONE (OUTPATIENT)
Dept: UROLOGY | Age: 48
End: 2023-04-26

## 2023-04-26 NOTE — TELEPHONE ENCOUNTER
Patient called requesting to schedule procedure to \"blast\" kidney stones. He states he had an Infusion and was unable to schedule this procedure because of that.   Call him back at [phone]

## 2023-04-26 NOTE — TELEPHONE ENCOUNTER
Spoke with Ashley Rodriguez. Cystolithalopaxy scheduled for 6/12/23 at [facility]. Reviewed pre-op instructions and sent paper copy to patients house. Pre-op testing complete. Patient voiced understanding, no further questions.

## 2023-06-06 ENCOUNTER — TELEPHONE (OUTPATIENT)
Dept: UROLOGY | Age: 48
End: 2023-06-06

## 2023-06-06 NOTE — TELEPHONE ENCOUNTER
[facility]    Pre-Operative Evaluation/Consultation    Name:  Nandini Colvin                                         Age:  49 y.o. MRN:  [id number]       :  1975   Date:  2023         Sex: male    There were no encounter diagnoses. Surgeon:  Dr. Venessa Tse    Procedure (Planned):  Cystolithalopaxy  Date Scheduled surgery: 23    Attending : No att. providers found    Primary Physician: Buelah Siemens  Cardiologist: None    Type of Anesthesia Requested: General    Patient Medical history: Allergies   Allergen Reactions    Excedrin Migraine  [Asa-Apap-Caff Buffered]      Felt very shaky    Prednisone      Patient Active Problem List   Diagnosis    Multiple sclerosis ([facility]    Fall at home, initial encounter    Sacral fracture, closed ([facility]    Closed compression fracture of sacrum, with delayed healing, subsequent encounter    Fx sacrum-closed w/ cord inj ([facility]     Past Medical History:   Diagnosis Date    Chronic kidney disease     GERD (gastroesophageal reflux disease)     MS (multiple sclerosis) ([facility]      Past Surgical History:   Procedure Laterality Date    COLONOSCOPY      UPPER GASTROINTESTINAL ENDOSCOPY       Social History     Tobacco Use    Smoking status: Former     Packs/day: 1.00     Years: 25.00     Pack years: 25.00     Types: Cigarettes     Start date: 1990     Quit date: 2015     Years since quittin.0    Smokeless tobacco: Never   Substance Use Topics    Alcohol use: No    Drug use: No     Medications:  Current Outpatient Medications   Medication Sig Dispense Refill    Misc.  Devices (WALKER) MISC Use as directed      gabapentin (NEURONTIN) 100 MG capsule take 6 capsules by mouth nightly      INSULIN SYRINGE 1CC/29G ([employer] INS SYRINGE 1CC/29G) 29G X 1/2\" 1 ML MISC 1 each by Does not apply route daily 100 each 3    oxybutynin (DITROPAN XL) 15 MG extended release tablet take 1 tablet by mouth once daily 90 tablet 3    midodrine (PROAMATINE) 5 MG tablet take 1

## 2023-06-23 RX ORDER — OXYBUTYNIN CHLORIDE 15 MG/1
TABLET, EXTENDED RELEASE ORAL
Qty: 90 TABLET | Refills: 3 | Status: SHIPPED | OUTPATIENT
Start: 2023-06-23

## 2023-06-26 ENCOUNTER — OFFICE VISIT (OUTPATIENT)
Dept: UROLOGY | Age: 48
End: 2023-06-26
Payer: MEDICARE

## 2023-06-26 VITALS
WEIGHT: 136 LBS | BODY MASS INDEX: 18.02 KG/M2 | RESPIRATION RATE: 14 BRPM | DIASTOLIC BLOOD PRESSURE: 62 MMHG | SYSTOLIC BLOOD PRESSURE: 90 MMHG | HEART RATE: 88 BPM | HEIGHT: 73 IN

## 2023-06-26 DIAGNOSIS — N31.9 NEUROGENIC BLADDER: Primary | ICD-10-CM

## 2023-06-26 DIAGNOSIS — N53.19 ANEJACULATION: ICD-10-CM

## 2023-06-26 DIAGNOSIS — N52.9 ERECTILE DYSFUNCTION, UNSPECIFIED ERECTILE DYSFUNCTION TYPE: ICD-10-CM

## 2023-06-26 PROCEDURE — 99213 OFFICE O/P EST LOW 20 MIN: CPT | Performed by: UROLOGY

## 2023-09-14 ENCOUNTER — ANESTHESIA EVENT (OUTPATIENT)
Dept: OPERATING ROOM | Age: 48
End: 2023-09-14
Payer: MEDICARE

## 2023-09-14 ENCOUNTER — HOSPITAL ENCOUNTER (INPATIENT)
Age: 48
LOS: 2 days | Discharge: HOME | DRG: 854 | End: 2023-09-16
Attending: EMERGENCY MEDICINE | Admitting: UROLOGY
Payer: MEDICARE

## 2023-09-14 DIAGNOSIS — N39.0 SEPSIS DUE TO URINARY TRACT INFECTION (HCC): ICD-10-CM

## 2023-09-14 DIAGNOSIS — A41.9 SEPSIS DUE TO URINARY TRACT INFECTION (HCC): ICD-10-CM

## 2023-09-14 DIAGNOSIS — G89.18 ACUTE POST-OPERATIVE PAIN: ICD-10-CM

## 2023-09-14 DIAGNOSIS — N20.0 RENAL CALCULUS, LEFT: Primary | ICD-10-CM

## 2023-09-14 PROBLEM — N20.1 URETERAL CALCULUS, LEFT: Status: ACTIVE | Noted: 2023-09-14

## 2023-09-14 LAB
ANION GAP SERPL CALCULATED.3IONS-SCNC: 14 MMOL/L (ref 9–17)
BASOPHILS # BLD: 0 K/UL (ref 0–0.2)
BASOPHILS NFR BLD: 0 % (ref 0–2)
BILIRUB UR QL STRIP: NEGATIVE
BUN SERPL-MCNC: 10 MG/DL (ref 6–20)
CALCIUM SERPL-MCNC: 8.5 MG/DL (ref 8.6–10.4)
CHLORIDE SERPL-SCNC: 98 MMOL/L (ref 98–107)
CLARITY UR: CLEAR
CO2 SERPL-SCNC: 20 MMOL/L (ref 20–31)
COLOR UR: YELLOW
CREAT SERPL-MCNC: 0.8 MG/DL (ref 0.7–1.2)
EOSINOPHIL # BLD: 0 K/UL (ref 0–0.4)
EOSINOPHILS RELATIVE PERCENT: 0 % (ref 1–4)
EPI CELLS #/AREA URNS HPF: NORMAL /HPF (ref 0–5)
ERYTHROCYTE [DISTWIDTH] IN BLOOD BY AUTOMATED COUNT: 15.7 % (ref 11.8–14.4)
GFR SERPL CREATININE-BSD FRML MDRD: >60 ML/MIN/1.73M2
GLUCOSE SERPL-MCNC: 121 MG/DL (ref 70–99)
GLUCOSE UR STRIP-MCNC: NEGATIVE MG/DL
HCT VFR BLD AUTO: 44 % (ref 40.7–50.3)
HGB BLD-MCNC: 14.2 G/DL (ref 13–17)
HGB UR QL STRIP.AUTO: NEGATIVE
IMM GRANULOCYTES # BLD AUTO: 0 K/UL (ref 0–0.3)
IMM GRANULOCYTES NFR BLD: 0 %
KETONES UR STRIP-MCNC: NEGATIVE MG/DL
LACTIC ACID, SEPSIS WHOLE BLOOD: 1.6 MMOL/L (ref 0.5–1.9)
LACTIC ACID, SEPSIS WHOLE BLOOD: 3 MMOL/L (ref 0.5–1.9)
LEUKOCYTE ESTERASE UR QL STRIP: ABNORMAL
LYMPHOCYTES NFR BLD: 0.94 K/UL (ref 1–4.8)
LYMPHOCYTES RELATIVE PERCENT: 4 % (ref 24–44)
MCH RBC QN AUTO: 27.2 PG (ref 25.2–33.5)
MCHC RBC AUTO-ENTMCNC: 32.3 G/DL (ref 28.4–34.8)
MCV RBC AUTO: 84.1 FL (ref 82.6–102.9)
MONOCYTES NFR BLD: 1.87 K/UL (ref 0.1–0.8)
MONOCYTES NFR BLD: 8 % (ref 1–7)
MORPHOLOGY: ABNORMAL
NEUTROPHILS NFR BLD: 88 % (ref 36–66)
NEUTS SEG NFR BLD: 20.59 K/UL (ref 1.8–7.7)
NITRITE UR QL STRIP: NEGATIVE
NRBC BLD-RTO: 0 PER 100 WBC
PH UR STRIP: 8.5 [PH] (ref 5–8)
PLATELET # BLD AUTO: 500 K/UL (ref 138–453)
PMV BLD AUTO: 8.9 FL (ref 8.1–13.5)
POTASSIUM SERPL-SCNC: 3.9 MMOL/L (ref 3.7–5.3)
PROCALCITONIN SERPL-MCNC: 0.48 NG/ML (ref 0–0.09)
PROT UR STRIP-MCNC: ABNORMAL MG/DL
RBC # BLD AUTO: 5.23 M/UL (ref 4.21–5.77)
RBC #/AREA URNS HPF: NORMAL /HPF (ref 0–2)
REASON FOR REJECTION: NORMAL
SODIUM SERPL-SCNC: 132 MMOL/L (ref 135–144)
SP GR UR STRIP: 1.04 (ref 1–1.03)
SPECIMEN SOURCE: NORMAL
UROBILINOGEN UR STRIP-ACNC: NORMAL EU/DL (ref 0–1)
WBC #/AREA URNS HPF: NORMAL /HPF (ref 0–5)
WBC OTHER # BLD: 23.4 K/UL (ref 3.5–11.3)
ZZ NTE CLEAN UP: ORDERED TEST: NORMAL

## 2023-09-14 PROCEDURE — 6370000000 HC RX 637 (ALT 250 FOR IP): Performed by: STUDENT IN AN ORGANIZED HEALTH CARE EDUCATION/TRAINING PROGRAM

## 2023-09-14 PROCEDURE — 87086 URINE CULTURE/COLONY COUNT: CPT

## 2023-09-14 PROCEDURE — 81001 URINALYSIS AUTO W/SCOPE: CPT

## 2023-09-14 PROCEDURE — 6360000002 HC RX W HCPCS: Performed by: STUDENT IN AN ORGANIZED HEALTH CARE EDUCATION/TRAINING PROGRAM

## 2023-09-14 PROCEDURE — 36415 COLL VENOUS BLD VENIPUNCTURE: CPT

## 2023-09-14 PROCEDURE — 2580000003 HC RX 258

## 2023-09-14 PROCEDURE — C9113 INJ PANTOPRAZOLE SODIUM, VIA: HCPCS | Performed by: STUDENT IN AN ORGANIZED HEALTH CARE EDUCATION/TRAINING PROGRAM

## 2023-09-14 PROCEDURE — 2580000003 HC RX 258: Performed by: STUDENT IN AN ORGANIZED HEALTH CARE EDUCATION/TRAINING PROGRAM

## 2023-09-14 PROCEDURE — 84145 PROCALCITONIN (PCT): CPT

## 2023-09-14 PROCEDURE — 83605 ASSAY OF LACTIC ACID: CPT

## 2023-09-14 PROCEDURE — 2580000003 HC RX 258: Performed by: PHYSICIAN ASSISTANT

## 2023-09-14 PROCEDURE — 6360000002 HC RX W HCPCS

## 2023-09-14 PROCEDURE — 6370000000 HC RX 637 (ALT 250 FOR IP): Performed by: PHYSICIAN ASSISTANT

## 2023-09-14 PROCEDURE — 96374 THER/PROPH/DIAG INJ IV PUSH: CPT

## 2023-09-14 PROCEDURE — 87040 BLOOD CULTURE FOR BACTERIA: CPT

## 2023-09-14 PROCEDURE — 6360000002 HC RX W HCPCS: Performed by: PHYSICIAN ASSISTANT

## 2023-09-14 PROCEDURE — 85025 COMPLETE CBC W/AUTO DIFF WBC: CPT

## 2023-09-14 PROCEDURE — 1200000000 HC SEMI PRIVATE

## 2023-09-14 PROCEDURE — 99285 EMERGENCY DEPT VISIT HI MDM: CPT

## 2023-09-14 PROCEDURE — 80048 BASIC METABOLIC PNL TOTAL CA: CPT

## 2023-09-14 RX ORDER — TAMSULOSIN HYDROCHLORIDE 0.4 MG/1
0.4 CAPSULE ORAL DAILY
Status: DISCONTINUED | OUTPATIENT
Start: 2023-09-14 | End: 2023-09-16 | Stop reason: HOSPADM

## 2023-09-14 RX ORDER — SODIUM CHLORIDE 0.9 % (FLUSH) 0.9 %
5-40 SYRINGE (ML) INJECTION EVERY 12 HOURS SCHEDULED
Status: DISCONTINUED | OUTPATIENT
Start: 2023-09-14 | End: 2023-09-16 | Stop reason: HOSPADM

## 2023-09-14 RX ORDER — BACLOFEN 10 MG/1
10 TABLET ORAL 3 TIMES DAILY PRN
Status: DISCONTINUED | OUTPATIENT
Start: 2023-09-14 | End: 2023-09-16 | Stop reason: HOSPADM

## 2023-09-14 RX ORDER — SODIUM CHLORIDE 0.9 % (FLUSH) 0.9 %
5-40 SYRINGE (ML) INJECTION PRN
Status: DISCONTINUED | OUTPATIENT
Start: 2023-09-14 | End: 2023-09-16 | Stop reason: HOSPADM

## 2023-09-14 RX ORDER — VITAMIN B COMPLEX
5000 TABLET ORAL DAILY
Status: DISCONTINUED | OUTPATIENT
Start: 2023-09-14 | End: 2023-09-16 | Stop reason: HOSPADM

## 2023-09-14 RX ORDER — PANTOPRAZOLE SODIUM 40 MG/1
40 TABLET, DELAYED RELEASE ORAL
Status: DISCONTINUED | OUTPATIENT
Start: 2023-09-15 | End: 2023-09-14

## 2023-09-14 RX ORDER — SODIUM CHLORIDE 9 MG/ML
INJECTION, SOLUTION INTRAVENOUS CONTINUOUS
Status: DISCONTINUED | OUTPATIENT
Start: 2023-09-14 | End: 2023-09-16 | Stop reason: HOSPADM

## 2023-09-14 RX ORDER — MEGESTROL ACETATE 20 MG/1
20 TABLET ORAL DAILY
Status: DISCONTINUED | OUTPATIENT
Start: 2023-09-14 | End: 2023-09-16 | Stop reason: HOSPADM

## 2023-09-14 RX ORDER — VITAMIN E 268 MG
400 CAPSULE ORAL DAILY
Status: DISCONTINUED | OUTPATIENT
Start: 2023-09-14 | End: 2023-09-16 | Stop reason: HOSPADM

## 2023-09-14 RX ORDER — LIDOCAINE HYDROCHLORIDE 20 MG/ML
JELLY TOPICAL ONCE
Status: DISCONTINUED | OUTPATIENT
Start: 2023-09-14 | End: 2023-09-16 | Stop reason: HOSPADM

## 2023-09-14 RX ORDER — ONDANSETRON 2 MG/ML
4 INJECTION INTRAMUSCULAR; INTRAVENOUS ONCE
Status: COMPLETED | OUTPATIENT
Start: 2023-09-14 | End: 2023-09-14

## 2023-09-14 RX ORDER — ONDANSETRON 4 MG/1
4 TABLET, ORALLY DISINTEGRATING ORAL EVERY 8 HOURS PRN
Status: DISCONTINUED | OUTPATIENT
Start: 2023-09-14 | End: 2023-09-16 | Stop reason: HOSPADM

## 2023-09-14 RX ORDER — CHOLECALCIFEROL (VITAMIN D3) 125 MCG
3000 CAPSULE ORAL DAILY
Status: DISCONTINUED | OUTPATIENT
Start: 2023-09-14 | End: 2023-09-16 | Stop reason: HOSPADM

## 2023-09-14 RX ORDER — MIDODRINE HYDROCHLORIDE 5 MG/1
5 TABLET ORAL 2 TIMES DAILY
Status: DISCONTINUED | OUTPATIENT
Start: 2023-09-14 | End: 2023-09-16 | Stop reason: HOSPADM

## 2023-09-14 RX ORDER — SODIUM CHLORIDE 9 MG/ML
INJECTION, SOLUTION INTRAVENOUS PRN
Status: DISCONTINUED | OUTPATIENT
Start: 2023-09-14 | End: 2023-09-16 | Stop reason: HOSPADM

## 2023-09-14 RX ORDER — OXYCODONE HYDROCHLORIDE 5 MG/1
10 TABLET ORAL EVERY 4 HOURS PRN
Status: DISCONTINUED | OUTPATIENT
Start: 2023-09-14 | End: 2023-09-16 | Stop reason: HOSPADM

## 2023-09-14 RX ORDER — IBUPROFEN 800 MG/1
400 TABLET ORAL EVERY 6 HOURS PRN
Status: DISCONTINUED | OUTPATIENT
Start: 2023-09-14 | End: 2023-09-16 | Stop reason: HOSPADM

## 2023-09-14 RX ORDER — ACYCLOVIR 200 MG/1
400 CAPSULE ORAL 2 TIMES DAILY PRN
Status: DISCONTINUED | OUTPATIENT
Start: 2023-09-14 | End: 2023-09-16 | Stop reason: HOSPADM

## 2023-09-14 RX ORDER — MORPHINE SULFATE 4 MG/ML
2 INJECTION, SOLUTION INTRAMUSCULAR; INTRAVENOUS
Status: DISCONTINUED | OUTPATIENT
Start: 2023-09-14 | End: 2023-09-16 | Stop reason: HOSPADM

## 2023-09-14 RX ORDER — ACETAMINOPHEN 325 MG/1
650 TABLET ORAL EVERY 4 HOURS PRN
Status: DISCONTINUED | OUTPATIENT
Start: 2023-09-14 | End: 2023-09-16 | Stop reason: HOSPADM

## 2023-09-14 RX ORDER — BISACODYL 5 MG/1
5 TABLET, DELAYED RELEASE ORAL DAILY PRN
Status: DISCONTINUED | OUTPATIENT
Start: 2023-09-14 | End: 2023-09-16 | Stop reason: HOSPADM

## 2023-09-14 RX ORDER — OXYCODONE HYDROCHLORIDE 5 MG/1
5 TABLET ORAL EVERY 4 HOURS PRN
Status: DISCONTINUED | OUTPATIENT
Start: 2023-09-14 | End: 2023-09-16 | Stop reason: HOSPADM

## 2023-09-14 RX ORDER — 0.9 % SODIUM CHLORIDE 0.9 %
1000 INTRAVENOUS SOLUTION INTRAVENOUS ONCE
Status: COMPLETED | OUTPATIENT
Start: 2023-09-14 | End: 2023-09-14

## 2023-09-14 RX ORDER — GABAPENTIN 300 MG/1
600 CAPSULE ORAL NIGHTLY
Status: DISCONTINUED | OUTPATIENT
Start: 2023-09-14 | End: 2023-09-16 | Stop reason: HOSPADM

## 2023-09-14 RX ORDER — ONDANSETRON 2 MG/ML
4 INJECTION INTRAMUSCULAR; INTRAVENOUS EVERY 6 HOURS PRN
Status: DISCONTINUED | OUTPATIENT
Start: 2023-09-14 | End: 2023-09-16 | Stop reason: HOSPADM

## 2023-09-14 RX ADMIN — Medication 5000 UNITS: at 20:28

## 2023-09-14 RX ADMIN — SODIUM CHLORIDE 1000 ML: 9 INJECTION, SOLUTION INTRAVENOUS at 16:48

## 2023-09-14 RX ADMIN — IBUPROFEN 400 MG: 800 TABLET, FILM COATED ORAL at 23:46

## 2023-09-14 RX ADMIN — Medication 400 UNITS: at 20:28

## 2023-09-14 RX ADMIN — ONDANSETRON 4 MG: 2 INJECTION INTRAMUSCULAR; INTRAVENOUS at 15:48

## 2023-09-14 RX ADMIN — BACLOFEN 10 MG: 10 TABLET ORAL at 18:34

## 2023-09-14 RX ADMIN — GABAPENTIN 600 MG: 300 CAPSULE ORAL at 20:28

## 2023-09-14 RX ADMIN — OXYBUTYNIN CHLORIDE 15 MG: 10 TABLET, EXTENDED RELEASE ORAL at 18:34

## 2023-09-14 RX ADMIN — SODIUM CHLORIDE, PRESERVATIVE FREE 40 MG: 5 INJECTION INTRAVENOUS at 21:46

## 2023-09-14 RX ADMIN — SODIUM CHLORIDE: 9 INJECTION, SOLUTION INTRAVENOUS at 19:31

## 2023-09-14 RX ADMIN — MIDODRINE HYDROCHLORIDE 5 MG: 5 TABLET ORAL at 20:28

## 2023-09-14 RX ADMIN — Medication 3000 MCG: at 20:28

## 2023-09-14 RX ADMIN — TAMSULOSIN HYDROCHLORIDE 0.4 MG: 0.4 CAPSULE ORAL at 17:19

## 2023-09-14 RX ADMIN — CEFTRIAXONE SODIUM 1000 MG: 1 INJECTION, POWDER, FOR SOLUTION INTRAMUSCULAR; INTRAVENOUS at 17:18

## 2023-09-14 ASSESSMENT — PAIN SCALES - GENERAL
PAINLEVEL_OUTOF10: 5
PAINLEVEL_OUTOF10: 5

## 2023-09-14 ASSESSMENT — ENCOUNTER SYMPTOMS
VOMITING: 1
NAUSEA: 1
ABDOMINAL PAIN: 1

## 2023-09-14 NOTE — PROGRESS NOTES
Pharmacy Note    Alison Rizo was ordered Zinc acetate oral syrup. As per the [address] [place] [address], herbals and certain dietary supplements will be discontinued.   The herbal or dietary supplement may be continued after discharge from the hospital.    Kristopher Sandoval, PharmD  PGY2 Pharmacy Resident 9/14/2023 5:41 PM

## 2023-09-14 NOTE — ED NOTES
Dr. Shannon Cook at bedside     Melyssa Walton, MERLIN  09/14/23 2533
Pt placed on full cardiac monitor     Sylvie Bautista RN  09/14/23 9968
Pt reports to the ED with complaints of urinary urgency. Pt states he self caths at home and had some trouble last week, it got slightly better but then last night he was having trouble again. Pt states he went to [facility] and was diagnosed with a L kidney stone. Pt also admits to some nausea but denies any flank pain or emesis at this time. Pt denies any cardiac hx. Pt is A&O x4 and speaking in complete sentences. Pt is resting in bed comfortably, NAD noted. Pt denies chest pain or SOB.  Care ongoing       Richa Gomez RN  09/14/23 7381
The following labs were labeled with appropriate pt sticker and tubed to lab:     [] Blue     [] Lavender   [] on ice  [] Green/yellow  [] Green/black [] on ice  [] Ahmad Jennifer  [] on ice  [] Yellow  [] Red  [] Type/ Screen  [] ABG  [] VBG    [] COVID-19 swab    [] Rapid  [] PCR  [] Flu swab  [] Peds Viral Panel     [x] Urine Sample  [] Fecal Sample  [] Pelvic Cultures  [] Blood Cultures  [] X 2  [] STREP Cultures       Fátima Ponce RN  09/14/23 4897
The following labs were labeled with appropriate pt sticker and tubed to lab:     [x] Blue     [x] Lavender   [] on ice  [x] Green/yellow  [x] Green/black [] on ice  [] Madonna Kitten  [] on ice  [] Yellow  [] Red  [] Type/ Screen  [] ABG  [] VBG    [] COVID-19 swab    [] Rapid  [] PCR  [] Flu swab  [] Peds Viral Panel     [] Urine Sample  [] Fecal Sample  [] Pelvic Cultures  [x] Blood Cultures  [x] X 2  [] STREP Cultures       Magaly De Jesus RN  09/14/23 4334
Urology at bedside     Eleuterio Jones RN  09/14/23 4769
Grandmother        ALLERGIES     Excedrin migraine  [asa-apap-caff buffered] and Prednisone    CURRENT MEDICATIONS       Previous Medications    ASCORBIC ACID (VITAMIN C) 250 MG TABLET    Take 1 tablet by mouth daily    BACLOFEN (LIORESAL) 10 MG TABLET    1 tablet 3 times daily as needed    CYANOCOBALAMIN (B-12) 3000 MCG SUBL    Take by mouth daily     GABAPENTIN (NEURONTIN) 100 MG CAPSULE    take 6 capsules by mouth nightly    INSULIN SYRINGE 1CC/29G ([employer] INS SYRINGE 1CC/29G) 29G X 1/2\" 1 ML MISC    1 each by Does not apply route daily    MEGESTROL (MEGACE) 20 MG TABLET    1 tablet daily    MIDODRINE (PROAMATINE) 5 MG TABLET    take 1 tablet by mouth twice a day    MISC. DEVICES (WALKER) MISC    Use as directed    MULTIPLE VITAMINS-MINERALS (MULTIVITAMIN ADULT PO)    Take 1 tablet by mouth daily    OCRELIZUMAB (OCREVUS) 300 MG/10ML SOLN INJECTION    Infuse 10 mLs intravenously once 2 times a year/ every 6 months    OMEPRAZOLE (PRILOSEC) 40 MG DELAYED RELEASE CAPSULE    take 1 capsule by mouth once daily    OXYBUTYNIN (DITROPAN XL) 15 MG EXTENDED RELEASE TABLET    take 1 tablet by mouth once daily    RA VITAMIN D-3 5000 UNITS CAPS CAPSULE    1 capsule daily    VALACYCLOVIR (VALTREX) 500 MG TABLET    Take 1 tablet by mouth 2 times daily as needed    VITAMIN E 400 UNITS TABS    Take by mouth daily    ZINC ACETATE 10 MG/ML ORAL SYRUP    Take by mouth daily     Orders Placed This Encounter   Medications    ondansetron (ZOFRAN) injection 4 mg    baclofen (LIORESAL) tablet 10 mg    vitamin B-12 (CYANOCOBALAMIN) tablet 3,000 mcg    gabapentin (NEURONTIN) capsule 600 mg    megestrol (MEGACE) tablet 20 mg    midodrine (PROAMATINE) tablet 5 mg    pantoprazole (PROTONIX) tablet 40 mg    oxybutynin (DITROPAN-XL) extended release tablet 15 mg    Vitamin D (CHOLECALCIFEROL) tablet 5,000 Units    acyclovir (ZOVIRAX) capsule 400 mg     Order Specific Question:   Antimicrobial Indications     Answer:    Other     Order Specific

## 2023-09-14 NOTE — PROGRESS NOTES
Pharmacy Note     Renal Dose Adjustment    Avril Murphy is a 51 y.o. male. Pharmacist assessment of renally cleared medications. Recent Labs     09/14/23  1548   BUN 10     Recent Labs     09/14/23  1548   CREATININE 0.8     CrCl cannot be calculated (Unknown ideal weight.).     Height:   Ht Readings from Last 1 Encounters:   06/26/23 6' 1\" (1.854 m)     Weight:  Wt Readings from Last 1 Encounters:   06/26/23 136 lb (61.7 kg)     The following medication dose has been adjusted based upon renal function per P&T Guidelines:             Ceftriaxone 1,000 mg IV Q12H to Ceftriaxone 1,000 mg IV Q24H    Erin Valera, PharmD  PGY2 Pharmacy Resident 9/14/2023 4:48 PM

## 2023-09-14 NOTE — H&P
Edinson Hopper, Rica Stephen, Cindy Nyhan, [facility]  Urology H&P      Patient:  Avril Murphy  MRN: [id number]  YOB: 1975    CHIEF COMPLAINT:  Concern for UTI, N/V, found 4mm left UVJ stone with hydro at OSH    HISTORY OF PRESENT ILLNESS:   The patient is a 51 y.o. male who presents with history of MS and known to Dr. Ministerio Cagle for NGB on CIC at home. He began having difficulty straight cathing a few days ago, which he relates to starting a UTI. He also starting feeling weak, fatigued, and chills with N/V. He also is known to Dr. Andrea Holder for history of a bladder stone treated with cystolitholapaxy in June of this year. Patient and his wife went to [facility] ED and CT revealed a 4mm left UVJ stone with moderate hydroureteronephrosis. WBC was 23, UA with leukocytes and hgb, no nitrites. They decided to transport here for further evaluation. He currently is c/o nausea and feeling cold. He is slightly tachycardic . SIRS workup initiated by ED. Patient's old records, notes and chart reviewed and summarized above. Past Medical History:    Past Medical History:   Diagnosis Date    Chronic kidney disease     GERD (gastroesophageal reflux disease)     MS (multiple sclerosis) (HCC)     Ureteral calculus, left 9/14/2023       Past Surgical History:    Past Surgical History:   Procedure Laterality Date    COLONOSCOPY      CYSTOSCOPY N/A 6/12/2023    Cystoscopy Litholopaxy with Holmium Laser with Greenlight sheath and Urethral dilation performed by Felicitas Bradshaw MD at [address] [place] [address]         Medications:    No current facility-administered medications for this encounter. Current Outpatient Medications:     oxybutynin (DITROPAN XL) 15 MG extended release tablet, take 1 tablet by mouth once daily, Disp: 90 tablet, Rfl: 3    Misc.  Devices (WALKER) MISC, Use as directed, Disp: , Rfl:     gabapentin (NEURONTIN) 100 MG capsule, take 6 capsules by mouth nightly, Disp: ,

## 2023-09-15 ENCOUNTER — APPOINTMENT (OUTPATIENT)
Dept: GENERAL RADIOLOGY | Age: 48
DRG: 854 | End: 2023-09-15
Payer: MEDICARE

## 2023-09-15 ENCOUNTER — ANESTHESIA (OUTPATIENT)
Dept: OPERATING ROOM | Age: 48
End: 2023-09-15
Payer: MEDICARE

## 2023-09-15 LAB
ANION GAP SERPL CALCULATED.3IONS-SCNC: 9 MMOL/L (ref 9–17)
BUN SERPL-MCNC: 10 MG/DL (ref 6–20)
CALCIUM SERPL-MCNC: 8.2 MG/DL (ref 8.6–10.4)
CHLORIDE SERPL-SCNC: 108 MMOL/L (ref 98–107)
CO2 SERPL-SCNC: 24 MMOL/L (ref 20–31)
CREAT SERPL-MCNC: 0.7 MG/DL (ref 0.7–1.2)
ERYTHROCYTE [DISTWIDTH] IN BLOOD BY AUTOMATED COUNT: 16 % (ref 11.8–14.4)
GFR SERPL CREATININE-BSD FRML MDRD: >60 ML/MIN/1.73M2
GLUCOSE SERPL-MCNC: 93 MG/DL (ref 70–99)
HCT VFR BLD AUTO: 41 % (ref 40.7–50.3)
HGB BLD-MCNC: 12.7 G/DL (ref 13–17)
MCH RBC QN AUTO: 27.1 PG (ref 25.2–33.5)
MCHC RBC AUTO-ENTMCNC: 31 G/DL (ref 28.4–34.8)
MCV RBC AUTO: 87.6 FL (ref 82.6–102.9)
MICROORGANISM SPEC CULT: NORMAL
NRBC BLD-RTO: 0 PER 100 WBC
PLATELET # BLD AUTO: 409 K/UL (ref 138–453)
PMV BLD AUTO: 9 FL (ref 8.1–13.5)
POTASSIUM SERPL-SCNC: 4 MMOL/L (ref 3.7–5.3)
RBC # BLD AUTO: 4.68 M/UL (ref 4.21–5.77)
SODIUM SERPL-SCNC: 141 MMOL/L (ref 135–144)
SPECIMEN DESCRIPTION: NORMAL
WBC OTHER # BLD: 13.2 K/UL (ref 3.5–11.3)

## 2023-09-15 PROCEDURE — 3600000002 HC SURGERY LEVEL 2 BASE: Performed by: UROLOGY

## 2023-09-15 PROCEDURE — C9113 INJ PANTOPRAZOLE SODIUM, VIA: HCPCS | Performed by: STUDENT IN AN ORGANIZED HEALTH CARE EDUCATION/TRAINING PROGRAM

## 2023-09-15 PROCEDURE — 2580000003 HC RX 258: Performed by: PHYSICIAN ASSISTANT

## 2023-09-15 PROCEDURE — 6370000000 HC RX 637 (ALT 250 FOR IP): Performed by: STUDENT IN AN ORGANIZED HEALTH CARE EDUCATION/TRAINING PROGRAM

## 2023-09-15 PROCEDURE — 6360000002 HC RX W HCPCS: Performed by: STUDENT IN AN ORGANIZED HEALTH CARE EDUCATION/TRAINING PROGRAM

## 2023-09-15 PROCEDURE — 3600000012 HC SURGERY LEVEL 2 ADDTL 15MIN: Performed by: UROLOGY

## 2023-09-15 PROCEDURE — 3700000001 HC ADD 15 MINUTES (ANESTHESIA): Performed by: UROLOGY

## 2023-09-15 PROCEDURE — 2709999900 HC NON-CHARGEABLE SUPPLY: Performed by: UROLOGY

## 2023-09-15 PROCEDURE — 0WHR8YZ INSERTION OF OTHER DEVICE INTO GENITOURINARY TRACT, VIA NATURAL OR ARTIFICIAL OPENING ENDOSCOPIC: ICD-10-PCS | Performed by: UROLOGY

## 2023-09-15 PROCEDURE — 2580000003 HC RX 258: Performed by: UROLOGY

## 2023-09-15 PROCEDURE — 1200000000 HC SEMI PRIVATE

## 2023-09-15 PROCEDURE — 7100000000 HC PACU RECOVERY - FIRST 15 MIN: Performed by: UROLOGY

## 2023-09-15 PROCEDURE — C1769 GUIDE WIRE: HCPCS | Performed by: UROLOGY

## 2023-09-15 PROCEDURE — 2580000003 HC RX 258: Performed by: STUDENT IN AN ORGANIZED HEALTH CARE EDUCATION/TRAINING PROGRAM

## 2023-09-15 PROCEDURE — 3700000000 HC ANESTHESIA ATTENDED CARE: Performed by: UROLOGY

## 2023-09-15 PROCEDURE — 2500000003 HC RX 250 WO HCPCS: Performed by: NURSE ANESTHETIST, CERTIFIED REGISTERED

## 2023-09-15 PROCEDURE — 80048 BASIC METABOLIC PNL TOTAL CA: CPT

## 2023-09-15 PROCEDURE — 36415 COLL VENOUS BLD VENIPUNCTURE: CPT

## 2023-09-15 PROCEDURE — 85027 COMPLETE CBC AUTOMATED: CPT

## 2023-09-15 PROCEDURE — 6370000000 HC RX 637 (ALT 250 FOR IP): Performed by: PHYSICIAN ASSISTANT

## 2023-09-15 PROCEDURE — 0T778DZ DILATION OF LEFT URETER WITH INTRALUMINAL DEVICE, VIA NATURAL OR ARTIFICIAL OPENING ENDOSCOPIC: ICD-10-PCS | Performed by: UROLOGY

## 2023-09-15 PROCEDURE — 7100000001 HC PACU RECOVERY - ADDTL 15 MIN: Performed by: UROLOGY

## 2023-09-15 PROCEDURE — 6360000002 HC RX W HCPCS: Performed by: NURSE ANESTHETIST, CERTIFIED REGISTERED

## 2023-09-15 PROCEDURE — C2617 STENT, NON-COR, TEM W/O DEL: HCPCS | Performed by: UROLOGY

## 2023-09-15 DEVICE — URETERAL STENT
Type: IMPLANTABLE DEVICE | Site: URETER | Status: FUNCTIONAL
Brand: POLARIS™ ULTRA

## 2023-09-15 RX ORDER — TAMSULOSIN HYDROCHLORIDE 0.4 MG/1
0.4 CAPSULE ORAL DAILY
Qty: 30 CAPSULE | Refills: 0 | Status: SHIPPED | OUTPATIENT
Start: 2023-09-15

## 2023-09-15 RX ORDER — HYOSCYAMINE SULFATE 0.12 MG/1
0.12 TABLET SUBLINGUAL EVERY 4 HOURS PRN
Qty: 15 EACH | Refills: 0 | Status: SHIPPED | OUTPATIENT
Start: 2023-09-15 | End: 2023-09-21 | Stop reason: SDUPTHER

## 2023-09-15 RX ORDER — CEFADROXIL 500 MG/1
500 CAPSULE ORAL 2 TIMES DAILY
Qty: 6 CAPSULE | Refills: 0 | Status: SHIPPED | OUTPATIENT
Start: 2023-09-15 | End: 2023-09-16 | Stop reason: SDUPTHER

## 2023-09-15 RX ORDER — SODIUM CHLORIDE 0.9 % (FLUSH) 0.9 %
5-40 SYRINGE (ML) INJECTION PRN
Status: DISCONTINUED | OUTPATIENT
Start: 2023-09-15 | End: 2023-09-15 | Stop reason: HOSPADM

## 2023-09-15 RX ORDER — SODIUM CHLORIDE 9 MG/ML
INJECTION, SOLUTION INTRAVENOUS PRN
Status: DISCONTINUED | OUTPATIENT
Start: 2023-09-15 | End: 2023-09-15 | Stop reason: HOSPADM

## 2023-09-15 RX ORDER — MAGNESIUM HYDROXIDE 1200 MG/15ML
LIQUID ORAL PRN
Status: DISCONTINUED | OUTPATIENT
Start: 2023-09-15 | End: 2023-09-15 | Stop reason: HOSPADM

## 2023-09-15 RX ORDER — PROPOFOL 10 MG/ML
INJECTION, EMULSION INTRAVENOUS CONTINUOUS PRN
Status: DISCONTINUED | OUTPATIENT
Start: 2023-09-15 | End: 2023-09-15 | Stop reason: SDUPTHER

## 2023-09-15 RX ORDER — MAGNESIUM HYDROXIDE 1200 MG/15ML
LIQUID ORAL CONTINUOUS PRN
Status: COMPLETED | OUTPATIENT
Start: 2023-09-15 | End: 2023-09-15

## 2023-09-15 RX ORDER — SODIUM CHLORIDE 0.9 % (FLUSH) 0.9 %
5-40 SYRINGE (ML) INJECTION EVERY 12 HOURS SCHEDULED
Status: DISCONTINUED | OUTPATIENT
Start: 2023-09-15 | End: 2023-09-15 | Stop reason: HOSPADM

## 2023-09-15 RX ORDER — MIDAZOLAM HYDROCHLORIDE 2 MG/2ML
2 INJECTION, SOLUTION INTRAMUSCULAR; INTRAVENOUS ONCE
Status: COMPLETED | OUTPATIENT
Start: 2023-09-15 | End: 2023-09-15

## 2023-09-15 RX ORDER — LIDOCAINE HYDROCHLORIDE 10 MG/ML
INJECTION, SOLUTION EPIDURAL; INFILTRATION; INTRACAUDAL; PERINEURAL PRN
Status: DISCONTINUED | OUTPATIENT
Start: 2023-09-15 | End: 2023-09-15 | Stop reason: SDUPTHER

## 2023-09-15 RX ORDER — FENTANYL CITRATE 50 UG/ML
25 INJECTION, SOLUTION INTRAMUSCULAR; INTRAVENOUS EVERY 5 MIN PRN
Status: DISCONTINUED | OUTPATIENT
Start: 2023-09-15 | End: 2023-09-15 | Stop reason: HOSPADM

## 2023-09-15 RX ORDER — PHENAZOPYRIDINE HYDROCHLORIDE 200 MG/1
200 TABLET, FILM COATED ORAL 3 TIMES DAILY PRN
Qty: 15 TABLET | Refills: 0 | Status: SHIPPED | OUTPATIENT
Start: 2023-09-15 | End: 2023-09-20

## 2023-09-15 RX ORDER — OXYCODONE HYDROCHLORIDE AND ACETAMINOPHEN 5; 325 MG/1; MG/1
1 TABLET ORAL EVERY 6 HOURS PRN
Qty: 12 TABLET | Refills: 0 | Status: SHIPPED | OUTPATIENT
Start: 2023-09-15 | End: 2023-09-18

## 2023-09-15 RX ADMIN — BACLOFEN 10 MG: 10 TABLET ORAL at 08:32

## 2023-09-15 RX ADMIN — Medication 5000 UNITS: at 08:33

## 2023-09-15 RX ADMIN — MEGESTROL ACETATE 20 MG: 20 TABLET ORAL at 08:33

## 2023-09-15 RX ADMIN — IBUPROFEN 400 MG: 800 TABLET, FILM COATED ORAL at 06:34

## 2023-09-15 RX ADMIN — MIDODRINE HYDROCHLORIDE 5 MG: 5 TABLET ORAL at 08:33

## 2023-09-15 RX ADMIN — SODIUM CHLORIDE, PRESERVATIVE FREE 40 MG: 5 INJECTION INTRAVENOUS at 08:33

## 2023-09-15 RX ADMIN — SODIUM CHLORIDE, PRESERVATIVE FREE 10 ML: 5 INJECTION INTRAVENOUS at 08:36

## 2023-09-15 RX ADMIN — MIDAZOLAM HYDROCHLORIDE 2 MG: 1 INJECTION, SOLUTION INTRAMUSCULAR; INTRAVENOUS at 14:14

## 2023-09-15 RX ADMIN — ACETAMINOPHEN 650 MG: 325 TABLET ORAL at 19:02

## 2023-09-15 RX ADMIN — LIDOCAINE HYDROCHLORIDE 50 MG: 10 INJECTION, SOLUTION EPIDURAL; INFILTRATION; INTRACAUDAL; PERINEURAL at 14:55

## 2023-09-15 RX ADMIN — Medication 400 UNITS: at 08:33

## 2023-09-15 RX ADMIN — PROPOFOL 120 MCG/KG/MIN: 10 INJECTION, EMULSION INTRAVENOUS at 14:58

## 2023-09-15 RX ADMIN — GABAPENTIN 600 MG: 300 CAPSULE ORAL at 20:36

## 2023-09-15 RX ADMIN — MIDODRINE HYDROCHLORIDE 5 MG: 5 TABLET ORAL at 20:36

## 2023-09-15 RX ADMIN — BISACODYL 5 MG: 5 TABLET, COATED ORAL at 08:32

## 2023-09-15 RX ADMIN — Medication 3000 MCG: at 08:33

## 2023-09-15 RX ADMIN — ACETAMINOPHEN 650 MG: 325 TABLET ORAL at 08:32

## 2023-09-15 RX ADMIN — CEFTRIAXONE SODIUM 1000 MG: 1 INJECTION, POWDER, FOR SOLUTION INTRAMUSCULAR; INTRAVENOUS at 15:02

## 2023-09-15 RX ADMIN — TAMSULOSIN HYDROCHLORIDE 0.4 MG: 0.4 CAPSULE ORAL at 08:33

## 2023-09-15 RX ADMIN — BACLOFEN 10 MG: 10 TABLET ORAL at 20:36

## 2023-09-15 RX ADMIN — OXYBUTYNIN CHLORIDE 15 MG: 10 TABLET, EXTENDED RELEASE ORAL at 08:33

## 2023-09-15 ASSESSMENT — PAIN - FUNCTIONAL ASSESSMENT
PAIN_FUNCTIONAL_ASSESSMENT: 0-10
PAIN_FUNCTIONAL_ASSESSMENT: ACTIVITIES ARE NOT PREVENTED

## 2023-09-15 ASSESSMENT — PAIN DESCRIPTION - ONSET: ONSET: ON-GOING

## 2023-09-15 ASSESSMENT — PAIN SCALES - GENERAL
PAINLEVEL_OUTOF10: 7
PAINLEVEL_OUTOF10: 8
PAINLEVEL_OUTOF10: 5

## 2023-09-15 ASSESSMENT — PAIN DESCRIPTION - FREQUENCY: FREQUENCY: CONTINUOUS

## 2023-09-15 ASSESSMENT — PAIN DESCRIPTION - LOCATION: LOCATION: HEAD

## 2023-09-15 ASSESSMENT — PAIN DESCRIPTION - DESCRIPTORS: DESCRIPTORS: ACHING;DULL

## 2023-09-15 ASSESSMENT — PAIN DESCRIPTION - ORIENTATION: ORIENTATION: ANTERIOR

## 2023-09-15 ASSESSMENT — PAIN DESCRIPTION - PAIN TYPE: TYPE: ACUTE PAIN

## 2023-09-15 NOTE — CARE COORDINATION
Case Management Assessment  Initial Evaluation    Date/Time of Evaluation: 9/15/2023 11:10 AM  Assessment Completed by: Lucie Anglin RN    If patient is discharged prior to next notation, then this note serves as note for discharge by case management. Patient Name: Gerald Sheldon                   YOB: 1975  Diagnosis: Renal calculus, left [N20.0]  Ureteral calculus, left [N20.1]  Sepsis due to urinary tract infection ([address]) [A41.9, N39.0]                   Date / Time: 9/14/2023  3:18 PM    Patient Admission Status: Inpatient   Readmission Risk (Low < 19, Mod (19-27), High > 27): Readmission Risk Score: 13.5    Current PCP: Raysa Mendoza MD  PCP verified by CM? (P) Yes    Chart Reviewed: Yes      History Provided by: (P) Patient  Patient Orientation: (P) Alert and Oriented    Patient Cognition: (P) Alert    Hospitalization in the last 30 days (Readmission):  No    If yes, Readmission Assessment in  Navigator will be completed.     Advance Directives:      Code Status: Full Code   Patient's Primary Decision Maker is:        Discharge Planning:    Patient lives with: (P) Spouse/Significant Other Type of Home: (P) House  Primary Care Giver: (P) Self  Patient Support Systems include: (P) Spouse/Significant Other, Home Care Staff   Current Financial resources:    Current community resources:    Current services prior to admission: (P) Home Care, Durable Medical Equipment            Current DME: (P) Wheelchair, Walker, Shower Chair            Type of Home Care services:  Nursing Services    ADLS  Prior functional level: (P) Assistance with the following:, Mobility, Shopping, Housework, Cooking  Current functional level: (P) Assistance with the following:, Cooking, Housework, Mobility, Shopping    PT AM-PAC:   /24  OT AM-PAC:   /24    Family can provide assistance at DC: (P) Yes  Would you like Case Management to discuss the discharge plan with any other family members/significant others, and if so, who?

## 2023-09-15 NOTE — ANESTHESIA POSTPROCEDURE EVALUATION
Department of Anesthesiology  Postprocedure Note    Patient: Brenda Chacon  MRN: [id number]  YOB: 1975  Date of evaluation: 9/15/2023      Procedure Summary     Date: 09/15/23 Room / Location: [address]    Anesthesia Start: 1450 Anesthesia Stop: 1527    Procedure: CYSTOSCOPY [facility] (Left) Diagnosis:       Ureteral stone      (Ureteral stone [N20.1])    Surgeons: Krysta Fay MD Responsible Provider: Eliana Pino MD    Anesthesia Type: MAC ASA Status: 3          Anesthesia Type: No value filed.     Hernan Phase I:      Hernan Phase II:        Anesthesia Post Evaluation    Patient location during evaluation: PACU  Patient participation: complete - patient participated  Level of consciousness: awake and alert  Pain score: 2  Airway patency: patent  Nausea & Vomiting: no nausea and no vomiting  Complications: no  Cardiovascular status: hemodynamically stable  Respiratory status: acceptable  Hydration status: stable  Pain management: adequate

## 2023-09-15 NOTE — PLAN OF CARE
Problem: Discharge Planning  Goal: Discharge to home or other facility with appropriate resources  9/15/2023 1709 by Phuc Loya RN  Outcome: Progressing  9/15/2023 0447 by Ivette Lopez RN  Outcome: Progressing     Problem: Skin/Tissue Integrity  Goal: Absence of new skin breakdown  Description: 1. Monitor for areas of redness and/or skin breakdown  2. Assess vascular access sites hourly  3. Every 4-6 hours minimum:  Change oxygen saturation probe site  4. Every 4-6 hours:  If on nasal continuous positive airway pressure, respiratory therapy assess nares and determine need for appliance change or resting period.   9/15/2023 1709 by Phuc Loya RN  Outcome: Progressing  9/15/2023 0447 by Ivette Lopez RN  Outcome: Progressing     Problem: Safety - Adult  Goal: Free from fall injury  9/15/2023 1709 by Phuc Loya RN  Outcome: Progressing  9/15/2023 0447 by Ivette Lopez RN  Outcome: Progressing     Problem: ABCDS Injury Assessment  Goal: Absence of physical injury  Outcome: Progressing     Problem: Pain  Goal: Verbalizes/displays adequate comfort level or baseline comfort level  Outcome: Progressing

## 2023-09-15 NOTE — OP NOTE
Operative Note      Patient: Mitra Ponce  YOB: 1975  MRN: [id number]    Date of Procedure: 9/15/2023    Preoperative diagnosis: Left UVJ stone, obstructing with left hydroureteronephrosis, and sepsis secondary to UTI    Post-Op Diagnosis: Same       Procedure(s):  CYSTOSCOPY STENT INSERTION-left    Surgeon(s):  Lise Lai MD    Assistant:   Resident: Jeevan El MD; [facility], DO    Anesthesia: Monitor Anesthesia Care    Estimated Blood Loss (mL): Minimal    Complications: None    Specimens:   * No specimens in log *    Implants:  Implant Name Type Inv. Item Serial No.  Lot No. LRB No. Used Action   STENT URET 6FR L28CM PERCFLX HYDR+ DBL PGTL THRD 2 - [id number]  STENT URET 6FR L28CM PERCFLX HYDR+ DBL PGTL THRD 2  [email or web address] [facility]- [id number] Left 1 Implanted         Drains:   6 Brazilian x28 cm stent on the left no string  16 [place] two-way Ervin catheter indwelling    Findings:   Cystoscopy revealed no bladder masses or lesions. Bilateral ureteral orifices in orthotopic location. Indications: This is a 65-year-old male with a history of multiple sclerosis on a regimen of home self-catheterization who presented with left flank pain as well as sepsis secondary to UTI. CT findings demonstrated a left ureterovesical junction stone with left moderate hydroureteronephrosis. He presents today for stent placement for decompression of the left urinary system. Risk, benefits, and alternatives to the procedure were discussed with the patient in detail and he elected to proceed. Informed consent was obtained. Detailed Description of Procedure:   Patient was brought back to the operating room placed on table supine position. Anesthesia was induced and preoperative antibiotics were given. He was transferred to dorsolithotomy position, prepped in normal sterile fashion. Timeout was then performed. Rigid cystoscope inserted to the urethra and advanced towards the bladder.   Systematic

## 2023-09-15 NOTE — PROGRESS NOTES
Physician Progress Note      [facility]  CSN #:                  [id number]  :                       1975  ADMIT DATE:       2023 3:18 PM  [facility] DATE:  RESPONDING  PROVIDER #:        ANISA BUNDY        QUERY TEXT:    Urosepsis - UTI vs Sepsis: Please provide further specificity, if known. Clinical indicators include:  Options provided:  -- Urinary tract infection with sepsis  -- Urinary tract infection without sepsis  -- Other - I will add my own diagnosis  -- Disagree - Not applicable / Not valid  -- Disagree - Clinically Unable to determine / Unknown        PROVIDER RESPONSE TEXT:    The patient has a urinary tract infection with sepsis. QUERY TEXT:    Patient admitted with Sepsis/UTI/Kidney stone/Neurogenic bladder and  MS. Noted to have BMI of 17.94. If possible, please document in progress notes and   discharge summary if you are evaluating and /or treating any of the   following: The medical record reflects the following:  Risk Factors: MS, Neurogenic bladder  Clinical Indicators: BMI 17.94  Treatment: labs, monitor, weight    ASPEN Criteria:    https://aspenjournals. onlinelibrary. perry. com/doi/full/10.1177/960659294612513  5    Any questions, please call:  Rae Aldrich RN,BSN,[facility]  -Fri 6am-230pm  [phone]  Options provided:  -- Protein calorie malnutrition mild  -- Protein calorie malnutrition moderate  -- Protein calorie malnutrition severe  -- Underweight with BMI 17.94: This patient is underweight with a BMI of 17.94.  -- Other - I will add my own diagnosis  -- Disagree - Not applicable / Not valid  -- Disagree - Clinically unable to determine / Unknown  -- Refer to Clinical Documentation Reviewer    PROVIDER RESPONSE TEXT:    Malnutrition related to [address] created by:  Rae Aldrich on 9/15/2023 11:24 AM      Electronically signed by:  Maykel Niño 9/15/2023 5:15 PM

## 2023-09-16 VITALS
TEMPERATURE: 98.3 F | SYSTOLIC BLOOD PRESSURE: 106 MMHG | OXYGEN SATURATION: 94 % | DIASTOLIC BLOOD PRESSURE: 63 MMHG | RESPIRATION RATE: 16 BRPM | HEART RATE: 78 BPM

## 2023-09-16 LAB
ANION GAP SERPL CALCULATED.3IONS-SCNC: 9 MMOL/L (ref 9–17)
BASOPHILS # BLD: 0.05 K/UL (ref 0–0.2)
BASOPHILS NFR BLD: 0 % (ref 0–2)
BUN SERPL-MCNC: 7 MG/DL (ref 6–20)
CALCIUM SERPL-MCNC: 7.6 MG/DL (ref 8.6–10.4)
CHLORIDE SERPL-SCNC: 107 MMOL/L (ref 98–107)
CO2 SERPL-SCNC: 20 MMOL/L (ref 20–31)
CREAT SERPL-MCNC: 0.6 MG/DL (ref 0.7–1.2)
EOSINOPHIL # BLD: 0.05 K/UL (ref 0–0.44)
EOSINOPHILS RELATIVE PERCENT: 0 % (ref 1–4)
ERYTHROCYTE [DISTWIDTH] IN BLOOD BY AUTOMATED COUNT: 16 % (ref 11.8–14.4)
GFR SERPL CREATININE-BSD FRML MDRD: >60 ML/MIN/1.73M2
GLUCOSE SERPL-MCNC: 105 MG/DL (ref 70–99)
HCT VFR BLD AUTO: 39.9 % (ref 40.7–50.3)
HGB BLD-MCNC: 12.1 G/DL (ref 13–17)
IMM GRANULOCYTES # BLD AUTO: 0.07 K/UL (ref 0–0.3)
IMM GRANULOCYTES NFR BLD: 1 %
LYMPHOCYTES NFR BLD: 0.98 K/UL (ref 1.1–3.7)
LYMPHOCYTES RELATIVE PERCENT: 9 % (ref 24–43)
MCH RBC QN AUTO: 26.7 PG (ref 25.2–33.5)
MCHC RBC AUTO-ENTMCNC: 30.3 G/DL (ref 28.4–34.8)
MCV RBC AUTO: 87.9 FL (ref 82.6–102.9)
MONOCYTES NFR BLD: 0.8 K/UL (ref 0.1–1.2)
MONOCYTES NFR BLD: 7 % (ref 3–12)
NEUTROPHILS NFR BLD: 83 % (ref 36–65)
NEUTS SEG NFR BLD: 9.36 K/UL (ref 1.5–8.1)
NRBC BLD-RTO: 0 PER 100 WBC
PLATELET # BLD AUTO: 364 K/UL (ref 138–453)
PMV BLD AUTO: 9.2 FL (ref 8.1–13.5)
POTASSIUM SERPL-SCNC: 3.8 MMOL/L (ref 3.7–5.3)
RBC # BLD AUTO: 4.54 M/UL (ref 4.21–5.77)
RBC # BLD: ABNORMAL 10*6/UL
SODIUM SERPL-SCNC: 136 MMOL/L (ref 135–144)
WBC OTHER # BLD: 11.3 K/UL (ref 3.5–11.3)

## 2023-09-16 PROCEDURE — 80048 BASIC METABOLIC PNL TOTAL CA: CPT

## 2023-09-16 PROCEDURE — 6370000000 HC RX 637 (ALT 250 FOR IP): Performed by: STUDENT IN AN ORGANIZED HEALTH CARE EDUCATION/TRAINING PROGRAM

## 2023-09-16 PROCEDURE — 36415 COLL VENOUS BLD VENIPUNCTURE: CPT

## 2023-09-16 PROCEDURE — C9113 INJ PANTOPRAZOLE SODIUM, VIA: HCPCS | Performed by: STUDENT IN AN ORGANIZED HEALTH CARE EDUCATION/TRAINING PROGRAM

## 2023-09-16 PROCEDURE — 6360000002 HC RX W HCPCS: Performed by: STUDENT IN AN ORGANIZED HEALTH CARE EDUCATION/TRAINING PROGRAM

## 2023-09-16 PROCEDURE — 85025 COMPLETE CBC W/AUTO DIFF WBC: CPT

## 2023-09-16 PROCEDURE — 2580000003 HC RX 258: Performed by: STUDENT IN AN ORGANIZED HEALTH CARE EDUCATION/TRAINING PROGRAM

## 2023-09-16 RX ORDER — CEFADROXIL 500 MG/1
500 CAPSULE ORAL 2 TIMES DAILY
Qty: 28 CAPSULE | Refills: 0 | Status: SHIPPED | OUTPATIENT
Start: 2023-09-16 | End: 2023-09-30

## 2023-09-16 RX ADMIN — TAMSULOSIN HYDROCHLORIDE 0.4 MG: 0.4 CAPSULE ORAL at 09:31

## 2023-09-16 RX ADMIN — Medication 400 UNITS: at 09:30

## 2023-09-16 RX ADMIN — SODIUM CHLORIDE, PRESERVATIVE FREE 40 MG: 5 INJECTION INTRAVENOUS at 09:29

## 2023-09-16 RX ADMIN — MIDODRINE HYDROCHLORIDE 5 MG: 5 TABLET ORAL at 09:31

## 2023-09-16 RX ADMIN — Medication 5000 UNITS: at 09:30

## 2023-09-16 RX ADMIN — MEGESTROL ACETATE 20 MG: 20 TABLET ORAL at 09:31

## 2023-09-16 RX ADMIN — OXYBUTYNIN CHLORIDE 15 MG: 10 TABLET, EXTENDED RELEASE ORAL at 09:31

## 2023-09-16 RX ADMIN — Medication 3000 MCG: at 09:30

## 2023-09-16 ASSESSMENT — PAIN SCALES - GENERAL
PAINLEVEL_OUTOF10: 0
PAINLEVEL_OUTOF10: 0

## 2023-09-16 NOTE — PLAN OF CARE
Problem: Discharge Planning  Goal: Discharge to home or other facility with appropriate resources  9/16/2023 1239 by Chantel Gonzalez RN  Outcome: Completed  9/16/2023 0450 by Umer Walter RN  Outcome: Progressing     Problem: Skin/Tissue Integrity  Goal: Absence of new skin breakdown  Description: 1. Monitor for areas of redness and/or skin breakdown  2. Assess vascular access sites hourly  3. Every 4-6 hours minimum:  Change oxygen saturation probe site  4. Every 4-6 hours:  If on nasal continuous positive airway pressure, respiratory therapy assess nares and determine need for appliance change or resting period.   Outcome: Completed     Problem: Safety - Adult  Goal: Free from fall injury  Outcome: Completed     Problem: ABCDS Injury Assessment  Goal: Absence of physical injury  Outcome: Completed     Problem: Pain  Goal: Verbalizes/displays adequate comfort level or baseline comfort level  9/16/2023 1239 by Chantel Gonzalez RN  Outcome: Completed  9/16/2023 0450 by Umer Walter RN  Outcome: Progressing

## 2023-09-16 NOTE — DISCHARGE SUMMARY
DISCHARGE SUMMARY NOTE:      Patient Identification  PATIENT: Cynthia Mars   MRN: [id number]  :  1975  Admit Date:  2023  Discharge date:  23                                   Disposition: home health care  Discharged Condition:  good  Discharge Diagnoses:   Patient Active Problem List   Diagnosis    Multiple sclerosis ([address])    Fall at home, initial encounter    Sacral fracture, closed ([address])    Closed compression fracture of sacrum, with delayed healing, subsequent encounter    Fx sacrum-closed w/ cord inj ([address])    Ureteral calculus, left       Consults: none    Surgery:   Cystoscopy   Left stent placement     Patient Instructions: Activity: Per instructions  Diet: As tolerated  Patient told to follow up with Dr. Sayra Hess in 1-2 weeks  Discharge Medications:     Medication List        START taking these medications      cefadroxil 500 MG capsule  Commonly known as: DURICEF  Take 1 capsule by mouth 2 times daily for 14 days     Hyoscyamine Sulfate SL 0.125 MG Subl  Commonly known as: Levsin/SL  Place 0.125 mcg under the tongue every 4 hours as needed (for stent pain or bladder spasms)     oxyCODONE-acetaminophen 5-325 MG per tablet  Commonly known as: Percocet  Take 1 tablet by mouth every 6 hours as needed for Pain for up to 3 days. Intended supply: 3 days.  Take lowest dose possible to manage pain Max Daily Amount: 4 tablets     phenazopyridine 200 MG tablet  Commonly known as: Pyridium  Take 1 tablet by mouth 3 times daily as needed for Pain     tamsulosin 0.4 MG capsule  Commonly known as: FLOMAX  Take 1 capsule by mouth daily            CONTINUE taking these medications      B-12 3000 MCG Subl     baclofen 10 MG tablet  Commonly known as: LIORESAL     gabapentin 100 MG capsule  Commonly known as: NEURONTIN     INSULIN SYRINGE 1CC/29G 29G X 1/2\" 1 ML Misc  Commonly known as: [employer]ER INS SYRINGE 1CC/29G  1 each by Does not apply route daily     megestrol 20 MG tablet  Commonly known as: MEGACE midodrine 5 MG tablet  Commonly known as: PROAMATINE     MULTIVITAMIN ADULT PO     ocrelizumab 300 MG/10ML Soln injection  Commonly known as: OCREVUS     omeprazole 40 MG delayed release capsule  Commonly known as: PRILOSEC     oxybutynin 15 MG extended release tablet  Commonly known as: DITROPAN XL  take 1 tablet by mouth once daily     RA Vitamin D-3 125 MCG (5000 UT) Caps capsule  Generic drug: vitamin D     valACYclovir 500 MG tablet  Commonly known as: VALTREX     vitamin C 250 MG tablet     Vitamin E 400 units Tabs     Walker Misc     zinc acetate 10 mg/mL oral syrup               Where to Get Your Medications        These medications were sent to [address] [facility],   [address] [facility] [phone] [facility] , [address]      Phone: [phone]   cefadroxil 500 MG capsule  Hyoscyamine Sulfate SL 0.125 MG Subl  oxyCODONE-acetaminophen 5-325 MG per tablet  phenazopyridine 200 MG tablet  tamsulosin 0.4 MG capsule         Hospital course:   51year old male with a past medical history of multiple sclerosis on self catheterization who presented with left flank pain. CT A/P showed a left 4mm UVJ stone with left moderated hydroureteronephrosis. UA showed + leuk esterase, negative nitrites. He underwent left stent placement on 9/15/23. He tolerated the procedure well. He was extubated in the OR and transferred to the PACU without incidence. On post operative day 1, he was deemed ready and stable for discharge. Ervin was removed prior to discharge and he was able to resume CIC. He was given rocephin in the hospital and urine and blood cultures were negative. He was instructed to follow up in 1-2 weeks for definitive stone treatment.       Balwinder Briggs MD  11:32 AM 9/16/2023

## 2023-09-16 NOTE — DISCHARGE INSTR - COC
Continuity of Care Form    Patient Name: Kasi Ashraf   :  1975  MRN:  [id number]    Admit date:  2023  Discharge date:  2023    Code Status Order: Full Code   Advance Directives:   [address] Documentation       Date/Time Healthcare Directive Type of Healthcare Directive Copy in [address] Agent's Name Healthcare Agent's Phone Number    09/15/23 1323 No, patient does not have an advance directive for healthcare treatment -- -- -- -- --            Admitting Physician:  Sandra Stern MD  PCP: Riya Fagan MD    [facility]/Room[facility] 3133/2585-51  Discharging Unit Phone Number: [phone]    Emergency Contact:   Extended Emergency Contact Information  Primary Emergency Contact: Alan Huddleston  Address: [address] [place] [address], [address] [place] [address] [place] [address] [address] Phone: [phone]  Relation: Other  Secondary Emergency Contact: [address] [place] [address] Phone: [phone]  Relation: Brother/Sister    Past Surgical History:  Past Surgical History:   Procedure Laterality Date    COLONOSCOPY      CYSTOSCOPY N/A 2023    Cystoscopy Litholopaxy with Holmium Laser with Greenlight sheath and Urethral dilation performed by Baltazar Vela MD at [facility] Left 09/15/2023    UPPER GASTROINTESTINAL ENDOSCOPY         Immunization History:   Immunization History   Administered Date(s) Administered    COVID-19, J&J, (age 18y+), IM, 0.5 mL 03/10/2021       Active Problems:  Patient Active Problem List   Diagnosis Code    Multiple sclerosis ([address]) Maya     Fall at home, initial encounter W19. XXXA, Y92.009    Sacral fracture, closed ([address]) S32.10XA    Closed compression fracture of sacrum, with delayed healing, subsequent encounter S32.10XG    Fx sacrum-closed w/ cord inj ([address]) S34.139A, S32.10XA    Ureteral calculus, left N20.1       Isolation/Infection:   Isolation

## 2023-09-16 NOTE — CARE COORDINATION
Left VM for [facility] that patient is being discharged today.   Faxed AVS, orders as requested    Discharge [address] Case Management Department  Written by: Cruz Arndt RN    Patient Name: Sharona Butts  Attending Provider: Elier Freire MD  Admit Date: 2023  3:18 PM  MRN: [id number]  Account: [de-identified]                     : 1975  Discharge Date:   2023      Disposition: home with [facility]    Cruz Arndt RN

## 2023-09-16 NOTE — PLAN OF CARE
Problem: Discharge Planning  Goal: Discharge to home or other facility with appropriate resources  9/16/2023 0450 by Edel Sweeney RN  Outcome: Progressing  9/15/2023 1709 by Valarie Ovalle RN  Outcome: Progressing     Problem: Pain  Goal: Verbalizes/displays adequate comfort level or baseline comfort level  9/16/2023 0450 by Edel Sweeney RN  Outcome: Progressing  9/15/2023 1709 by Valarie Ovalle RN  Outcome: Progressing

## 2023-09-17 LAB
MICROORGANISM SPEC CULT: NORMAL
MICROORGANISM SPEC CULT: NORMAL
SERVICE CMNT-IMP: NORMAL
SERVICE CMNT-IMP: NORMAL
SPECIMEN DESCRIPTION: NORMAL
SPECIMEN DESCRIPTION: NORMAL

## 2023-09-18 ENCOUNTER — TELEPHONE (OUTPATIENT)
Dept: UROLOGY | Age: 48
End: 2023-09-18

## 2023-09-18 NOTE — TELEPHONE ENCOUNTER
1 tablet 3 times daily as needed       No current facility-administered medications for this visit. Scheduled Meds:  Continuous Infusions:  PRN Meds:. Prior to Admission medications    Medication Sig Start Date End Date Taking? Authorizing Provider   cefadroxil (DURICEF) 500 MG capsule Take 1 capsule by mouth 2 times daily for 14 days 9/16/23 9/30/23  Ofelia Olivarez MD   tamsulosin [facility]) 0.4 MG capsule Take 1 capsule by mouth daily 9/15/23   [facility], DO   phenazopyridine (PYRIDIUM) 200 MG tablet Take 1 tablet by mouth 3 times daily as needed for Pain 9/15/23 9/20/23  Sofia Ingrid, DO   Hyoscyamine Sulfate SL (LEVSIN/SL) 0.125 MG SUBL Place 0.125 mcg under the tongue every 4 hours as needed (for stent pain or bladder spasms) 9/15/23   Sofia Quintero, DO   oxyCODONE-acetaminophen (PERCOCET) 5-325 MG per tablet Take 1 tablet by mouth every 6 hours as needed for Pain for up to 3 days. Intended supply: 3 days. Take lowest dose possible to manage pain Max Daily Amount: 4 tablets 9/15/23 9/18/23  Sofia Quintero, DO   oxybutynin (DITROPAN XL) 15 MG extended release tablet take 1 tablet by mouth once daily 6/23/23   Gerardo Olivia MD   Misc.  Devices Jama Solo) MISC Use as directed 2/7/23   Historical Provider, MD   gabapentin (NEURONTIN) 100 MG capsule take 6 capsules by mouth nightly 2/16/23   Historical Provider, MD   INSULIN SYRINGE 1CC/29G ([employer] INS SYRINGE 1CC/29G) 29G X 1/2\" 1 ML MISC 1 each by Does not apply route daily 10/4/22   Gerardo Olivia MD   midodrine (PROAMATINE) 5 MG tablet take 1 tablet by mouth twice a day 5/18/21   Historical Provider, MD   zinc acetate 10 mg/mL oral syrup Take by mouth daily    Historical Provider, MD   megestrol (MEGACE) 20 MG tablet 1 tablet daily 3/24/19   Historical Provider, MD BRASHER VITAMIN D-3 5000 units CAPS capsule 1 capsule daily 4/10/19   Historical Provider, MD   valACYclovir (VALTREX) 500 MG tablet Take 1 tablet by mouth 2 times daily as needed    Historical Provider, MD 26-Jan-2019 00:07

## 2023-09-18 NOTE — BRIEF OP NOTE
Brief Postoperative Note      Patient: Federico Armstrong  YOB: 1975  MRN: [id number]    Date of Procedure: 9/15/2023    Pre-Op Diagnosis Codes:     * Ureteral stone [N20.1]    Post-Op Diagnosis: Same       Procedure(s):  CYSTOSCOPY STENT INSERTION    Surgeon(s):  Kathleen Christine MD    Assistant:  Resident: Cony Light MD; [facility],     Anesthesia: Monitor Anesthesia Care    Estimated Blood Loss (mL): Minimal    Complications: None    Specimens:   * No specimens in log *    Implants:  Implant Name Type Inv. Item Serial No.  Lot No. LRB No. Used Action   STENT URET 6FR L28CM PERCFLX HYDR+ DBL PGTL THRD 2 - [id number]  STENT URET 6FR L28CM PERCFLX HYDR+ DBL PGTL THRD 2  [employer] [facility]- [id number] Left 1 Implanted         Drains:   [REMOVED] Urinary Catheter 09/14/23 Ervin-Temperature (Removed)   $ Urethral catheter insertion $ Not inserted for procedure 09/14/23 1823   Catheter Indications Urinary retention (acute or chronic), continuous bladder irrigation or bladder outlet obstruction 09/15/23 1331   Site Assessment No urethral drainage 09/15/23 1331   Urine Color Yellow 09/15/23 1331   Urine Appearance Clear 09/15/23 1331   Urine Odor Malodorous 09/15/23 1331   Collection Container Standard 09/15/23 1331   Securement Method Securing device (Describe) 09/15/23 1331   Catheter Care  Soap and water 09/15/23 1230   Catheter Best Practices  Drainage tube clipped to bed;Catheter secured to thigh; Tamper seal intact; Bag below bladder;Bag not on floor; Lack of dependent loop in tubing;Drainage bag less than half full 09/15/23 1331   Status Draining 09/15/23 1331   Output (mL) 1900 mL 09/15/23 0612       [REMOVED] Urinary Catheter 09/15/23 2 Way (Removed)   $ Urethral catheter insertion Inserted for procedure 09/16/23 0745   Catheter Indications Perioperative use for selected surgical procedures 09/16/23 0745   Site Assessment No urethral drainage 09/16/23 0745   Urine Color Yellow 09/16/23 0745

## 2023-09-21 DIAGNOSIS — N31.9 NEUROGENIC BLADDER: Primary | ICD-10-CM

## 2023-09-21 RX ORDER — HYOSCYAMINE SULFATE 0.12 MG/1
0.12 TABLET SUBLINGUAL EVERY 4 HOURS PRN
Qty: 15 EACH | Refills: 0 | Status: SHIPPED | OUTPATIENT
Start: 2023-09-21

## 2023-09-22 DIAGNOSIS — N31.9 NEUROGENIC BLADDER: ICD-10-CM

## 2023-09-22 NOTE — PROGRESS NOTES
Writer called patient regarding procedure on Monday. He states that he is going to run out of his Levsin prior to Monday and that he is having pain between his hip and ribs. States the pain is not like any pain that he has ever had. Writer contacted Dr Baltazar Zamarripa office and spoke with [place]. She states that the office called in a script for the medicine earlier. Also says pt should go to the ER if he feels pain is increasing or is different than what he had in the past.     Writer notified patient of above conversation.

## 2023-09-25 ENCOUNTER — ANESTHESIA (OUTPATIENT)
Dept: OPERATING ROOM | Age: 48
End: 2023-09-25
Payer: MEDICARE

## 2023-09-25 ENCOUNTER — APPOINTMENT (OUTPATIENT)
Dept: GENERAL RADIOLOGY | Age: 48
End: 2023-09-25
Attending: UROLOGY
Payer: MEDICARE

## 2023-09-25 ENCOUNTER — HOSPITAL ENCOUNTER (OUTPATIENT)
Age: 48
Setting detail: OUTPATIENT SURGERY
Discharge: HOME | End: 2023-09-25
Attending: UROLOGY | Admitting: UROLOGY
Payer: MEDICARE

## 2023-09-25 ENCOUNTER — ANESTHESIA EVENT (OUTPATIENT)
Dept: OPERATING ROOM | Age: 48
End: 2023-09-25
Payer: MEDICARE

## 2023-09-25 VITALS
HEART RATE: 78 BPM | BODY MASS INDEX: 18.02 KG/M2 | TEMPERATURE: 97.5 F | WEIGHT: 136 LBS | DIASTOLIC BLOOD PRESSURE: 57 MMHG | RESPIRATION RATE: 16 BRPM | HEIGHT: 73 IN | SYSTOLIC BLOOD PRESSURE: 111 MMHG | OXYGEN SATURATION: 99 %

## 2023-09-25 DIAGNOSIS — N20.1 URETERAL CALCULUS, LEFT: Primary | ICD-10-CM

## 2023-09-25 PROCEDURE — C2617 STENT, NON-COR, TEM W/O DEL: HCPCS | Performed by: UROLOGY

## 2023-09-25 PROCEDURE — 2580000003 HC RX 258: Performed by: NURSE ANESTHETIST, CERTIFIED REGISTERED

## 2023-09-25 PROCEDURE — 2720000010 HC SURG SUPPLY STERILE: Performed by: UROLOGY

## 2023-09-25 PROCEDURE — 3700000001 HC ADD 15 MINUTES (ANESTHESIA): Performed by: UROLOGY

## 2023-09-25 PROCEDURE — 3700000000 HC ANESTHESIA ATTENDED CARE: Performed by: UROLOGY

## 2023-09-25 PROCEDURE — 6360000002 HC RX W HCPCS: Performed by: UROLOGY

## 2023-09-25 PROCEDURE — 6360000002 HC RX W HCPCS: Performed by: NURSE ANESTHETIST, CERTIFIED REGISTERED

## 2023-09-25 PROCEDURE — 7100000010 HC PHASE II RECOVERY - FIRST 15 MIN: Performed by: UROLOGY

## 2023-09-25 PROCEDURE — 3600000003 HC SURGERY LEVEL 3 BASE: Performed by: UROLOGY

## 2023-09-25 PROCEDURE — 2500000003 HC RX 250 WO HCPCS: Performed by: NURSE ANESTHETIST, CERTIFIED REGISTERED

## 2023-09-25 PROCEDURE — 2580000003 HC RX 258: Performed by: UROLOGY

## 2023-09-25 PROCEDURE — 2709999900 HC NON-CHARGEABLE SUPPLY: Performed by: UROLOGY

## 2023-09-25 PROCEDURE — 3600000013 HC SURGERY LEVEL 3 ADDTL 15MIN: Performed by: UROLOGY

## 2023-09-25 PROCEDURE — 7100000011 HC PHASE II RECOVERY - ADDTL 15 MIN: Performed by: UROLOGY

## 2023-09-25 DEVICE — URETERAL STENT
Type: IMPLANTABLE DEVICE | Status: FUNCTIONAL
Brand: CONTOUR™

## 2023-09-25 RX ORDER — SODIUM CHLORIDE 9 MG/ML
INJECTION, SOLUTION INTRAVENOUS PRN
Status: DISCONTINUED | OUTPATIENT
Start: 2023-09-25 | End: 2023-09-25 | Stop reason: HOSPADM

## 2023-09-25 RX ORDER — SODIUM CHLORIDE, SODIUM LACTATE, POTASSIUM CHLORIDE, CALCIUM CHLORIDE 600; 310; 30; 20 MG/100ML; MG/100ML; MG/100ML; MG/100ML
INJECTION, SOLUTION INTRAVENOUS CONTINUOUS
Status: DISCONTINUED | OUTPATIENT
Start: 2023-09-25 | End: 2023-09-25 | Stop reason: HOSPADM

## 2023-09-25 RX ORDER — SODIUM CHLORIDE 0.9 % (FLUSH) 0.9 %
5-40 SYRINGE (ML) INJECTION EVERY 12 HOURS SCHEDULED
Status: DISCONTINUED | OUTPATIENT
Start: 2023-09-25 | End: 2023-09-25 | Stop reason: HOSPADM

## 2023-09-25 RX ORDER — ONDANSETRON 2 MG/ML
INJECTION INTRAMUSCULAR; INTRAVENOUS PRN
Status: DISCONTINUED | OUTPATIENT
Start: 2023-09-25 | End: 2023-09-25 | Stop reason: SDUPTHER

## 2023-09-25 RX ORDER — DEXAMETHASONE SODIUM PHOSPHATE 10 MG/ML
INJECTION INTRAMUSCULAR; INTRAVENOUS PRN
Status: DISCONTINUED | OUTPATIENT
Start: 2023-09-25 | End: 2023-09-25 | Stop reason: SDUPTHER

## 2023-09-25 RX ORDER — SODIUM CHLORIDE, SODIUM LACTATE, POTASSIUM CHLORIDE, CALCIUM CHLORIDE 600; 310; 30; 20 MG/100ML; MG/100ML; MG/100ML; MG/100ML
INJECTION, SOLUTION INTRAVENOUS CONTINUOUS PRN
Status: DISCONTINUED | OUTPATIENT
Start: 2023-09-25 | End: 2023-09-25 | Stop reason: SDUPTHER

## 2023-09-25 RX ORDER — HYDROCODONE BITARTRATE AND ACETAMINOPHEN 5; 325 MG/1; MG/1
1 TABLET ORAL EVERY 6 HOURS PRN
Qty: 12 TABLET | Refills: 0 | Status: SHIPPED | OUTPATIENT
Start: 2023-09-25 | End: 2023-09-30

## 2023-09-25 RX ORDER — PROPOFOL 10 MG/ML
INJECTION, EMULSION INTRAVENOUS PRN
Status: DISCONTINUED | OUTPATIENT
Start: 2023-09-25 | End: 2023-09-25 | Stop reason: SDUPTHER

## 2023-09-25 RX ORDER — KETOROLAC TROMETHAMINE 30 MG/ML
INJECTION, SOLUTION INTRAMUSCULAR; INTRAVENOUS PRN
Status: DISCONTINUED | OUTPATIENT
Start: 2023-09-25 | End: 2023-09-25 | Stop reason: SDUPTHER

## 2023-09-25 RX ORDER — SODIUM CHLORIDE 0.9 % (FLUSH) 0.9 %
5-40 SYRINGE (ML) INJECTION PRN
Status: DISCONTINUED | OUTPATIENT
Start: 2023-09-25 | End: 2023-09-25 | Stop reason: HOSPADM

## 2023-09-25 RX ORDER — DEXMEDETOMIDINE HYDROCHLORIDE 100 UG/ML
INJECTION, SOLUTION INTRAVENOUS PRN
Status: DISCONTINUED | OUTPATIENT
Start: 2023-09-25 | End: 2023-09-25 | Stop reason: SDUPTHER

## 2023-09-25 RX ADMIN — KETOROLAC TROMETHAMINE 30 MG: 30 INJECTION, SOLUTION INTRAMUSCULAR at 12:56

## 2023-09-25 RX ADMIN — SODIUM CHLORIDE, POTASSIUM CHLORIDE, SODIUM LACTATE AND CALCIUM CHLORIDE: 600; 310; 30; 20 INJECTION, SOLUTION INTRAVENOUS at 12:27

## 2023-09-25 RX ADMIN — PROPOFOL 100 MG: 10 INJECTION, EMULSION INTRAVENOUS at 12:56

## 2023-09-25 RX ADMIN — PROPOFOL 135 MCG/KG/MIN: 10 INJECTION, EMULSION INTRAVENOUS at 12:57

## 2023-09-25 RX ADMIN — DEXAMETHASONE SODIUM PHOSPHATE 5 MG: 10 INJECTION INTRAMUSCULAR; INTRAVENOUS at 12:56

## 2023-09-25 RX ADMIN — SODIUM CHLORIDE, POTASSIUM CHLORIDE, SODIUM LACTATE AND CALCIUM CHLORIDE: 600; 310; 30; 20 INJECTION, SOLUTION INTRAVENOUS at 12:54

## 2023-09-25 RX ADMIN — DEXMEDETOMIDINE HYDROCHLORIDE 20 MCG: 100 INJECTION, SOLUTION INTRAVENOUS at 12:56

## 2023-09-25 RX ADMIN — ONDANSETRON 4 MG: 2 INJECTION INTRAMUSCULAR; INTRAVENOUS at 12:56

## 2023-09-25 RX ADMIN — Medication 2 G: at 12:54

## 2023-09-25 ASSESSMENT — PAIN SCALES - GENERAL
PAINLEVEL_OUTOF10: 0

## 2023-09-25 ASSESSMENT — PAIN - FUNCTIONAL ASSESSMENT: PAIN_FUNCTIONAL_ASSESSMENT: 0-10

## 2023-09-25 NOTE — H&P
Pooja Mullen MD  History and Physical    Patient:  Soila Mancia  MRN: [id number]  YOB: 1975    HISTORY OF PRESENT ILLNESS:     The patient is a 51 y.o. male who presents with ureteral stone. Here for procedure. Patient's old records, notes and chart reviewed and summarized above. Pooja Mullen MD independently reviewed the images and verified the radiology reports from:    No results found. Past Medical History:    Past Medical History:   Diagnosis Date    Chronic kidney disease     GERD (gastroesophageal reflux disease)     MS (multiple sclerosis) (HCC)     Ureteral calculus, left 9/14/2023       Past Surgical History:    Past Surgical History:   Procedure Laterality Date    COLONOSCOPY      CYSTOSCOPY N/A 06/12/2023    Cystoscopy Litholopaxy with Holmium Laser with Greenlight sheath and Urethral dilation performed by Immanuel Rowe MD at [facility] 9/15/2023    CYSTOSCOPY STENT INSERTION performed by Immanuel Rowe MD at [address] [place] [address] Left 09/15/2023    UPPER GASTROINTESTINAL ENDOSCOPY         Medications Prior to Admission:    Prior to Admission medications    Medication Sig Start Date End Date Taking? Authorizing Provider   Hyoscyamine Sulfate SL (LEVSIN/SL) 0.125 MG SUBL Place 0.125 mcg under the tongue every 4 hours as needed (for stent pain or bladder spasms) 9/21/23   Aleksander Poe APRN - CNP   cefadroxil (DURICEF) 500 MG capsule Take 1 capsule by mouth 2 times daily for 14 days 9/16/23 9/30/23  Cristy Morales MD   tamsulosin [facility]) 0.4 MG capsule Take 1 capsule by mouth daily 9/15/23   [facility], DO   oxybutynin (DITROPAN XL) 15 MG extended release tablet take 1 tablet by mouth once daily 6/23/23   Immanuel Rowe MD   Misc.  Devices Giuseppe Morita) MISC Use as directed 2/7/23   Historical Provider, MD   gabapentin (NEURONTIN) 100 MG capsule take 6 capsules by mouth nightly 2/16/23   Historical Provider, MD   INSULIN SYRINGE 1CC/29G

## 2023-09-25 NOTE — ANESTHESIA PRE PROCEDURE
Department of Anesthesiology  Preprocedure Note       Name:  Mitra Ponce   Age:  51 y.o.  :  1975                                          MRN:  [id number]         Date:  2023      Surgeon: Damion Tomlin):  Lise Lai MD    Procedure: Procedure(s):  Cystoscopy Left Ureteroscopy Holmium Laser with stent exchange Layne Sheehan [id number] -Leigha [id number])    Medications prior to admission:   Prior to Admission medications    Medication Sig Start Date End Date Taking? Authorizing Provider   Hyoscyamine Sulfate SL (LEVSIN/SL) 0.125 MG SUBL Place 0.125 mcg under the tongue every 4 hours as needed (for stent pain or bladder spasms) 23   Dwight Castro APRN - CNP   cefadroxil (DURICEF) 500 MG capsule Take 1 capsule by mouth 2 times daily for 14 days 23  Peter Gregorio MD   tamsulosin [facility]) 0.4 MG capsule Take 1 capsule by mouth daily 9/15/23   [facility], DO   oxybutynin (DITROPAN XL) 15 MG extended release tablet take 1 tablet by mouth once daily 23   Lise Lai MD   Misc.  Devices Yaron Denis) MISC Use as directed 23   Historical Provider, MD   gabapentin (NEURONTIN) 100 MG capsule take 6 capsules by mouth nightly 23   Historical Provider, MD   INSULIN SYRINGE 1CC/29G (Iker Morrison INS SYRINGE 1CC/29G) 29G X 1/2\" 1 ML MISC 1 each by Does not apply route daily 10/4/22   Lise Lai MD   midodrine (PROAMATINE) 5 MG tablet take 1 tablet by mouth twice a day 21   Historical Provider, MD   zinc acetate 10 mg/mL oral syrup Take by mouth daily    Historical Provider, MD   megestrol (MEGACE) 20 MG tablet 1 tablet daily 3/24/19   Historical Provider, MD BRASHER VITAMIN D-3 5000 units CAPS capsule 1 capsule daily 4/10/19   Historical Provider, MD   valACYclovir (VALTREX) 500 MG tablet Take 1 tablet by mouth 2 times daily as needed    Historical Provider, MD   omeprazole (PRILOSEC) 40 MG delayed release capsule take 1 capsule by mouth once daily 19   Historical Provider, MD   Vitamin E 400 units

## 2023-09-25 NOTE — OP NOTE
[facility]: 9/25/2023  Patient:  Jenna Newton  MRN: [id number]  YOB: 1975    SURGEON: Richa Willis MD.    ASSISTANT: none    PREOPERATIVE DIAGNOSIS: left ureteral stone      POSTOPERATIVE DIAGNOSIS:  left ureteral stone      PROCEDURE PERFORMED: cystoscopy, left ureteroscopy left stone basketing left stent exchange    ANESTHESIA: General    COMPLICATIONS: none    OR BLOOD LOSS:  Minimal    FLUIDS: Cystalloids per Anesthesia    SPECIMENS:  * No specimens in log *      DRAINS: 6 x 26 double j stent with string, roblero catheter    INDICATIONS FOR PROCEDURE:  The patient is a 51 y.o. male who presents today with Left ureteral stone [N20.1] here for Cystoscopy Left Ureteroscopy Holmium Laser with stent exchange. After risks, benefits and alternatives of the procedure were discussed with the patient, the patient elected to proceed. DETAILS OF PROCEDURE:  After informed consent was obtained in the preoperative area, the patient was taken back to the operating room and transferred to the operating table in supine position. Anesthesia was induced and antibiotics were given. The patient was placed in modified dorsal lithotomy position and sterilely prepped and draped in a standard fashion. A timeout occurred. Two patient identifiers were used. We entered the urethra with a 22 [place] scope. We focused our attention on the left ureteral orifice. The stent was seen emanating from the ureteral orifice. It was grasped using a foreign body grasper and brought to the ureteral meatus. A wire was placed through the stent into the kidney under fluoroscopic guidance. The stent was removed, and a dual lumen catheter was used to place a second wire into the kidney under fluoroscopic guidance. The rigid ureteroscope was assembled, placed next to the Glidewire, and advanced into the ureter carefully.  A wire remained in place as a

## 2023-09-25 NOTE — BRIEF OP NOTE
Brief Postoperative Note      Patient: Cary Dolan  YOB: 1975  MRN: [id number]    Date of Procedure: 9/25/2023    Pre-Op Diagnosis Codes:     * Left ureteral stone [N20.1]    Post-Op Diagnosis: Same       Procedure(s):  Cystoscopy Left Ureteroscopy Holmium Laser with stent exchange    Surgeon(s):  Edward Tinajero MD    Assistant:  * No surgical staff found *    Anesthesia: General    Estimated Blood Loss (mL): Minimal    Complications: None    Specimens:   * No specimens in log *    Implants:  Implant Name Type Inv. Item Serial No.  Lot No. LRB No. Used Action   STENT URET 6FR L26CM PERCFLX HYDR+ Raenelle Ulisses [id number]  [facility] 6FR L26CM PERCFLX HYDR+ Aj Tao [facility]- [id number] Left 1 Implanted         Drains:   Urinary Catheter 09/25/23 (Active)       [REMOVED] Urinary Catheter 09/14/23 Ervin-Temperature (Removed)   $ Urethral catheter insertion $ Not inserted for procedure 09/14/23 1823   Catheter Indications Urinary retention (acute or chronic), continuous bladder irrigation or bladder outlet obstruction 09/15/23 1331   Site Assessment No urethral drainage 09/15/23 1331   Urine Color Yellow 09/15/23 1331   Urine Appearance Clear 09/15/23 1331   Urine Odor Malodorous 09/15/23 1331   Collection Container Standard 09/15/23 1331   Securement Method Securing device (Describe) 09/15/23 1331   Catheter Care  Soap and water 09/15/23 1230   Catheter Best Practices  Drainage tube clipped to bed;Catheter secured to thigh; Tamper seal intact; Bag below bladder;Bag not on floor; Lack of dependent loop in tubing;Drainage bag less than half full 09/15/23 1331   Status Draining 09/15/23 1331   Output (mL) 1900 mL 09/15/23 0612       [REMOVED] Urinary Catheter 09/15/23 2 Way (Removed)   $ Urethral catheter insertion Inserted for procedure 09/16/23 0745   Catheter Indications Perioperative use for selected surgical procedures 09/16/23 0745   Site Assessment No urethral drainage 09/16/23

## 2023-09-25 NOTE — ANESTHESIA POSTPROCEDURE EVALUATION
Department of Anesthesiology  Postprocedure Note    Patient: Mitra Ponce  MRN: [id number]  [address] [facility] [place]: 1975  Date of evaluation: 9/25/2023      Procedure Summary     Date: 09/25/23 Room / Location: [address] [place] [address]    Anesthesia Start: 1404 [facility] Anesthesia Stop: 1331    Procedure: Cystoscopy Left Ureteroscopy Holmium Laser with stent exchange (Left) Diagnosis:       Left ureteral stone      (Left ureteral stone [N20.1])    Surgeons: Lise Lai MD Responsible Provider: PAULY Esparza CRNA    Anesthesia Type: General, TIVA ASA Status: 3          Anesthesia Type: General, TIVA    Hernan Phase I: Hernan Score: 9    Hernan Phase II: Hernan Score: 3      Anesthesia Post Evaluation    Patient location during evaluation: bedside  Level of consciousness: sleepy but conscious  Airway patency: patent  Nausea & Vomiting: no nausea and no vomiting  Complications: no  Cardiovascular status: hemodynamically stable  Respiratory status: spontaneous ventilation  Hydration status: euvolemic  Pain management: satisfactory to patient

## 2023-09-25 NOTE — DISCHARGE INSTRUCTIONS
Pt ok to discharge home in good condition  No heavy lifting, >10 lbs for today  Pt should avoid strenuous activity for today  Pt should walk moderately at home  Pt ok to shower   Pt may resume diet as tolerated  Pt should take Rx as directed  No driving while on narcotics  Please call attending physician or hospital  with questions  Call or Present to ED if fever (> 101F), intractable nausea vomiting or pain. Prescription in chart  Office to remove stent and roblero catheter in 1 week. Post Operative UROLOGY Instructions: It is very important to increase your fluid intake for the first few days - water is preferred. You may experience slight discomfort with urination for 1-2 days following surgery. Your urine may also be colored red. Take medications as prescribed. If you are experiencing only minor discomfort, you may take Tylenol or any non-prescription pain medication. Activity  You have received sedation. Your judgement and coordination may be impaired.  -Do not drive or operate any machinery today.  -Do not make personal, medical, legal, or business decisions today.  -Do not consume alcohol, tranquilizers, sleeping or non-prescription medications today, unless indicated by your physician.  -You may resume normal activities after 24 hours and return to work unless otherwise stated by your doctor. Diet  Begin with clear liquids and progress to your normal diet if you are not nauseated. Medications  -resume your usual medications. Call Your Physician If You Experience Any of the Following  -Fever >100.5, Chills, Excessive sweating.  -Any redness or swelling at the IV site.  -Persistant nausea or vomiting  -Severe abdominal or chest pain  -You are unable to urinate within 12 hours. If you have any questions or problems, call:  [phone] [facility]) or after hours call: [phone] [facility]).       Urinary Catheter Care: After Your Visit  Your

## 2023-09-27 ENCOUNTER — TELEPHONE (OUTPATIENT)
Dept: UROLOGY | Age: 48
End: 2023-09-27

## 2023-09-27 NOTE — TELEPHONE ENCOUNTER
Spoke with patient, he will come in tomorrow to get stent removed. Will discuss future appts/meds depending on [employer] medicare coverage.

## 2023-09-27 NOTE — TELEPHONE ENCOUNTER
Patient had a procedure Monday with Dr Jose A Parrish. cystoscopy, left ureteroscopy left stone basketing left stent exchange    Patient wants to speak with the office about the medication he can put under his tongue, which he states works best for him. Patient did not know name of medication.   Call him back at [phone]

## 2023-09-27 NOTE — TELEPHONE ENCOUNTER
Patient called the office again. Patient does not have transportation Friday for the appointment to have the stent removed. Patient does have transportation tomorrow Thursday. Patient also wants to speak with Dr Len Person nurse about the pill he puts under is tongue for bladder spasms.      Call back # [phone]

## 2023-09-28 ENCOUNTER — TELEPHONE (OUTPATIENT)
Dept: UROLOGY | Age: 48
End: 2023-09-28

## 2023-09-28 ENCOUNTER — NURSE ONLY (OUTPATIENT)
Dept: UROLOGY | Age: 48
End: 2023-09-28
Payer: MEDICARE

## 2023-09-28 DIAGNOSIS — N31.9 NEUROGENIC BLADDER: Primary | ICD-10-CM

## 2023-09-28 PROCEDURE — 99212 OFFICE O/P EST SF 10 MIN: CPT

## 2023-09-28 NOTE — TELEPHONE ENCOUNTER
Spoke with patient, let him know 24hour urine will be mailed to his house, patient will not need another antibiotic at this time

## 2023-09-28 NOTE — TELEPHONE ENCOUNTER
Patient called back and wants to know if he needs to renew his antibiotic as he is out. Also needs to know if he is to do 24 hr urine and he does not have a container.     [phone]

## 2023-09-28 NOTE — PROGRESS NOTES
Patient present for roblero removal  and ureteral stent removal post cystoscopy ureteroscopy and stent placement , dark yellow color urine noted in drainage bag, 10 ml balloon deflated, and 14 fr  was removed without difficulty. Ureteral stent was then removed without difficulty. Instructed patient to drink fluids, patient may experience urinary leakage, blood in the urine, and it may burn when urinating for a few weeks. If patient is unable to urinate in 8 hours, is to call the office to have roblero placed or needs to go to the emergency room. Patient verbalized understanding.

## 2023-10-09 ENCOUNTER — OFFICE VISIT (OUTPATIENT)
Dept: UROLOGY | Age: 48
End: 2023-10-09
Payer: MEDICARE

## 2023-10-09 VITALS
HEART RATE: 90 BPM | WEIGHT: 136 LBS | RESPIRATION RATE: 16 BRPM | OXYGEN SATURATION: 97 % | HEIGHT: 73 IN | DIASTOLIC BLOOD PRESSURE: 74 MMHG | BODY MASS INDEX: 18.02 KG/M2 | SYSTOLIC BLOOD PRESSURE: 112 MMHG

## 2023-10-09 DIAGNOSIS — N52.9 ERECTILE DYSFUNCTION, UNSPECIFIED ERECTILE DYSFUNCTION TYPE: Primary | ICD-10-CM

## 2023-10-09 DIAGNOSIS — N40.1 BENIGN LOCALIZED PROSTATIC HYPERPLASIA WITH LOWER URINARY TRACT SYMPTOMS (LUTS): ICD-10-CM

## 2023-10-09 DIAGNOSIS — N31.9 NEUROGENIC BLADDER: ICD-10-CM

## 2023-10-09 DIAGNOSIS — N53.19 ANEJACULATION: ICD-10-CM

## 2023-10-09 PROCEDURE — 99213 OFFICE O/P EST LOW 20 MIN: CPT | Performed by: UROLOGY

## 2023-10-09 PROCEDURE — 99214 OFFICE O/P EST MOD 30 MIN: CPT | Performed by: UROLOGY

## 2023-10-09 RX ORDER — HYOSCYAMINE SULFATE 0.12 MG/1
0.12 TABLET SUBLINGUAL EVERY 4 HOURS PRN
Qty: 30 EACH | Refills: 3 | Status: SHIPPED | OUTPATIENT
Start: 2023-10-09

## 2023-10-09 NOTE — PROGRESS NOTES
Medina Doe MD        [facility]  [address]  [address]  Dept: [phone]  Dept Fax: [phone]  Loc: [phone]      [facility] Urology Office Note - Follow up Patient    Patient:  Kasi Ashraf  YOB: 1975  Date: 10/9/2023    The patient is a 51 y.o. male who presents today for evaluation of the following problems:   Chief Complaint   Patient presents with    Nephrolithiasis    Bladder Problem     Neurogenic bladder    referred/consultation requested by Riya Fagan MD.    HISTORY OF PRESENT ILLNESS:       Neurogenic bladder  No new issues. On ditropan. Prostate: lateral lobe hypertrophy (moderate), high riding bladder neck  Hx of MS. Catheterizes 3-4x. Generally has no problems catheterizing. Can feel discomfort when bladder is very full. Cant void on his own. Recurrent UTI- usually 2-3 UTI annually. Had septic stone recently. ED- trimix intermittently effective. No priapism episodes recently. Anejaculation- stable         Requested/reviewed records from Riya Fagan MD office and/or outside physician/EMR    (Patient's old records have been requested, reviewed and pertinent findings summarized in today's note.)    Procedures Today:       Last several PSA's:  Lab Results   Component Value Date    PSA 3.35 02/13/2023       Last total testosterone:  No results found for: \"TESTOSTERONE\"    Urinalysis today:  No results found for this visit on 10/09/23.     Last BUN and creatinine:  Lab Results   Component Value Date    BUN 7 09/16/2023     Lab Results   Component Value Date    CREATININE 0.6 (L) 09/16/2023       Additional Lab/Culture results: none    Imaging Reviewed during this Office Visit:   Medina Doe MD independently reviewed the images and verified the radiology reports from:    CT  IMPRESSION:     4 mm calculus at the left ureterovesicular junction with moderate hydronephrosis and hydroureter on the left        PAST

## 2023-11-09 NOTE — PROGRESS NOTES
[place] [address]  [address] [facility]  [place] [address]  Dept: [phone]    Visit Date: 11/10/2023        HPI:     Sharona Butts is a 51 y.o. male who presents today for:  Chief Complaint   Patient presents with    Nephrolithiasis     1 tristen follow up with litholink        HPI  Patient presents to urology clinic for BPH and neurogenic bladder. Dexter Lawrence is doing well. Denies flank pain, suprapubic pressure, gross hematuria, dysuria, fever or chills. He recently has urs 9/2023. Litholink: Low urine volume, low urine citrate    Neurogenic bladder  No new issues. On ditropan. Hx of MS. Catheterizes 3-4x. Generally has no problems catheterizing. Can feel discomfort when bladder is very full. Cant void on his own. There was an element of prostatic hypertrophy. However, pt has not been able to void for > 25 years on his own and it his voiding dysfunction is primarily neurogenic in etiology and not obstructive. Continue oxybutynin and add levsin (which was very helpful to pt)       Prostate: lateral lobe hypertrophy (moderate), high riding bladder neck    Recurrent UTI- No new infections. ED- trimix intermittently effective. No priapism episodes recently. Anejaculation- stable     Current Outpatient Medications   Medication Sig Dispense Refill    Hyoscyamine Sulfate SL (LEVSIN/SL) 0.125 MG SUBL Place 0.125 mcg under the tongue every 4 hours as needed (for stent pain or bladder spasms) 30 each 3    oxybutynin (DITROPAN XL) 15 MG extended release tablet take 1 tablet by mouth once daily 90 tablet 3    Misc.  Devices (WALKER) MISC Use as directed      gabapentin (NEURONTIN) 100 MG capsule take 6 capsules by mouth nightly      INSULIN SYRINGE 1CC/29G ([employer] INS SYRINGE 1CC/29G) 29G X 1/2\" 1 ML MISC 1 each by Does not apply route daily 100 each 3    midodrine (PROAMATINE) 5 MG tablet take 1

## 2023-11-10 ENCOUNTER — OFFICE VISIT (OUTPATIENT)
Dept: UROLOGY | Age: 48
End: 2023-11-10
Payer: MEDICARE

## 2023-11-10 VITALS
SYSTOLIC BLOOD PRESSURE: 116 MMHG | BODY MASS INDEX: 17.94 KG/M2 | DIASTOLIC BLOOD PRESSURE: 64 MMHG | HEART RATE: 80 BPM | HEIGHT: 73 IN

## 2023-11-10 DIAGNOSIS — N20.0 KIDNEY STONE: ICD-10-CM

## 2023-11-10 DIAGNOSIS — N40.1 BENIGN LOCALIZED PROSTATIC HYPERPLASIA WITH LOWER URINARY TRACT SYMPTOMS (LUTS): ICD-10-CM

## 2023-11-10 DIAGNOSIS — N53.19 ANEJACULATION: ICD-10-CM

## 2023-11-10 DIAGNOSIS — N31.9 NEUROGENIC BLADDER: Primary | ICD-10-CM

## 2023-11-10 DIAGNOSIS — N52.9 ERECTILE DYSFUNCTION, UNSPECIFIED ERECTILE DYSFUNCTION TYPE: ICD-10-CM

## 2023-11-10 DIAGNOSIS — N39.0 RECURRENT UTI: ICD-10-CM

## 2023-11-10 PROCEDURE — 99213 OFFICE O/P EST LOW 20 MIN: CPT | Performed by: NURSE PRACTITIONER

## 2023-11-10 PROCEDURE — 99214 OFFICE O/P EST MOD 30 MIN: CPT | Performed by: NURSE PRACTITIONER

## 2023-11-10 NOTE — PATIENT INSTRUCTIONS
Citrate was also low. Citrate is a molecule in blood and urine that binds to calcium. When citrate binds to calcium in the urine, it acts like a shield by preventing calcium from binding with oxalate or phosphate. This shield will prevent the patient from making more kidney stones. Having low citrate levels in the urine means the patient does not have this natural shield and is more likely to form new kidney stones. Patient should increase citrate consumption by drinking orange juice, lemonade, or diluted lemon juice. One good and inexpensive way to achieve this goal is by mixing 4 ounces of lemon juice in 2 liters of water to be used in a 24-hour period. Call sooner with any questions or concerns. Spoke to patient. Relayed message;  Patient's cholesterol has remarkably improved since he has been on the medication.  The rest of his lab work looks stable.  There was no clinically significant abnormalities.    He understands all information and has no questions at this time.

## 2024-06-19 ENCOUNTER — HOSPITAL ENCOUNTER (OUTPATIENT)
Dept: GENERAL RADIOLOGY | Age: 49
Discharge: HOME | End: 2024-06-21
Payer: MEDICARE

## 2024-06-19 DIAGNOSIS — N20.0 KIDNEY STONE: ICD-10-CM

## 2024-06-19 PROCEDURE — 74018 RADEX ABDOMEN 1 VIEW: CPT

## 2024-06-24 ENCOUNTER — OFFICE VISIT (OUTPATIENT)
Dept: UROLOGY | Age: 49
End: 2024-06-24
Payer: MEDICARE

## 2024-06-24 VITALS
HEART RATE: 92 BPM | RESPIRATION RATE: 16 BRPM | OXYGEN SATURATION: 98 % | BODY MASS INDEX: 17.89 KG/M2 | SYSTOLIC BLOOD PRESSURE: 112 MMHG | HEIGHT: 73 IN | DIASTOLIC BLOOD PRESSURE: 62 MMHG | WEIGHT: 135 LBS

## 2024-06-24 DIAGNOSIS — N20.0 KIDNEY STONE: ICD-10-CM

## 2024-06-24 DIAGNOSIS — N31.9 NEUROGENIC BLADDER: Primary | ICD-10-CM

## 2024-06-24 DIAGNOSIS — N40.1 BENIGN LOCALIZED PROSTATIC HYPERPLASIA WITH LOWER URINARY TRACT SYMPTOMS (LUTS): ICD-10-CM

## 2024-06-24 DIAGNOSIS — N39.0 RECURRENT UTI: ICD-10-CM

## 2024-06-24 DIAGNOSIS — N52.9 ERECTILE DYSFUNCTION, UNSPECIFIED ERECTILE DYSFUNCTION TYPE: ICD-10-CM

## 2024-06-24 DIAGNOSIS — N53.19 ANEJACULATION: ICD-10-CM

## 2024-06-24 PROCEDURE — 99214 OFFICE O/P EST MOD 30 MIN: CPT | Performed by: UROLOGY

## 2024-06-24 PROCEDURE — 99213 OFFICE O/P EST LOW 20 MIN: CPT | Performed by: UROLOGY

## 2024-06-24 RX ORDER — GABAPENTIN 600 MG/1
TABLET ORAL
COMMUNITY
Start: 2024-03-27

## 2024-06-24 RX ORDER — MODAFINIL 100 MG
100 TABLET ORAL DAILY
COMMUNITY
Start: 2024-05-14

## 2024-06-24 RX ORDER — DALFAMPRIDINE 10 MG/1
10 TABLET, FILM COATED, EXTENDED RELEASE ORAL 2 TIMES DAILY
COMMUNITY

## 2024-06-24 NOTE — PROGRESS NOTES
ISABELLA CARTAGENA MD        [facility]  [facility]  [address] [facility] [address]  Dept: [phone]  Dept Fax: [phone]  Loc: [phone]      [facility] Urology Office Note - Follow up Patient    Patient:  John Beaulieu  YOB: 1975  Date: 6/24/2024    The patient is a 48 y.o. male who presents today for evaluation of the following problems:   Chief Complaint   Patient presents with    Bladder Problem     6 month with KUB    Erectile Dysfunction    referred/consultation requested by Varun Og MD.    HISTORY OF PRESENT ILLNESS:       Kidney stones  No new issues  Poss bladder stone on kub?    Neurogenic bladder  No new issues. On ditropan.  Prostate: lateral lobe hypertrophy (moderate), high riding bladder neck  Hx of MS. Catheterizes 3-4x. Generally has no problems catheterizing. Can feel discomfort when bladder is very full. Cant void on his own.    Recurrent UTI- usually 2-3 UTI annually. Had septic stone in 2023    ED- trimix intermittently effective. No priapism episodes recently.    Anejaculation- stable         Requested/reviewed records from Varun Og MD office and/or outside physician/EMR    (Patient's old records have been requested, reviewed and pertinent findings summarized in today's note.)    Procedures Today:       Last several PSA's:  Lab Results   Component Value Date    PSA 3.35 02/13/2023       Last total testosterone:  No results found for: \"TESTOSTERONE\"    Urinalysis today:  No results found for this visit on 06/24/24.    Last BUN and creatinine:  Lab Results   Component Value Date    BUN 7 09/16/2023     Lab Results   Component Value Date    CREATININE 0.6 (L) 09/16/2023       Additional Lab/Culture results: none    Imaging Reviewed during this Office Visit:   ISABELLA CARTAGENA MD independently reviewed the images and verified the radiology reports from:    CT  IMPRESSION:     4 mm calculus at the left ureterovesicular junction with

## 2024-08-12 ENCOUNTER — HOSPITAL ENCOUNTER (OUTPATIENT)
Age: 49
Setting detail: SPECIMEN
Discharge: HOME | End: 2024-08-12
Payer: MEDICARE

## 2024-08-12 ENCOUNTER — TELEPHONE (OUTPATIENT)
Dept: UROLOGY | Age: 49
End: 2024-08-12

## 2024-08-12 ENCOUNTER — PROCEDURE VISIT (OUTPATIENT)
Dept: UROLOGY | Age: 49
End: 2024-08-12
Payer: MEDICARE

## 2024-08-12 VITALS
WEIGHT: 128 LBS | DIASTOLIC BLOOD PRESSURE: 78 MMHG | HEIGHT: 73 IN | SYSTOLIC BLOOD PRESSURE: 108 MMHG | BODY MASS INDEX: 16.96 KG/M2 | HEART RATE: 74 BPM

## 2024-08-12 DIAGNOSIS — N31.9 NEUROGENIC BLADDER: Primary | ICD-10-CM

## 2024-08-12 DIAGNOSIS — N39.0 RECURRENT UTI: ICD-10-CM

## 2024-08-12 DIAGNOSIS — N40.1 BENIGN PROSTATIC HYPERPLASIA WITH LOWER URINARY TRACT SYMPTOMS, SYMPTOM DETAILS UNSPECIFIED: ICD-10-CM

## 2024-08-12 PROCEDURE — 99215 OFFICE O/P EST HI 40 MIN: CPT

## 2024-08-12 PROCEDURE — 87077 CULTURE AEROBIC IDENTIFY: CPT

## 2024-08-12 PROCEDURE — 87086 URINE CULTURE/COLONY COUNT: CPT

## 2024-08-12 PROCEDURE — 52000 CYSTOURETHROSCOPY: CPT | Performed by: UROLOGY

## 2024-08-12 PROCEDURE — PBSHW CULTURE, URINE: Performed by: UROLOGY

## 2024-08-12 RX ORDER — LIDOCAINE HYDROCHLORIDE 20 MG/ML
JELLY TOPICAL ONCE
Status: COMPLETED | OUTPATIENT
Start: 2024-08-12 | End: 2024-08-12

## 2024-08-12 RX ADMIN — LIDOCAINE HYDROCHLORIDE: 20 JELLY TOPICAL at 09:49

## 2024-08-12 NOTE — TELEPHONE ENCOUNTER
[facility]     Pre-Operative Evaluation/Consultation    Name:  John Beaulieu                                         Age:  48 y.o.  MRN:  [id number]       :  1975   Date:  2024         Sex: male    Surgeon:  Dr. Aubrey Mobley  Procedure (Planned):  Cystolithalopaxy with PVP sheath available  Date Scheduled surgery: 24    Attending : No att. providers found    Primary Physician:   Cardiologist: None    Type of Anesthesia Requested: General    Patient Medical history:  Allergies   Allergen Reactions    Excedrin Migraine  [Asa-Apap-Caff Buffered]      Felt very shaky    Prednisone     Other Nausea And Vomiting     All narcotics       Social History     Tobacco Use    Smoking status: Former     Current packs/day: 0.00     Average packs/day: 1 pack/day for 25.4 years (25.4 ttl pk-yrs)     Types: Cigarettes     Start date: 1990     Quit date: 2015     Years since quittin.2    Smokeless tobacco: Never   Substance Use Topics    Alcohol use: No    Drug use: Yes     Frequency: 21.0 times per week     Types: Marijuana (Weed)         Additional ordered pre-operative testing:  []CBC    []ABG      [] BMP   []URINALYSIS   []CMP    []HCG   []COAGS PT/INR  []T&C  []LFTs   []TYPE AND SCREEN    [] EKG  [] Chest X-Ray  [] Other Radiology    [] Sent to Hospitalist None  [] Sent to Anesthesia for your review: None   [] Additional Orders: None     Comments:None   Requests: No Special requests    Signed: Nina Ferguson LPN 2024 3:41 PM

## 2024-08-12 NOTE — PROGRESS NOTES
Cystoscopy    Operative Note    Patient:  John Beaulieu  MRN: [id number]  YOB: 1975    Date: 08/12/24  Surgeon: ISABELLA CARTAGENA MD  Anesthesia: Urojet Local  Indications:     XR ABDOMEN (KUB) (SINGLE AP VIEW)    Result Date: 6/24/2024  EXAMINATION: ONE SUPINE XRAY VIEW(S) OF THE ABDOMEN 6/19/2024 8:35 am COMPARISON: None. HISTORY: ORDERING SYSTEM PROVIDED HISTORY: Kidney stone TECHNOLOGIST PROVIDED HISTORY: kidney stones Reason for Exam: Kidney stone f/u; no current complaints FINDINGS: Lines and tubes: None Bowel gas pattern: Moderate amount of stool seen throughout the colon.No dilated bowel loops seen.Given limitations of supine imaging, no evidence of free air, pneumatosis or portal venous gas. Abnormal calcifications: 11 mm calcified densities seen overlying the right pelvis of unsure etiology.  This would be an unusual location and size for distal ureteral stone. Bones: Within normal limits for age Other: None     Indeterminate 11 mm calcifications in the right hemipelvis.             Position: Supine  EBL: 0 ml    Findings:   The patient was prepped and draped in the usual sterile fashion.  The flexible cystoscope was advanced through the urethra and into the bladder.  The bladder was thoroughly inspected and the following was noted:    Residual Urine: significant\" \" .  Urine clear, with no obvious infection  Urethra:  bulbar urethral stricture bypassed with scope but very tight. Also tight sphincter. . Urethral dilation was not performed.    Prostate: lateral lobe hypertrophy + present, prostate moderately obstructing, intravesical extension of prostate not present. There was no previous TURP defect.   Bladder: no tumor noted .   Mild trabeculation noted.  no bladder diverticulum.  stone in bladder about 2 cm  Ureters: Orifices with normal configuration and location.      The cystoscope was removed.  The patient tolerated the procedure well.  Discussed treatment of stone with

## 2024-08-12 NOTE — PROGRESS NOTES
Larry Storz Cystourethroscope Model Number [id number]; Serial Number [id number] scope#2 used for procedure today, was removed from sterile container after visual inspection

## 2024-08-12 NOTE — PATIENT INSTRUCTIONS
PRE-ADMISSION SURGERY INSTRUCTIONS    1. If you should develop a cold, sore throat, cough, fever or other new indication of illness prior to the scheduled surgery, please notify the Doctor's office as early as possible.    2. DO NOT EAT OR DRINK ANYTHING AFTER MIDNIGHT THE NIGHT BEFORE YOUR SURGERY. THIS INCLUDES WATER, TEA, JUICE, CEREAL, COFFEE, ETC. THIS INSTRUCTION MUST BE FOLLOWED IF SURGERY IS TO BE DONE.    3. Refrain from smoking the day of your surgery or procedure.    4. Wear simple loose clothing, which can be easily changed.    5. Do not wear any make-up, lipstick, or nail polish.    6. Please leave contact lenses at home or bring container for them.    7. Leave jewelry (including rings) and other valuables at home.    8. MAKE ARRANGEMENTS FOR A FAMILY MEMBER OR FRIEND TO DRIVE YOU TO AND FROM THE SURGERY CENTER. The anesthetic may affect your judgment following surgery and driving a vehicle within 24 hours after the anesthesia could be dangerous. Please remember that a responsible adult must remain in the building at all times during your surgery.    9. Please shower or bathe both the evening prior to surgery and on the morning of surgery using antibacterial soap. Please use hibaclens to the surgical area and let it sit for five minutes before rinsing both times also.     10. Take no ASPIRIN, VITAMIN OR HERBAL SUPPLEMENTS for ONE WEEK prior to surgery.    11. The morning of your procedure, please take the following medications with a sip of water:________________      DAY OF PROCEDURE REPORT TO THE [facility] REGISTRATION OFFICE ON THE FIRST FLOOR.    PROCEDURE DATE:___________ ARRIVAL TIME:____________

## 2024-08-13 LAB
MICROORGANISM SPEC CULT: ABNORMAL
SERVICE CMNT-IMP: ABNORMAL
SPECIMEN DESCRIPTION: ABNORMAL

## 2024-08-13 NOTE — TELEPHONE ENCOUNTER
Cystolithalopaxy with Greenlight PVP under general scheduled for 9/9/24 at [facility].  Medication list, insurance cards, and last OV notes available via Epic.  Notified patient of procedure date and instructed to arrive 2 hours prior to procedure. Instructed to hold aspirin, vitamins, and supplements for one week prior to procedure.  Patient instructed to be NPO after midnight, and to hold all other medications until after procedure.  Patient repeats instructions back and voices understanding.

## 2024-08-14 DIAGNOSIS — N39.0 RECURRENT UTI: Primary | ICD-10-CM

## 2024-08-14 RX ORDER — NITROFURANTOIN 25; 75 MG/1; MG/1
100 CAPSULE ORAL 2 TIMES DAILY
Qty: 20 CAPSULE | Refills: 0 | Status: SHIPPED | OUTPATIENT
Start: 2024-08-14 | End: 2024-08-24

## 2024-08-19 ENCOUNTER — TELEPHONE (OUTPATIENT)
Dept: UROLOGY | Age: 49
End: 2024-08-19

## 2024-08-19 NOTE — TELEPHONE ENCOUNTER
Patients wife called stating patient is scheduled for a Cystolithalopaxy on 9//09/2024 and needs to reschedule. Can call patients wife at # [phone].

## 2024-09-24 ENCOUNTER — TELEPHONE (OUTPATIENT)
Dept: SURGERY | Age: 49
End: 2024-09-24

## 2024-10-14 ENCOUNTER — ANESTHESIA (OUTPATIENT)
Dept: OPERATING ROOM | Age: 49
End: 2024-10-14
Payer: MEDICARE

## 2024-10-14 ENCOUNTER — HOSPITAL ENCOUNTER (OUTPATIENT)
Age: 49
Setting detail: OUTPATIENT SURGERY
Discharge: HOME | End: 2024-10-14
Attending: UROLOGY | Admitting: UROLOGY
Payer: MEDICARE

## 2024-10-14 ENCOUNTER — ANESTHESIA EVENT (OUTPATIENT)
Dept: OPERATING ROOM | Age: 49
End: 2024-10-14
Payer: MEDICARE

## 2024-10-14 VITALS
DIASTOLIC BLOOD PRESSURE: 56 MMHG | WEIGHT: 128 LBS | SYSTOLIC BLOOD PRESSURE: 113 MMHG | RESPIRATION RATE: 16 BRPM | HEART RATE: 65 BPM | BODY MASS INDEX: 16.96 KG/M2 | TEMPERATURE: 98.1 F | HEIGHT: 73 IN | OXYGEN SATURATION: 98 %

## 2024-10-14 DIAGNOSIS — G89.18 POSTOPERATIVE PAIN: ICD-10-CM

## 2024-10-14 DIAGNOSIS — N20.1 URETERAL CALCULUS, LEFT: Primary | ICD-10-CM

## 2024-10-14 PROCEDURE — 2720000010 HC SURG SUPPLY STERILE: Performed by: UROLOGY

## 2024-10-14 PROCEDURE — 3600000003 HC SURGERY LEVEL 3 BASE: Performed by: UROLOGY

## 2024-10-14 PROCEDURE — 6360000002 HC RX W HCPCS: Performed by: NURSE ANESTHETIST, CERTIFIED REGISTERED

## 2024-10-14 PROCEDURE — 2709999900 HC NON-CHARGEABLE SUPPLY: Performed by: UROLOGY

## 2024-10-14 PROCEDURE — 3700000000 HC ANESTHESIA ATTENDED CARE: Performed by: UROLOGY

## 2024-10-14 PROCEDURE — 3600000013 HC SURGERY LEVEL 3 ADDTL 15MIN: Performed by: UROLOGY

## 2024-10-14 PROCEDURE — 2580000003 HC RX 258: Performed by: UROLOGY

## 2024-10-14 PROCEDURE — 7100000010 HC PHASE II RECOVERY - FIRST 15 MIN: Performed by: UROLOGY

## 2024-10-14 PROCEDURE — 7100000011 HC PHASE II RECOVERY - ADDTL 15 MIN: Performed by: UROLOGY

## 2024-10-14 PROCEDURE — 3700000001 HC ADD 15 MINUTES (ANESTHESIA): Performed by: UROLOGY

## 2024-10-14 PROCEDURE — 6360000002 HC RX W HCPCS: Performed by: UROLOGY

## 2024-10-14 RX ORDER — SODIUM CHLORIDE 0.9 % (FLUSH) 0.9 %
5-40 SYRINGE (ML) INJECTION PRN
Status: DISCONTINUED | OUTPATIENT
Start: 2024-10-14 | End: 2024-10-14 | Stop reason: HOSPADM

## 2024-10-14 RX ORDER — CEPHALEXIN 500 MG/1
500 CAPSULE ORAL 3 TIMES DAILY
Qty: 21 CAPSULE | Refills: 0 | Status: SHIPPED | OUTPATIENT
Start: 2024-10-14 | End: 2024-10-21

## 2024-10-14 RX ORDER — SODIUM CHLORIDE 0.9 % (FLUSH) 0.9 %
5-40 SYRINGE (ML) INJECTION EVERY 12 HOURS SCHEDULED
Status: DISCONTINUED | OUTPATIENT
Start: 2024-10-14 | End: 2024-10-14 | Stop reason: HOSPADM

## 2024-10-14 RX ORDER — HYDROCODONE BITARTRATE AND ACETAMINOPHEN 5; 325 MG/1; MG/1
1 TABLET ORAL EVERY 6 HOURS PRN
Qty: 12 TABLET | Refills: 0 | Status: SHIPPED | OUTPATIENT
Start: 2024-10-14 | End: 2024-10-19

## 2024-10-14 RX ORDER — SODIUM CHLORIDE, SODIUM LACTATE, POTASSIUM CHLORIDE, CALCIUM CHLORIDE 600; 310; 30; 20 MG/100ML; MG/100ML; MG/100ML; MG/100ML
INJECTION, SOLUTION INTRAVENOUS CONTINUOUS
Status: DISCONTINUED | OUTPATIENT
Start: 2024-10-14 | End: 2024-10-14 | Stop reason: HOSPADM

## 2024-10-14 RX ORDER — FENTANYL CITRATE 50 UG/ML
INJECTION, SOLUTION INTRAMUSCULAR; INTRAVENOUS
Status: DISCONTINUED | OUTPATIENT
Start: 2024-10-14 | End: 2024-10-14 | Stop reason: SDUPTHER

## 2024-10-14 RX ORDER — PROPOFOL 10 MG/ML
INJECTION, EMULSION INTRAVENOUS
Status: DISCONTINUED | OUTPATIENT
Start: 2024-10-14 | End: 2024-10-14 | Stop reason: SDUPTHER

## 2024-10-14 RX ORDER — ONDANSETRON 2 MG/ML
INJECTION INTRAMUSCULAR; INTRAVENOUS
Status: DISCONTINUED | OUTPATIENT
Start: 2024-10-14 | End: 2024-10-14 | Stop reason: SDUPTHER

## 2024-10-14 RX ORDER — SODIUM CHLORIDE 9 MG/ML
INJECTION, SOLUTION INTRAVENOUS PRN
Status: DISCONTINUED | OUTPATIENT
Start: 2024-10-14 | End: 2024-10-14 | Stop reason: HOSPADM

## 2024-10-14 RX ORDER — DEXAMETHASONE SODIUM PHOSPHATE 4 MG/ML
INJECTION, SOLUTION INTRA-ARTICULAR; INTRALESIONAL; INTRAMUSCULAR; INTRAVENOUS; SOFT TISSUE
Status: DISCONTINUED | OUTPATIENT
Start: 2024-10-14 | End: 2024-10-14 | Stop reason: SDUPTHER

## 2024-10-14 RX ADMIN — SODIUM CHLORIDE, PRESERVATIVE FREE 10 ML: 5 INJECTION INTRAVENOUS at 13:25

## 2024-10-14 RX ADMIN — Medication 2000 MG: at 14:46

## 2024-10-14 RX ADMIN — PROPOFOL 150 MCG/KG/MIN: 10 INJECTION, EMULSION INTRAVENOUS at 14:49

## 2024-10-14 RX ADMIN — PHENYLEPHRINE HYDROCHLORIDE 100 MCG: 10 INJECTION INTRAVENOUS at 15:02

## 2024-10-14 RX ADMIN — DEXAMETHASONE SODIUM PHOSPHATE 4 MG: 4 INJECTION INTRA-ARTICULAR; INTRALESIONAL; INTRAMUSCULAR; INTRAVENOUS; SOFT TISSUE at 14:48

## 2024-10-14 RX ADMIN — ONDANSETRON 4 MG: 2 INJECTION INTRAMUSCULAR; INTRAVENOUS at 14:48

## 2024-10-14 RX ADMIN — PHENYLEPHRINE HYDROCHLORIDE 200 MCG: 10 INJECTION INTRAVENOUS at 15:16

## 2024-10-14 RX ADMIN — PROPOFOL 200 MG: 10 INJECTION, EMULSION INTRAVENOUS at 14:48

## 2024-10-14 RX ADMIN — FENTANYL CITRATE 50 MCG: 50 INJECTION, SOLUTION INTRAMUSCULAR; INTRAVENOUS at 14:53

## 2024-10-14 RX ADMIN — FENTANYL CITRATE 50 MCG: 50 INJECTION, SOLUTION INTRAMUSCULAR; INTRAVENOUS at 14:48

## 2024-10-14 ASSESSMENT — PAIN SCALES - GENERAL
PAINLEVEL_OUTOF10: 0

## 2024-10-14 ASSESSMENT — PAIN - FUNCTIONAL ASSESSMENT: PAIN_FUNCTIONAL_ASSESSMENT: 0-10

## 2024-10-14 NOTE — H&P
AUBREY MOBLEY MD  History and Physical    Patient:  John Beaulieu  MRN: [id number]  YOB: 1975    HISTORY OF PRESENT ILLNESS:     The patient is a 49 y.o. male who presents with bladder stone. Here for procedure.    Patient's old records, notes and chart reviewed and summarized above.     AUBREY MOBLEY MD independently reviewed the images and verified the radiology reports from:    No results found.      Past Medical History:    Past Medical History:   Diagnosis Date    Chronic kidney disease     GERD (gastroesophageal reflux disease)     MS (multiple sclerosis) (HCC)     Ureteral calculus, left 9/14/2023       Past Surgical History:    Past Surgical History:   Procedure Laterality Date    COLONOSCOPY      CYSTOSCOPY N/A 06/12/2023    Cystoscopy Litholopaxy with Holmium Laser with Greenlight sheath and Urethral dilation performed by Aubrey Mobley MD at [facility] OR    CYSTOSCOPY Left 9/15/2023    CYSTOSCOPY STENT INSERTION performed by Aubrey Mobley MD at [facility] OR    CYSTOSCOPY Left 9/25/2023    Cystoscopy Left Ureteroscopy Holmium Laser with stent exchange performed by Aubrey Mobley MD at [facility] OR    CYSTOSCOPY W/ URETERAL STENT PLACEMENT Left 09/15/2023    UPPER GASTROINTESTINAL ENDOSCOPY         Medications Prior to Admission:    Prior to Admission medications    Medication Sig Start Date End Date Taking? Authorizing Provider   PROVIGIL 100 MG tablet Take 1 tablet by mouth daily. 5/14/24   Geetha Otero MD   dalfampridine (AMPYRA) 10 MG extended release tablet Take 1 tablet by mouth 2 times daily    Geetha Otero MD   gabapentin (NEURONTIN) 600 MG tablet take 1 tablet by mouth every morning and BEFORE BEDTIME 3/27/24   Geetha Otero MD   Hyoscyamine Sulfate SL (LEVSIN/SL) 0.125 MG SUBL Place 0.125 mcg under the tongue every 4 hours as needed (for stent pain or bladder spasms) 10/9/23   Aubrey Mobley MD   oxybutynin (DITROPAN XL) 15 MG extended release tablet take 1 tablet by mouth once daily      Frequency of Social Gatherings with Friends and Family: Once a week     Attends [employer] Services: Never     Active Member of Clubs or Organizations: No     Attends Club or Organization Meetings: Patient declined     Marital Status: Living with partner   Intimate Partner Violence: Not on file   Housing Stability: Not on file       Family History:    Family History   Problem Relation Age of Onset    Cancer Paternal Uncle     Cancer Paternal Grandmother        REVIEW OF SYSTEMS:  Constitutional: negative  Eyes: negative  Respiratory: negative  Cardiovascular: negative  Gastrointestinal: negative  Genitourinary: no acute issues  Musculoskeletal: negative  Skin: negative   Neurological: negative  Hematological/Lymphatic: negative  Psychological: negative    Physical Exam:      No data found.  Constitutional: Patient in no acute distress;   Neuro: alert and oriented to person place and time.    Psych: Mood and affect normal.  Skin: Normal  Lungs: Respiratory effort normal, CTA  Cardiovascular:  Normal peripheral pulses; no murmur. Normal rhythm  Abdomen: Soft, non-tender, non-distended with no CVA, flank pain, hepatosplenomegaly or hernia.  Kidneys normal.  Bladder non-tender and not distended.      LABS:   No results for input(s): \"WBC\", \"HGB\", \"HCT\", \"MCV\", \"PLT\" in the last 72 hours.  No results for input(s): \"NA\", \"K\", \"CL\", \"CO2\", \"PHOS\", \"BUN\", \"CREATININE\" in the last 72 hours.    Invalid input(s): \"CA\"  Lab Results   Component Value Date    PSA 3.35 02/13/2023         Urinalysis: No results for input(s): \"COLORU\", \"PHUR\", \"LABCAST\", \"WBCUA\", \"RBCUA\", \"MUCUS\", \"TRICHOMONAS\", \"YEAST\", \"BACTERIA\", \"CLARITYU\", \"SPECGRAV\", \"LEUKOCYTESUR\", \"UROBILINOGEN\", \"BILIRUBINUR\", \"BLOODU\" in the last 72 hours.    Invalid input(s): \"NITRATE\", \"GLUCOSEUKETONESUAMORPHOUS\"     -----------------------------------------------------------------      Assessment and Plan     Impression:    Patient Active Problem List   Diagnosis

## 2024-10-14 NOTE — PROGRESS NOTES
CLINICAL PHARMACY NOTE: MEDS TO BEDS    Total # of Prescriptions Filled: 2   The following medications were delivered to the patient:  Cephalexin  Hydrocodone/Acetaminophen    Additional Documentation:

## 2024-10-14 NOTE — ANESTHESIA PRE PROCEDURE
Medical History:        Diagnosis Date    Chronic kidney disease     GERD (gastroesophageal reflux disease)     MS (multiple sclerosis) (HCC)     Ureteral calculus, left 2023       Past Surgical History:        Procedure Laterality Date    COLONOSCOPY      CYSTOSCOPY N/A 2023    Cystoscopy Litholopaxy with Holmium Laser with Greenlight sheath and Urethral dilation performed by Aubrey Mobley MD at [facility] OR    CYSTOSCOPY Left 9/15/2023    CYSTOSCOPY STENT INSERTION performed by Aubrey Mobley MD at [facility] OR    CYSTOSCOPY Left 2023    Cystoscopy Left Ureteroscopy Holmium Laser with stent exchange performed by Aubrey Mobley MD at [facility] OR    CYSTOSCOPY W/ URETERAL STENT PLACEMENT Left 09/15/2023    UPPER GASTROINTESTINAL ENDOSCOPY         Social History:    Social History     Tobacco Use    Smoking status: Former     Current packs/day: 0.00     Average packs/day: 1 pack/day for 25.4 years (25.4 ttl pk-yrs)     Types: Cigarettes     Start date: 1990     Quit date: 2015     Years since quittin.3    Smokeless tobacco: Never   Substance Use Topics    Alcohol use: No                                Counseling given: Not Answered      Vital Signs (Current):   Vitals:    10/14/24 1319   BP: 132/83   Pulse: 70   Resp: 14   Temp: 36.8 °C (98.2 °F)   TempSrc: Temporal   SpO2: 100%   Weight: 58.1 kg (128 lb)   Height: 1.854 m (6' 1\")                                              BP Readings from Last 3 Encounters:   10/14/24 132/83   24 108/78   24 112/62       NPO Status: Time of last liquid consumption: 1                        Time of last solid consumption: 1                        Date of last liquid consumption: 10/14/24                        Date of last solid food consumption: 10/14/24    BMI:   Wt Readings from Last 3 Encounters:   10/14/24 58.1 kg (128 lb)   24 58.1 kg (128 lb)   24 61.2 kg (135 lb)     Body mass index is 16.89 kg/m².    CBC:   Lab Results   Component Value

## 2024-10-14 NOTE — DISCHARGE INSTRUCTIONS
Pt ok to discharge home in good condition  No heavy lifting, >10 lbs for today  Pt should avoid strenuous activity for today  Pt should walk moderately at home  Pt ok to shower   Pt may resume diet as tolerated  Pt should take Rx as directed  No driving while on narcotics  Please call attending physician or hospital  with questions  Call or Present to ED if fever (> 101F), intractable nausea vomiting or pain.  Rx in chart    Pt should follow up with ISABELLA CARTAGENA MD, in 4 weeks, call to confirm appointment    Post Operative UROLOGY Instructions:    It is very important to increase your fluid intake for the first few days - water is preferred.  You may experience slight discomfort with urination for 1-2 days following surgery.  Your urine may also be colored red.        Take medications as prescribed.  If you are experiencing only minor discomfort, you may take Tylenol or any non-prescription pain medication.    Activity  You have received sedation. Your judgement and coordination may be impaired.  -Do not drive or operate any machinery today.  -Do not make personal, medical, legal, or business decisions today.  -Do not consume alcohol, tranquilizers, sleeping or non-prescription medications today, unless indicated by your physician.  -You may resume normal activities after 24 hours and return to work unless otherwise stated by your doctor.    Diet  Begin with clear liquids and progress to your normal diet if you are not nauseated.    Medications  -resume your usual medications.    Call Your Physician If You Experience Any of the Following  -Fever >100.5, Chills, Excessive sweating.  -Any redness or swelling at the IV site.  -Persistant nausea or vomiting  -Severe abdominal or chest pain  -You are unable to urinate within 12 hours.    If you have any questions or problems, call:  [phone] ([facility]) or after hours call: [phone] ([facility]).      Urinary Catheter Care:  bag, wipe off any liquid on the end of the drain spout. Close the valve. Then put the drain spout back into its sleeve at the bottom of the collection bag.  Wash your hands with soap and water.  When should you call for help?  Call your doctor now or seek immediate medical care if:  You have symptoms of a urinary infection. For example:  You have blood or pus in your urine.  You have pain in your back just below your rib cage. This is called flank pain.  You have a fever, chills, or body aches.  You have groin or belly pain.  Your urine smells bad.  You see large blood clots in your urine.  No urine or very little urine is flowing into the bag for 4 or more hours.  Watch closely for changes in your health, and be sure to contact your doctor if:  The area around the catheter becomes irritated, swollen, red, or tender, or it has pus draining from it.  Urine is leaking from the place where the catheter enters your body.

## 2024-10-14 NOTE — OP NOTE
Aubrey Mobley MD.  Urologic Surgery      [place], [place]    DATE: 10/14/2024  Patient:  John Beaulieu  MRN: [id number]  YOB: 1975    SURGEON: Aurbey Mobley MD.    ASSISTANT: none    PREOPERATIVE DIAGNOSIS: Bladder Stone    POSTOPERATIVE DIAGNOSIS: Bladder stone    PROCEDURE PERFORMED: Cystoscopy cystolithalopaxy for stone < 3.5 cm,      ANESTHESIA: General    COMPLICATIONS: none    OR BLOOD LOSS:  Minimal    FLUIDS: Cystalloids per Anesthesia    SPECIMENS:  * No specimens in log *  none    DRAINS: 18 roblero    INDICATIONS FOR PROCEDURE:  The patient is a 49 y.o. male who presents today with Bladder stone [N21.0] here for Cystolithalopaxy with holmium laser & greenlight sheath (1000 micron fibers) (([facility] [id number] Carolyne/lp) ( grnlght set-lp). After risks, benefits and alternatives of the procedure were discussed with the patient, the patient elected to proceed.     DETAILS OF PROCEDURE:  The patient was brought back to the operating room. They were laid in the supine position and anesthesia was induced. The genitals were prepped and draped in usual sterile surgical fashion after being placed in dorsal lithotomy position. A timeout was taken per protocol with everyone in agreement.     Rigid cystoscope was assembled using a 30 degree lens and passed per the urethra. The urethra was normal without any lesions. The bladder was entered with ease and inspected. The bladder stone was identified, which was approximately 2.5 cm in total aggregate.   we used the holmium laser to fragment the stone into small enough pieces that were able to be removed with the Urovac evacuator. We then inspected the bladder. There were no fragments left. There was no damage to the bladder.  a roblero catheter was replaced and the bladder was drained. The patient tolerated the procedure well. The scope was removed intact and without any issues. This concluded the case. They were taken to recovery period and

## 2024-10-15 NOTE — ANESTHESIA POSTPROCEDURE EVALUATION
Department of Anesthesiology  Postprocedure Note    Patient: John Beaulieu  MRN: [id number]  YOB: 1975  Date of evaluation: 10/14/2024    Procedure Summary       Date: 10/14/24 Room / Location: [address]    Anesthesia Start: 1446 Anesthesia Stop: 1525    Procedure: Cystolithalopaxy with holmium laser & greenlight sheath Diagnosis:       Bladder stone      (Bladder stone [N21.0])    Surgeons: Aubrey Mobley MD Responsible Provider: Tyler Falk APRN - CRNA    Anesthesia Type: General, TIVA ASA Status: 3            Anesthesia Type: General, TIVA    Hernan Phase I: Hernan Score: 10    Hernan Phase II: Hernan Score: 10    Anesthesia Post Evaluation    Patient location during evaluation: bedside  Level of consciousness: sleepy but conscious  Airway patency: patent  Nausea & Vomiting: no nausea and no vomiting  Cardiovascular status: hemodynamically stable  Respiratory status: spontaneous ventilation  Hydration status: euvolemic  Pain management: satisfactory to patient        No notable events documented.

## 2024-10-25 ENCOUNTER — PATIENT MESSAGE (OUTPATIENT)
Dept: FAMILY MEDICINE CLINIC | Age: 49
End: 2024-10-25

## 2024-11-21 ENCOUNTER — SCHEDULED TELEPHONE ENCOUNTER (OUTPATIENT)
Dept: UROLOGY | Age: 49
End: 2024-11-21
Payer: MEDICARE

## 2024-11-21 DIAGNOSIS — N52.9 ERECTILE DYSFUNCTION, UNSPECIFIED ERECTILE DYSFUNCTION TYPE: ICD-10-CM

## 2024-11-21 DIAGNOSIS — N40.1 BENIGN LOCALIZED PROSTATIC HYPERPLASIA WITH LOWER URINARY TRACT SYMPTOMS (LUTS): ICD-10-CM

## 2024-11-21 DIAGNOSIS — N39.0 RECURRENT UTI: ICD-10-CM

## 2024-11-21 DIAGNOSIS — N20.0 KIDNEY STONE: ICD-10-CM

## 2024-11-21 DIAGNOSIS — N31.9 NEUROGENIC BLADDER: Primary | ICD-10-CM

## 2024-11-21 PROCEDURE — 99212 OFFICE O/P EST SF 10 MIN: CPT

## 2024-11-21 PROCEDURE — 99442 PR PHYS/QHP TELEPHONE EVALUATION 11-20 MIN: CPT

## 2024-11-21 RX ORDER — OXYBUTYNIN CHLORIDE 15 MG/1
15 TABLET, EXTENDED RELEASE ORAL DAILY
Qty: 90 TABLET | Refills: 3 | Status: SHIPPED | OUTPATIENT
Start: 2024-11-21

## 2024-11-21 NOTE — PROGRESS NOTES
Scheduled Telephone Encounter  8/2/2022  [facility] Urology  Addison Kim PA-C  Urology   Reason for Visit: Post-OP Check     Progress Notes  Addison Kim PA-C   Urology    HPI  John Beaulieu is a 49-year-old male being evaluated via telephone on 11/21/24 for post-op check.    S/P Cystolitholapaxy w/ Holmium Laser and Greenlight Sheath by Dr. Mobley on 10/14/24 to address his large bladder calculus. No complications to note post-operatively. Patient denies residual stone fragment passage or blood. No fever or chills.    Assessment/Plan  Kidney & Bladder Stones  - S/P Cystolitholapaxy w/ Holmium Laser and Greenlight Sheath. Patient has done well post-operatively. Stone fragment passage     2.   Neurogenic Bladder  - No new issues. On Ditropan XL 10 mg daily for bladder spasms.    3.   BPH w/ Obstruction  - Moderate LLH and high riding bladder neck. Hx of MS. CIC x 4/day. Doing well with this.     4.   Recurrent UTI  - 2-3 UTI's annually. Septic stone in 2023. Pushing fluids + cranberry.     5.   Erectile Dysfunction  - Utilizing Trimix. Intermittently effective. Tolerating injections well.    *Follow-up in 1 year with [facility] prior.     Total Time: 12 Minutes     John Beaulieu was evaluated through a synchronous (real-time) audio encounter. Patient identification was verified at the start of the visit. He/She (or guardian if applicable) is aware that this is a billable service, which includes applicable co-pays. This visit was conducted with the patient's (and/or legal guardian's) verbal consent. He/She has not had a related appointment within my department in the past 7 days or scheduled within the next 24 hours.   The patient was located at Home.  The provider was located at Facility (Appt Dept): [address]     Note: Not billable if this call serves to triage the patient into an appointment for the relevant concern       Addison iKm PA-C  Urology

## 2025-01-09 ENCOUNTER — OFFICE VISIT (OUTPATIENT)
Dept: FAMILY MEDICINE CLINIC | Age: 50
End: 2025-01-09

## 2025-01-09 VITALS
DIASTOLIC BLOOD PRESSURE: 90 MMHG | HEART RATE: 78 BPM | HEIGHT: 73 IN | BODY MASS INDEX: 18.02 KG/M2 | OXYGEN SATURATION: 96 % | WEIGHT: 136 LBS | SYSTOLIC BLOOD PRESSURE: 116 MMHG

## 2025-01-09 DIAGNOSIS — G35 MULTIPLE SCLEROSIS (HCC): ICD-10-CM

## 2025-01-09 DIAGNOSIS — Z13.220 SCREENING FOR LIPID DISORDERS: ICD-10-CM

## 2025-01-09 DIAGNOSIS — M79.605 PAIN IN BOTH LOWER EXTREMITIES: ICD-10-CM

## 2025-01-09 DIAGNOSIS — M79.604 PAIN IN BOTH LOWER EXTREMITIES: ICD-10-CM

## 2025-01-09 DIAGNOSIS — E55.9 VITAMIN D DEFICIENCY: ICD-10-CM

## 2025-01-09 DIAGNOSIS — Z12.5 PROSTATE CANCER SCREENING: Primary | ICD-10-CM

## 2025-01-09 PROBLEM — R39.198 DIFFICULTY IN URINATION: Status: ACTIVE | Noted: 2024-02-02

## 2025-01-09 PROBLEM — N52.9 ERECTILE DYSFUNCTION: Status: ACTIVE | Noted: 2025-01-09

## 2025-01-09 PROBLEM — F52.21 MALE ERECTILE DISORDER (CODE): Status: ACTIVE | Noted: 2017-01-15

## 2025-01-09 PROBLEM — R26.9 ABNORMAL GAIT: Status: ACTIVE | Noted: 2023-10-27

## 2025-01-09 PROBLEM — E78.5 HYPERLIPIDEMIA: Status: ACTIVE | Noted: 2024-02-02

## 2025-01-09 RX ORDER — MIRTAZAPINE 7.5 MG/1
7.5 TABLET, FILM COATED ORAL NIGHTLY
Qty: 30 TABLET | Refills: 2 | Status: SHIPPED | OUTPATIENT
Start: 2025-01-09 | End: 2025-01-23 | Stop reason: SDUPTHER

## 2025-01-09 RX ORDER — PREGABALIN 50 MG/1
50 CAPSULE ORAL EVERY EVENING
Qty: 30 CAPSULE | Refills: 2 | Status: SHIPPED | OUTPATIENT
Start: 2025-01-09 | End: 2025-01-23 | Stop reason: DRUGHIGH

## 2025-01-09 SDOH — ECONOMIC STABILITY: FOOD INSECURITY: WITHIN THE PAST 12 MONTHS, THE FOOD YOU BOUGHT JUST DIDN'T LAST AND YOU DIDN'T HAVE MONEY TO GET MORE.: NEVER TRUE

## 2025-01-09 SDOH — ECONOMIC STABILITY: FOOD INSECURITY: WITHIN THE PAST 12 MONTHS, YOU WORRIED THAT YOUR FOOD WOULD RUN OUT BEFORE YOU GOT MONEY TO BUY MORE.: NEVER TRUE

## 2025-01-09 ASSESSMENT — PATIENT HEALTH QUESTIONNAIRE - PHQ9
SUM OF ALL RESPONSES TO PHQ QUESTIONS 1-9: 0
2. FEELING DOWN, DEPRESSED OR HOPELESS: NOT AT ALL
SUM OF ALL RESPONSES TO PHQ QUESTIONS 1-9: 0
1. LITTLE INTEREST OR PLEASURE IN DOING THINGS: NOT AT ALL
SUM OF ALL RESPONSES TO PHQ9 QUESTIONS 1 & 2: 0

## 2025-01-16 ENCOUNTER — TELEPHONE (OUTPATIENT)
Dept: FAMILY MEDICINE CLINIC | Age: 50
End: 2025-01-16

## 2025-01-16 DIAGNOSIS — Z96.0 PRESENCE OF UROGENITAL IMPLANTS: ICD-10-CM

## 2025-01-16 DIAGNOSIS — Z91.81 HISTORY OF FALLING: ICD-10-CM

## 2025-01-16 DIAGNOSIS — Z87.81 PERSONAL HISTORY OF (HEALED) TRAUMATIC FRACTURE: ICD-10-CM

## 2025-01-16 DIAGNOSIS — N20.1 CALCULUS OF URETER: ICD-10-CM

## 2025-01-16 DIAGNOSIS — G35 MULTIPLE SCLEROSIS (HCC): Primary | ICD-10-CM

## 2025-01-16 DIAGNOSIS — Z74.1 NEED FOR ASSISTANCE WITH PERSONAL CARE: ICD-10-CM

## 2025-01-16 NOTE — TELEPHONE ENCOUNTER
Home health plan of care reviewed and certification completed on patient for service dates 1/13/25 to 3/13/25. Verified current medications, allergies, diagnoses. Physician time spent on activities to coordinate service, documenting, medical decision making, review of reports/treatment plans/ test results is 15 min.

## 2025-01-17 NOTE — TELEPHONE ENCOUNTER
Home health plan of care reviewed and signed. I agree with the plan of care.   Electronically signed by Elijah Handy DO on 1/17/2025 at 8:47 AM

## 2025-01-22 ASSESSMENT — ENCOUNTER SYMPTOMS
CONSTIPATION: 0
EYE PAIN: 0
ABDOMINAL PAIN: 0
NAUSEA: 0
BLOOD IN STOOL: 0
DIARRHEA: 0
SHORTNESS OF BREATH: 0
TROUBLE SWALLOWING: 0
COUGH: 0
VOMITING: 0

## 2025-01-23 ENCOUNTER — TELEPHONE (OUTPATIENT)
Dept: FAMILY MEDICINE CLINIC | Age: 50
End: 2025-01-23

## 2025-01-23 DIAGNOSIS — M79.604 PAIN IN BOTH LOWER EXTREMITIES: ICD-10-CM

## 2025-01-23 DIAGNOSIS — G35 MULTIPLE SCLEROSIS (HCC): ICD-10-CM

## 2025-01-23 DIAGNOSIS — M79.605 PAIN IN BOTH LOWER EXTREMITIES: ICD-10-CM

## 2025-01-23 RX ORDER — PREGABALIN 150 MG/1
150 CAPSULE ORAL NIGHTLY
Qty: 30 CAPSULE | Refills: 2 | Status: SHIPPED | OUTPATIENT
Start: 2025-01-23 | End: 2025-04-23

## 2025-01-23 RX ORDER — PREGABALIN 100 MG/1
100 CAPSULE ORAL DAILY
Qty: 30 CAPSULE | Refills: 2 | Status: SHIPPED | OUTPATIENT
Start: 2025-01-23 | End: 2025-04-23

## 2025-01-23 RX ORDER — MIRTAZAPINE 15 MG/1
15 TABLET, FILM COATED ORAL NIGHTLY
Qty: 90 TABLET | Refills: 0 | Status: SHIPPED | OUTPATIENT
Start: 2025-01-23 | End: 2025-04-23

## 2025-01-23 NOTE — TELEPHONE ENCOUNTER
Pt calling stating at last visit he was started on Lyrica 50mg and pt states that dose did not work, pt feels 150mg at bedtime and 100mg during the day is the best dose for him, will you change his dose, if so pt needs new script to [facility].    Pt also questions if the dose on his Remeron 7.5 can be increased and sent to pharmacy also, please advise.

## 2025-01-23 NOTE — TELEPHONE ENCOUNTER
John called requesting a refill of the below medication which has been pended for you:     Requested Prescriptions     Pending Prescriptions Disp Refills    pregabalin (LYRICA) 150 MG capsule 30 capsule 2     Sig: Take 1 capsule by mouth at bedtime for 90 days. Max Daily Amount: 150 mg    pregabalin (LYRICA) 100 MG capsule 30 capsule 2     Sig: Take 1 capsule by mouth daily for 90 days. Max Daily Amount: 100 mg    mirtazapine (REMERON) 15 MG tablet 90 tablet 0     Sig: Take 1 tablet by mouth nightly       Last Appointment Date: 1/9/2025  Next Appointment Date: 7/9/2025    Allergies   Allergen Reactions    Excedrin Migraine  [Asa-Apap-Caff Buffered]      Felt very shaky    Other Nausea And Vomiting     All narcotics      Prednisone Nausea And Vomiting     IV only. Can tolerate oral

## 2025-01-25 ENCOUNTER — APPOINTMENT (OUTPATIENT)
Dept: GENERAL RADIOLOGY | Age: 50
End: 2025-01-25
Payer: MEDICARE

## 2025-01-25 ENCOUNTER — APPOINTMENT (OUTPATIENT)
Dept: CT IMAGING | Age: 50
End: 2025-01-25
Payer: MEDICARE

## 2025-01-25 ENCOUNTER — HOSPITAL ENCOUNTER (EMERGENCY)
Age: 50
Discharge: HOME | End: 2025-01-25
Attending: EMERGENCY MEDICINE
Payer: MEDICARE

## 2025-01-25 VITALS
BODY MASS INDEX: 18.29 KG/M2 | RESPIRATION RATE: 17 BRPM | HEIGHT: 73 IN | TEMPERATURE: 99.6 F | SYSTOLIC BLOOD PRESSURE: 135 MMHG | WEIGHT: 138 LBS | HEART RATE: 115 BPM | OXYGEN SATURATION: 98 % | DIASTOLIC BLOOD PRESSURE: 71 MMHG

## 2025-01-25 DIAGNOSIS — R53.1 GENERALIZED WEAKNESS: Primary | ICD-10-CM

## 2025-01-25 DIAGNOSIS — R00.0 TACHYCARDIA: ICD-10-CM

## 2025-01-25 DIAGNOSIS — G35 MULTIPLE SCLEROSIS (HCC): ICD-10-CM

## 2025-01-25 LAB
ALBUMIN SERPL-MCNC: 4.5 G/DL (ref 3.5–5.2)
ALBUMIN/GLOB SERPL: 1.7 {RATIO} (ref 1–2.5)
ALP SERPL-CCNC: 114 U/L (ref 40–129)
ALT SERPL-CCNC: 17 U/L (ref 5–41)
AMORPH SED URNS QL MICRO: ABNORMAL
ANION GAP SERPL CALCULATED.3IONS-SCNC: 13 MMOL/L (ref 9–17)
AST SERPL-CCNC: 19 U/L
BACTERIA URNS QL MICRO: ABNORMAL
BASOPHILS # BLD: 0 K/UL (ref 0–0.2)
BASOPHILS NFR BLD: 0 % (ref 0–2)
BILIRUB SERPL-MCNC: 0.5 MG/DL (ref 0.3–1.2)
BILIRUB UR QL STRIP: NEGATIVE
BUN SERPL-MCNC: 14 MG/DL (ref 6–20)
BUN/CREAT SERPL: 18 (ref 9–20)
CALCIUM SERPL-MCNC: 9.1 MG/DL (ref 8.6–10.4)
CHARACTER UR: ABNORMAL
CHLORIDE SERPL-SCNC: 102 MMOL/L (ref 98–107)
CLARITY UR: CLEAR
CO2 SERPL-SCNC: 24 MMOL/L (ref 20–31)
COLOR UR: YELLOW
CREAT SERPL-MCNC: 0.8 MG/DL (ref 0.7–1.2)
D DIMER PPP FEU-MCNC: 0.29 UG/ML FEU (ref 0–0.59)
EOSINOPHIL # BLD: 0.09 K/UL (ref 0–0.4)
EOSINOPHILS RELATIVE PERCENT: 1 % (ref 1–8)
EPI CELLS #/AREA URNS HPF: ABNORMAL /HPF (ref 0–5)
ERYTHROCYTE [DISTWIDTH] IN BLOOD BY AUTOMATED COUNT: 15 % (ref 11.8–14.4)
FLUAV AG SPEC QL: NEGATIVE
FLUBV AG SPEC QL: NEGATIVE
GFR, ESTIMATED: >90 ML/MIN/1.73M2
GLUCOSE SERPL-MCNC: 109 MG/DL (ref 70–99)
GLUCOSE UR STRIP-MCNC: NEGATIVE MG/DL
HCT VFR BLD AUTO: 47.2 % (ref 40.7–50.3)
HGB BLD-MCNC: 15.4 G/DL (ref 13–17)
HGB UR QL STRIP.AUTO: NEGATIVE
IMM GRANULOCYTES # BLD AUTO: 0 K/UL (ref 0–0.3)
IMM GRANULOCYTES NFR BLD: 0 %
KETONES UR STRIP-MCNC: NEGATIVE MG/DL
LACTATE BLDV-SCNC: 1.4 MMOL/L (ref 0.5–2.2)
LEUKOCYTE ESTERASE UR QL STRIP: NEGATIVE
LYMPHOCYTES NFR BLD: 0.26 K/UL (ref 1–4.8)
LYMPHOCYTES RELATIVE PERCENT: 3 % (ref 15–43)
MAGNESIUM SERPL-MCNC: 1.9 MG/DL (ref 1.6–2.6)
MCH RBC QN AUTO: 27.6 PG (ref 25.2–33.5)
MCHC RBC AUTO-ENTMCNC: 32.6 G/DL (ref 25.2–33.5)
MCV RBC AUTO: 84.6 FL (ref 82.6–102.9)
MONOCYTES NFR BLD: 1.06 K/UL (ref 0.1–1.2)
MONOCYTES NFR BLD: 12 % (ref 6–14)
MORPHOLOGY: ABNORMAL
MORPHOLOGY: ABNORMAL
NEUTROPHILS NFR BLD: 84 % (ref 44–74)
NEUTS SEG NFR BLD: 7.39 K/UL (ref 1.5–8.1)
NITRITE UR QL STRIP: NEGATIVE
NRBC BLD-RTO: 0 PER 100 WBC
PH UR STRIP: 6.5 [PH] (ref 5–6)
PLATELET # BLD AUTO: 299 K/UL (ref 138–453)
PMV BLD AUTO: 9.1 FL (ref 8.1–13.5)
POTASSIUM SERPL-SCNC: 4.2 MMOL/L (ref 3.7–5.3)
PROCALCITONIN SERPL-MCNC: 0.16 NG/ML (ref 0–0.09)
PROLACTIN SERPL-MCNC: 9.21 NG/ML (ref 4.04–15.2)
PROT SERPL-MCNC: 7.1 G/DL (ref 6.4–8.3)
PROT UR STRIP-MCNC: ABNORMAL MG/DL
RBC # BLD AUTO: 5.58 M/UL (ref 4.21–5.77)
RBC #/AREA URNS HPF: ABNORMAL /HPF (ref 0–4)
SARS-COV-2 RDRP RESP QL NAA+PROBE: NOT DETECTED
SODIUM SERPL-SCNC: 139 MMOL/L (ref 135–144)
SP GR UR STRIP: 1.02 (ref 1.01–1.02)
SPECIMEN DESCRIPTION: NORMAL
T4 FREE SERPL-MCNC: 1 NG/DL (ref 0.9–1.7)
TROPONIN I SERPL HS-MCNC: 20 NG/L (ref 0–22)
TSH SERPL DL<=0.05 MIU/L-ACNC: 0.55 UIU/ML (ref 0.3–5)
UROBILINOGEN UR STRIP-ACNC: NORMAL EU/DL (ref 0–1)
WBC #/AREA URNS HPF: ABNORMAL /HPF (ref 0–4)
WBC OTHER # BLD: 8.8 K/UL (ref 3.5–11.3)

## 2025-01-25 PROCEDURE — 87040 BLOOD CULTURE FOR BACTERIA: CPT

## 2025-01-25 PROCEDURE — 85379 FIBRIN DEGRADATION QUANT: CPT

## 2025-01-25 PROCEDURE — 96360 HYDRATION IV INFUSION INIT: CPT

## 2025-01-25 PROCEDURE — 93005 ELECTROCARDIOGRAM TRACING: CPT | Performed by: EMERGENCY MEDICINE

## 2025-01-25 PROCEDURE — 6370000000 HC RX 637 (ALT 250 FOR IP): Performed by: EMERGENCY MEDICINE

## 2025-01-25 PROCEDURE — 84443 ASSAY THYROID STIM HORMONE: CPT

## 2025-01-25 PROCEDURE — 36415 COLL VENOUS BLD VENIPUNCTURE: CPT

## 2025-01-25 PROCEDURE — 96361 HYDRATE IV INFUSION ADD-ON: CPT

## 2025-01-25 PROCEDURE — 80053 COMPREHEN METABOLIC PANEL: CPT

## 2025-01-25 PROCEDURE — 84484 ASSAY OF TROPONIN QUANT: CPT

## 2025-01-25 PROCEDURE — 83735 ASSAY OF MAGNESIUM: CPT

## 2025-01-25 PROCEDURE — 71045 X-RAY EXAM CHEST 1 VIEW: CPT

## 2025-01-25 PROCEDURE — 84439 ASSAY OF FREE THYROXINE: CPT

## 2025-01-25 PROCEDURE — 84145 PROCALCITONIN (PCT): CPT

## 2025-01-25 PROCEDURE — 84146 ASSAY OF PROLACTIN: CPT

## 2025-01-25 PROCEDURE — 99285 EMERGENCY DEPT VISIT HI MDM: CPT

## 2025-01-25 PROCEDURE — 87804 INFLUENZA ASSAY W/OPTIC: CPT

## 2025-01-25 PROCEDURE — 87635 SARS-COV-2 COVID-19 AMP PRB: CPT

## 2025-01-25 PROCEDURE — 83605 ASSAY OF LACTIC ACID: CPT

## 2025-01-25 PROCEDURE — 81001 URINALYSIS AUTO W/SCOPE: CPT

## 2025-01-25 PROCEDURE — 74176 CT ABD & PELVIS W/O CONTRAST: CPT

## 2025-01-25 PROCEDURE — 85025 COMPLETE CBC W/AUTO DIFF WBC: CPT

## 2025-01-25 PROCEDURE — 2580000003 HC RX 258: Performed by: EMERGENCY MEDICINE

## 2025-01-25 RX ORDER — IBUPROFEN 600 MG/1
600 TABLET, FILM COATED ORAL ONCE
Status: COMPLETED | OUTPATIENT
Start: 2025-01-25 | End: 2025-01-25

## 2025-01-25 RX ORDER — 0.9 % SODIUM CHLORIDE 0.9 %
500 INTRAVENOUS SOLUTION INTRAVENOUS ONCE
Status: COMPLETED | OUTPATIENT
Start: 2025-01-25 | End: 2025-01-25

## 2025-01-25 RX ADMIN — IBUPROFEN 600 MG: 600 TABLET, FILM COATED ORAL at 09:58

## 2025-01-25 RX ADMIN — SODIUM CHLORIDE 500 ML: 9 INJECTION, SOLUTION INTRAVENOUS at 08:17

## 2025-01-25 RX ADMIN — SODIUM CHLORIDE 500 ML: 9 INJECTION, SOLUTION INTRAVENOUS at 09:32

## 2025-01-25 ASSESSMENT — PAIN - FUNCTIONAL ASSESSMENT: PAIN_FUNCTIONAL_ASSESSMENT: NONE - DENIES PAIN

## 2025-01-25 ASSESSMENT — LIFESTYLE VARIABLES
HOW OFTEN DO YOU HAVE A DRINK CONTAINING ALCOHOL: NEVER
HOW MANY STANDARD DRINKS CONTAINING ALCOHOL DO YOU HAVE ON A TYPICAL DAY: PATIENT DOES NOT DRINK

## 2025-01-25 NOTE — DISCHARGE INSTRUCTIONS
Please call your neurologist on Monday as you may be having an exacerbation of your multiple sclerosis.  Return to the ER for any worsening of condition or other concerns.

## 2025-01-25 NOTE — ED PROVIDER NOTES
[facility] Emergency Department  [address] [facility] [address]  Phone: [phone]        [facility] EMERGENCY DEPARTMENT  EMERGENCY DEPARTMENT ENCOUNTER      Pt Name: John Beaulieu  MRN: [id number]  Birthdate 1975  Date of evaluation: 1/25/2025  Provider: Ilana Carrillo MD    CHIEF COMPLAINT       Chief Complaint   Patient presents with    Extremity Weakness    Fatigue         HISTORY OF PRESENT ILLNESS   (Location/Symptom, Timing/Onset,Context/Setting, Quality, Duration, Modifying Factors, Severity)  Note limiting factors.   John Beaulieu is a 49 y.o. male who presents to the emergency department with complaints of generalized weakness, fatigue, difficulty with urination.  He does have a history of multiple sclerosis.  States symptoms have been ongoing for the last couple of months and this is why he stopped going to physical therapy.  Symptoms were acutely worse today and he felt so weak that he was unable to self cath.  He otherwise denies any abdominal pain.  He denies any nausea or vomiting.  He denies any chest pain or shortness of breath.  He denies any cough or cold symptoms.  He is due to see his neurologist in mid February.  Nursing Notes were reviewed.    REVIEW OF SYSTEMS    (2-9systems for level 4, 10 or more for level 5)     Review of Systems   Neurological:  Positive for weakness.       Except asnoted above the remainder of the review of systems was reviewed and negative.       PAST MEDICAL HISTORY     Past Medical History:   Diagnosis Date    Chronic kidney disease     GERD (gastroesophageal reflux disease)     MS (multiple sclerosis) (HCC)     Ureteral calculus, left 09/14/2023         SURGICAL HISTORY       Past Surgical History:   Procedure Laterality Date    COLONOSCOPY      CYSTOSCOPY N/A 06/12/2023    Cystoscopy Litholopaxy with Holmium Laser with Greenlight sheath and Urethral dilation performed by Aubrey Mobley MD at [facility] OR    CYSTOSCOPY Left 9/15/2023

## 2025-01-26 LAB
EKG ATRIAL RATE: 104 BPM
EKG P AXIS: 76 DEGREES
EKG P-R INTERVAL: 130 MS
EKG Q-T INTERVAL: 330 MS
EKG QRS DURATION: 68 MS
EKG QTC CALCULATION (BAZETT): 433 MS
EKG R AXIS: 32 DEGREES
EKG T AXIS: 70 DEGREES
EKG VENTRICULAR RATE: 104 BPM
MICROORGANISM SPEC CULT: NORMAL
MICROORGANISM SPEC CULT: NORMAL
SERVICE CMNT-IMP: NORMAL
SERVICE CMNT-IMP: NORMAL
SPECIMEN DESCRIPTION: NORMAL
SPECIMEN DESCRIPTION: NORMAL

## 2025-01-27 ENCOUNTER — TELEPHONE (OUTPATIENT)
Dept: FAMILY MEDICINE CLINIC | Age: 50
End: 2025-01-27

## 2025-01-30 NOTE — TELEPHONE ENCOUNTER
Patient is made aware. Patient states he is still having some weakness in his legs, but is going to the neurologist at the end of February. Patient does not want to schedule a future f/u at this time. States if he feels worse, he will call the office.

## 2025-03-17 ENCOUNTER — TRANSCRIBE ORDERS (OUTPATIENT)
Dept: GENERAL RADIOLOGY | Age: 50
End: 2025-03-17

## 2025-03-17 DIAGNOSIS — Z09 ENCOUNTER FOR FOLLOW-UP EXAMINATION AFTER COMPLETED TREATMENT FOR CONDITIONS OTHER THAN MALIGNANT NEOPLASM: ICD-10-CM

## 2025-03-17 DIAGNOSIS — S22.000G COMPRESSION FRACTURE OF THORACIC VERTEBRA WITH DELAYED HEALING, UNSPECIFIED THORACIC VERTEBRAL LEVEL, SUBSEQUENT ENCOUNTER: ICD-10-CM

## 2025-03-17 DIAGNOSIS — G35 MULTIPLE SCLEROSIS (HCC): Primary | ICD-10-CM

## 2025-03-31 RX ORDER — VALACYCLOVIR HYDROCHLORIDE 500 MG/1
500 TABLET, FILM COATED ORAL 2 TIMES DAILY PRN
Qty: 30 TABLET | Refills: 0 | Status: SHIPPED | OUTPATIENT
Start: 2025-03-31

## 2025-04-01 ENCOUNTER — HOSPITAL ENCOUNTER (OUTPATIENT)
Dept: BONE DENSITY | Age: 50
Discharge: HOME | End: 2025-04-03
Payer: MEDICARE

## 2025-04-01 DIAGNOSIS — G35 MULTIPLE SCLEROSIS (HCC): ICD-10-CM

## 2025-04-01 DIAGNOSIS — Z09 ENCOUNTER FOR FOLLOW-UP EXAMINATION AFTER COMPLETED TREATMENT FOR CONDITIONS OTHER THAN MALIGNANT NEOPLASM: ICD-10-CM

## 2025-04-01 DIAGNOSIS — S22.000G COMPRESSION FRACTURE OF THORACIC VERTEBRA WITH DELAYED HEALING, UNSPECIFIED THORACIC VERTEBRAL LEVEL, SUBSEQUENT ENCOUNTER: ICD-10-CM

## 2025-04-01 PROCEDURE — 77080 DXA BONE DENSITY AXIAL: CPT

## 2025-04-21 DIAGNOSIS — G35 MULTIPLE SCLEROSIS (HCC): ICD-10-CM

## 2025-04-21 RX ORDER — MIRTAZAPINE 15 MG/1
15 TABLET, FILM COATED ORAL NIGHTLY
Qty: 90 TABLET | Refills: 0 | Status: SHIPPED | OUTPATIENT
Start: 2025-04-21

## 2025-04-21 NOTE — TELEPHONE ENCOUNTER
John called requesting a refill of the below medication which has been pended for you:     Requested Prescriptions     Pending Prescriptions Disp Refills    mirtazapine (REMERON) 15 MG tablet [Pharmacy Med Name: MIRTAZAPINE 15MG TABLETS] 90 tablet 0     Sig: TAKE 1 TABLET BY MOUTH EVERY NIGHT       Last Appointment Date: 1/9/2025  Next Appointment Date: 7/9/2025    Allergies   Allergen Reactions    Excedrin Migraine  [Asa-Apap-Caff Buffered]      Felt very shaky    Other Nausea And Vomiting     All narcotics      Prednisone Nausea And Vomiting     IV only. Can tolerate oral

## 2025-04-30 RX ORDER — BACLOFEN 10 MG/1
10 TABLET ORAL 4 TIMES DAILY PRN
Qty: 120 TABLET | Refills: 0 | Status: SHIPPED | OUTPATIENT
Start: 2025-04-30

## 2025-04-30 NOTE — TELEPHONE ENCOUNTER
Patient currently having muscles spasms and takes the medication 4 times a day as needed.    Patient states he's going to do research on which provider he will follow up with.

## 2025-04-30 NOTE — TELEPHONE ENCOUNTER
John called requesting a refill of the below medication which has been pended for you:     Requested Prescriptions     Pending Prescriptions Disp Refills    baclofen (LIORESAL) 10 MG tablet 120 tablet 0     Sig: Take 1 tablet by mouth 4 times daily as needed (as needed)       Last Appointment Date: 1/9/2025  Next Appointment Date: 7/9/2025    Allergies   Allergen Reactions    Excedrin Migraine  [Asa-Apap-Caff Buffered]      Felt very shaky    Other Nausea And Vomiting     All narcotics      Prednisone Nausea And Vomiting     IV only. Can tolerate oral

## 2025-05-02 DIAGNOSIS — G35 MULTIPLE SCLEROSIS (HCC): ICD-10-CM

## 2025-05-02 RX ORDER — MIRTAZAPINE 15 MG/1
15 TABLET, FILM COATED ORAL NIGHTLY
Qty: 90 TABLET | Refills: 0 | OUTPATIENT
Start: 2025-05-02

## 2025-05-21 ENCOUNTER — TELEPHONE (OUTPATIENT)
Dept: FAMILY MEDICINE CLINIC | Age: 50
End: 2025-05-21
Payer: MEDICARE

## 2025-05-21 DIAGNOSIS — Z87.81 PERSONAL HISTORY OF (HEALED) TRAUMATIC FRACTURE: ICD-10-CM

## 2025-05-21 DIAGNOSIS — N20.1 CALCULUS OF URETER: ICD-10-CM

## 2025-05-21 DIAGNOSIS — Z96.0 PRESENCE OF UROGENITAL IMPLANTS: ICD-10-CM

## 2025-05-21 DIAGNOSIS — Z74.1 NEED FOR ASSISTANCE WITH PERSONAL CARE: ICD-10-CM

## 2025-05-21 DIAGNOSIS — Z91.81 HISTORY OF FALLING: ICD-10-CM

## 2025-05-21 DIAGNOSIS — G35 MULTIPLE SCLEROSIS (HCC): Primary | ICD-10-CM

## 2025-05-21 PROCEDURE — G0179 MD RECERTIFICATION HHA PT: HCPCS | Performed by: STUDENT IN AN ORGANIZED HEALTH CARE EDUCATION/TRAINING PROGRAM

## 2025-05-21 NOTE — TELEPHONE ENCOUNTER
Home health plan of care reviewed and certification completed on patient for service dates 5/13/25 to 7/11/25. Verified current medications, allergies, diagnoses. Physician time spent on activities to coordinate service, documenting, medical decision making, review of reports/treatment plans/ test results is 15 min.

## 2025-06-03 NOTE — TELEPHONE ENCOUNTER
Patient wants to follow with Dr. Calvert. Waiting to see if Dr Calvert will take him as a patient. Can you fill for patient?       John called requesting a refill of the below medication which has been pended for you:     Requested Prescriptions     Pending Prescriptions Disp Refills    baclofen (LIORESAL) 10 MG tablet [Pharmacy Med Name: BACLOFEN 10MG TABLETS] 360 tablet 0     Sig: TAKE 1 TABLET(10 MG) BY MOUTH FOUR TIMES DAILY AS NEEDED FOR MUSCLE SPASMS       Last Appointment Date: 1/9/2025  Next Appointment Date: Visit date not found    Allergies   Allergen Reactions    Excedrin Migraine  [Asa-Apap-Caff Buffered]      Felt very shaky    Other/Food Nausea And Vomiting     All narcotics      Prednisone Nausea And Vomiting     IV only. Can tolerate oral

## 2025-06-04 RX ORDER — BACLOFEN 10 MG/1
TABLET ORAL
Qty: 360 TABLET | Refills: 0 | Status: SHIPPED | OUTPATIENT
Start: 2025-06-04

## 2025-07-03 ENCOUNTER — TELEPHONE (OUTPATIENT)
Dept: FAMILY MEDICINE CLINIC | Age: 50
End: 2025-07-03

## 2025-07-03 NOTE — TELEPHONE ENCOUNTER
Called and spoke to patient and he plans to establish care with [facility] due to insurance change, Dr Og.

## (undated) DEVICE — GLOVE ORANGE PI 7 1/2   MSG9075

## (undated) DEVICE — 3M™ STERI-STRIP™ BLEND TONE SKIN CLOSURES, B1557, TAN, 1/2 IN X 4 IN (12MM X 100MM), 6 STRIPS/ENVELOPE: Brand: 3M™ STERI-STRIP™

## (undated) DEVICE — CATHETER URETH 16FR BLLN 5CC SIL ALLY W/ SIL HYDRGEL 2 W F

## (undated) DEVICE — NITINOL STONE RETRIEVAL BASKET: Brand: ZERO TIP

## (undated) DEVICE — 3M™ STERI-STRIP™ COMPOUND BENZOIN TINCTURE 40 BAGS/CARTON 4 CARTONS/CASE C1544: Brand: 3M™ STERI-STRIP™

## (undated) DEVICE — CATHETER,FOLEY,100% SIL,COUDE,18FR 10ML: Brand: MEDLINE

## (undated) DEVICE — DRAPE,REIN 53X77,STERILE: Brand: MEDLINE

## (undated) DEVICE — GARMENT,MEDLINE,DVT,INT,CALF,MED, GEN2: Brand: MEDLINE

## (undated) DEVICE — 100% SILICONE FOLEY CATHETER, 30 CC, 3-WAY, 22 FR (7.3 MM): Brand: DOVER

## (undated) DEVICE — Z DUP USE 2139215 SHEATH URO 11/13FR L28CM URETRAL NAVIGATOR ACC

## (undated) DEVICE — SOLUTION IV 1000ML 0.9% SOD CHL PH 5 INJ USP VIAFLX PLAS

## (undated) DEVICE — SYSTEM PMP SGL ACT W/ 10CC VAC SYR 1 W VLV FOR ENDOSCP SURG

## (undated) DEVICE — GOWN,AURORA,NONRNF,XL,30/CS: Brand: MEDLINE

## (undated) DEVICE — BAG DRN UROLOGY ANTI REFLX 200ML

## (undated) DEVICE — SOLUTION IRRIG 3000ML 0.9% SOD CHL USP UROMATIC PLAS CONT

## (undated) DEVICE — SOLUTION SCRB 4OZ 4% CHG CLN BASE FOR PT SKIN ANTISEPSIS

## (undated) DEVICE — MASTISOL ADHESIVE LIQ 2/3ML

## (undated) DEVICE — JELLY LUBRICATING 4OZ FLIP TOP TB E Z

## (undated) DEVICE — GUIDEWIRE URO L150CM DIA .035IN STIFF NIT HYDRPHL STR TIP

## (undated) DEVICE — PACK,CYSTOSCOPY,PK I,AURORA: Brand: MEDLINE

## (undated) DEVICE — PROTECTOR ULN NRV PUR FOAM HK LOOP STRP ANATOMICALLY

## (undated) DEVICE — DRAINBAG,ANTI-REFLUX TOWER,L/F,2000ML,LL: Brand: MEDLINE

## (undated) DEVICE — CYSTO/BLADDER IRRIGATION SET, REGULATING CLAMP

## (undated) DEVICE — Z DISCONTINUED USE 2743520 GUIDEWIRE URO L150CM DIA0.035IN STIFF NIT HYDRPHLC STR TIP

## (undated) DEVICE — GOWN,AURORA,NONREINFORCED,LARGE: Brand: MEDLINE

## (undated) DEVICE — Z DUP USE 2175984 BAND URIN CATH W2IN LEG FIT UPTO 30IN LOK SYS STRTCH MAT 316] DALE MEDICAL PRODUCTS INC]

## (undated) DEVICE — Z INACTIVE USE 2660663 SOLUTION IRRIG 1000ML STRIL H2O USP PLAS POUR BTL

## (undated) DEVICE — GLOVE ORANGE PI 7   MSG9070

## (undated) DEVICE — SOLUTION IV IRRIG WATER 1000ML POUR BRL 2F7114

## (undated) DEVICE — PACK PROCEDURE SURG CYSTO SVMMC LF

## (undated) DEVICE — 4-PORT MANIFOLD: Brand: NEPTUNE 2

## (undated) DEVICE — RIGID PNEUMATIC PROBE: Brand: RIGID PNEUMATIC PROBE

## (undated) DEVICE — POUCH DRNGE FLX BND INTEGR RAIL CLMP DISP EZ CTCH